# Patient Record
Sex: FEMALE | Race: BLACK OR AFRICAN AMERICAN | NOT HISPANIC OR LATINO | Employment: FULL TIME | URBAN - METROPOLITAN AREA
[De-identification: names, ages, dates, MRNs, and addresses within clinical notes are randomized per-mention and may not be internally consistent; named-entity substitution may affect disease eponyms.]

---

## 2017-01-12 ENCOUNTER — PATIENT OUTREACH (OUTPATIENT)
Dept: ADMINISTRATIVE | Facility: HOSPITAL | Age: 42
End: 2017-01-12
Payer: MEDICAID

## 2017-06-27 ENCOUNTER — PATIENT OUTREACH (OUTPATIENT)
Dept: ADMINISTRATIVE | Facility: HOSPITAL | Age: 42
End: 2017-06-27

## 2017-08-25 ENCOUNTER — OFFICE VISIT (OUTPATIENT)
Dept: OBSTETRICS AND GYNECOLOGY | Facility: CLINIC | Age: 42
End: 2017-08-25
Payer: COMMERCIAL

## 2017-08-25 VITALS
BODY MASS INDEX: 44.01 KG/M2 | HEIGHT: 66 IN | SYSTOLIC BLOOD PRESSURE: 108 MMHG | DIASTOLIC BLOOD PRESSURE: 76 MMHG | WEIGHT: 273.81 LBS

## 2017-08-25 DIAGNOSIS — Z12.4 ROUTINE PAPANICOLAOU SMEAR: Primary | ICD-10-CM

## 2017-08-25 DIAGNOSIS — Z12.31 VISIT FOR SCREENING MAMMOGRAM: ICD-10-CM

## 2017-08-25 PROCEDURE — 88175 CYTOPATH C/V AUTO FLUID REDO: CPT

## 2017-08-25 PROCEDURE — 99999 PR PBB SHADOW E&M-EST. PATIENT-LVL III: CPT | Mod: PBBFAC,,, | Performed by: OBSTETRICS & GYNECOLOGY

## 2017-08-25 PROCEDURE — 99386 PREV VISIT NEW AGE 40-64: CPT | Mod: S$GLB,,, | Performed by: OBSTETRICS & GYNECOLOGY

## 2017-08-25 RX ORDER — MEDROXYPROGESTERONE ACETATE 10 MG/1
10 TABLET ORAL DAILY
Qty: 12 TABLET | Refills: 1 | Status: SHIPPED | OUTPATIENT
Start: 2017-08-25 | End: 2018-11-08

## 2017-08-25 NOTE — PROGRESS NOTES
"HPI:   42 y.o.   OB History      Para Term  AB Living    2 2 2          SAB TAB Ectopic Multiple Live Births                    Patient's last menstrual period was 2017 (within weeks).    Patient is  here for her annual gynecologic exam.  She has no complaints.     ROS:  GENERAL: No fever, chills, fatigability or weight loss.  SKIN: No rashes, itching or changes in color or texture of skin.  HEAD: No headaches or recent head trauma.  EYES: Visual acuity fine. No photophobia, ocular pain or diplopia.  EARS: Denies ear pain, discharge or vertigo.  NOSE: No loss of smell, no epistaxis or postnasal drip.  MOUTH & THROAT: No hoarseness or change in voice. No excessive gum bleeding.  NODES: Denies swollen glands.  CHEST: Denies ANDERS, cyanosis, wheezing, cough and sputum production.  CARDIOVASCULAR: Denies chest pain, PND, orthopnea or reduced exercise tolerance.  ABDOMEN: Appetite fine. No weight loss. Denies diarrhea, abdominal pain, hematemesis or blood in stool.  URINARY: No flank pain, dysuria or hematuria.  PERIPHERAL VASCULAR: No claudication or cyanosis.  MUSCULOSKELETAL: No joint stiffness or swelling. Denies back pain.  NEUROLOGIC: No history of seizures, paralysis, alteration of gait or coordination.    PE:   /76   Ht 5' 6" (1.676 m)   Wt 124.2 kg (273 lb 13 oz)   LMP 2017 (Within Weeks)   BMI 44.19 kg/m²   APPEARANCE: Well nourished, well developed, in no acute distress.  NECK: Neck symmetric without masses or thyromegaly.  BREASTS: Symmetrical, no skin changes or visible lesions. No palpable masses, nipple discharge or adenopathy bilaterally.  ABDOMEN: Flat. Soft. No tenderness or masses. No hepatosplenomegaly. No hernias. No CVA tenderness.  VULVA: No lesions. Normal female genitalia.  URETHRAL MEATUS: Normal size and location, no lesions, no prolapse.  URETHRA: No masses, tenderness, prolapse or scarring.  VAGINA: Moist and well rugated, no discharge, no significant " cystocele or rectocele.  CERVIX: No lesions and discharge. PAP done.  UTERUS: Normal size, regular shape, mobile, non-tender, bladder base nontender.  ADNEXA: No masses, tenderness or CDS nodularity.  ANUS PERINEUM: Normal.    PROCEDURES:  Pap smear    Assessment:  Normal Gynecologic Exam    Plan:  Mammogram and Colonoscopy if indicated by current recommendations.  Return to clinic in one year or for any problems or complaints.  Long hx irreg ccles/skips  Co bilat low discomfort, will set up us  Cycle with provera, call with cycle to set up when cycle ending

## 2017-09-07 ENCOUNTER — HOSPITAL ENCOUNTER (OUTPATIENT)
Dept: RADIOLOGY | Facility: HOSPITAL | Age: 42
Discharge: HOME | End: 2017-09-07
Attending: OBSTETRICS & GYNECOLOGY
Payer: COMMERCIAL

## 2017-09-07 VITALS — BODY MASS INDEX: 43.87 KG/M2 | WEIGHT: 273 LBS | HEIGHT: 66 IN

## 2017-09-07 DIAGNOSIS — Z12.31 VISIT FOR SCREENING MAMMOGRAM: ICD-10-CM

## 2017-09-07 PROCEDURE — 77067 SCR MAMMO BI INCL CAD: CPT | Mod: TC

## 2017-09-29 ENCOUNTER — TELEPHONE (OUTPATIENT)
Dept: OBSTETRICS AND GYNECOLOGY | Facility: CLINIC | Age: 42
End: 2017-09-29

## 2017-09-29 NOTE — TELEPHONE ENCOUNTER
----- Message from Peterson Hull sent at 9/29/2017 10:36 AM CDT -----  Contact: Patient  Patient stated that she was suppose to schedule an ultrasound appointment with Dr. Wiedemann however she was trying to wait until her cycle was over, and now she's asking should she schedule it for Monday or Tuesday? Please contact Patient at [phone]. Thank You.

## 2017-10-03 ENCOUNTER — TELEPHONE (OUTPATIENT)
Dept: OBSTETRICS AND GYNECOLOGY | Facility: CLINIC | Age: 42
End: 2017-10-03

## 2017-10-03 DIAGNOSIS — R10.2 PELVIC PAIN: Primary | ICD-10-CM

## 2017-10-03 NOTE — TELEPHONE ENCOUNTER
----- Message from Jeremy Dubon sent at 10/2/2017  4:05 PM CDT -----  Contact: [phone]/self  Patient requesting to speak with you about being seen tomorrow. Please advise.

## 2017-10-03 NOTE — TELEPHONE ENCOUNTER
----- Message from Silvia Marlow sent at 10/3/2017  2:02 PM CDT -----  Contact: self, [phone]  Patient called in returning your call. Please advise.

## 2017-10-04 ENCOUNTER — OFFICE VISIT (OUTPATIENT)
Dept: OBSTETRICS AND GYNECOLOGY | Facility: CLINIC | Age: 42
End: 2017-10-04
Payer: COMMERCIAL

## 2017-10-04 DIAGNOSIS — R10.2 PELVIC PAIN: ICD-10-CM

## 2017-10-04 PROCEDURE — 76830 TRANSVAGINAL US NON-OB: CPT | Mod: S$GLB,,, | Performed by: OBSTETRICS & GYNECOLOGY

## 2017-10-05 ENCOUNTER — TELEPHONE (OUTPATIENT)
Dept: OBSTETRICS AND GYNECOLOGY | Facility: CLINIC | Age: 42
End: 2017-10-05

## 2017-10-05 NOTE — TELEPHONE ENCOUNTER
----- Message from Jeremy Dubon sent at 10/5/2017 11:36 AM CDT -----  Contact: [phone]/self  Patient is returning your call. Please advise.

## 2017-10-20 ENCOUNTER — HOSPITAL ENCOUNTER (OUTPATIENT)
Dept: RADIOLOGY | Facility: HOSPITAL | Age: 42
Discharge: HOME | End: 2017-10-20
Attending: NURSE PRACTITIONER
Payer: COMMERCIAL

## 2017-10-20 DIAGNOSIS — R10.32 LEFT LOWER QUADRANT PAIN: ICD-10-CM

## 2017-10-20 DIAGNOSIS — R10.32 LEFT LOWER QUADRANT PAIN: Primary | ICD-10-CM

## 2017-10-20 PROCEDURE — 74020 XR ABDOMEN FLAT AND ERECT: CPT | Mod: TC,PO

## 2018-08-09 ENCOUNTER — HOSPITAL ENCOUNTER (EMERGENCY)
Facility: HOSPITAL | Age: 43
Discharge: HOME | End: 2018-08-09
Attending: SURGERY
Payer: COMMERCIAL

## 2018-08-09 VITALS
BODY MASS INDEX: 43.58 KG/M2 | HEART RATE: 91 BPM | TEMPERATURE: 98 F | HEIGHT: 66 IN | OXYGEN SATURATION: 98 % | SYSTOLIC BLOOD PRESSURE: 123 MMHG | WEIGHT: 271.19 LBS | RESPIRATION RATE: 20 BRPM | DIASTOLIC BLOOD PRESSURE: 66 MMHG

## 2018-08-09 DIAGNOSIS — R07.9 CHEST PAIN: ICD-10-CM

## 2018-08-09 DIAGNOSIS — R07.89 ATYPICAL CHEST PAIN: Primary | ICD-10-CM

## 2018-08-09 LAB
ALBUMIN SERPL BCP-MCNC: 4 G/DL
ALP SERPL-CCNC: 102 U/L
ALT SERPL W/O P-5'-P-CCNC: 76 U/L
ANION GAP SERPL CALC-SCNC: 10 MMOL/L
AST SERPL-CCNC: 43 U/L
BASOPHILS # BLD AUTO: 0.04 K/UL
BASOPHILS NFR BLD: 0.4 %
BILIRUB SERPL-MCNC: 0.6 MG/DL
BUN SERPL-MCNC: 10 MG/DL
CALCIUM SERPL-MCNC: 8.7 MG/DL
CHLORIDE SERPL-SCNC: 102 MMOL/L
CO2 SERPL-SCNC: 24 MMOL/L
CREAT SERPL-MCNC: 0.59 MG/DL
D DIMER PPP FEU-MCNC: <200 NG/ML
DIFFERENTIAL METHOD: ABNORMAL
EOSINOPHIL # BLD AUTO: 0.1 K/UL
EOSINOPHIL NFR BLD: 1 %
ERYTHROCYTE [DISTWIDTH] IN BLOOD BY AUTOMATED COUNT: 13.1 %
EST. GFR  (AFRICAN AMERICAN): >60 ML/MIN/1.73 M^2
EST. GFR  (NON AFRICAN AMERICAN): >60 ML/MIN/1.73 M^2
GLUCOSE SERPL-MCNC: 303 MG/DL
HCT VFR BLD AUTO: 42.1 %
HGB BLD-MCNC: 14.1 G/DL
INR PPP: 1.3
LYMPHOCYTES # BLD AUTO: 3.5 K/UL
LYMPHOCYTES NFR BLD: 39.3 %
MCH RBC QN AUTO: 27.1 PG
MCHC RBC AUTO-ENTMCNC: 33.5 G/DL
MCV RBC AUTO: 81 FL
MONOCYTES # BLD AUTO: 0.7 K/UL
MONOCYTES NFR BLD: 7.6 %
NEUTROPHILS # BLD AUTO: 4.6 K/UL
NEUTROPHILS NFR BLD: 51.4 %
PLATELET # BLD AUTO: 306 K/UL
PMV BLD AUTO: 10.2 FL
POTASSIUM SERPL-SCNC: 4 MMOL/L
PROT SERPL-MCNC: 7.7 G/DL
PROTHROMBIN TIME: 14.1 SEC
RBC # BLD AUTO: 5.2 M/UL
SODIUM SERPL-SCNC: 136 MMOL/L
TROPONIN I SERPL DL<=0.01 NG/ML-MCNC: <0.012 NG/ML
WBC # BLD AUTO: 8.91 K/UL

## 2018-08-09 PROCEDURE — 25000003 PHARM REV CODE 250: Performed by: SURGERY

## 2018-08-09 PROCEDURE — 93010 ELECTROCARDIOGRAM REPORT: CPT | Mod: ,,, | Performed by: INTERNAL MEDICINE

## 2018-08-09 PROCEDURE — 85379 FIBRIN DEGRADATION QUANT: CPT

## 2018-08-09 PROCEDURE — 85610 PROTHROMBIN TIME: CPT

## 2018-08-09 PROCEDURE — 85025 COMPLETE CBC W/AUTO DIFF WBC: CPT

## 2018-08-09 PROCEDURE — 96360 HYDRATION IV INFUSION INIT: CPT

## 2018-08-09 PROCEDURE — 99284 EMERGENCY DEPT VISIT MOD MDM: CPT | Mod: 25

## 2018-08-09 PROCEDURE — 80053 COMPREHEN METABOLIC PANEL: CPT

## 2018-08-09 PROCEDURE — 93005 ELECTROCARDIOGRAM TRACING: CPT

## 2018-08-09 PROCEDURE — 84484 ASSAY OF TROPONIN QUANT: CPT

## 2018-08-09 RX ORDER — GLIPIZIDE 10 MG/1
10 TABLET ORAL
COMMUNITY
End: 2018-11-08

## 2018-08-09 RX ORDER — LORAZEPAM 1 MG/1
1 TABLET ORAL EVERY 6 HOURS PRN
Qty: 10 TABLET | Refills: 0 | Status: SHIPPED | OUTPATIENT
Start: 2018-08-09 | End: 2019-08-23

## 2018-08-09 RX ORDER — TRAMADOL HYDROCHLORIDE 50 MG/1
100 TABLET ORAL
Status: COMPLETED | OUTPATIENT
Start: 2018-08-09 | End: 2018-08-09

## 2018-08-09 RX ORDER — ASPIRIN 325 MG
325 TABLET, DELAYED RELEASE (ENTERIC COATED) ORAL
Status: COMPLETED | OUTPATIENT
Start: 2018-08-09 | End: 2018-08-09

## 2018-08-09 RX ADMIN — TRAMADOL HYDROCHLORIDE 100 MG: 50 TABLET, COATED ORAL at 03:08

## 2018-08-09 RX ADMIN — ASPIRIN 325 MG: 325 TABLET, DELAYED RELEASE ORAL at 02:08

## 2018-08-09 RX ADMIN — SODIUM CHLORIDE 1000 ML: 0.9 INJECTION, SOLUTION INTRAVENOUS at 02:08

## 2018-11-08 ENCOUNTER — OFFICE VISIT (OUTPATIENT)
Dept: OBSTETRICS AND GYNECOLOGY | Facility: CLINIC | Age: 43
End: 2018-11-08
Payer: COMMERCIAL

## 2018-11-08 VITALS — WEIGHT: 266 LBS | DIASTOLIC BLOOD PRESSURE: 86 MMHG | SYSTOLIC BLOOD PRESSURE: 114 MMHG | BODY MASS INDEX: 42.93 KG/M2

## 2018-11-08 DIAGNOSIS — Z01.419 WELL WOMAN EXAM WITH ROUTINE GYNECOLOGICAL EXAM: Primary | ICD-10-CM

## 2018-11-08 DIAGNOSIS — Z12.4 ROUTINE PAPANICOLAOU SMEAR: ICD-10-CM

## 2018-11-08 PROCEDURE — 3074F SYST BP LT 130 MM HG: CPT | Mod: CPTII,S$GLB,, | Performed by: OBSTETRICS & GYNECOLOGY

## 2018-11-08 PROCEDURE — 88175 CYTOPATH C/V AUTO FLUID REDO: CPT

## 2018-11-08 PROCEDURE — 99999 PR PBB SHADOW E&M-EST. PATIENT-LVL III: CPT | Mod: PBBFAC,,, | Performed by: OBSTETRICS & GYNECOLOGY

## 2018-11-08 PROCEDURE — 3079F DIAST BP 80-89 MM HG: CPT | Mod: CPTII,S$GLB,, | Performed by: OBSTETRICS & GYNECOLOGY

## 2018-11-08 PROCEDURE — 99396 PREV VISIT EST AGE 40-64: CPT | Mod: S$GLB,,, | Performed by: OBSTETRICS & GYNECOLOGY

## 2018-11-08 NOTE — PROGRESS NOTES
HPI:   43 y.o.   OB History      Para Term  AB Living    2 2 2          SAB TAB Ectopic Multiple Live Births                    Patient's last menstrual period was 2018.    Patient is  here for her annual gynecologic exam.  She has no complaints.     ROS:  GENERAL: No fever, chills, fatigability or weight loss.  SKIN: No rashes, itching or changes in color or texture of skin.  HEAD: No headaches or recent head trauma.  EYES: Visual acuity fine. No photophobia, ocular pain or diplopia.  EARS: Denies ear pain, discharge or vertigo.  NOSE: No loss of smell, no epistaxis or postnasal drip.  MOUTH & THROAT: No hoarseness or change in voice. No excessive gum bleeding.  NODES: Denies swollen glands.  CHEST: Denies ANDERS, cyanosis, wheezing, cough and sputum production.  CARDIOVASCULAR: Denies chest pain, PND, orthopnea or reduced exercise tolerance.  ABDOMEN: Appetite fine. No weight loss. Denies diarrhea, abdominal pain, hematemesis or blood in stool.  URINARY: No flank pain, dysuria or hematuria.  PERIPHERAL VASCULAR: No claudication or cyanosis.  MUSCULOSKELETAL: No joint stiffness or swelling. Denies back pain.  NEUROLOGIC: No history of seizures, paralysis, alteration of gait or coordination.    PE:   /86   Wt 120.7 kg (265 lb 15.8 oz)   LMP 2018   BMI 42.93 kg/m²   APPEARANCE: Well nourished, well developed, in no acute distress.  NECK: Neck symmetric without masses or thyromegaly.  BREASTS: Symmetrical, no skin changes or visible lesions. No palpable masses, nipple discharge or adenopathy bilaterally.  ABDOMEN: Flat. Soft. No tenderness or masses. No hepatosplenomegaly. No hernias. No CVA tenderness.  VULVA: No lesions. Normal female genitalia.  URETHRAL MEATUS: Normal size and location, no lesions, no prolapse.  URETHRA: No masses, tenderness, prolapse or scarring.  VAGINA: Moist and well rugated, no discharge, no significant cystocele or rectocele.  CERVIX: No lesions and  discharge. PAP done.  UTERUS: Normal size, regular shape, mobile, non-tender, bladder base nontender.  ADNEXA: No masses, tenderness or CDS nodularity.  ANUS PERINEUM: Normal.    PROCEDURES:  Pap smear    Assessment:  Normal Gynecologic Exam    Plan:  Mammogram and Colonoscopy if indicated by current recommendations.  Return to clinic in one year or for any problems or complaints.  Still with L side pain, us neg  rec fu with gi

## 2018-11-12 ENCOUNTER — TELEPHONE (OUTPATIENT)
Dept: GASTROENTEROLOGY | Facility: CLINIC | Age: 43
End: 2018-11-12

## 2018-11-12 NOTE — TELEPHONE ENCOUNTER
----- Message from Kerri Dill MD sent at 11/12/2018  2:31 PM CST -----      ----- Message -----  From: Sushil Dill MD  Sent: 11/11/2018   9:49 AM  To: Kerri Dill MD        ----- Message -----  From: Michael A. Wiedemann, MD  Sent: 11/11/2018   9:35 AM  To: Sushil Dill MD    Hi , can you have your nurse call and get her an appt with you or one of the gi docs , for abdominal discomfort L Lower area, thanks, may need scope, thanks

## 2018-11-21 ENCOUNTER — HOSPITAL ENCOUNTER (OUTPATIENT)
Dept: RADIOLOGY | Facility: HOSPITAL | Age: 43
Discharge: HOME | End: 2018-11-21
Attending: OBSTETRICS & GYNECOLOGY
Payer: COMMERCIAL

## 2018-11-21 VITALS — BODY MASS INDEX: 42.59 KG/M2 | WEIGHT: 265 LBS | HEIGHT: 66 IN

## 2018-11-21 DIAGNOSIS — Z01.419 WELL WOMAN EXAM WITH ROUTINE GYNECOLOGICAL EXAM: ICD-10-CM

## 2018-11-21 PROCEDURE — 77063 BREAST TOMOSYNTHESIS BI: CPT | Mod: TC

## 2018-11-21 PROCEDURE — 77067 SCR MAMMO BI INCL CAD: CPT | Mod: 26,,, | Performed by: RADIOLOGY

## 2018-11-21 PROCEDURE — 77063 BREAST TOMOSYNTHESIS BI: CPT | Mod: 26,,, | Performed by: RADIOLOGY

## 2018-11-21 PROCEDURE — 77067 SCR MAMMO BI INCL CAD: CPT | Mod: TC

## 2019-04-12 ENCOUNTER — TELEPHONE (OUTPATIENT)
Dept: OBSTETRICS AND GYNECOLOGY | Facility: CLINIC | Age: 44
End: 2019-04-12

## 2019-04-12 DIAGNOSIS — N64.4 BREAST TENDERNESS IN FEMALE: Primary | ICD-10-CM

## 2019-04-12 NOTE — TELEPHONE ENCOUNTER
Pt states her right breast has been painful and she though it was due to her menstrual cycle but she is still having the pain and it has been 2 weeks. She is asking for an order for a mammo or u/s or do you need to see her in the office?

## 2019-04-12 NOTE — TELEPHONE ENCOUNTER
----- Message from Thuy Vargas sent at 4/12/2019  9:17 AM CDT -----  Contact: self/[phone]  Patient called to state she has been having issues with her breast and needs a referral to [facility] in [place].    Please call to discuss today.

## 2019-04-17 ENCOUNTER — TELEPHONE (OUTPATIENT)
Dept: OBSTETRICS AND GYNECOLOGY | Facility: CLINIC | Age: 44
End: 2019-04-17

## 2019-04-17 NOTE — TELEPHONE ENCOUNTER
----- Message from Thuy Vargas sent at 4/17/2019  1:25 PM CDT -----  Contact: self/[phone]  Patient called to check the status of a message left earlier today.    Please call to advise.

## 2019-04-17 NOTE — TELEPHONE ENCOUNTER
----- Message from Almaz Robertson sent at 4/17/2019  2:41 PM CDT -----  Contact: [phone]  Type:  Patient Returning Call    Who Called: Pt    Who Left Message for Patient: Nurse    Does the patient know what this is regarding?:    Would the patient rather a call back or a response via [facility]? Call back    Best Call Back Number: [phone]    Additional Information: N/A

## 2019-04-24 ENCOUNTER — HOSPITAL ENCOUNTER (OUTPATIENT)
Dept: RADIOLOGY | Facility: HOSPITAL | Age: 44
Discharge: HOME | End: 2019-04-24
Attending: OBSTETRICS & GYNECOLOGY
Payer: COMMERCIAL

## 2019-04-24 DIAGNOSIS — N64.4 BREAST TENDERNESS IN FEMALE: ICD-10-CM

## 2019-04-24 PROCEDURE — 76641 ULTRASOUND BREAST COMPLETE: CPT | Mod: TC,PO,RT

## 2019-08-02 ENCOUNTER — TELEPHONE (OUTPATIENT)
Dept: FAMILY MEDICINE | Facility: CLINIC | Age: 44
End: 2019-08-02

## 2019-08-02 NOTE — TELEPHONE ENCOUNTER
Left message for patient to call office back to make an appointment with Dr Macedo or with another full time physician for PCP.

## 2019-08-23 ENCOUNTER — LAB VISIT (OUTPATIENT)
Dept: LAB | Facility: HOSPITAL | Age: 44
End: 2019-08-23
Attending: INTERNAL MEDICINE
Payer: COMMERCIAL

## 2019-08-23 ENCOUNTER — OFFICE VISIT (OUTPATIENT)
Dept: INTERNAL MEDICINE | Facility: CLINIC | Age: 44
End: 2019-08-23
Payer: COMMERCIAL

## 2019-08-23 VITALS
OXYGEN SATURATION: 95 % | SYSTOLIC BLOOD PRESSURE: 140 MMHG | DIASTOLIC BLOOD PRESSURE: 70 MMHG | HEART RATE: 99 BPM | TEMPERATURE: 99 F | BODY MASS INDEX: 42.27 KG/M2 | HEIGHT: 66 IN | WEIGHT: 263 LBS

## 2019-08-23 DIAGNOSIS — R71.8 MICROCYTOSIS: ICD-10-CM

## 2019-08-23 DIAGNOSIS — F32.A DEPRESSION, UNSPECIFIED DEPRESSION TYPE: ICD-10-CM

## 2019-08-23 DIAGNOSIS — E11.9 TYPE 2 DIABETES MELLITUS WITHOUT COMPLICATION, WITHOUT LONG-TERM CURRENT USE OF INSULIN: ICD-10-CM

## 2019-08-23 DIAGNOSIS — D22.9 ATYPICAL MOLE: ICD-10-CM

## 2019-08-23 DIAGNOSIS — E55.9 VITAMIN D INSUFFICIENCY: ICD-10-CM

## 2019-08-23 DIAGNOSIS — E11.9 TYPE 2 DIABETES MELLITUS WITHOUT COMPLICATION, WITHOUT LONG-TERM CURRENT USE OF INSULIN: Primary | ICD-10-CM

## 2019-08-23 DIAGNOSIS — I10 UNCONTROLLED STAGE 2 HYPERTENSION: ICD-10-CM

## 2019-08-23 DIAGNOSIS — R53.83 FATIGUE, UNSPECIFIED TYPE: ICD-10-CM

## 2019-08-23 DIAGNOSIS — E78.5 HYPERLIPIDEMIA ASSOCIATED WITH TYPE 2 DIABETES MELLITUS: ICD-10-CM

## 2019-08-23 DIAGNOSIS — K59.00 CONSTIPATION, UNSPECIFIED CONSTIPATION TYPE: ICD-10-CM

## 2019-08-23 DIAGNOSIS — E11.69 HYPERLIPIDEMIA ASSOCIATED WITH TYPE 2 DIABETES MELLITUS: ICD-10-CM

## 2019-08-23 LAB
25(OH)D3+25(OH)D2 SERPL-MCNC: 7 NG/ML (ref 30–96)
ALBUMIN SERPL BCP-MCNC: 3.6 G/DL (ref 3.5–5.2)
ALBUMIN/CREAT UR: 6 UG/MG (ref 0–30)
ALP SERPL-CCNC: 100 U/L (ref 55–135)
ALT SERPL W/O P-5'-P-CCNC: 46 U/L (ref 10–44)
ANION GAP SERPL CALC-SCNC: 8 MMOL/L (ref 8–16)
AST SERPL-CCNC: 24 U/L (ref 10–40)
BASOPHILS # BLD AUTO: 0.06 K/UL (ref 0–0.2)
BASOPHILS NFR BLD: 0.7 % (ref 0–1.9)
BILIRUB SERPL-MCNC: 0.3 MG/DL (ref 0.1–1)
BUN SERPL-MCNC: 14 MG/DL (ref 6–20)
CALCIUM SERPL-MCNC: 9.7 MG/DL (ref 8.7–10.5)
CHLORIDE SERPL-SCNC: 102 MMOL/L (ref 95–110)
CHOLEST SERPL-MCNC: 201 MG/DL (ref 120–199)
CHOLEST/HDLC SERPL: 4.6 {RATIO} (ref 2–5)
CO2 SERPL-SCNC: 25 MMOL/L (ref 23–29)
CREAT SERPL-MCNC: 0.9 MG/DL (ref 0.5–1.4)
CREAT UR-MCNC: 50 MG/DL (ref 15–325)
DIFFERENTIAL METHOD: ABNORMAL
EOSINOPHIL # BLD AUTO: 0.1 K/UL (ref 0–0.5)
EOSINOPHIL NFR BLD: 0.9 % (ref 0–8)
ERYTHROCYTE [DISTWIDTH] IN BLOOD BY AUTOMATED COUNT: 12.8 % (ref 11.5–14.5)
EST. GFR  (AFRICAN AMERICAN): >60 ML/MIN/1.73 M^2
EST. GFR  (NON AFRICAN AMERICAN): >60 ML/MIN/1.73 M^2
ESTIMATED AVG GLUCOSE: 312 MG/DL (ref 68–131)
GLUCOSE SERPL-MCNC: 325 MG/DL (ref 70–110)
HBA1C MFR BLD HPLC: 12.5 % (ref 4–5.6)
HCT VFR BLD AUTO: 44.5 % (ref 37–48.5)
HDLC SERPL-MCNC: 44 MG/DL (ref 40–75)
HDLC SERPL: 21.9 % (ref 20–50)
HGB BLD-MCNC: 14.5 G/DL (ref 12–16)
IMM GRANULOCYTES # BLD AUTO: 0.06 K/UL (ref 0–0.04)
IMM GRANULOCYTES NFR BLD AUTO: 0.7 % (ref 0–0.5)
LDLC SERPL CALC-MCNC: 130.2 MG/DL (ref 63–159)
LYMPHOCYTES # BLD AUTO: 3.7 K/UL (ref 1–4.8)
LYMPHOCYTES NFR BLD: 40.6 % (ref 18–48)
MCH RBC QN AUTO: 27.2 PG (ref 27–31)
MCHC RBC AUTO-ENTMCNC: 32.6 G/DL (ref 32–36)
MCV RBC AUTO: 83 FL (ref 82–98)
MICROALBUMIN UR DL<=1MG/L-MCNC: 3 UG/ML
MONOCYTES # BLD AUTO: 0.5 K/UL (ref 0.3–1)
MONOCYTES NFR BLD: 5.7 % (ref 4–15)
NEUTROPHILS # BLD AUTO: 4.6 K/UL (ref 1.8–7.7)
NEUTROPHILS NFR BLD: 51.4 % (ref 38–73)
NONHDLC SERPL-MCNC: 157 MG/DL
NRBC BLD-RTO: 0 /100 WBC
PLATELET # BLD AUTO: 397 K/UL (ref 150–350)
PMV BLD AUTO: 10.7 FL (ref 9.2–12.9)
POTASSIUM SERPL-SCNC: 4.2 MMOL/L (ref 3.5–5.1)
PROT SERPL-MCNC: 7.6 G/DL (ref 6–8.4)
RBC # BLD AUTO: 5.34 M/UL (ref 4–5.4)
SODIUM SERPL-SCNC: 135 MMOL/L (ref 136–145)
TRIGL SERPL-MCNC: 134 MG/DL (ref 30–150)
TSH SERPL DL<=0.005 MIU/L-ACNC: 2.02 UIU/ML (ref 0.4–4)
WBC # BLD AUTO: 8.99 K/UL (ref 3.9–12.7)

## 2019-08-23 PROCEDURE — 99999 PR PBB SHADOW E&M-EST. PATIENT-LVL IV: ICD-10-PCS | Mod: PBBFAC,,, | Performed by: INTERNAL MEDICINE

## 2019-08-23 PROCEDURE — 85025 COMPLETE CBC W/AUTO DIFF WBC: CPT

## 2019-08-23 PROCEDURE — 99204 OFFICE O/P NEW MOD 45 MIN: CPT | Mod: S$GLB,,, | Performed by: INTERNAL MEDICINE

## 2019-08-23 PROCEDURE — 3077F PR MOST RECENT SYSTOLIC BLOOD PRESSURE >= 140 MM HG: ICD-10-PCS | Mod: CPTII,S$GLB,, | Performed by: INTERNAL MEDICINE

## 2019-08-23 PROCEDURE — 82306 VITAMIN D 25 HYDROXY: CPT

## 2019-08-23 PROCEDURE — 3078F DIAST BP <80 MM HG: CPT | Mod: CPTII,S$GLB,, | Performed by: INTERNAL MEDICINE

## 2019-08-23 PROCEDURE — 80053 COMPREHEN METABOLIC PANEL: CPT

## 2019-08-23 PROCEDURE — 82043 UR ALBUMIN QUANTITATIVE: CPT

## 2019-08-23 PROCEDURE — 93010 EKG 12-LEAD: ICD-10-PCS | Mod: S$GLB,,, | Performed by: INTERNAL MEDICINE

## 2019-08-23 PROCEDURE — 36415 COLL VENOUS BLD VENIPUNCTURE: CPT | Mod: PO

## 2019-08-23 PROCEDURE — 3008F BODY MASS INDEX DOCD: CPT | Mod: CPTII,S$GLB,, | Performed by: INTERNAL MEDICINE

## 2019-08-23 PROCEDURE — 99999 PR PBB SHADOW E&M-EST. PATIENT-LVL IV: CPT | Mod: PBBFAC,,, | Performed by: INTERNAL MEDICINE

## 2019-08-23 PROCEDURE — 3077F SYST BP >= 140 MM HG: CPT | Mod: CPTII,S$GLB,, | Performed by: INTERNAL MEDICINE

## 2019-08-23 PROCEDURE — 93010 ELECTROCARDIOGRAM REPORT: CPT | Mod: S$GLB,,, | Performed by: INTERNAL MEDICINE

## 2019-08-23 PROCEDURE — 93005 ELECTROCARDIOGRAM TRACING: CPT | Mod: S$GLB,,, | Performed by: INTERNAL MEDICINE

## 2019-08-23 PROCEDURE — 83036 HEMOGLOBIN GLYCOSYLATED A1C: CPT

## 2019-08-23 PROCEDURE — 93005 EKG 12-LEAD: ICD-10-PCS | Mod: S$GLB,,, | Performed by: INTERNAL MEDICINE

## 2019-08-23 PROCEDURE — 80061 LIPID PANEL: CPT

## 2019-08-23 PROCEDURE — 84443 ASSAY THYROID STIM HORMONE: CPT

## 2019-08-23 PROCEDURE — 3008F PR BODY MASS INDEX (BMI) DOCUMENTED: ICD-10-PCS | Mod: CPTII,S$GLB,, | Performed by: INTERNAL MEDICINE

## 2019-08-23 PROCEDURE — 99204 PR OFFICE/OUTPT VISIT, NEW, LEVL IV, 45-59 MIN: ICD-10-PCS | Mod: S$GLB,,, | Performed by: INTERNAL MEDICINE

## 2019-08-23 PROCEDURE — 3078F PR MOST RECENT DIASTOLIC BLOOD PRESSURE < 80 MM HG: ICD-10-PCS | Mod: CPTII,S$GLB,, | Performed by: INTERNAL MEDICINE

## 2019-08-23 RX ORDER — GLIPIZIDE 5 MG/1
5 TABLET ORAL
Qty: 30 TABLET | Refills: 1 | Status: SHIPPED | OUTPATIENT
Start: 2019-08-23 | End: 2019-09-30

## 2019-08-23 RX ORDER — SERTRALINE HYDROCHLORIDE 25 MG/1
TABLET, FILM COATED ORAL
Qty: 30 TABLET | Refills: 1 | Status: SHIPPED | OUTPATIENT
Start: 2019-08-23 | End: 2020-07-30

## 2019-08-23 RX ORDER — LOSARTAN POTASSIUM AND HYDROCHLOROTHIAZIDE 12.5; 5 MG/1; MG/1
1 TABLET ORAL DAILY
Qty: 30 TABLET | Refills: 1 | Status: SHIPPED | OUTPATIENT
Start: 2019-08-23 | End: 2019-12-11

## 2019-08-23 RX ORDER — METFORMIN HYDROCHLORIDE 500 MG/1
500 TABLET ORAL
Qty: 30 TABLET | Refills: 1 | Status: SHIPPED | OUTPATIENT
Start: 2019-08-23 | End: 2020-03-18

## 2019-08-23 NOTE — PROGRESS NOTES
Subjective:       Patient ID: Hamzah Reis is a 44 y.o. female.    Chief Complaint: [facility]    HPI Mrs. Reis is a 44-year-old female with type 2 diabetes, hypertension, hyperlipidemia and depression who presents to [facility].  She has not been on medication for the last approximately 1 year.  She was taking metformin, Januvia, and glipizide for treatment of type 2 diabetes.  Also at 1 point in the past, she was treated with insulin for about 2 months.  She has symptoms of increased thirst, increased urination, and fatigue.  She checks her blood glucose levels occasionally at home.  Blood glucose levels are typically greater than 200 but not greater than 300.  She reports having hypertension that was treated with medication.  She was taken off of the medication and for some time her blood pressure remained well controlled with no treatment.  However blood pressure today is 152/100 and repeat was 140/70.  She does not recall ever being treated with a statin medication for elevated cholesterol levels.  She had her eye exam at the end of the year.  She is concerned about constipation.  Has a bowel movement every 2-3 days.  Reports that each time she just passes small amounts of stool.  No straining associated.  No associated blood in the stool.  She feels depressed.  Sometimes thinks about killing herself.  But has not had any recent active plans to kill herself.  She has never been treated for anxiety or depression in the past.  She also has a mole on her right leg that she would like to have evaluated.  States that used to be flat but she can now feel that it is raised.    Review of Systems   Constitutional: Positive for fatigue.   Endocrine: Positive for polydipsia and polyuria.   Psychiatric/Behavioral: Positive for dysphoric mood. Negative for suicidal ideas.   All other systems reviewed and are negative.      Objective:      Physical Exam   Constitutional: She is oriented to person, place, and  time. She appears well-developed and well-nourished. No distress.   HENT:   Head: Normocephalic and atraumatic.   Right Ear: External ear normal.   Left Ear: External ear normal.   Nose: Nose normal.   Eyes: Conjunctivae and EOM are normal.   Cardiovascular: Normal rate, regular rhythm, normal heart sounds and intact distal pulses. Exam reveals no gallop and no friction rub.   No murmur heard.  Pulmonary/Chest: Effort normal and breath sounds normal. No stridor. No respiratory distress. She has no wheezes. She has no rales.   Neurological: She is alert and oriented to person, place, and time.   Skin: Skin is warm and dry. She is not diaphoretic.   Psychiatric: She has a normal mood and affect. Her behavior is normal. Judgment and thought content normal.   Vitals reviewed.      Assessment:       1. Type 2 diabetes mellitus without complication, without long-term current use of insulin    2. Vitamin D insufficiency    3. Microcytosis    4. Fatigue, unspecified type    5. Uncontrolled stage 2 hypertension    6. Atypical mole    7. Depression, unspecified depression type    8. Hyperlipidemia associated with type 2 diabetes mellitus    9. Constipation, unspecified constipation type        Plan:     1.  Type 2 diabetes without complication  CMP, A1c, lipids, urine microalbumin today  Starting metformin 500 mg p.o. daily and glipizide 5 mg p.o. daily  Patient will continue to check her blood glucose levels at home and bring these back to clinic on return appointment.    2.  Vitamin-D insufficiency  Checking vitamin-D level today    3.  Microcytosis  Recheck CBC today    4.  Fatigue  Likely due to uncontrolled diabetes and/or depression  Treating both and checking TSH    5.  Uncontrolled stage II hypertension  In office EKG was obtained and appears unchanged when compared to prior  Starting losartan-hydrochlorothiazide today  Checking CMP, lipids, urine microalbumin today    6.  Atypical mole  Referral placed to  Dermatology    7.  Depression  Starting Zoloft 25 mg p.o. daily  Recommend counseling    8.  Hyperlipidemia associated with type 2 diabetes  Check lipid profile today  Discussed with patient that if LDL not less than 70, will start statin medication on return appointment    9.  Constipation  See AVS    Return to clinic in 2 weeks

## 2019-08-23 NOTE — PATIENT INSTRUCTIONS
Drink at least 6, 8 oz glasses of water per day  Increase fiber in your  diet by taking a tbsp of Metamucil in 8 oz of water per day  Add MiraLax 1 cap full in a glass of water per day.  You may titrate this dose up or down as needed to achieve your goal.  Your goal is to have 1 soft formed stool daily

## 2019-09-03 DIAGNOSIS — E11.9 TYPE 2 DIABETES MELLITUS WITHOUT COMPLICATION, UNSPECIFIED WHETHER LONG TERM INSULIN USE: ICD-10-CM

## 2019-09-30 ENCOUNTER — OFFICE VISIT (OUTPATIENT)
Dept: INTERNAL MEDICINE | Facility: CLINIC | Age: 44
End: 2019-09-30
Payer: COMMERCIAL

## 2019-09-30 VITALS
DIASTOLIC BLOOD PRESSURE: 60 MMHG | OXYGEN SATURATION: 98 % | SYSTOLIC BLOOD PRESSURE: 104 MMHG | HEIGHT: 66 IN | TEMPERATURE: 98 F | WEIGHT: 259.94 LBS | BODY MASS INDEX: 41.78 KG/M2 | HEART RATE: 87 BPM

## 2019-09-30 DIAGNOSIS — E78.5 HYPERLIPIDEMIA ASSOCIATED WITH TYPE 2 DIABETES MELLITUS: ICD-10-CM

## 2019-09-30 DIAGNOSIS — E11.9 TYPE 2 DIABETES MELLITUS WITHOUT COMPLICATION, WITHOUT LONG-TERM CURRENT USE OF INSULIN: ICD-10-CM

## 2019-09-30 DIAGNOSIS — E55.9 VITAMIN D DEFICIENCY: ICD-10-CM

## 2019-09-30 DIAGNOSIS — I83.813 VARICOSE VEINS OF BILATERAL LOWER EXTREMITIES WITH PAIN: ICD-10-CM

## 2019-09-30 DIAGNOSIS — M79.604 PAIN OF RIGHT LOWER EXTREMITY: ICD-10-CM

## 2019-09-30 DIAGNOSIS — I15.2 HYPERTENSION ASSOCIATED WITH DIABETES: ICD-10-CM

## 2019-09-30 DIAGNOSIS — E11.59 HYPERTENSION ASSOCIATED WITH DIABETES: ICD-10-CM

## 2019-09-30 DIAGNOSIS — E11.69 HYPERLIPIDEMIA ASSOCIATED WITH TYPE 2 DIABETES MELLITUS: ICD-10-CM

## 2019-09-30 PROCEDURE — 3046F PR MOST RECENT HEMOGLOBIN A1C LEVEL > 9.0%: ICD-10-PCS | Mod: CPTII,S$GLB,, | Performed by: INTERNAL MEDICINE

## 2019-09-30 PROCEDURE — 99214 OFFICE O/P EST MOD 30 MIN: CPT | Mod: S$GLB,,, | Performed by: INTERNAL MEDICINE

## 2019-09-30 PROCEDURE — 99214 PR OFFICE/OUTPT VISIT, EST, LEVL IV, 30-39 MIN: ICD-10-PCS | Mod: S$GLB,,, | Performed by: INTERNAL MEDICINE

## 2019-09-30 PROCEDURE — 3078F DIAST BP <80 MM HG: CPT | Mod: CPTII,S$GLB,, | Performed by: INTERNAL MEDICINE

## 2019-09-30 PROCEDURE — 3008F BODY MASS INDEX DOCD: CPT | Mod: CPTII,S$GLB,, | Performed by: INTERNAL MEDICINE

## 2019-09-30 PROCEDURE — 3008F PR BODY MASS INDEX (BMI) DOCUMENTED: ICD-10-PCS | Mod: CPTII,S$GLB,, | Performed by: INTERNAL MEDICINE

## 2019-09-30 PROCEDURE — 99999 PR PBB SHADOW E&M-EST. PATIENT-LVL IV: ICD-10-PCS | Mod: PBBFAC,,, | Performed by: INTERNAL MEDICINE

## 2019-09-30 PROCEDURE — 3078F PR MOST RECENT DIASTOLIC BLOOD PRESSURE < 80 MM HG: ICD-10-PCS | Mod: CPTII,S$GLB,, | Performed by: INTERNAL MEDICINE

## 2019-09-30 PROCEDURE — 3074F PR MOST RECENT SYSTOLIC BLOOD PRESSURE < 130 MM HG: ICD-10-PCS | Mod: CPTII,S$GLB,, | Performed by: INTERNAL MEDICINE

## 2019-09-30 PROCEDURE — 3074F SYST BP LT 130 MM HG: CPT | Mod: CPTII,S$GLB,, | Performed by: INTERNAL MEDICINE

## 2019-09-30 PROCEDURE — 99999 PR PBB SHADOW E&M-EST. PATIENT-LVL IV: CPT | Mod: PBBFAC,,, | Performed by: INTERNAL MEDICINE

## 2019-09-30 PROCEDURE — 3046F HEMOGLOBIN A1C LEVEL >9.0%: CPT | Mod: CPTII,S$GLB,, | Performed by: INTERNAL MEDICINE

## 2019-09-30 RX ORDER — ASPIRIN 325 MG
50000 TABLET, DELAYED RELEASE (ENTERIC COATED) ORAL WEEKLY
Qty: 12 CAPSULE | Refills: 3 | Status: SHIPPED | OUTPATIENT
Start: 2019-09-30 | End: 2020-07-30 | Stop reason: SDUPTHER

## 2019-09-30 RX ORDER — GLIPIZIDE 5 MG/1
5 TABLET ORAL
Qty: 30 TABLET | Refills: 3 | Status: SHIPPED | OUTPATIENT
Start: 2019-09-30 | End: 2020-02-17 | Stop reason: SDUPTHER

## 2019-09-30 RX ORDER — IBUPROFEN 800 MG/1
800 TABLET ORAL 3 TIMES DAILY PRN
Qty: 60 TABLET | Refills: 0 | Status: SHIPPED | OUTPATIENT
Start: 2019-09-30 | End: 2019-12-13 | Stop reason: SDUPTHER

## 2019-09-30 RX ORDER — ATORVASTATIN CALCIUM 40 MG/1
40 TABLET, FILM COATED ORAL DAILY
Qty: 90 TABLET | Refills: 3 | Status: SHIPPED | OUTPATIENT
Start: 2019-09-30 | End: 2020-05-05 | Stop reason: SDUPTHER

## 2019-09-30 RX ORDER — GLIPIZIDE 5 MG/1
10 TABLET, FILM COATED, EXTENDED RELEASE ORAL
Qty: 60 TABLET | Refills: 5 | Status: SHIPPED | OUTPATIENT
Start: 2019-09-30 | End: 2019-09-30

## 2019-09-30 NOTE — PROGRESS NOTES
Subjective:       Patient ID: Hamzah Reis is a 44 y.o. female.    Chief Complaint: Follow-up; Hypertension; and Leg Pain (both on and off)    HPI Mrs. Reis is a 44 year old female with HTN, type II DM, HLD who presents for follow up of HTN. She also complains of leg pain today. It is located in the right leg. Starts at the ankle and goes up to the hip. On further description, it is actually two separate areas of pain, one in the right thigh and one in the right lower leg. Onset was 1.5 weeks ago. It comes and goes. It is not constant in duration. It is 7/10 in severity. It is sharp in character and associated with some numbness, Says she has large varicose veins in the right upper leg. Denies any trauma to the leg. Has not taken any medications for pain relief.  We started a combo pill at last visit for BP. She took it for about two weeks Now off meds X 2 weeks and BP normal today.   Blood glucose at home has been >200 while taking metformin and 5 mg of glipizide daily. Cannot increase metformin due to GI issues. Has been on insulin in the past and has no issues restarting it    Review of Systems   All other systems reviewed and are negative.      Objective:      Physical Exam   Constitutional: She is oriented to person, place, and time. She appears well-developed and well-nourished. No distress.   HENT:   Head: Normocephalic and atraumatic.   Right Ear: External ear normal.   Left Ear: External ear normal.   Nose: Nose normal.   Eyes: Conjunctivae and EOM are normal.   Cardiovascular: Normal rate, regular rhythm, normal heart sounds and intact distal pulses. Exam reveals no gallop and no friction rub.   No murmur heard.  Pulmonary/Chest: Effort normal and breath sounds normal. No stridor. No respiratory distress. She has no wheezes. She has no rales.   Musculoskeletal:   Negative straight leg raise test on the right. Has full ROM of right hip actively and passively without reproduction of pain    Neurological: She is alert and oriented to person, place, and time.   Skin: Skin is warm and dry. She is not diaphoretic.   Psychiatric: She has a normal mood and affect. Her behavior is normal. Judgment and thought content normal.   Vitals reviewed.      Assessment:       1. Pain of right lower extremity    2. Type 2 diabetes mellitus without complication, without long-term current use of insulin    3. Hyperlipidemia associated with type 2 diabetes mellitus    4. Vitamin D deficiency    5. Hypertension associated with diabetes    6. Varicose veins of bilateral lower extremities with pain        Plan:   Venous insufficiency study to evaluate veins of lower extremities. Ibuprofen prescribed for pain relief.  For type II DM: Continue metformin 500 mg daily and glipizide 5 mg daily. Start levemir 10 units nightly. Pt aware to monitor BG 4 times daily and alert me for any low BGs (see AVS). Pt aware of risk of hypoglycemia with levemir and glipizide use. However her BG is consistently >200 on metformin and glipizide and we are starting very low dose of levemir. If insulin is increased in the future, then her glipizide will be stopped at that time.  Atorvastatin started today  HTN currently resolved  Vitamin D prescribed for supplementation    Hamzah was seen today for follow-up, hypertension and leg pain.    Diagnoses and all orders for this visit:    Pain of right lower extremity  -     ibuprofen (ADVIL,MOTRIN) 800 MG tablet; Take 1 tablet (800 mg total) by mouth 3 (three) times daily as needed for Pain.    Type 2 diabetes mellitus without complication, without long-term current use of insulin  -     Discontinue: glipiZIDE (GLUCOTROL) 5 MG TR24; Take 2 tablets (10 mg total) by mouth daily with breakfast.  -     insulin detemir U-100 (LEVEMIR FLEXTOUCH U-100 INSULN) 100 unit/mL (3 mL) SubQ InPn pen; Inject 10 Units into the skin every evening.  -     glipiZIDE (GLUCOTROL) 5 MG tablet; Take 1 tablet (5 mg total) by  mouth daily with breakfast.    Hyperlipidemia associated with type 2 diabetes mellitus  -     atorvastatin (LIPITOR) 40 MG tablet; Take 1 tablet (40 mg total) by mouth once daily.    Vitamin D deficiency  -     cholecalciferol, vitamin D3, 50,000 unit capsule; Take 1 capsule (50,000 Units total) by mouth once a week.    Hypertension associated with diabetes    Varicose veins of bilateral lower extremities with pain  -     US Lower Extremity Veins Bilateral Insufficiency; Future      RTC one month

## 2019-09-30 NOTE — PATIENT INSTRUCTIONS
Please check your blood sugars before meals and at bedtime (4 times per day total). Please write these numbers down and bring with you to next appt.   You will take metformin 500 mg daily, glipizide 5 mg daily and levemir 10 units nightly   If your BG is < 70, please alert Dr. Santos

## 2019-10-04 ENCOUNTER — PATIENT OUTREACH (OUTPATIENT)
Dept: ADMINISTRATIVE | Facility: OTHER | Age: 44
End: 2019-10-04

## 2019-10-08 ENCOUNTER — INITIAL CONSULT (OUTPATIENT)
Dept: DERMATOLOGY | Facility: CLINIC | Age: 44
End: 2019-10-08
Payer: COMMERCIAL

## 2019-10-08 VITALS — BODY MASS INDEX: 41.8 KG/M2 | WEIGHT: 259 LBS

## 2019-10-08 DIAGNOSIS — L91.8 CUTANEOUS SKIN TAGS: ICD-10-CM

## 2019-10-08 DIAGNOSIS — R20.9 DISTURBANCE OF SKIN SENSATION: ICD-10-CM

## 2019-10-08 DIAGNOSIS — D22.9 NEVUS: Primary | ICD-10-CM

## 2019-10-08 DIAGNOSIS — L81.1 MELASMA: ICD-10-CM

## 2019-10-08 PROCEDURE — 3008F BODY MASS INDEX DOCD: CPT | Mod: CPTII,S$GLB,, | Performed by: DERMATOLOGY

## 2019-10-08 PROCEDURE — 11200 RMVL SKIN TAGS UP TO&INC 15: CPT | Mod: S$GLB,,, | Performed by: DERMATOLOGY

## 2019-10-08 PROCEDURE — 99202 PR OFFICE/OUTPT VISIT, NEW, LEVL II, 15-29 MIN: ICD-10-PCS | Mod: 25,S$GLB,, | Performed by: DERMATOLOGY

## 2019-10-08 PROCEDURE — 99999 PR PBB SHADOW E&M-EST. PATIENT-LVL II: CPT | Mod: PBBFAC,,, | Performed by: DERMATOLOGY

## 2019-10-08 PROCEDURE — 11200 PR REMOVAL OF SKIN TAGS, UP TO 15: ICD-10-PCS | Mod: S$GLB,,, | Performed by: DERMATOLOGY

## 2019-10-08 PROCEDURE — 99999 PR PBB SHADOW E&M-EST. PATIENT-LVL II: ICD-10-PCS | Mod: PBBFAC,,, | Performed by: DERMATOLOGY

## 2019-10-08 PROCEDURE — 99202 OFFICE O/P NEW SF 15 MIN: CPT | Mod: 25,S$GLB,, | Performed by: DERMATOLOGY

## 2019-10-08 PROCEDURE — 3008F PR BODY MASS INDEX (BMI) DOCUMENTED: ICD-10-PCS | Mod: CPTII,S$GLB,, | Performed by: DERMATOLOGY

## 2019-10-08 NOTE — LETTER
October 8, 2019      Tatum Santos MD  [address]  [place] [address]           [place] - Dermatology  [address]  [facility] [place] [place] [address]  Phone: [phone]  Fax: [phone]          Patient: Hamzah Reis   MR Number: [id number]   YOB: 1975   Date of Visit: 10/8/2019       Dear Dr. Tatum Santos:    Thank you for referring Hamzah Reis to me for evaluation. Attached you will find relevant portions of my assessment and plan of care.    If you have questions, please do not hesitate to call me. I look forward to following Hamzah Reis along with you.    Sincerely,    Sylvia Ramirez MD    Enclosure  CC:  No Recipients    If you would like to receive this communication electronically, please contact [email or web address] or [phone] to request more information on [employer] access.    For providers and/or their staff who would like to refer a patient to [facility], please contact us through our one-stop-shop provider referral line, [facility], at [phone].    If you feel you have received this communication in error or would no longer like to receive these types of communications, please e-mail [email or web address]

## 2019-10-08 NOTE — PROGRESS NOTES
Subjective:       Patient ID:  Hamzah Reis is a 44 y.o. female who presents for   Chief Complaint   Patient presents with    Mole     right upper leg, raised, painful     Has a nevus on her right leg present for years irritated and raised now does not bleed no tx.  Also has skin tags around neck and right axilla irritated by clothing.    Mole  - Initial  Affected locations: right upper leg  Signs / symptoms: itching and pain  Aggravated by: nothing  Relieving factors/Treatments tried: nothing        Review of Systems   Constitutional: Negative for fever, chills, weight loss, weight gain, fatigue, night sweats and malaise.   Skin: Negative for daily sunscreen use, activity-related sunscreen use and wears hat.   Hematologic/Lymphatic: Bruises/bleeds easily.        Objective:    Physical Exam   Constitutional: She appears well-developed and well-nourished. No distress.   Neurological: She is alert and oriented to person, place, and time. She is not disoriented.   Psychiatric: She has a normal mood and affect.   Skin:   Areas Examined (abnormalities noted in diagram):   Head / Face Inspection Performed  Neck Inspection Performed  Chest / Axilla Inspection Performed  Back Inspection Performed  RUE Inspected  LUE Inspection Performed  RLE Inspected                   Diagram Legend     Erythematous scaling macule/papule c/w actinic keratosis       Vascular papule c/w angioma      Pigmented verrucoid papule/plaque c/w seborrheic keratosis      Yellow umbilicated papule c/w sebaceous hyperplasia      Irregularly shaped tan macule c/w lentigo     1-2 mm smooth white papules consistent with Milia      Movable subcutaneous cyst with punctum c/w epidermal inclusion cyst      Subcutaneous movable cyst c/w pilar cyst      Firm pink to brown papule c/w dermatofibroma      Pedunculated fleshy papule(s) c/w skin tag(s)      Evenly pigmented macule c/w junctional nevus     Mildly variegated pigmented, slightly  "irregular-bordered macule c/w mildly atypical nevus      Flesh colored to evenly pigmented papule c/w intradermal nevus       Pink pearly papule/plaque c/w basal cell carcinoma      Erythematous hyperkeratotic cursted plaque c/w SCC      Surgical scar with no sign of skin cancer recurrence      Open and closed comedones      Inflammatory papules and pustules      Verrucoid papule consistent consistent with wart     Erythematous eczematous patches and plaques     Dystrophic onycholytic nail with subungual debris c/w onychomycosis     Umbilicated papule    Erythematous-base heme-crusted tan verrucoid plaque consistent with inflamed seborrheic keratosis     Erythematous Silvery Scaling Plaque c/w Psoriasis     See annotation      Assessment / Plan:        Nevus left thigh  The "ABCD" rules to observe pigmented lesions were reviewed.  Brochure provided  Call if grows, darkens    Cutaneous skin tags  Verbal consent obtained. 10 lesions removed with scissor snip removal after anesthesia with 1% lidocaine with epinephrine. Hemostasis achieved with aluminum chloride and hyfrecation. No complications.      Melasma             Follow up if symptoms worsen or fail to improve.  "

## 2019-10-31 RX ORDER — FLUCONAZOLE 100 MG/1
100 TABLET ORAL DAILY
Qty: 3 TABLET | Refills: 0 | Status: SHIPPED | OUTPATIENT
Start: 2019-10-31 | End: 2021-04-22 | Stop reason: SDUPTHER

## 2019-10-31 NOTE — TELEPHONE ENCOUNTER
----- Message from Shoshana Fried sent at 10/31/2019 10:36 AM CDT -----  Contact: [phone]/self  Patient is calling to get medication sent to pharmacy for a yeast infection. [facility]. Please advise.

## 2019-11-26 ENCOUNTER — TELEPHONE (OUTPATIENT)
Dept: INTERNAL MEDICINE | Facility: CLINIC | Age: 44
End: 2019-11-26

## 2019-11-26 NOTE — TELEPHONE ENCOUNTER
Spoke with patient and she declined 315 appt today with Dr. Torres but will go to [facility] on [address] for c/o sore throat. Patient voices understanding.

## 2019-11-26 NOTE — TELEPHONE ENCOUNTER
"----- Message from Shana Lance sent at 11/26/2019  2:38 PM CST -----  Contact: [phone]-self  Patient is requesting to be seen today for "eye discharge and stuck together, sore throat, headache". Please call [phone].  "

## 2019-12-06 DIAGNOSIS — E11.9 TYPE 2 DIABETES MELLITUS WITHOUT COMPLICATION: ICD-10-CM

## 2019-12-11 ENCOUNTER — OFFICE VISIT (OUTPATIENT)
Dept: INTERNAL MEDICINE | Facility: CLINIC | Age: 44
End: 2019-12-11
Payer: COMMERCIAL

## 2019-12-11 ENCOUNTER — LAB VISIT (OUTPATIENT)
Dept: LAB | Facility: HOSPITAL | Age: 44
End: 2019-12-11
Attending: INTERNAL MEDICINE
Payer: COMMERCIAL

## 2019-12-11 VITALS
WEIGHT: 260.13 LBS | SYSTOLIC BLOOD PRESSURE: 126 MMHG | DIASTOLIC BLOOD PRESSURE: 80 MMHG | TEMPERATURE: 99 F | OXYGEN SATURATION: 98 % | BODY MASS INDEX: 41.8 KG/M2 | HEIGHT: 66 IN | HEART RATE: 87 BPM

## 2019-12-11 DIAGNOSIS — Z23 FLU VACCINE NEED: ICD-10-CM

## 2019-12-11 DIAGNOSIS — E55.9 VITAMIN D INSUFFICIENCY: ICD-10-CM

## 2019-12-11 DIAGNOSIS — E78.5 HYPERLIPIDEMIA ASSOCIATED WITH TYPE 2 DIABETES MELLITUS: ICD-10-CM

## 2019-12-11 DIAGNOSIS — E11.9 TYPE 2 DIABETES MELLITUS WITHOUT COMPLICATION: ICD-10-CM

## 2019-12-11 DIAGNOSIS — Z79.4 TYPE 2 DIABETES MELLITUS WITHOUT COMPLICATION, WITH LONG-TERM CURRENT USE OF INSULIN: ICD-10-CM

## 2019-12-11 DIAGNOSIS — E11.9 TYPE 2 DIABETES MELLITUS WITHOUT COMPLICATION, WITH LONG-TERM CURRENT USE OF INSULIN: ICD-10-CM

## 2019-12-11 DIAGNOSIS — R10.9 ABDOMINAL PAIN, UNSPECIFIED ABDOMINAL LOCATION: ICD-10-CM

## 2019-12-11 DIAGNOSIS — E11.69 HYPERLIPIDEMIA ASSOCIATED WITH TYPE 2 DIABETES MELLITUS: ICD-10-CM

## 2019-12-11 LAB
25(OH)D3+25(OH)D2 SERPL-MCNC: 47 NG/ML (ref 30–96)
ALBUMIN SERPL BCP-MCNC: 3.5 G/DL (ref 3.5–5.2)
ALP SERPL-CCNC: 95 U/L (ref 55–135)
ALT SERPL W/O P-5'-P-CCNC: 30 U/L (ref 10–44)
ANION GAP SERPL CALC-SCNC: 8 MMOL/L (ref 8–16)
AST SERPL-CCNC: 16 U/L (ref 10–40)
BASOPHILS # BLD AUTO: 0.04 K/UL (ref 0–0.2)
BASOPHILS NFR BLD: 0.5 % (ref 0–1.9)
BILIRUB SERPL-MCNC: 0.5 MG/DL (ref 0.1–1)
BUN SERPL-MCNC: 10 MG/DL (ref 6–20)
CALCIUM SERPL-MCNC: 9.5 MG/DL (ref 8.7–10.5)
CHLORIDE SERPL-SCNC: 101 MMOL/L (ref 95–110)
CHOLEST SERPL-MCNC: 127 MG/DL (ref 120–199)
CHOLEST/HDLC SERPL: 3.7 {RATIO} (ref 2–5)
CO2 SERPL-SCNC: 27 MMOL/L (ref 23–29)
CREAT SERPL-MCNC: 0.8 MG/DL (ref 0.5–1.4)
CRP SERPL-MCNC: 17.9 MG/L (ref 0–8.2)
DIFFERENTIAL METHOD: ABNORMAL
EOSINOPHIL # BLD AUTO: 0.1 K/UL (ref 0–0.5)
EOSINOPHIL NFR BLD: 1.1 % (ref 0–8)
ERYTHROCYTE [DISTWIDTH] IN BLOOD BY AUTOMATED COUNT: 12.6 % (ref 11.5–14.5)
ERYTHROCYTE [SEDIMENTATION RATE] IN BLOOD BY WESTERGREN METHOD: 27 MM/HR (ref 0–36)
EST. GFR  (AFRICAN AMERICAN): >60 ML/MIN/1.73 M^2
EST. GFR  (NON AFRICAN AMERICAN): >60 ML/MIN/1.73 M^2
GLUCOSE SERPL-MCNC: 273 MG/DL (ref 70–110)
HCT VFR BLD AUTO: 42.5 % (ref 37–48.5)
HDLC SERPL-MCNC: 34 MG/DL (ref 40–75)
HDLC SERPL: 26.8 % (ref 20–50)
HGB BLD-MCNC: 13.7 G/DL (ref 12–16)
IMM GRANULOCYTES # BLD AUTO: 0.03 K/UL (ref 0–0.04)
IMM GRANULOCYTES NFR BLD AUTO: 0.4 % (ref 0–0.5)
LDLC SERPL CALC-MCNC: 75 MG/DL (ref 63–159)
LYMPHOCYTES # BLD AUTO: 3.1 K/UL (ref 1–4.8)
LYMPHOCYTES NFR BLD: 36.6 % (ref 18–48)
MCH RBC QN AUTO: 26.8 PG (ref 27–31)
MCHC RBC AUTO-ENTMCNC: 32.2 G/DL (ref 32–36)
MCV RBC AUTO: 83 FL (ref 82–98)
MONOCYTES # BLD AUTO: 0.5 K/UL (ref 0.3–1)
MONOCYTES NFR BLD: 6.2 % (ref 4–15)
NEUTROPHILS # BLD AUTO: 4.7 K/UL (ref 1.8–7.7)
NEUTROPHILS NFR BLD: 55.2 % (ref 38–73)
NONHDLC SERPL-MCNC: 93 MG/DL
NRBC BLD-RTO: 0 /100 WBC
PLATELET # BLD AUTO: 368 K/UL (ref 150–350)
PMV BLD AUTO: 10.7 FL (ref 9.2–12.9)
POTASSIUM SERPL-SCNC: 4.3 MMOL/L (ref 3.5–5.1)
PROT SERPL-MCNC: 7.1 G/DL (ref 6–8.4)
RBC # BLD AUTO: 5.11 M/UL (ref 4–5.4)
SODIUM SERPL-SCNC: 136 MMOL/L (ref 136–145)
TRIGL SERPL-MCNC: 90 MG/DL (ref 30–150)
WBC # BLD AUTO: 8.56 K/UL (ref 3.9–12.7)

## 2019-12-11 PROCEDURE — 99999 PR PBB SHADOW E&M-EST. PATIENT-LVL III: ICD-10-PCS | Mod: PBBFAC,,, | Performed by: INTERNAL MEDICINE

## 2019-12-11 PROCEDURE — 85652 RBC SED RATE AUTOMATED: CPT

## 2019-12-11 PROCEDURE — 3046F HEMOGLOBIN A1C LEVEL >9.0%: CPT | Mod: CPTII,S$GLB,, | Performed by: INTERNAL MEDICINE

## 2019-12-11 PROCEDURE — 3079F DIAST BP 80-89 MM HG: CPT | Mod: CPTII,S$GLB,, | Performed by: INTERNAL MEDICINE

## 2019-12-11 PROCEDURE — 3079F PR MOST RECENT DIASTOLIC BLOOD PRESSURE 80-89 MM HG: ICD-10-PCS | Mod: CPTII,S$GLB,, | Performed by: INTERNAL MEDICINE

## 2019-12-11 PROCEDURE — 36415 COLL VENOUS BLD VENIPUNCTURE: CPT | Mod: PO

## 2019-12-11 PROCEDURE — 90471 FLU VACCINE (QUAD) GREATER THAN OR EQUAL TO 3YO PRESERVATIVE FREE IM: ICD-10-PCS | Mod: S$GLB,,, | Performed by: INTERNAL MEDICINE

## 2019-12-11 PROCEDURE — 85025 COMPLETE CBC W/AUTO DIFF WBC: CPT

## 2019-12-11 PROCEDURE — 99214 OFFICE O/P EST MOD 30 MIN: CPT | Mod: 25,S$GLB,, | Performed by: INTERNAL MEDICINE

## 2019-12-11 PROCEDURE — 3074F SYST BP LT 130 MM HG: CPT | Mod: CPTII,S$GLB,, | Performed by: INTERNAL MEDICINE

## 2019-12-11 PROCEDURE — 90686 IIV4 VACC NO PRSV 0.5 ML IM: CPT | Mod: S$GLB,,, | Performed by: INTERNAL MEDICINE

## 2019-12-11 PROCEDURE — 3046F PR MOST RECENT HEMOGLOBIN A1C LEVEL > 9.0%: ICD-10-PCS | Mod: CPTII,S$GLB,, | Performed by: INTERNAL MEDICINE

## 2019-12-11 PROCEDURE — 3074F PR MOST RECENT SYSTOLIC BLOOD PRESSURE < 130 MM HG: ICD-10-PCS | Mod: CPTII,S$GLB,, | Performed by: INTERNAL MEDICINE

## 2019-12-11 PROCEDURE — 90686 FLU VACCINE (QUAD) GREATER THAN OR EQUAL TO 3YO PRESERVATIVE FREE IM: ICD-10-PCS | Mod: S$GLB,,, | Performed by: INTERNAL MEDICINE

## 2019-12-11 PROCEDURE — 80053 COMPREHEN METABOLIC PANEL: CPT

## 2019-12-11 PROCEDURE — 83036 HEMOGLOBIN GLYCOSYLATED A1C: CPT

## 2019-12-11 PROCEDURE — 80061 LIPID PANEL: CPT

## 2019-12-11 PROCEDURE — 3008F BODY MASS INDEX DOCD: CPT | Mod: CPTII,S$GLB,, | Performed by: INTERNAL MEDICINE

## 2019-12-11 PROCEDURE — 99999 PR PBB SHADOW E&M-EST. PATIENT-LVL III: CPT | Mod: PBBFAC,,, | Performed by: INTERNAL MEDICINE

## 2019-12-11 PROCEDURE — 99214 PR OFFICE/OUTPT VISIT, EST, LEVL IV, 30-39 MIN: ICD-10-PCS | Mod: 25,S$GLB,, | Performed by: INTERNAL MEDICINE

## 2019-12-11 PROCEDURE — 90471 IMMUNIZATION ADMIN: CPT | Mod: S$GLB,,, | Performed by: INTERNAL MEDICINE

## 2019-12-11 PROCEDURE — 82306 VITAMIN D 25 HYDROXY: CPT

## 2019-12-11 PROCEDURE — 86140 C-REACTIVE PROTEIN: CPT

## 2019-12-11 PROCEDURE — 3008F PR BODY MASS INDEX (BMI) DOCUMENTED: ICD-10-PCS | Mod: CPTII,S$GLB,, | Performed by: INTERNAL MEDICINE

## 2019-12-11 NOTE — PATIENT INSTRUCTIONS
Check blood sugar before meals and at bedtime (total of 4 times daily).  Please write these numbers down and bring them with you to your next appointment with Dr. Santos.    Take over the counter vitamin D 2,000 units per day

## 2019-12-11 NOTE — PROGRESS NOTES
Subjective:       Patient ID: Hamzah Reis is a 44 y.o. female.    Chief Complaint: Diabetes    HPI Mrs. Reis is a 44 year old female with type II DM, HTN, and HLD who presents for follow up of type II DM. Last seen in September and was supposed to follow up one month later.     She complains of abdominal pain. Onset was 2 days ago. It is located in the left lower area of the abdomen. Pain occurs off and on; it is not constant in duration. It occurs randomly. She had diarrhea on Thurs, Fri and Saturday. Last episode was Saturday (4 days ago). Has associated symptoms of nausea and decreased appetite but no associated vomiting, blood in the stools, constipation or hard stools. Had a croissant for breakfast today.    Review of Systems   Gastrointestinal: Positive for abdominal pain, diarrhea (last episode was 4 days ago) and nausea. Negative for blood in stool and vomiting.   All other systems reviewed and are negative.      Objective:      Physical Exam   Constitutional: She is oriented to person, place, and time. She appears well-developed and well-nourished. No distress.   HENT:   Head: Normocephalic and atraumatic.   Right Ear: External ear normal.   Left Ear: External ear normal.   Nose: Nose normal.   Eyes: Conjunctivae and EOM are normal.   Cardiovascular: Normal rate, regular rhythm, normal heart sounds and intact distal pulses. Exam reveals no gallop and no friction rub.   No murmur heard.  Pulmonary/Chest: Effort normal and breath sounds normal. No stridor. No respiratory distress. She has no wheezes. She has no rales.   Abdominal: Soft. Bowel sounds are normal. She exhibits no distension and no mass. There is tenderness (LUQ and LLQ). There is no rebound and no guarding. No hernia.   Musculoskeletal: She exhibits no edema or deformity.   Neurological: She is alert and oriented to person, place, and time.   Skin: Skin is warm and dry. She is not diaphoretic.   Psychiatric: She has a normal mood and  affect. Her behavior is normal. Judgment and thought content normal.   Vitals reviewed.      Assessment:       1. Type 2 diabetes mellitus without complication, with long-term current use of insulin Active   2. Abdominal pain, unspecified abdominal location Active   3. Vitamin D insufficiency Active   4. Hyperlipidemia associated with type 2 diabetes mellitus Well controlled   5. Flu vaccine need        Plan:         Hamzah was seen today for diabetes.    Diagnoses and all orders for this visit:    Type 2 diabetes mellitus without complication, with long-term current use of insulin  -     Lipid panel; Future  -     Comprehensive metabolic panel; Future  -     Hemoglobin A1c; Future    Abdominal pain, unspecified abdominal location  Comments:  Suspect due to viral gastroenteritis. Recommend liquids (soups) and then slow progression to bland diet and then regular diabetic diet  Orders:  -     CBC auto differential; Future  -     Sedimentation rate; Future  -     C-reactive protein; Future    Vitamin D insufficiency  Comments:  Check vitamin D level. Continue vitamin D supplementation  Orders:  -     Vitamin D; Future    Hyperlipidemia associated with type 2 diabetes mellitus  Comments:  LDL very close to goal. continue current medication    Flu vaccine need  -     Influenza - Quadrivalent (6 months+) (PF)    RTC one month

## 2019-12-12 ENCOUNTER — PATIENT MESSAGE (OUTPATIENT)
Dept: INTERNAL MEDICINE | Facility: CLINIC | Age: 44
End: 2019-12-12

## 2019-12-12 ENCOUNTER — TELEPHONE (OUTPATIENT)
Dept: INTERNAL MEDICINE | Facility: CLINIC | Age: 44
End: 2019-12-12

## 2019-12-12 DIAGNOSIS — B37.9 YEAST INFECTION: Primary | ICD-10-CM

## 2019-12-12 LAB
ESTIMATED AVG GLUCOSE: 303 MG/DL (ref 68–131)
ESTIMATED AVG GLUCOSE: 303 MG/DL (ref 68–131)
HBA1C MFR BLD HPLC: 12.2 % (ref 4–5.6)
HBA1C MFR BLD HPLC: 12.2 % (ref 4–5.6)

## 2019-12-12 RX ORDER — FLUCONAZOLE 150 MG/1
150 TABLET ORAL DAILY
Qty: 1 TABLET | Refills: 0 | Status: SHIPPED | OUTPATIENT
Start: 2019-12-12 | End: 2019-12-13 | Stop reason: SDUPTHER

## 2019-12-12 NOTE — TELEPHONE ENCOUNTER
----- Message from Madhuri Burch sent at 12/12/2019  8:11 AM CST -----  Contact: Self [phone]  Patient would like to speak with you about needing medication for an yeast infection. Please advise

## 2019-12-13 DIAGNOSIS — B37.9 YEAST INFECTION: ICD-10-CM

## 2019-12-13 DIAGNOSIS — M79.604 PAIN OF RIGHT LOWER EXTREMITY: ICD-10-CM

## 2019-12-13 RX ORDER — IBUPROFEN 800 MG/1
800 TABLET ORAL 3 TIMES DAILY PRN
Qty: 60 TABLET | Refills: 0 | Status: SHIPPED | OUTPATIENT
Start: 2019-12-13 | End: 2020-03-24 | Stop reason: SDUPTHER

## 2019-12-13 RX ORDER — FLUCONAZOLE 150 MG/1
150 TABLET ORAL DAILY
Qty: 1 TABLET | Refills: 0 | Status: SHIPPED | OUTPATIENT
Start: 2019-12-13 | End: 2019-12-14

## 2019-12-13 RX ORDER — IBUPROFEN 800 MG/1
800 TABLET ORAL 3 TIMES DAILY PRN
Qty: 60 TABLET | Refills: 0 | Status: SHIPPED | OUTPATIENT
Start: 2019-12-13 | End: 2019-12-13 | Stop reason: SDUPTHER

## 2019-12-20 ENCOUNTER — PATIENT OUTREACH (OUTPATIENT)
Dept: ADMINISTRATIVE | Facility: OTHER | Age: 44
End: 2019-12-20

## 2019-12-26 LAB
LEFT EYE DM RETINOPATHY: NEGATIVE
RIGHT EYE DM RETINOPATHY: NEGATIVE

## 2020-01-16 ENCOUNTER — PATIENT OUTREACH (OUTPATIENT)
Dept: ADMINISTRATIVE | Facility: HOSPITAL | Age: 45
End: 2020-01-16

## 2020-01-21 ENCOUNTER — PATIENT OUTREACH (OUTPATIENT)
Dept: ADMINISTRATIVE | Facility: OTHER | Age: 45
End: 2020-01-21

## 2020-02-14 ENCOUNTER — PATIENT OUTREACH (OUTPATIENT)
Dept: ADMINISTRATIVE | Facility: OTHER | Age: 45
End: 2020-02-14

## 2020-02-17 DIAGNOSIS — E11.9 TYPE 2 DIABETES MELLITUS WITHOUT COMPLICATION, WITHOUT LONG-TERM CURRENT USE OF INSULIN: ICD-10-CM

## 2020-02-17 RX ORDER — GLIPIZIDE 5 MG/1
5 TABLET ORAL
Qty: 30 TABLET | Refills: 3 | Status: SHIPPED | OUTPATIENT
Start: 2020-02-17 | End: 2020-03-18

## 2020-02-18 ENCOUNTER — OFFICE VISIT (OUTPATIENT)
Dept: OBSTETRICS AND GYNECOLOGY | Facility: CLINIC | Age: 45
End: 2020-02-18
Payer: COMMERCIAL

## 2020-02-18 VITALS
DIASTOLIC BLOOD PRESSURE: 78 MMHG | SYSTOLIC BLOOD PRESSURE: 114 MMHG | WEIGHT: 253.69 LBS | HEIGHT: 66 IN | BODY MASS INDEX: 40.77 KG/M2

## 2020-02-18 DIAGNOSIS — Z11.3 SCREENING EXAMINATION FOR STD (SEXUALLY TRANSMITTED DISEASE): ICD-10-CM

## 2020-02-18 DIAGNOSIS — N89.8 VAGINAL DISCHARGE: ICD-10-CM

## 2020-02-18 DIAGNOSIS — Z12.31 VISIT FOR SCREENING MAMMOGRAM: ICD-10-CM

## 2020-02-18 DIAGNOSIS — Z12.4 ROUTINE PAPANICOLAOU SMEAR: Primary | ICD-10-CM

## 2020-02-18 PROCEDURE — 99999 PR PBB SHADOW E&M-EST. PATIENT-LVL III: CPT | Mod: PBBFAC,,, | Performed by: OBSTETRICS & GYNECOLOGY

## 2020-02-18 PROCEDURE — 88141 PR  CYTOPATH CERV/VAG INTERPRET: ICD-10-PCS | Mod: ,,, | Performed by: PATHOLOGY

## 2020-02-18 PROCEDURE — 88175 CYTOPATH C/V AUTO FLUID REDO: CPT | Performed by: PATHOLOGY

## 2020-02-18 PROCEDURE — 99999 PR PBB SHADOW E&M-EST. PATIENT-LVL III: ICD-10-PCS | Mod: PBBFAC,,, | Performed by: OBSTETRICS & GYNECOLOGY

## 2020-02-18 PROCEDURE — 99396 PR PREVENTIVE VISIT,EST,40-64: ICD-10-PCS | Mod: S$GLB,,, | Performed by: OBSTETRICS & GYNECOLOGY

## 2020-02-18 PROCEDURE — 87491 CHLMYD TRACH DNA AMP PROBE: CPT | Mod: 59

## 2020-02-18 PROCEDURE — 3078F PR MOST RECENT DIASTOLIC BLOOD PRESSURE < 80 MM HG: ICD-10-PCS | Mod: CPTII,S$GLB,, | Performed by: OBSTETRICS & GYNECOLOGY

## 2020-02-18 PROCEDURE — 88141 CYTOPATH C/V INTERPRET: CPT | Mod: ,,, | Performed by: PATHOLOGY

## 2020-02-18 PROCEDURE — 3078F DIAST BP <80 MM HG: CPT | Mod: CPTII,S$GLB,, | Performed by: OBSTETRICS & GYNECOLOGY

## 2020-02-18 PROCEDURE — 99396 PREV VISIT EST AGE 40-64: CPT | Mod: S$GLB,,, | Performed by: OBSTETRICS & GYNECOLOGY

## 2020-02-18 PROCEDURE — 3074F PR MOST RECENT SYSTOLIC BLOOD PRESSURE < 130 MM HG: ICD-10-PCS | Mod: CPTII,S$GLB,, | Performed by: OBSTETRICS & GYNECOLOGY

## 2020-02-18 PROCEDURE — 87481 CANDIDA DNA AMP PROBE: CPT | Mod: 59

## 2020-02-18 PROCEDURE — 87661 TRICHOMONAS VAGINALIS AMPLIF: CPT

## 2020-02-18 PROCEDURE — 3074F SYST BP LT 130 MM HG: CPT | Mod: CPTII,S$GLB,, | Performed by: OBSTETRICS & GYNECOLOGY

## 2020-02-18 RX ORDER — FLUCONAZOLE 100 MG/1
100 TABLET ORAL DAILY
Qty: 3 TABLET | Refills: 2 | Status: SHIPPED | OUTPATIENT
Start: 2020-02-18 | End: 2020-03-13 | Stop reason: SDUPTHER

## 2020-02-18 NOTE — PROGRESS NOTES
"HPI:   44 y.o.   OB History        2    Para   2    Term   2            AB        Living           SAB        TAB        Ectopic        Multiple        Live Births                  Patient's last menstrual period was 2020 (exact date).    Patient is  here for her annual gynecologic exam.  She has no complaints.     ROS:  GENERAL: No fever, chills, fatigability or weight loss.  SKIN: No rashes, itching or changes in color or texture of skin.  HEAD: No headaches or recent head trauma.  EYES: Visual acuity fine. No photophobia, ocular pain or diplopia.  EARS: Denies ear pain, discharge or vertigo.  NOSE: No loss of smell, no epistaxis or postnasal drip.  MOUTH & THROAT: No hoarseness or change in voice. No excessive gum bleeding.  NODES: Denies swollen glands.  CHEST: Denies ANDERS, cyanosis, wheezing, cough and sputum production.  CARDIOVASCULAR: Denies chest pain, PND, orthopnea or reduced exercise tolerance.  ABDOMEN: Appetite fine. No weight loss. Denies diarrhea, abdominal pain, hematemesis or blood in stool.  URINARY: No flank pain, dysuria or hematuria.  PERIPHERAL VASCULAR: No claudication or cyanosis.  MUSCULOSKELETAL: No joint stiffness or swelling. Denies back pain.  NEUROLOGIC: No history of seizures, paralysis, alteration of gait or coordination.    PE:   /78   Ht 5' 6" (1.676 m)   Wt 115.1 kg (253 lb 11.2 oz)   LMP 2020 (Exact Date)   BMI 40.95 kg/m²   APPEARANCE: Well nourished, well developed, in no acute distress.  NECK: Neck symmetric without masses or thyromegaly.  BREASTS: Symmetrical, no skin changes or visible lesions. No palpable masses, nipple discharge or adenopathy bilaterally.  ABDOMEN: Flat. Soft. No tenderness or masses. No hepatosplenomegaly. No hernias. No CVA tenderness.  VULVA: No lesions. Normal female genitalia.  URETHRAL MEATUS: Normal size and location, no lesions, no prolapse.  URETHRA: No masses, tenderness, prolapse or scarring.  VAGINA: Moist and " well rugated, no discharge, no significant cystocele or rectocele.  CERVIX: No lesions and discharge. PAP done.  UTERUS: Normal size, regular shape, mobile, non-tender, bladder base nontender.  ADNEXA: No masses, tenderness or CDS nodularity.  ANUS PERINEUM: Normal.    PROCEDURES:  Pap smear    Assessment:  Normal Gynecologic Exam    Plan:  Mammogram and Colonoscopy if indicated by current recommendations.  Return to clinic in one year or for any problems or complaints.  Reg but heavy cycles, flooding  Ulns, us for poss [id number]  Culture done, rxdifluan  pamhplet on ablation

## 2020-02-19 LAB
BACTERIAL VAGINOSIS DNA: POSITIVE
CANDIDA GLABRATA DNA: NEGATIVE
CANDIDA KRUSEI DNA: NEGATIVE
CANDIDA RRNA VAG QL PROBE: POSITIVE
T VAGINALIS RRNA GENITAL QL PROBE: NEGATIVE

## 2020-02-20 ENCOUNTER — TELEPHONE (OUTPATIENT)
Dept: OBSTETRICS AND GYNECOLOGY | Facility: CLINIC | Age: 45
End: 2020-02-20

## 2020-02-20 DIAGNOSIS — N92.1 MENOMETRORRHAGIA: Primary | ICD-10-CM

## 2020-02-20 RX ORDER — METRONIDAZOLE 500 MG/1
500 TABLET ORAL 3 TIMES DAILY
Qty: 15 TABLET | Refills: 2 | Status: SHIPPED | OUTPATIENT
Start: 2020-02-20 | End: 2020-03-18

## 2020-02-20 NOTE — TELEPHONE ENCOUNTER
1. I gave her diflucan, but I'm also sending flagyl for bv    2. Set up vagus only for the heavy cycles and possible fibroids    Order in,   Yay, thanks

## 2020-02-21 ENCOUNTER — TELEPHONE (OUTPATIENT)
Dept: OBSTETRICS AND GYNECOLOGY | Facility: CLINIC | Age: 45
End: 2020-02-21

## 2020-02-21 LAB
C TRACH DNA SPEC QL NAA+PROBE: NOT DETECTED
N GONORRHOEA DNA SPEC QL NAA+PROBE: NOT DETECTED

## 2020-02-21 RX ORDER — NAPROXEN 500 MG/1
500 TABLET ORAL 2 TIMES DAILY WITH MEALS
Qty: 20 TABLET | Refills: 2 | Status: SHIPPED | OUTPATIENT
Start: 2020-02-21 | End: 2020-03-22

## 2020-02-21 NOTE — TELEPHONE ENCOUNTER
----- Message from Hellen Baumann sent at 2/21/2020 10:00 AM CST -----  Contact: [phone]-self  Patient is requesting a call back concerning if the Dr Can send in a prescription for Pain. Please call

## 2020-02-28 NOTE — TELEPHONE ENCOUNTER
----- Message from Kristy Shay sent at 2/28/2020 11:47 AM CST -----  Contact: [phone] /self  Type:  Needs Medical Advice    Who Called:  self  Symptoms (please be specific):  Still itching    How long has patient had these symptoms:   Completed the treatment   Pharmacy name and phone #:   [id number] - [place] [place] - [address] [facility]  Would the patient rather a call back or a response via [facility]?  Call   Best Call Back Number:    Additional Information:  Please call to review her PAP results

## 2020-02-29 ENCOUNTER — HOSPITAL ENCOUNTER (OUTPATIENT)
Dept: RADIOLOGY | Facility: HOSPITAL | Age: 45
Discharge: HOME | End: 2020-02-29
Attending: OBSTETRICS & GYNECOLOGY
Payer: COMMERCIAL

## 2020-02-29 DIAGNOSIS — Z12.31 VISIT FOR SCREENING MAMMOGRAM: ICD-10-CM

## 2020-02-29 PROCEDURE — 77067 SCR MAMMO BI INCL CAD: CPT | Mod: 26,,, | Performed by: RADIOLOGY

## 2020-02-29 PROCEDURE — 77067 MAMMO DIGITAL SCREENING BILAT WITH TOMOSYNTHESIS_CAD: ICD-10-PCS | Mod: 26,,, | Performed by: RADIOLOGY

## 2020-02-29 PROCEDURE — 77067 SCR MAMMO BI INCL CAD: CPT | Mod: TC

## 2020-02-29 PROCEDURE — 77063 BREAST TOMOSYNTHESIS BI: CPT | Mod: 26,,, | Performed by: RADIOLOGY

## 2020-02-29 PROCEDURE — 77063 MAMMO DIGITAL SCREENING BILAT WITH TOMOSYNTHESIS_CAD: ICD-10-PCS | Mod: 26,,, | Performed by: RADIOLOGY

## 2020-03-02 RX ORDER — FLUCONAZOLE 100 MG/1
100 TABLET ORAL DAILY
Qty: 7 TABLET | Refills: 0 | Status: SHIPPED | OUTPATIENT
Start: 2020-03-02 | End: 2020-03-09

## 2020-03-04 ENCOUNTER — PATIENT OUTREACH (OUTPATIENT)
Dept: ADMINISTRATIVE | Facility: HOSPITAL | Age: 45
End: 2020-03-04

## 2020-03-04 DIAGNOSIS — E11.9 DIABETES MELLITUS WITHOUT COMPLICATION: Primary | ICD-10-CM

## 2020-03-12 ENCOUNTER — TELEPHONE (OUTPATIENT)
Dept: OBSTETRICS AND GYNECOLOGY | Facility: CLINIC | Age: 45
End: 2020-03-12

## 2020-03-12 NOTE — TELEPHONE ENCOUNTER
----- Message from Andreina Tamayo sent at 3/12/2020  3:48 PM CDT -----  Contact: [phone]  Pt is requesting to speak with you re: personal matter. Please advise

## 2020-03-13 RX ORDER — FLUCONAZOLE 100 MG/1
100 TABLET ORAL DAILY
Qty: 3 TABLET | Refills: 2 | Status: SHIPPED | OUTPATIENT
Start: 2020-03-13 | End: 2020-04-13 | Stop reason: SDUPTHER

## 2020-03-17 ENCOUNTER — TELEPHONE (OUTPATIENT)
Dept: OBSTETRICS AND GYNECOLOGY | Facility: CLINIC | Age: 45
End: 2020-03-17

## 2020-03-17 NOTE — TELEPHONE ENCOUNTER
----- Message from Marietta Yao sent at 3/17/2020  1:27 PM CDT -----  Pt requested a call back regard current medication isn't working.    Pt contact # [phone].      Thanks

## 2020-03-18 ENCOUNTER — LAB VISIT (OUTPATIENT)
Dept: LAB | Facility: HOSPITAL | Age: 45
End: 2020-03-18
Attending: INTERNAL MEDICINE
Payer: COMMERCIAL

## 2020-03-18 ENCOUNTER — OFFICE VISIT (OUTPATIENT)
Dept: INTERNAL MEDICINE | Facility: CLINIC | Age: 45
End: 2020-03-18
Payer: COMMERCIAL

## 2020-03-18 VITALS
SYSTOLIC BLOOD PRESSURE: 126 MMHG | TEMPERATURE: 99 F | OXYGEN SATURATION: 97 % | HEART RATE: 96 BPM | BODY MASS INDEX: 40.25 KG/M2 | WEIGHT: 250.44 LBS | HEIGHT: 66 IN | DIASTOLIC BLOOD PRESSURE: 72 MMHG

## 2020-03-18 DIAGNOSIS — N76.0 BACTERIAL VAGINOSIS: ICD-10-CM

## 2020-03-18 DIAGNOSIS — Z79.4 TYPE 2 DIABETES MELLITUS WITH DIABETIC POLYNEUROPATHY, WITH LONG-TERM CURRENT USE OF INSULIN: ICD-10-CM

## 2020-03-18 DIAGNOSIS — E78.5 HYPERLIPIDEMIA ASSOCIATED WITH TYPE 2 DIABETES MELLITUS: ICD-10-CM

## 2020-03-18 DIAGNOSIS — E11.69 HYPERLIPIDEMIA ASSOCIATED WITH TYPE 2 DIABETES MELLITUS: ICD-10-CM

## 2020-03-18 DIAGNOSIS — E11.42 TYPE 2 DIABETES MELLITUS WITH DIABETIC POLYNEUROPATHY, WITH LONG-TERM CURRENT USE OF INSULIN: ICD-10-CM

## 2020-03-18 DIAGNOSIS — Z79.4 TYPE 2 DIABETES MELLITUS WITH DIABETIC POLYNEUROPATHY, WITH LONG-TERM CURRENT USE OF INSULIN: Primary | ICD-10-CM

## 2020-03-18 DIAGNOSIS — B96.89 BACTERIAL VAGINOSIS: ICD-10-CM

## 2020-03-18 DIAGNOSIS — B37.9 YEAST INFECTION: ICD-10-CM

## 2020-03-18 DIAGNOSIS — E11.42 TYPE 2 DIABETES MELLITUS WITH DIABETIC POLYNEUROPATHY, WITH LONG-TERM CURRENT USE OF INSULIN: Primary | ICD-10-CM

## 2020-03-18 LAB
ALBUMIN SERPL BCP-MCNC: 3.7 G/DL (ref 3.5–5.2)
ALP SERPL-CCNC: 74 U/L (ref 55–135)
ALT SERPL W/O P-5'-P-CCNC: 31 U/L (ref 10–44)
ANION GAP SERPL CALC-SCNC: 11 MMOL/L (ref 8–16)
AST SERPL-CCNC: 21 U/L (ref 10–40)
BILIRUB SERPL-MCNC: 0.5 MG/DL (ref 0.1–1)
BUN SERPL-MCNC: 9 MG/DL (ref 6–20)
CALCIUM SERPL-MCNC: 9.7 MG/DL (ref 8.7–10.5)
CHLORIDE SERPL-SCNC: 103 MMOL/L (ref 95–110)
CHOLEST SERPL-MCNC: 142 MG/DL (ref 120–199)
CHOLEST/HDLC SERPL: 3.6 {RATIO} (ref 2–5)
CO2 SERPL-SCNC: 21 MMOL/L (ref 23–29)
CREAT SERPL-MCNC: 0.9 MG/DL (ref 0.5–1.4)
EST. GFR  (AFRICAN AMERICAN): >60 ML/MIN/1.73 M^2
EST. GFR  (NON AFRICAN AMERICAN): >60 ML/MIN/1.73 M^2
ESTIMATED AVG GLUCOSE: 318 MG/DL (ref 68–131)
GLUCOSE SERPL-MCNC: 270 MG/DL (ref 70–110)
HBA1C MFR BLD HPLC: 12.7 % (ref 4–5.6)
HDLC SERPL-MCNC: 39 MG/DL (ref 40–75)
HDLC SERPL: 27.5 % (ref 20–50)
LDLC SERPL CALC-MCNC: 87.8 MG/DL (ref 63–159)
NONHDLC SERPL-MCNC: 103 MG/DL
POTASSIUM SERPL-SCNC: 4.1 MMOL/L (ref 3.5–5.1)
PROT SERPL-MCNC: 7.6 G/DL (ref 6–8.4)
SODIUM SERPL-SCNC: 135 MMOL/L (ref 136–145)
TRIGL SERPL-MCNC: 76 MG/DL (ref 30–150)

## 2020-03-18 PROCEDURE — 99214 PR OFFICE/OUTPT VISIT, EST, LEVL IV, 30-39 MIN: ICD-10-PCS | Mod: S$GLB,,, | Performed by: INTERNAL MEDICINE

## 2020-03-18 PROCEDURE — 3046F PR MOST RECENT HEMOGLOBIN A1C LEVEL > 9.0%: ICD-10-PCS | Mod: CPTII,S$GLB,, | Performed by: INTERNAL MEDICINE

## 2020-03-18 PROCEDURE — 99214 OFFICE O/P EST MOD 30 MIN: CPT | Mod: S$GLB,,, | Performed by: INTERNAL MEDICINE

## 2020-03-18 PROCEDURE — 3046F HEMOGLOBIN A1C LEVEL >9.0%: CPT | Mod: CPTII,S$GLB,, | Performed by: INTERNAL MEDICINE

## 2020-03-18 PROCEDURE — 36415 COLL VENOUS BLD VENIPUNCTURE: CPT | Mod: PO

## 2020-03-18 PROCEDURE — 3008F BODY MASS INDEX DOCD: CPT | Mod: CPTII,S$GLB,, | Performed by: INTERNAL MEDICINE

## 2020-03-18 PROCEDURE — 80061 LIPID PANEL: CPT

## 2020-03-18 PROCEDURE — 3008F PR BODY MASS INDEX (BMI) DOCUMENTED: ICD-10-PCS | Mod: CPTII,S$GLB,, | Performed by: INTERNAL MEDICINE

## 2020-03-18 PROCEDURE — 3078F PR MOST RECENT DIASTOLIC BLOOD PRESSURE < 80 MM HG: ICD-10-PCS | Mod: CPTII,S$GLB,, | Performed by: INTERNAL MEDICINE

## 2020-03-18 PROCEDURE — 3074F SYST BP LT 130 MM HG: CPT | Mod: CPTII,S$GLB,, | Performed by: INTERNAL MEDICINE

## 2020-03-18 PROCEDURE — 83036 HEMOGLOBIN GLYCOSYLATED A1C: CPT

## 2020-03-18 PROCEDURE — 80053 COMPREHEN METABOLIC PANEL: CPT

## 2020-03-18 PROCEDURE — 99999 PR PBB SHADOW E&M-EST. PATIENT-LVL IV: CPT | Mod: PBBFAC,,, | Performed by: INTERNAL MEDICINE

## 2020-03-18 PROCEDURE — 3074F PR MOST RECENT SYSTOLIC BLOOD PRESSURE < 130 MM HG: ICD-10-PCS | Mod: CPTII,S$GLB,, | Performed by: INTERNAL MEDICINE

## 2020-03-18 PROCEDURE — 99999 PR PBB SHADOW E&M-EST. PATIENT-LVL IV: ICD-10-PCS | Mod: PBBFAC,,, | Performed by: INTERNAL MEDICINE

## 2020-03-18 PROCEDURE — 3078F DIAST BP <80 MM HG: CPT | Mod: CPTII,S$GLB,, | Performed by: INTERNAL MEDICINE

## 2020-03-18 RX ORDER — METRONIDAZOLE 500 MG/1
TABLET ORAL
COMMUNITY
Start: 2020-03-12 | End: 2020-03-18

## 2020-03-18 RX ORDER — INSULIN GLARGINE 100 [IU]/ML
15 INJECTION, SOLUTION SUBCUTANEOUS NIGHTLY
Qty: 15 ML | Refills: 3 | Status: SHIPPED | OUTPATIENT
Start: 2020-03-18 | End: 2020-04-23

## 2020-03-18 RX ORDER — FLUCONAZOLE 100 MG/1
TABLET ORAL
COMMUNITY
Start: 2020-03-13 | End: 2020-03-18

## 2020-03-18 RX ORDER — INSULIN GLARGINE 100 [IU]/ML
15 INJECTION, SOLUTION SUBCUTANEOUS NIGHTLY
Qty: 15 ML | Refills: 3 | Status: SHIPPED | OUTPATIENT
Start: 2020-03-18 | End: 2020-03-18 | Stop reason: SDUPTHER

## 2020-03-18 RX ORDER — CLOTRIMAZOLE AND BETAMETHASONE DIPROPIONATE 10; .64 MG/G; MG/G
CREAM TOPICAL
Qty: 30 G | Refills: 1 | Status: SHIPPED | OUTPATIENT
Start: 2020-03-18 | End: 2021-03-18

## 2020-03-18 RX ORDER — GABAPENTIN 300 MG/1
300 CAPSULE ORAL 3 TIMES DAILY
Qty: 90 CAPSULE | Refills: 11 | Status: SHIPPED | OUTPATIENT
Start: 2020-03-18 | End: 2020-05-05 | Stop reason: SDUPTHER

## 2020-03-18 RX ORDER — METFORMIN HYDROCHLORIDE 1000 MG/1
1000 TABLET ORAL 2 TIMES DAILY WITH MEALS
Qty: 180 TABLET | Refills: 1 | Status: SHIPPED | OUTPATIENT
Start: 2020-03-18 | End: 2021-03-26 | Stop reason: SDUPTHER

## 2020-03-18 RX ORDER — METRONIDAZOLE 500 MG/1
500 TABLET ORAL EVERY 12 HOURS
Qty: 14 TABLET | Refills: 0 | Status: SHIPPED | OUTPATIENT
Start: 2020-03-18 | End: 2020-03-25

## 2020-03-18 RX ORDER — FLUCONAZOLE 150 MG/1
150 TABLET ORAL DAILY
Qty: 7 TABLET | Refills: 0 | Status: SHIPPED | OUTPATIENT
Start: 2020-03-18 | End: 2020-03-25

## 2020-03-18 NOTE — PATIENT INSTRUCTIONS
Check blood sugar before meals and at bedtime (total of 4 times per day).  Please return to clinic in 1 month with this log of your blood sugar measurements.

## 2020-03-18 NOTE — TELEPHONE ENCOUNTER
Called patient, informed patient provider sent in otrisone cream for her to try. Informed patient to try the cream and see if that helps if not to call the office back.

## 2020-03-18 NOTE — TELEPHONE ENCOUNTER
----- Message from Madhuri Burch sent at 3/18/2020  2:06 PM CDT -----  Contact: Self [phone]  Patient would like to speak with you about her medication. Please advise

## 2020-03-18 NOTE — PROGRESS NOTES
Patient ID: Hamzah Reis is a 44 y.o. female.    Chief Complaint: Follow-up (med refills, yeast infection)    MACI Goodson is a 44 y.o. female with type II DM and hyperlipidemia who presents for follow-up of these medical conditions.  Patient was last seen in December and was supposed to follow-up 1 month later.  She reports that she ran out of her Levemir insulin in January.  States that her insurance company does not want to pay for Levemir.  She is currently just taking glipizide 5 mg p.o. daily and metformin 500 mg p.o. daily.  She has been checking her blood sugars occasionally.  She admits that most have been in the 200s and she has seen some in the 300s.  She has not brought a log of blood glucose measurements with her today.  Patient states she has made efforts to improve her diet.  She no longer eats fast food and no longer drinks sodas.    Patient complains of a burning pain in the toes of her feet.  Onset was a few weeks or months ago.  She finds it to be more noticeable at nighttime.  She requests a referral to podiatry.      She also reports that she recently saw her OBGYN and was diagnosed with bacterial vaginosis and yeast infections.  She has had courses of Flagyl and Diflucan respectively for treatment.  She just finished her courses of Flagyl and Diflucan and reports that her symptoms have returned.    Review of Systems   Genitourinary:        Vaginal itching   Neurological:        Pain in toes   All other systems reviewed and are negative.        Objective:     Vitals:    03/18/20 1016   BP: 126/72   Pulse: 96   Temp: 98.5 °F (36.9 °C)        Physical Exam   Constitutional: She is oriented to person, place, and time. She appears well-developed and well-nourished. No distress.   HENT:   Head: Normocephalic and atraumatic.   Right Ear: External ear normal.   Left Ear: External ear normal.   Nose: Nose normal.   Eyes: Conjunctivae and EOM are normal. Right eye exhibits no discharge. Left eye  exhibits no discharge. No scleral icterus.   Cardiovascular: Normal rate, regular rhythm, normal heart sounds and intact distal pulses. Exam reveals no gallop and no friction rub.   No murmur heard.  Pulses:       Dorsalis pedis pulses are 2+ on the right side, and 2+ on the left side.   Pulmonary/Chest: Effort normal and breath sounds normal. No stridor. No respiratory distress. She has no wheezes. She has no rales.   Musculoskeletal:        Right foot: There is normal range of motion and no deformity.        Left foot: There is normal range of motion and no deformity.   Feet:   Right Foot:   Protective Sensation: 10 sites tested. 10 sites sensed.   Skin Integrity: Negative for ulcer, blister, skin breakdown, erythema, warmth, callus or dry skin.   Left Foot:   Protective Sensation: 10 sites tested. 10 sites sensed.   Skin Integrity: Negative for ulcer, blister, skin breakdown, erythema, warmth, callus or dry skin.   Neurological: She is alert and oriented to person, place, and time.   Skin: Skin is warm and dry. She is not diaphoretic.   Psychiatric: She has a normal mood and affect. Her behavior is normal. Judgment and thought content normal.   Vitals reviewed.      Assessment:       1. Type 2 diabetes mellitus with diabetic polyneuropathy, with long-term current use of insulin Poorly controlled   2. Yeast infection Active   3. Bacterial vaginosis Active   4. Hyperlipidemia associated with type 2 diabetes mellitus Well controlled       Plan:     Discussed with patient that she is at risk of infections (such as yeast infections) because of her uncontrolled diabetes.   We are changing her diabetes regimen today.  I am restarting her on insulin (Lantus) at 15 units nightly as she was not well controlled even on 10 units of Levemir in the past.  We are discontinuing glipizide.  I have increased her metformin dose to 1000 mg p.o. b.i.d..  She was given instructions to check her blood sugar before meals and at bedtime.   She is to report back to clinic in 1 month with this blood sugar log so that I can make further adjustments.  Starting gabapentin for diabetic neuropathy.  Referral placed to Podiatry as requested.    Type 2 diabetes mellitus with diabetic polyneuropathy, with long-term current use of insulin  -     insulin (LANTUS SOLOSTAR U-100 INSULIN) glargine 100 units/mL (3mL) SubQ pen; Inject 15 Units into the skin every evening.  Dispense: 15 mL; Refill: 3  -     metFORMIN (GLUCOPHAGE) 1000 MG tablet; Take 1 tablet (1,000 mg total) by mouth 2 (two) times daily with meals.  Dispense: 180 tablet; Refill: 1  -     Hemoglobin A1c; Future; Expected date: 03/18/2020  -     Lipid panel; Future; Expected date: 03/18/2020  -     Comprehensive metabolic panel; Future; Expected date: 03/18/2020  -     gabapentin (NEURONTIN) 300 MG capsule; Take 1 capsule (300 mg total) by mouth 3 (three) times daily.  Dispense: 90 capsule; Refill: 11  -     Ambulatory referral/consult to Podiatry; Future; Expected date: 03/25/2020    Yeast infection  -     fluconazole (DIFLUCAN) 150 MG Tab; Take 1 tablet (150 mg total) by mouth once daily. for 7 days  Dispense: 7 tablet; Refill: 0    Bacterial vaginosis  -     metroNIDAZOLE (FLAGYL) 500 MG tablet; Take 1 tablet (500 mg total) by mouth every 12 (twelve) hours. for 7 days  Dispense: 14 tablet; Refill: 0    Hyperlipidemia associated with type 2 diabetes mellitus  Comments:  Continue statin medication.      25 min spent face-to-face with patient in room with greater than 50% of time spent in discussion of diabetes management.  RTC one month     Warning signs discussed, patient to call with any further issues or worsening of symptoms.       Parts of the above note were dictated using a voice dictation software. Please excuse any grammatical or typographical errors.

## 2020-03-23 ENCOUNTER — TELEPHONE (OUTPATIENT)
Dept: OBSTETRICS AND GYNECOLOGY | Facility: CLINIC | Age: 45
End: 2020-03-23

## 2020-03-23 LAB
FINAL PATHOLOGIC DIAGNOSIS: NORMAL
Lab: NORMAL

## 2020-03-23 NOTE — TELEPHONE ENCOUNTER
----- Message from Michael A. Wiedemann, MD sent at 3/23/2020  2:04 PM CDT -----  Notify the patient that her pap was normal

## 2020-03-24 DIAGNOSIS — M79.604 PAIN OF RIGHT LOWER EXTREMITY: ICD-10-CM

## 2020-03-24 RX ORDER — IBUPROFEN 800 MG/1
800 TABLET ORAL 3 TIMES DAILY PRN
Qty: 60 TABLET | Refills: 0 | Status: SHIPPED | OUTPATIENT
Start: 2020-03-24 | End: 2020-04-09

## 2020-04-08 ENCOUNTER — TELEPHONE (OUTPATIENT)
Dept: INTERNAL MEDICINE | Facility: CLINIC | Age: 45
End: 2020-04-08

## 2020-04-08 ENCOUNTER — PATIENT MESSAGE (OUTPATIENT)
Dept: INTERNAL MEDICINE | Facility: CLINIC | Age: 45
End: 2020-04-08

## 2020-04-08 ENCOUNTER — LAB VISIT (OUTPATIENT)
Dept: PRIMARY CARE CLINIC | Facility: CLINIC | Age: 45
End: 2020-04-08
Payer: COMMERCIAL

## 2020-04-08 ENCOUNTER — OFFICE VISIT (OUTPATIENT)
Dept: INTERNAL MEDICINE | Facility: CLINIC | Age: 45
End: 2020-04-08
Payer: COMMERCIAL

## 2020-04-08 DIAGNOSIS — R05.9 COUGH: ICD-10-CM

## 2020-04-08 DIAGNOSIS — B34.9 VIRAL ILLNESS: ICD-10-CM

## 2020-04-08 DIAGNOSIS — R11.0 NAUSEA: ICD-10-CM

## 2020-04-08 DIAGNOSIS — B34.9 VIRAL ILLNESS: Primary | ICD-10-CM

## 2020-04-08 PROCEDURE — 99213 OFFICE O/P EST LOW 20 MIN: CPT | Mod: 95,,, | Performed by: INTERNAL MEDICINE

## 2020-04-08 PROCEDURE — U0002 COVID-19 LAB TEST NON-CDC: HCPCS

## 2020-04-08 PROCEDURE — 99213 PR OFFICE/OUTPT VISIT, EST, LEVL III, 20-29 MIN: ICD-10-PCS | Mod: 95,,, | Performed by: INTERNAL MEDICINE

## 2020-04-08 RX ORDER — BENZONATATE 100 MG/1
100 CAPSULE ORAL 3 TIMES DAILY PRN
Qty: 30 CAPSULE | Refills: 0 | Status: SHIPPED | OUTPATIENT
Start: 2020-04-08 | End: 2020-04-18

## 2020-04-08 RX ORDER — ONDANSETRON 4 MG/1
4 TABLET, FILM COATED ORAL EVERY 8 HOURS PRN
Qty: 30 TABLET | Refills: 0 | Status: SHIPPED | OUTPATIENT
Start: 2020-04-08 | End: 2020-09-04

## 2020-04-08 RX ORDER — PROMETHAZINE HYDROCHLORIDE AND DEXTROMETHORPHAN HYDROBROMIDE 6.25; 15 MG/5ML; MG/5ML
5 SYRUP ORAL EVERY 6 HOURS PRN
Qty: 118 ML | Refills: 0 | Status: SHIPPED | OUTPATIENT
Start: 2020-04-08 | End: 2020-04-18

## 2020-04-08 NOTE — TELEPHONE ENCOUNTER
----- Message from Magui Reyes sent at 4/8/2020  9:54 AM CDT -----  Contact: PAtient  Type:  Needs Medical Advice    Who Called: Shamica  Symptoms (please be specific): Feels really Whole body aching No fever Loss appetite  How long has patient had these symptoms:  Since Monday  Pharmacy name and phone #:    Would the patient rather a call back or a response via [facility]? Call back  Best Call Back Number: [phone]  Additional Information: Patient called crying and stating she is in a lot of pain

## 2020-04-08 NOTE — PROGRESS NOTES
Patient ID: Hamzah Reis is a 44 y.o. female.    Chief Complaint: No chief complaint on file.    The patient location is: [place]  The chief complaint leading to consultation is: not feeling well  Visit type: Virtual visit with synchronous audio and video  Total time spent with patient: 15 minutes  Each patient to whom he or she provides medical services by telemedicine is:  (1) informed of the relationship between the physician and patient and the respective role of any other health care provider with respect to management of the patient; and (2) notified that he or she may decline to receive medical services by telemedicine and may withdraw from such care at any time.      MACI Goodson is a 44 y.o. female who presents today with chief complaint of not feeling well.  Onset of symptoms was Saturday (4 days ago).  Patient is having headaches with associated symptoms of sore throat, myalgias, low-grade fever, cough, nausea, fatigue, decreased appetite, and abdominal pain.  Symptoms are constant in duration.  Patient has tried taking TheraFlu and Tylenol, but with no relief of her symptoms.  Nothing is making symptoms better.  Patient denies any associated symptoms of diarrhea or ear pain.  Temperature reached a T-max of 99.7°.  Patient works at [employer].  Reports that 2 associates at [employer] have tested positive for COVID-19.  Patient would like an excuse, excusing her from work until April 19th.    Review of Systems   Constitutional: Positive for fatigue and fever.   HENT: Positive for sore throat. Negative for ear pain, sinus pressure and sinus pain.    Respiratory: Positive for cough.    Gastrointestinal: Positive for nausea. Negative for diarrhea and vomiting.   Musculoskeletal: Positive for myalgias.   Neurological: Positive for headaches.       Objective:   There were no vitals filed for this visit.     Physical Exam   Constitutional: She is oriented to person, place, and time. She appears well-developed  and well-nourished. No distress.   Appears fatigued, lying on bed   HENT:   Head: Normocephalic and atraumatic.   Right Ear: External ear normal.   Left Ear: External ear normal.   Nose: Nose normal.   Eyes: Conjunctivae and EOM are normal.   Pulmonary/Chest: Effort normal. No respiratory distress.   Neurological: She is alert and oriented to person, place, and time.   Skin: No rash noted. She is not diaphoretic. No erythema. No pallor.   Psychiatric: She has a normal mood and affect. Her behavior is normal. Judgment and thought content normal.       Assessment:       1. Viral illness Active   2. Nausea Active   3. Cough Active       Plan:     Discussed with patient that her symptoms sound consistent with a viral illness. Sending her for COVID testing. I will give her appropriate return to work instructions once I have test results. For now, she was given a work excuse for today and tomorrow. Advised rest, hydration, tylenol for fever and myalgias and medications as prescribed below for symptomatic relief. Pt will alert me or my staff for worsening symptoms.    Viral illness  -     SARS- CoV-2 (COVID-19) QUALITATIVE PCR; Future; Expected date: 04/08/2020    Nausea  Comments:  Secondary to #1  Orders:  -     ondansetron (ZOFRAN) 4 MG tablet; Take 1 tablet (4 mg total) by mouth every 8 (eight) hours as needed for Nausea.  Dispense: 30 tablet; Refill: 0    Cough  Comments:  Secondary to #1  Orders:  -     promethazine-dextromethorphan (PROMETHAZINE-DM) 6.25-15 mg/5 mL Syrp; Take 5 mLs by mouth every 6 (six) hours as needed.  Dispense: 118 mL; Refill: 0  -     benzonatate (TESSALON) 100 MG capsule; Take 1 capsule (100 mg total) by mouth 3 (three) times daily as needed for Cough.  Dispense: 30 capsule; Refill: 0        RTC PRN    Warning signs discussed, patient to call with any further issues or worsening of symptoms.       Parts of the above note were dictated using a voice dictation software. Please excuse any  grammatical or typographical errors.

## 2020-04-08 NOTE — LETTER
April 8, 2020      [facility]  [address]  [place] [address]  Phone: [phone]  Fax: [phone]       Patient: Hamzah Reis   YOB: 1975  Date of Visit: 04/08/2020    To Whom It May Concern:    Jaciel Reis  was at [facility] on 04/08/2020. Please excuse her from work today April 8th and tomorrow April 9th. If you have any questions or concerns, or if I can be of further assistance, please do not hesitate to contact me.    Sincerely,    Tatum Santos MD

## 2020-04-09 ENCOUNTER — TELEPHONE (OUTPATIENT)
Dept: INTERNAL MEDICINE | Facility: CLINIC | Age: 45
End: 2020-04-09

## 2020-04-09 ENCOUNTER — PATIENT MESSAGE (OUTPATIENT)
Dept: INTERNAL MEDICINE | Facility: CLINIC | Age: 45
End: 2020-04-09

## 2020-04-09 DIAGNOSIS — U07.1 COVID-19 VIRUS INFECTION: Primary | ICD-10-CM

## 2020-04-09 DIAGNOSIS — M79.604 PAIN OF RIGHT LOWER EXTREMITY: ICD-10-CM

## 2020-04-09 LAB — SARS-COV-2 RNA RESP QL NAA+PROBE: DETECTED

## 2020-04-09 RX ORDER — IBUPROFEN 400 MG/1
400 TABLET ORAL 3 TIMES DAILY PRN
Qty: 30 TABLET | Refills: 1 | Status: SHIPPED | OUTPATIENT
Start: 2020-04-09 | End: 2020-10-05 | Stop reason: SDUPTHER

## 2020-04-09 NOTE — TELEPHONE ENCOUNTER
Spoke with patient this morning.  She is aware of positive COVID test.  Patient states she has been trying Tylenol, but this has not been providing relief of fever or body aches.  She is wondering if she can use ibuprofen.  I informed her that there is a theoretical risk with ibuprofen, but no data yet to support this.  It is okay for her to use some ibuprofen as needed for relief of fever or body aches.  We are setting her up for the COVID Home monitoring program.  She was instructed to immediately go to the ER for any symptoms of respiratory distress such as severe shortness of breath or difficulty breathing.  All questions answered.

## 2020-04-12 ENCOUNTER — NURSE TRIAGE (OUTPATIENT)
Dept: ADMINISTRATIVE | Facility: CLINIC | Age: 45
End: 2020-04-12

## 2020-04-12 NOTE — TELEPHONE ENCOUNTER
Pt c/o back and leg pain with no relief from use of extra strength tylenol and ibuprofen. Denies fever, sob, and chest pain. Pt advised to contact provider referring to back and leg pain. Pt concerned about lack of appetite. Advised pt to stay hydrated and to add food back as tolerated starting with bland food. Advised to continue to monitor and care for symptoms at home.    Reason for Disposition   1] COVID-19 infection diagnosed or suspected AND [2] mild symptoms (fever, cough) AND [2] no trouble breathing or other complications    Additional Information   Negative: SEVERE difficulty breathing (e.g., struggling for each breath, speaks in single words)   Negative: Difficult to awaken or acting confused (e.g., disoriented, slurred speech)   Negative: Bluish (or gray) lips or face now   Negative: Shock suspected (e.g., cold/pale/clammy skin, too weak to stand, low BP, rapid pulse)   Negative: Sounds like a life-threatening emergency to the triager   Negative: [1] COVID-19 suspected (e.g., cough, fever, shortness of breath) AND [2] public health department recommends testing   Negative: [1] COVID-19 exposure AND [2] no symptoms   Negative: COVID-19 and Breastfeeding, questions about   Negative: SEVERE or constant chest pain (Exception: mild central chest pain, present only when coughing)   Negative: MODERATE difficulty breathing (e.g., speaks in phrases, SOB even at rest, pulse 100-120)   Negative: Patient sounds very sick or weak to the triager   Negative: MILD difficulty breathing (e.g., minimal/no SOB at rest, SOB with walking, pulse <100)   Negative: Chest pain   Negative: Fever > 103 F (39.4 C)   Negative: [1] Fever > 101 F (38.3 C) AND [2] age > 60   Negative: [1] Fever > 100.0 F (37.8 C) AND [2] bedridden (e.g., nursing home patient, CVA, chronic illness, recovering from surgery)   Negative: HIGH RISK patient (e.g., age > 64 years, diabetes, heart or lung disease, weak immune system)    Negative: Fever present > 3 days (72 hours)   Negative: [1] Fever returns after gone for over 24 hours AND [2] symptoms worse or not improved   Negative: [1] Continuous (nonstop) coughing interferes with work or school AND [2] no improvement using cough treatment per protocol   Negative: Cough present > 3 weeks    Protocols used: CORONAVIRUS (COVID-19) DIAGNOSED OR [id number]

## 2020-04-13 ENCOUNTER — TELEPHONE (OUTPATIENT)
Dept: INTERNAL MEDICINE | Facility: CLINIC | Age: 45
End: 2020-04-13

## 2020-04-13 DIAGNOSIS — T14.8XXA MUSCLE STRAIN: Primary | ICD-10-CM

## 2020-04-13 RX ORDER — CYCLOBENZAPRINE HCL 5 MG
5 TABLET ORAL 3 TIMES DAILY PRN
Qty: 30 TABLET | Refills: 0 | Status: SHIPPED | OUTPATIENT
Start: 2020-04-13 | End: 2020-04-23

## 2020-04-13 NOTE — TELEPHONE ENCOUNTER
Spoke with patient, informed patient of prescription sent to Pharmacy and recommendations. Patient verbalizes understanding.

## 2020-04-13 NOTE — TELEPHONE ENCOUNTER
Patient reports severe back and leg pain, started a couple days ago, has tried tylenol, topical rubs, and heating pad, nothing helping. Patient reports she cant sleep. Will report to Doctor and call patient back with recommendations, patient verbalizes understanding.

## 2020-04-14 RX ORDER — FLUCONAZOLE 100 MG/1
100 TABLET ORAL DAILY
Qty: 3 TABLET | Refills: 2 | Status: SHIPPED | OUTPATIENT
Start: 2020-04-14 | End: 2020-07-13 | Stop reason: SDUPTHER

## 2020-04-16 ENCOUNTER — NURSE TRIAGE (OUTPATIENT)
Dept: ADMINISTRATIVE | Facility: CLINIC | Age: 45
End: 2020-04-16

## 2020-04-16 ENCOUNTER — HOSPITAL ENCOUNTER (EMERGENCY)
Facility: HOSPITAL | Age: 45
Discharge: HOME | End: 2020-04-16
Payer: COMMERCIAL

## 2020-04-16 VITALS
BODY MASS INDEX: 40.18 KG/M2 | OXYGEN SATURATION: 99 % | SYSTOLIC BLOOD PRESSURE: 124 MMHG | DIASTOLIC BLOOD PRESSURE: 77 MMHG | TEMPERATURE: 100 F | RESPIRATION RATE: 24 BRPM | WEIGHT: 250 LBS | HEIGHT: 66 IN | HEART RATE: 107 BPM

## 2020-04-16 DIAGNOSIS — R07.89 CHEST WALL PAIN: Primary | ICD-10-CM

## 2020-04-16 DIAGNOSIS — R07.9 CHEST PAIN: ICD-10-CM

## 2020-04-16 LAB
ALBUMIN SERPL BCP-MCNC: 3.4 G/DL (ref 3.5–5.2)
ALP SERPL-CCNC: 47 U/L (ref 38–126)
ALT SERPL W/O P-5'-P-CCNC: 40 U/L (ref 10–44)
ANION GAP SERPL CALC-SCNC: 8 MMOL/L (ref 8–16)
AST SERPL-CCNC: 30 U/L (ref 15–46)
BASOPHILS # BLD AUTO: 0.01 K/UL (ref 0–0.2)
BASOPHILS NFR BLD: 0.2 % (ref 0–1.9)
BILIRUB SERPL-MCNC: 0.4 MG/DL (ref 0.1–1)
BUN SERPL-MCNC: 5 MG/DL (ref 7–17)
CALCIUM SERPL-MCNC: 7.6 MG/DL (ref 8.7–10.5)
CHLORIDE SERPL-SCNC: 103 MMOL/L (ref 95–110)
CO2 SERPL-SCNC: 22 MMOL/L (ref 23–29)
CREAT SERPL-MCNC: 0.48 MG/DL (ref 0.5–1.4)
DIFFERENTIAL METHOD: ABNORMAL
EOSINOPHIL # BLD AUTO: 0 K/UL (ref 0–0.5)
EOSINOPHIL NFR BLD: 0 % (ref 0–8)
ERYTHROCYTE [DISTWIDTH] IN BLOOD BY AUTOMATED COUNT: 13 % (ref 11.5–14.5)
EST. GFR  (AFRICAN AMERICAN): >60 ML/MIN/1.73 M^2
EST. GFR  (NON AFRICAN AMERICAN): >60 ML/MIN/1.73 M^2
GLUCOSE SERPL-MCNC: 223 MG/DL (ref 70–110)
HCT VFR BLD AUTO: 46.7 % (ref 37–48.5)
HGB BLD-MCNC: 15.2 G/DL (ref 12–16)
IMM GRANULOCYTES # BLD AUTO: 0.03 K/UL (ref 0–0.04)
IMM GRANULOCYTES NFR BLD AUTO: 0.7 % (ref 0–0.5)
LYMPHOCYTES # BLD AUTO: 2 K/UL (ref 1–4.8)
LYMPHOCYTES NFR BLD: 45.3 % (ref 18–48)
MCH RBC QN AUTO: 26.1 PG (ref 27–31)
MCHC RBC AUTO-ENTMCNC: 32.5 G/DL (ref 32–36)
MCV RBC AUTO: 80 FL (ref 82–98)
MONOCYTES # BLD AUTO: 0.4 K/UL (ref 0.3–1)
MONOCYTES NFR BLD: 8.1 % (ref 4–15)
NEUTROPHILS # BLD AUTO: 2 K/UL (ref 1.8–7.7)
NEUTROPHILS NFR BLD: 45.7 % (ref 38–73)
NRBC BLD-RTO: 0 /100 WBC
PLATELET # BLD AUTO: 308 K/UL (ref 150–350)
PMV BLD AUTO: 10.6 FL (ref 9.2–12.9)
POTASSIUM SERPL-SCNC: 3.2 MMOL/L (ref 3.5–5.1)
PROT SERPL-MCNC: 6.5 G/DL (ref 6–8.4)
RBC # BLD AUTO: 5.82 M/UL (ref 4–5.4)
SODIUM SERPL-SCNC: 133 MMOL/L (ref 136–145)
TROPONIN I SERPL DL<=0.01 NG/ML-MCNC: <0.012 NG/ML (ref 0.01–0.03)
TSH SERPL DL<=0.005 MIU/L-ACNC: 1.88 UIU/ML (ref 0.4–4)
WBC # BLD AUTO: 4.46 K/UL (ref 3.9–12.7)

## 2020-04-16 PROCEDURE — 96374 THER/PROPH/DIAG INJ IV PUSH: CPT | Mod: ER

## 2020-04-16 PROCEDURE — 84484 ASSAY OF TROPONIN QUANT: CPT | Mod: ER

## 2020-04-16 PROCEDURE — 85025 COMPLETE CBC W/AUTO DIFF WBC: CPT | Mod: ER

## 2020-04-16 PROCEDURE — 84443 ASSAY THYROID STIM HORMONE: CPT | Mod: ER

## 2020-04-16 PROCEDURE — 93010 ELECTROCARDIOGRAM REPORT: CPT | Mod: ,,, | Performed by: INTERNAL MEDICINE

## 2020-04-16 PROCEDURE — 80053 COMPREHEN METABOLIC PANEL: CPT | Mod: ER

## 2020-04-16 PROCEDURE — 94760 N-INVAS EAR/PLS OXIMETRY 1: CPT | Mod: ER

## 2020-04-16 PROCEDURE — 93005 ELECTROCARDIOGRAM TRACING: CPT | Mod: ER

## 2020-04-16 PROCEDURE — 63600175 PHARM REV CODE 636 W HCPCS: Mod: ER | Performed by: NURSE PRACTITIONER

## 2020-04-16 PROCEDURE — 25000003 PHARM REV CODE 250: Mod: ER | Performed by: NURSE PRACTITIONER

## 2020-04-16 PROCEDURE — 63700000 PHARM REV CODE 250 ALT 637 W/O HCPCS: Mod: ER | Performed by: NURSE PRACTITIONER

## 2020-04-16 PROCEDURE — 93010 EKG 12-LEAD: ICD-10-PCS | Mod: ,,, | Performed by: INTERNAL MEDICINE

## 2020-04-16 PROCEDURE — 99285 EMERGENCY DEPT VISIT HI MDM: CPT | Mod: 25,ER

## 2020-04-16 RX ORDER — ACETAMINOPHEN 500 MG
1000 TABLET ORAL
Status: COMPLETED | OUTPATIENT
Start: 2020-04-16 | End: 2020-04-16

## 2020-04-16 RX ORDER — ONDANSETRON 2 MG/ML
4 INJECTION INTRAMUSCULAR; INTRAVENOUS
Status: COMPLETED | OUTPATIENT
Start: 2020-04-16 | End: 2020-04-16

## 2020-04-16 RX ORDER — POTASSIUM CHLORIDE 20 MEQ/15ML
40 SOLUTION ORAL
Status: COMPLETED | OUTPATIENT
Start: 2020-04-16 | End: 2020-04-16

## 2020-04-16 RX ADMIN — ACETAMINOPHEN 1000 MG: 500 TABLET ORAL at 02:04

## 2020-04-16 RX ADMIN — ONDANSETRON 4 MG: 2 INJECTION INTRAMUSCULAR; INTRAVENOUS at 02:04

## 2020-04-16 RX ADMIN — POTASSIUM CHLORIDE 40 MEQ: 1.5 SOLUTION ORAL at 04:04

## 2020-04-16 NOTE — DISCHARGE INSTRUCTIONS
"Given the current circumstances in the community regarding corona virus, you should presumptively treat yourself as if you do have Coronavirus / Covid 19 and quarantine yourself at home.  Please read the and follow the instructions of the discharge paperwork reguarding "Commonly asked Questions About Home Quarantine" provided by the CDC.    If you have significantly worsening shortness of breath, difficulty breathing, severe chest pain, return to the emergency room.  Take over-the-counter medications and any other prescriptions provided for symptoms of fever, cough, cold, etc..    "

## 2020-04-16 NOTE — ED PROVIDER NOTES
Encounter Date: 2020       History     Chief Complaint   Patient presents with    Cough     pt reports being diagnosed with COVID-19 on 20; reports intermittent sharp center chest discomfort today; denies SOB; +nausea    Chest Pain     44-year-old female presents to the emergency room with sharp center and chest pain and shortness of breath nausea states vomited twice this a.m..  Patient was tested for COVID-19 last Thursday and was positive.  Patient states that until today she had not had fever that she is aware of.  Patient rates chest pain 8/10.  She describes the pain as midsternal without radiation.  Patient states a stabbing like sensation increase in pain with cough patient denies a history of any heart disease.  Also denies abdominal pain.    Past medical history: Depression,Diabetes mellitus, type 2 , Hyperlipidemia, Hypertension             Review of patient's allergies indicates:  No Known Allergies  Past Medical History:   Diagnosis Date    Depression     Diabetes mellitus, type 2     Hyperlipidemia     Hypertension     Vision impairment     wears glasses     History reviewed. No pertinent surgical history.  Family History   Problem Relation Age of Onset    Hypertension Mother 55            Diabetes Mother     Cancer Mother         cervical    Heart disease Father     Diabetes Sister     Hypertension Sister     Cancer Sister         cervical    Breast cancer Sister     Diabetes Maternal Grandmother      Social History     Tobacco Use    Smoking status: Never Smoker    Smokeless tobacco: Never Used   Substance Use Topics    Alcohol use: No    Drug use: No     Review of Systems   Constitutional: Negative for fever.   HENT: Negative for sore throat.    Respiratory: Positive for cough and shortness of breath. Negative for chest tightness.    Cardiovascular: Positive for chest pain.   Gastrointestinal: Positive for nausea and vomiting. Negative for abdominal distention,  abdominal pain, blood in stool and constipation.   Genitourinary: Negative for dysuria.   Musculoskeletal: Negative for back pain.   Skin: Negative for rash.   Neurological: Negative for dizziness and weakness.   Hematological: Does not bruise/bleed easily.   Psychiatric/Behavioral: Negative for agitation and behavioral problems.       Physical Exam     Initial Vitals [04/16/20 1329]   BP Pulse Resp Temp SpO2   (!) 136/91 (!) 123 20 99.6 °F (37.6 °C) 95 %      MAP       --         Physical Exam    Nursing note and vitals reviewed.  Constitutional: She appears well-developed and well-nourished.   HENT:   Head: Normocephalic.   Neck: Normal range of motion.   Cardiovascular: Regular rhythm and normal heart sounds.   Pulmonary/Chest: Breath sounds normal. She has no wheezes. She has no rhonchi. She has no rales. She exhibits no tenderness.   Abdominal: Soft. Bowel sounds are normal.   Neurological: She is alert and oriented to person, place, and time. GCS score is 15. GCS eye subscore is 4. GCS verbal subscore is 5. GCS motor subscore is 6.   Skin: Skin is warm and dry. Capillary refill takes less than 2 seconds.   Psychiatric: She has a normal mood and affect. Thought content normal.         ED Course   Procedures  Labs Reviewed   CBC W/ AUTO DIFFERENTIAL - Abnormal; Notable for the following components:       Result Value    RBC 5.82 (*)     Mean Corpuscular Volume 80 (*)     Mean Corpuscular Hemoglobin 26.1 (*)     Immature Granulocytes 0.7 (*)     All other components within normal limits   COMPREHENSIVE METABOLIC PANEL - Abnormal; Notable for the following components:    Sodium 133 (*)     Potassium 3.2 (*)     CO2 22 (*)     Glucose 223 (*)     BUN, Bld 5 (*)     Creatinine 0.48 (*)     Calcium 7.6 (*)     Albumin 3.4 (*)     All other components within normal limits   TROPONIN I   TSH     EKG Readings: (Independently Interpreted)   Previous EKG: Compared with most recent EKG Previous EKG Date: 08/2019. Ectopy:  No Ectopy. Clinical Impression: Sinus Tachycardia   112 bpm, sinus tachycardia, no STEMI,      ECG Results          EKG 12-lead (Final result)  Result time 04/16/20 14:13:52    Final result by Interface, Lab In [facility] (04/16/20 14:13:52)                 Narrative:    Test Reason : R07.9,    Vent. Rate : 112 BPM     Atrial Rate : 112 BPM     P-R Int : 164 ms          QRS Dur : 082 ms      QT Int : 328 ms       P-R-T Axes : 069 246 062 degrees     QTc Int : 447 ms    Sinus tachycardia  When compared with ECG of 23-AUG-2019 12:44,  No significant change was found other then sinus tachycardia   Confirmed by IVELISSE GARRETT MD ([id number]) on 4/16/2020 2:13:40 PM    Referred By: [email or web address]   SELF           Confirmed By:IVELISSE GARRETT MD                            Imaging Results          X-Ray Chest AP Portable (Final result)  Result time 04/16/20 15:21:03    Final result by Bassam Alcaraz Jr., MD (04/16/20 15:21:03)                 Impression:      No acute findings or interval change.      Electronically signed by: Bassam Alcaraz Jr., MD  Date:    04/16/2020  Time:    15:21             Narrative:    EXAMINATION:  XR CHEST AP PORTABLE    CLINICAL HISTORY:  Chest Pain;    COMPARISON:  Prior radiograph from August 9, 2018.    FINDINGS:  Lungs are clear.  No pleural fluid or pneumothorax.  Heart size within normal limits.  No significant bony findings.                                 Medical Decision Making:   Initial Assessment:   Initial assessment patient states her chest pain started last night was worse last night then today she describes she said that someone called her from the COVID monitoring and she described the chest pain ( worse last night, had improved today) was told to come to the emergency room.  Patient states that she felt well yesterday and today she had felt like she was worsening and she vomited twice low-grade fever she denies abdominal pain, or diarrhea  Differential Diagnosis:   COVID-19, pneumonia,  dehydration    HEART Pathway:  History          0             Suspicion (Low, Med, High)    (+0, 1, 2)   EKG          0   LBBB, LVH, nonspecific repolarization changes  (+1)     ST depression or elevation    (+2)   Age          0   <45       (+0)     45-64       (+1)   >/=65       (+2)  Risk Factors          1  (HTN, HLD, DM, Obesity (BMI >30), FH, Smoking, Atherosclerotic Dz)     None       (+0)   1-2 risk factors     (+1)   3+       (+2)  Initial troponin         0   Normal       (+0)   1-2x normal limit     (+1)   >2x normal limit     (+2)     (total score = 1)     Clinical Tests:   Lab Tests: Ordered and Reviewed  Radiological Study: Ordered and Reviewed  Medical Tests: Ordered and Reviewed  ED Management:  Patient was given Zofran IV.  She is given Tylenol  for temperature, replace her potassium.    Discussed with patient she states that she has nausea medicine at home she was educated to return to the emergency room for increased shortness of breath or worsening chest pain for reassessment patient verbalized understanding  Other:   I have discussed this case with another health care provider.                                 Clinical Impression:       ICD-10-CM ICD-9-CM   1. Chest wall pain R07.89 786.52   2. Chest pain R07.9 786.50             ED Disposition Condition    Discharge Stable        ED Prescriptions     None        Follow-up Information     Follow up With Specialties Details Why Contact Info    Tatum Santos MD Internal Medicine Schedule an appointment as soon as possible for a visit  If symptoms worsen [address] [facility] [address]  [phone]                                       Carissa Barillas [facility]  04/16/20 1556       SANTO Morel  04/16/20 1600

## 2020-04-16 NOTE — TELEPHONE ENCOUNTER
Patient reports significant back pain that is being addressed by her PCP but getting little relief. Had  increase in cough and vomiting today not relieved by Zofran. Reports chest pain last PM, stabbing left side of chest lasting 2 hours, not associated with cough or back pain. Told patient protocols to talk with PCP, urgent care or ER. Attempt to call PCP unsuccessful (at lunch) explained protocol statesshe could go to ER or urgent care. She states she will decide and when she does she will get a ride. Plans to leave shortly.  Patient states understanding and agrees with disposition.  Reason for Disposition   All other patients with chest pain    Protocols used: CHEST PAIN-A-OH

## 2020-04-19 ENCOUNTER — NURSE TRIAGE (OUTPATIENT)
Dept: ADMINISTRATIVE | Facility: CLINIC | Age: 45
End: 2020-04-19

## 2020-04-19 NOTE — TELEPHONE ENCOUNTER
"Patient feeling better except for cough and feeling "winded" with activity and SOB. Explained needing to build tolerance, avoid positions that cause breathing discomfort.  Advised to go outside and get sunlight, avoid contact with others, wear mask.  Go to ed if difficulty breathing at rest,chest pain. Reassured cough lasting for extended periods of time and lungs being one of the last things to recover is not unusual with Covid19. Questions on stopping home isolation, CDC guidelines discussed with ultimate decesion being with PCP. Protocol states she can care for this at home. Patient states understanding and agrees with disposition.  Reason for Disposition   1] COVID-19 infection diagnosed or suspected AND [2] mild symptoms (fever, cough) AND [2] no trouble breathing or other complications    Additional Information   Negative: SEVERE difficulty breathing (e.g., struggling for each breath, speaks in single words)   Negative: Difficult to awaken or acting confused (e.g., disoriented, slurred speech)   Negative: Bluish (or gray) lips or face now   Negative: Shock suspected (e.g., cold/pale/clammy skin, too weak to stand, low BP, rapid pulse)   Negative: Sounds like a life-threatening emergency to the triager   Negative: [1] COVID-19 suspected (e.g., cough, fever, shortness of breath) AND [2] public health department recommends testing   Negative: [1] COVID-19 exposure AND [2] no symptoms   Negative: COVID-19 and Breastfeeding, questions about   Negative: SEVERE or constant chest pain (Exception: mild central chest pain, present only when coughing)   Negative: MODERATE difficulty breathing (e.g., speaks in phrases, SOB even at rest, pulse 100-120)   Negative: Patient sounds very sick or weak to the triager   Negative: MILD difficulty breathing (e.g., minimal/no SOB at rest, SOB with walking, pulse <100)   Negative: Chest pain   Negative: Fever > 103 F (39.4 C)   Negative: [1] Fever > 101 F (38.3 C) AND " [2] age > 60   Negative: [1] Fever > 100.0 F (37.8 C) AND [2] bedridden (e.g., nursing home patient, CVA, chronic illness, recovering from surgery)   Negative: HIGH RISK patient (e.g., age > 64 years, diabetes, heart or lung disease, weak immune system)   Negative: Fever present > 3 days (72 hours)   Negative: [1] Fever returns after gone for over 24 hours AND [2] symptoms worse or not improved   Negative: [1] Continuous (nonstop) coughing interferes with work or school AND [2] no improvement using cough treatment per protocol   Negative: Cough present > 3 weeks    Protocols used: CORONAVIRUS (COVID-19) DIAGNOSED OR [id number]

## 2020-04-21 ENCOUNTER — PATIENT OUTREACH (OUTPATIENT)
Dept: ADMINISTRATIVE | Facility: OTHER | Age: 45
End: 2020-04-21

## 2020-04-23 ENCOUNTER — PATIENT MESSAGE (OUTPATIENT)
Dept: INTERNAL MEDICINE | Facility: CLINIC | Age: 45
End: 2020-04-23

## 2020-04-23 ENCOUNTER — OFFICE VISIT (OUTPATIENT)
Dept: INTERNAL MEDICINE | Facility: CLINIC | Age: 45
End: 2020-04-23
Payer: COMMERCIAL

## 2020-04-23 DIAGNOSIS — S39.012D BACK STRAIN, SUBSEQUENT ENCOUNTER: ICD-10-CM

## 2020-04-23 DIAGNOSIS — Z79.4 TYPE 2 DIABETES MELLITUS WITH DIABETIC POLYNEUROPATHY, WITH LONG-TERM CURRENT USE OF INSULIN: ICD-10-CM

## 2020-04-23 DIAGNOSIS — B96.89 BACTERIAL VAGINOSIS: ICD-10-CM

## 2020-04-23 DIAGNOSIS — E11.42 TYPE 2 DIABETES MELLITUS WITH DIABETIC POLYNEUROPATHY, WITH LONG-TERM CURRENT USE OF INSULIN: ICD-10-CM

## 2020-04-23 DIAGNOSIS — N76.0 BACTERIAL VAGINOSIS: ICD-10-CM

## 2020-04-23 DIAGNOSIS — U07.1 COVID-19 VIRUS INFECTION: ICD-10-CM

## 2020-04-23 PROCEDURE — 3046F HEMOGLOBIN A1C LEVEL >9.0%: CPT | Mod: CPTII,,, | Performed by: INTERNAL MEDICINE

## 2020-04-23 PROCEDURE — 3046F PR MOST RECENT HEMOGLOBIN A1C LEVEL > 9.0%: ICD-10-PCS | Mod: CPTII,,, | Performed by: INTERNAL MEDICINE

## 2020-04-23 PROCEDURE — 99214 OFFICE O/P EST MOD 30 MIN: CPT | Mod: 95,,, | Performed by: INTERNAL MEDICINE

## 2020-04-23 PROCEDURE — 99214 PR OFFICE/OUTPT VISIT, EST, LEVL IV, 30-39 MIN: ICD-10-PCS | Mod: 95,,, | Performed by: INTERNAL MEDICINE

## 2020-04-23 RX ORDER — METRONIDAZOLE 500 MG/1
500 TABLET ORAL EVERY 12 HOURS
Qty: 14 TABLET | Refills: 0 | Status: SHIPPED | OUTPATIENT
Start: 2020-04-23 | End: 2020-04-30

## 2020-04-23 NOTE — PROGRESS NOTES
Patient ID: Hamzah Reis is a 44 y.o. female.    Chief Complaint: No chief complaint on file.    The patient location is: [place]  The chief complaint leading to consultation is: follow up of COVID and diabetes  Visit type: audiovisual  Total time spent with patient: 20 minutes  Each patient to whom he or she provides medical services by telemedicine is:  (1) informed of the relationship between the physician and patient and the respective role of any other health care provider with respect to management of the patient; and (2) notified that he or she may decline to receive medical services by telemedicine and may withdraw from such care at any time.    MACI Goodson is a 44 y.o. female with type II DM who presents for follow up of type II DM and COVID19 infection.  COVID19 infection: She is overall feeling better but still has symptoms of shortness of breath, cough, fatigue and muscle aches. She is having back pain. Feels like pulled muscle and muscle cramping in the back. Has been taking flexeril which helps.   Type II DM: Patient is taking 15 units of lantus daily and metformin 1,000 mg BID. Blood sugars have come down a little bit. She began feeling much better when her blood sugars went down. She admits to missing a few doses of insulin recently due to being sick with COVID. The highest value she has seen recently was 257. The lowest BG value she has seen recently was 200. She has lost a few pounds (7 lbs) since being sick with COVID.   At the end of our visit, she mentions having symptoms of BV again. Wants to know what she can do to prevent this and requests medication for treatment today.     Review of Systems   Constitutional: Positive for fatigue. Negative for fever.   Respiratory: Positive for cough and shortness of breath.    Cardiovascular: Negative for chest pain.   Genitourinary: Negative for dysuria, hematuria and pelvic pain.   Musculoskeletal: Positive for back pain and myalgias.    Neurological: Negative for weakness and headaches.       Objective:   There were no vitals filed for this visit.     Physical Exam   Constitutional: She is oriented to person, place, and time. She appears well-developed and well-nourished. No distress.   HENT:   Head: Normocephalic and atraumatic.   Right Ear: External ear normal.   Left Ear: External ear normal.   Nose: Nose normal.   Eyes: Conjunctivae and EOM are normal.   Pulmonary/Chest: Effort normal. No respiratory distress.   Neurological: She is alert and oriented to person, place, and time.   Skin: No rash noted. She is not diaphoretic. No erythema. No pallor.   Psychiatric: She has a normal mood and affect. Her behavior is normal. Judgment and thought content normal.       Assessment:       1. Type 2 diabetes mellitus with diabetic polyneuropathy, with long-term current use of insulin Active   2. Bacterial vaginosis Active   3. COVID-19 virus infection Active   4. Back strain, subsequent encounter Active       Plan:     For BV recurrence: Need to get BG under better control; no douching; use condoms     Type 2 diabetes mellitus with diabetic polyneuropathy, with long-term current use of insulin  Comments:  Continue current meds. Recommend working on consistency with meds now that she is feeling better. Recheck labs in 2 months  Orders:  -     insulin detemir U-100 (LEVEMIR FLEXTOUCH U-100 INSULN) 100 unit/mL (3 mL) SubQ InPn pen; Inject 15 Units into the skin every evening.  Dispense: 6 mL; Refill: 3  -     Hemoglobin A1c; Future; Expected date: 04/23/2020  -     Lipid panel; Future; Expected date: 04/23/2020  -     Comprehensive metabolic panel; Future; Expected date: 04/23/2020    Bacterial vaginosis  -     metroNIDAZOLE (FLAGYL) 500 MG tablet; Take 1 tablet (500 mg total) by mouth every 12 (twelve) hours. for 7 days  Dispense: 14 tablet; Refill: 0    COVID-19 virus infection  Comments:  Pt appears to be improving but still with some symptoms. Will  write work excuse until Monday May 4    Back strain, subsequent encounter  Comments:  Cont flexeril PRN, apply heat PRN, use movement/stretching/massage of site as needed for pain relief.        RTC 2 months     Warning signs discussed, patient to call with any further issues or worsening of symptoms.       Parts of the above note were dictated using a voice dictation software. Please excuse any grammatical or typographical errors.      Answers for HPI/ROS submitted by the patient on 4/23/2020   Back pain  genital pain: No

## 2020-04-23 NOTE — LETTER
April 23, 2020      [facility] Internal Medicine  [address]  [place] [address]  Phone: [phone]  Fax: [phone]       Patient: Hamzah Reis   YOB: 1975  Date of Visit: 04/23/2020    To Whom It May Concern:    Jaciel Reis  was at [facility] on 04/23/2020.She has tested positive for COVID19 and is still having symptoms. Please excuse her from work until Monday May 4. If you have any questions or concerns, or if I can be of further assistance, please do not hesitate to contact me.    Sincerely,    Tatum Santos MD

## 2020-04-30 ENCOUNTER — PATIENT OUTREACH (OUTPATIENT)
Dept: ADMINISTRATIVE | Facility: HOSPITAL | Age: 45
End: 2020-04-30

## 2020-04-30 ENCOUNTER — PATIENT MESSAGE (OUTPATIENT)
Dept: ADMINISTRATIVE | Facility: HOSPITAL | Age: 45
End: 2020-04-30

## 2020-05-01 ENCOUNTER — TELEPHONE (OUTPATIENT)
Dept: INTERNAL MEDICINE | Facility: CLINIC | Age: 45
End: 2020-05-01

## 2020-05-01 NOTE — TELEPHONE ENCOUNTER
----- Message from Sohalicharito Murphyt sent at 5/1/2020  1:17 PM CDT -----  Contact: [phone] / self   Patient would like to speak with your office regarding her returning back to work , pt would not go into detail. Please Advise.

## 2020-05-05 RX ORDER — FLUCONAZOLE 100 MG/1
100 TABLET ORAL DAILY
Qty: 3 TABLET | Refills: 2 | Status: CANCELLED | OUTPATIENT
Start: 2020-05-05

## 2020-05-07 ENCOUNTER — PATIENT OUTREACH (OUTPATIENT)
Dept: ADMINISTRATIVE | Facility: OTHER | Age: 45
End: 2020-05-07

## 2020-05-08 DIAGNOSIS — Z79.4 TYPE 2 DIABETES MELLITUS WITH DIABETIC POLYNEUROPATHY, WITH LONG-TERM CURRENT USE OF INSULIN: ICD-10-CM

## 2020-05-08 DIAGNOSIS — E11.42 TYPE 2 DIABETES MELLITUS WITH DIABETIC POLYNEUROPATHY, WITH LONG-TERM CURRENT USE OF INSULIN: ICD-10-CM

## 2020-05-11 ENCOUNTER — OFFICE VISIT (OUTPATIENT)
Dept: PODIATRY | Facility: CLINIC | Age: 45
End: 2020-05-11
Payer: COMMERCIAL

## 2020-05-11 VITALS — HEIGHT: 66 IN | BODY MASS INDEX: 38.89 KG/M2 | WEIGHT: 242 LBS

## 2020-05-11 DIAGNOSIS — B35.1 ONYCHOMYCOSIS: Primary | ICD-10-CM

## 2020-05-11 DIAGNOSIS — E11.49 TYPE 2 DIABETES MELLITUS WITH NEUROLOGICAL MANIFESTATIONS: ICD-10-CM

## 2020-05-11 PROCEDURE — 3008F PR BODY MASS INDEX (BMI) DOCUMENTED: ICD-10-PCS | Mod: CPTII,S$GLB,, | Performed by: PODIATRIST

## 2020-05-11 PROCEDURE — 99203 PR OFFICE/OUTPT VISIT, NEW, LEVL III, 30-44 MIN: ICD-10-PCS | Mod: S$GLB,,, | Performed by: PODIATRIST

## 2020-05-11 PROCEDURE — 3046F HEMOGLOBIN A1C LEVEL >9.0%: CPT | Mod: CPTII,S$GLB,, | Performed by: PODIATRIST

## 2020-05-11 PROCEDURE — 99203 OFFICE O/P NEW LOW 30 MIN: CPT | Mod: S$GLB,,, | Performed by: PODIATRIST

## 2020-05-11 PROCEDURE — 99999 PR PBB SHADOW E&M-EST. PATIENT-LVL III: ICD-10-PCS | Mod: PBBFAC,,, | Performed by: PODIATRIST

## 2020-05-11 PROCEDURE — 99999 PR PBB SHADOW E&M-EST. PATIENT-LVL III: CPT | Mod: PBBFAC,,, | Performed by: PODIATRIST

## 2020-05-11 PROCEDURE — 3008F BODY MASS INDEX DOCD: CPT | Mod: CPTII,S$GLB,, | Performed by: PODIATRIST

## 2020-05-11 PROCEDURE — 3046F PR MOST RECENT HEMOGLOBIN A1C LEVEL > 9.0%: ICD-10-PCS | Mod: CPTII,S$GLB,, | Performed by: PODIATRIST

## 2020-05-11 NOTE — PROGRESS NOTES
"Subjective:      Patient ID: Hamzah Reis is a 44 y.o. female.    Chief Complaint: No chief complaint on file.    complains of burning tingling and sharp intermittent pains to the arch of both feet and the toes at night.  She has been taking gabapentin per her primary care physician.  She was diagnosed with COVID-19 over a month ago and self isolated for more than 2 weeks.  She is currently asymptomatic and does not have any fever, shortness of breath.  Says that her diabetes is poorly controlled secondary to the COVID-19 infection and she is steadily getting under control again.  She has been taking metformin for some time.  She does admit that after she was diagnosed with COVID-19 she experienced more neurological symptoms to the feet.  She does admit that she has some low back pain that radiates down to her thighs while she is lying down at night.  This was also aggravated by exposure and infection due to COVID-19.  Furthermore patient complains thickened discolored toenails on both feet.  History of receiving pedicures.    Vitals:    20 1128   Weight: 109.8 kg (242 lb)   Height: 5' 6" (1.676 m)      Past Medical History:   Diagnosis Date    Depression     Diabetes mellitus, type 2     Hyperlipidemia     Hypertension     Vision impairment     wears glasses       No past surgical history on file.    Family History   Problem Relation Age of Onset    Hypertension Mother 55            Diabetes Mother     Cancer Mother         cervical    Heart disease Father     Diabetes Sister     Hypertension Sister     Cancer Sister         cervical    Breast cancer Sister     Diabetes Maternal Grandmother        Social History     Socioeconomic History    Marital status: Single     Spouse name: Not on file    Number of children: Not on file    Years of education: Not on file    Highest education level: Not on file   Occupational History     Employer: [employer]   Social Needs    Financial resource " strain: Not on file    Food insecurity:     Worry: Not on file     Inability: Not on file    Transportation needs:     Medical: Not on file     Non-medical: Not on file   Tobacco Use    Smoking status: Never Smoker    Smokeless tobacco: Never Used   Substance and Sexual Activity    Alcohol use: No    Drug use: No    Sexual activity: Yes     Partners: Male     Birth control/protection: None   Lifestyle    Physical activity:     Days per week: Not on file     Minutes per session: Not on file    Stress: Not on file   Relationships    Social connections:     Talks on phone: Not on file     Gets together: Not on file     Attends Mu-ism service: Not on file     Active member of club or organization: Not on file     Attends meetings of clubs or organizations: Not on file     Relationship status: Not on file   Other Topics Concern    Not on file   Social History Narrative    Not on file       Current Outpatient Medications   Medication Sig Dispense Refill    atorvastatin (LIPITOR) 40 MG tablet Take 1 tablet (40 mg total) by mouth once daily. 90 tablet 3    cholecalciferol, vitamin D3, 50,000 unit capsule Take 1 capsule (50,000 Units total) by mouth once a week. 12 capsule 3    clotrimazole-betamethasone 1-0.05% (LOTRISONE) cream Apply to affected area 2 times daily 30 g 1    fluconazole (DIFLUCAN) 100 MG tablet Take 1 tablet (100 mg total) by mouth once daily. 3 tablet 2    gabapentin (NEURONTIN) 300 MG capsule Take 1 capsule (300 mg total) by mouth 3 (three) times daily. 90 capsule 11    ibuprofen (ADVIL,MOTRIN) 400 MG tablet Take 1 tablet (400 mg total) by mouth 3 (three) times daily as needed (fever or body aches). 30 tablet 1    insulin detemir U-100 (LEVEMIR FLEXTOUCH U-100 INSULN) 100 unit/mL (3 mL) SubQ InPn pen Inject 15 Units into the skin every evening. 15 mL 0    metFORMIN (GLUCOPHAGE) 1000 MG tablet Take 1 tablet (1,000 mg total) by mouth 2 (two) times daily with meals. 180 tablet 1     ondansetron (ZOFRAN) 4 MG tablet Take 1 tablet (4 mg total) by mouth every 8 (eight) hours as needed for Nausea. 30 tablet 0    sertraline (ZOLOFT) 25 MG tablet Take 1/2 tablet p.o. daily for 7 days.  Then increase to 1 whole tablet p.o. daily thereafter. 30 tablet 1    nitroGLYCERIN (NITROSTAT) 0.4 MG SL tablet Place 1 tablet (0.4 mg total) under the tongue every 5 (five) minutes as needed for Chest pain. (Patient not taking: Reported on 12/11/2019) 30 tablet 2     No current facility-administered medications for this visit.        Review of patient's allergies indicates:  No Known Allergies      Review of Systems   Constitution: Negative for chills, fever and malaise/fatigue.   HENT: Negative for congestion and hearing loss.    Cardiovascular: Negative for chest pain, claudication and leg swelling.   Respiratory: Negative for cough and shortness of breath.    Skin: Positive for color change, dry skin and nail changes. Negative for itching, poor wound healing and rash.   Musculoskeletal: Positive for back pain. Negative for joint pain, muscle cramps and muscle weakness.   Gastrointestinal: Negative for nausea and vomiting.   Neurological: Positive for paresthesias. Negative for numbness and weakness.   Psychiatric/Behavioral: Negative for altered mental status.           Objective:      Physical Exam   Constitutional: She is oriented to person, place, and time. No distress.   Cardiovascular:   Pulses:       Dorsalis pedis pulses are 2+ on the right side, and 2+ on the left side.        Posterior tibial pulses are 2+ on the right side, and 2+ on the left side.   Musculoskeletal:   Negative provocation sign and Tinel sign to the lower extremity nerves bilateral.    Rectus appearing foot type bilateral.    No pain with range of motion or manual muscle strength testing to lower extremity bilateral.    No pain on palpation to the foot and ankle bilateral.   Feet:   Right Foot:   Protective Sensation: 10 sites tested.  10 sites sensed.   Skin Integrity: Positive for dry skin.   Left Foot:   Protective Sensation: 10 sites tested. 10 sites sensed.   Skin Integrity: Positive for dry skin.   Neurological: She is alert and oriented to person, place, and time. She has normal strength.   Skin: Skin is warm, dry and intact. Capillary refill takes less than 2 seconds. No ecchymosis and no rash noted. She is not diaphoretic. No cyanosis or erythema. No pallor. Nails show no clubbing.   Referred to [facility] media picture for toenail appearance.    Skin is dry to foot bilateral.  No open lesions or macerations noted to lower extremity bilateral.                 Assessment:       Encounter Diagnoses   Name Primary?    Type 2 diabetes mellitus with neurological manifestations     Onychomycosis Yes         Plan:       Diagnoses and all orders for this visit:    Onychomycosis    Type 2 diabetes mellitus with neurological manifestations  -     Ambulatory referral/consult to Podiatry      I counseled the patient on her conditions, their implications and medical management.    We discussed that her neurological manifestations may be also secondary to chronic use of metformin.  I recommend complex B vitamin to be taken daily which should help with the next 2-3 months.  If this does not continue to improve then I would recommend 2nd opinion from Neurology.  It is also possible that her previous COVID-19 infection could have aggravated the neurological manifestations.  Also discussed tight glycemic control to help with these manifestations and long-term weight loss.    Discussed treatment options for fungal toenails to include topical vs oral vs laser vs combined therapy in detail.    Prescribed topical compound nail solution from [facility].    RTC 6 months or p.r.n. as discussed      .

## 2020-05-11 NOTE — PATIENT INSTRUCTIONS
Prescribed topical compound nail solution from [facility].    Recommend complex B vitamin to be taken daily. It is available over the counter.

## 2020-05-11 NOTE — LETTER
May 11, 2020      Tatum Santos MD  [address] [facility]  [place] [address]           [facility]  [address]  [place] [address]  Phone: [phone]  Fax: [phone]          Patient: Hamzah Reis   MR Number: [id number]   YOB: 1975   Date of Visit: 5/11/2020       Dear Dr. Tatum Santos:    Thank you for referring Hamzah Reis to me for evaluation. Attached you will find relevant portions of my assessment and plan of care.    If you have questions, please do not hesitate to call me. I look forward to following Hamzah Reis along with you.    Sincerely,    Olayinka Patel, YOEL    Enclosure  CC:  No Recipients    If you would like to receive this communication electronically, please contact [email or web address] or [phone] to request more information on [facility] access.    For providers and/or their staff who would like to refer a patient to [facility], please contact us through our one-stop-shop provider referral line, [facility], at [phone].    If you feel you have received this communication in error or would no longer like to receive these types of communications, please e-mail [email or web address]

## 2020-05-18 ENCOUNTER — PATIENT OUTREACH (OUTPATIENT)
Dept: ADMINISTRATIVE | Facility: OTHER | Age: 45
End: 2020-05-18

## 2020-05-19 ENCOUNTER — OFFICE VISIT (OUTPATIENT)
Dept: OBSTETRICS AND GYNECOLOGY | Facility: CLINIC | Age: 45
End: 2020-05-19
Payer: COMMERCIAL

## 2020-05-19 VITALS
HEIGHT: 66 IN | DIASTOLIC BLOOD PRESSURE: 84 MMHG | WEIGHT: 241.5 LBS | BODY MASS INDEX: 38.81 KG/M2 | SYSTOLIC BLOOD PRESSURE: 122 MMHG

## 2020-05-19 DIAGNOSIS — Z11.3 SCREENING FOR STD (SEXUALLY TRANSMITTED DISEASE): Primary | ICD-10-CM

## 2020-05-19 DIAGNOSIS — N89.8 VAGINAL DISCHARGE: ICD-10-CM

## 2020-05-19 PROCEDURE — 3074F SYST BP LT 130 MM HG: CPT | Mod: CPTII,S$GLB,, | Performed by: OBSTETRICS & GYNECOLOGY

## 2020-05-19 PROCEDURE — 3074F PR MOST RECENT SYSTOLIC BLOOD PRESSURE < 130 MM HG: ICD-10-PCS | Mod: CPTII,S$GLB,, | Performed by: OBSTETRICS & GYNECOLOGY

## 2020-05-19 PROCEDURE — 99213 PR OFFICE/OUTPT VISIT, EST, LEVL III, 20-29 MIN: ICD-10-PCS | Mod: S$GLB,,, | Performed by: OBSTETRICS & GYNECOLOGY

## 2020-05-19 PROCEDURE — 87491 CHLMYD TRACH DNA AMP PROBE: CPT | Mod: 59

## 2020-05-19 PROCEDURE — 99999 PR PBB SHADOW E&M-EST. PATIENT-LVL III: CPT | Mod: PBBFAC,,, | Performed by: OBSTETRICS & GYNECOLOGY

## 2020-05-19 PROCEDURE — 99213 OFFICE O/P EST LOW 20 MIN: CPT | Mod: S$GLB,,, | Performed by: OBSTETRICS & GYNECOLOGY

## 2020-05-19 PROCEDURE — 99999 PR PBB SHADOW E&M-EST. PATIENT-LVL III: ICD-10-PCS | Mod: PBBFAC,,, | Performed by: OBSTETRICS & GYNECOLOGY

## 2020-05-19 PROCEDURE — 3079F DIAST BP 80-89 MM HG: CPT | Mod: CPTII,S$GLB,, | Performed by: OBSTETRICS & GYNECOLOGY

## 2020-05-19 PROCEDURE — 3008F BODY MASS INDEX DOCD: CPT | Mod: CPTII,S$GLB,, | Performed by: OBSTETRICS & GYNECOLOGY

## 2020-05-19 PROCEDURE — 3079F PR MOST RECENT DIASTOLIC BLOOD PRESSURE 80-89 MM HG: ICD-10-PCS | Mod: CPTII,S$GLB,, | Performed by: OBSTETRICS & GYNECOLOGY

## 2020-05-19 PROCEDURE — 87481 CANDIDA DNA AMP PROBE: CPT | Mod: 59

## 2020-05-19 PROCEDURE — 3008F PR BODY MASS INDEX (BMI) DOCUMENTED: ICD-10-PCS | Mod: CPTII,S$GLB,, | Performed by: OBSTETRICS & GYNECOLOGY

## 2020-05-19 NOTE — PROGRESS NOTES
"44 y.o.   OB History        2    Para   2    Term   2            AB        Living           SAB        TAB        Ectopic        Multiple        Live Births                   Comlaining of:  1. Bad cycles  2. Chronic irritation  See last visit, needs us, uterus was unls  Pt is diabetic  Did use diflucan but not extended        ROS:  GENERAL: No fever, chills, fatigability or weight loss.  SKIN: No rashes, itching or changes in color or texture of skin.  HEAD: No headaches or recent head trauma.  EYES: Visual acuity fine. No photophobia, ocular pain or diplopia.  EARS: Denies ear pain, discharge or vertigo.  NOSE: No loss of smell, no epistaxis or postnasal drip.  MOUTH & THROAT: No hoarseness or change in voice. No excessive gum bleeding.  NODES: Denies swollen glands.  CHEST: Denies ANDERS, cyanosis, wheezing, cough and sputum production.  CARDIOVASCULAR: Denies chest pain, PND, orthopnea or reduced exercise tolerance.  ABDOMEN: Appetite fine. No weight loss. Denies diarrhea, abdominal pain, hematemesis or blood in stool.  URINARY: No flank pain, dysuria or hematuria.  PERIPHERAL VASCULAR: No claudication or cyanosis.  MUSCULOSKELETAL: No joint stiffness or swelling. Denies back pain.  NEUROLOGIC: No history of seizures, paralysis, alteration of gait or coordination      PE: /84   Ht 5' 6" (1.676 m)   Wt 109.5 kg (241 lb 8 oz)   LMP 2020   BMI 38.98 kg/m²    vuvla sl irritated  Cultures done    A/Plan  rx as indicated for cultues, may n eed  Extended diflucan and/or temovate  gention violet applied  Set up vag us  Disc ablation last visit        "

## 2020-05-20 ENCOUNTER — TELEPHONE (OUTPATIENT)
Dept: OBSTETRICS AND GYNECOLOGY | Facility: CLINIC | Age: 45
End: 2020-05-20

## 2020-05-20 NOTE — TELEPHONE ENCOUNTER
Please set up vagus only   Pt has enlarged uterus, heavy cycles  Thanks,   I think order is already in,

## 2020-05-21 LAB
BACTERIAL VAGINOSIS DNA: NEGATIVE
CANDIDA GLABRATA DNA: NEGATIVE
CANDIDA KRUSEI DNA: NEGATIVE
CANDIDA RRNA VAG QL PROBE: POSITIVE
T VAGINALIS RRNA GENITAL QL PROBE: NEGATIVE

## 2020-05-22 ENCOUNTER — TELEPHONE (OUTPATIENT)
Dept: OBSTETRICS AND GYNECOLOGY | Facility: CLINIC | Age: 45
End: 2020-05-22

## 2020-05-22 LAB
C TRACH DNA SPEC QL NAA+PROBE: NOT DETECTED
N GONORRHOEA DNA SPEC QL NAA+PROBE: NOT DETECTED

## 2020-05-22 RX ORDER — KETOCONAZOLE 200 MG/1
200 TABLET ORAL DAILY
Qty: 7 TABLET | Refills: 1 | Status: SHIPPED | OUTPATIENT
Start: 2020-05-22 | End: 2020-05-29

## 2020-05-29 ENCOUNTER — TELEPHONE (OUTPATIENT)
Dept: OBSTETRICS AND GYNECOLOGY | Facility: CLINIC | Age: 45
End: 2020-05-29

## 2020-05-29 RX ORDER — CLOBETASOL PROPIONATE 0.5 MG/G
CREAM TOPICAL 2 TIMES DAILY
Qty: 30 G | Refills: 1 | Status: SHIPPED | OUTPATIENT
Start: 2020-05-29 | End: 2021-03-26

## 2020-05-29 NOTE — TELEPHONE ENCOUNTER
Pt took the nizoral and she said she is still itching and burning. She would like to know what else she can do

## 2020-05-29 NOTE — TELEPHONE ENCOUNTER
----- Message from Kristy Shay sent at 5/29/2020 11:51 AM CDT -----  Contact: [phone]/self  Type:  Patient Returning Call    Who Called: self  Who Left Message for Patient: Elana  Does the patient know what this is regarding?: US  Would the patient rather a call back or a response via [facility]?  call  Best Call Back Number: [phone]/self  Additional Information:

## 2020-05-29 NOTE — TELEPHONE ENCOUNTER
I guess the gention violet didn't help :(  Hmm  i'll send a stronger steroid cream  Try for few days bid

## 2020-06-05 ENCOUNTER — PROCEDURE VISIT (OUTPATIENT)
Dept: OBSTETRICS AND GYNECOLOGY | Facility: CLINIC | Age: 45
End: 2020-06-05
Payer: COMMERCIAL

## 2020-06-05 DIAGNOSIS — N92.1 MENOMETRORRHAGIA: ICD-10-CM

## 2020-06-05 PROCEDURE — 76830 TRANSVAGINAL US NON-OB: CPT | Mod: S$GLB,,, | Performed by: OBSTETRICS & GYNECOLOGY

## 2020-06-05 PROCEDURE — 76856 US EXAM PELVIC COMPLETE: CPT | Mod: S$GLB,,, | Performed by: OBSTETRICS & GYNECOLOGY

## 2020-06-05 PROCEDURE — 76856 PR  ECHO,PELVIC (NONOBSTETRIC): ICD-10-PCS | Mod: S$GLB,,, | Performed by: OBSTETRICS & GYNECOLOGY

## 2020-06-05 PROCEDURE — 76830 PR  ECHOGRAPHY,TRANSVAGINAL: ICD-10-PCS | Mod: S$GLB,,, | Performed by: OBSTETRICS & GYNECOLOGY

## 2020-06-09 ENCOUNTER — PATIENT OUTREACH (OUTPATIENT)
Dept: ADMINISTRATIVE | Facility: HOSPITAL | Age: 45
End: 2020-06-09

## 2020-06-09 NOTE — LETTER
AUTHORIZATION FOR RELEASE OF   CONFIDENTIAL INFORMATION      [facility],  [address] [place] [address] [place] [address]      Dear [facility],        We are seeing Hamzah Reis, date of birth 1975, in the clinic at [facility]. Tatum Santos MD is the patient's PCP. Hamzah Reis has an outstanding lab/procedure at the time we reviewed her chart. In order to help keep her health information updated, she has authorized us to request the following medical record(s):        (X )  EYE EXAM                    Please fax records to [facility], Shelly L Swindler, MD,          [facility]                                                                     Please Fax to [facility] at [phone].                                                                             [facility] [address] [facility] [place]  [address]    Thank you for your help, Daphne Isbell LPN-[facility]. If I can be of any assistance you can call at [phone]         Patient Name: Hamzah Reis  : 1975  Patient Phone #: [phone]

## 2020-06-09 NOTE — PROGRESS NOTES
audit performed telephone outreach to patient regarding eye exam - [facility] [address]. Outreach to [facility] - Non working fax - [facility]I mailed

## 2020-06-18 ENCOUNTER — LAB VISIT (OUTPATIENT)
Dept: LAB | Facility: HOSPITAL | Age: 45
End: 2020-06-18
Attending: INTERNAL MEDICINE
Payer: COMMERCIAL

## 2020-06-18 ENCOUNTER — OFFICE VISIT (OUTPATIENT)
Dept: URGENT CARE | Facility: CLINIC | Age: 45
End: 2020-06-18
Payer: COMMERCIAL

## 2020-06-18 VITALS
TEMPERATURE: 96 F | BODY MASS INDEX: 38.73 KG/M2 | HEIGHT: 66 IN | OXYGEN SATURATION: 100 % | RESPIRATION RATE: 16 BRPM | WEIGHT: 241 LBS | HEART RATE: 97 BPM

## 2020-06-18 DIAGNOSIS — M54.9 BACK PAIN, UNSPECIFIED BACK LOCATION, UNSPECIFIED BACK PAIN LATERALITY, UNSPECIFIED CHRONICITY: ICD-10-CM

## 2020-06-18 DIAGNOSIS — Z79.4 TYPE 2 DIABETES MELLITUS WITH DIABETIC POLYNEUROPATHY, WITH LONG-TERM CURRENT USE OF INSULIN: ICD-10-CM

## 2020-06-18 DIAGNOSIS — R51.9 NONINTRACTABLE HEADACHE, UNSPECIFIED CHRONICITY PATTERN, UNSPECIFIED HEADACHE TYPE: Primary | ICD-10-CM

## 2020-06-18 DIAGNOSIS — E11.42 TYPE 2 DIABETES MELLITUS WITH DIABETIC POLYNEUROPATHY, WITH LONG-TERM CURRENT USE OF INSULIN: ICD-10-CM

## 2020-06-18 LAB
ALBUMIN SERPL BCP-MCNC: 3.4 G/DL (ref 3.5–5.2)
ALP SERPL-CCNC: 83 U/L (ref 55–135)
ALT SERPL W/O P-5'-P-CCNC: 24 U/L (ref 10–44)
ANION GAP SERPL CALC-SCNC: 7 MMOL/L (ref 8–16)
AST SERPL-CCNC: 14 U/L (ref 10–40)
BILIRUB SERPL-MCNC: 0.4 MG/DL (ref 0.1–1)
BUN SERPL-MCNC: 9 MG/DL (ref 6–20)
CALCIUM SERPL-MCNC: 9.3 MG/DL (ref 8.7–10.5)
CHLORIDE SERPL-SCNC: 102 MMOL/L (ref 95–110)
CHOLEST SERPL-MCNC: 184 MG/DL (ref 120–199)
CHOLEST/HDLC SERPL: 4.8 {RATIO} (ref 2–5)
CO2 SERPL-SCNC: 28 MMOL/L (ref 23–29)
CREAT SERPL-MCNC: 0.8 MG/DL (ref 0.5–1.4)
EST. GFR  (AFRICAN AMERICAN): >60 ML/MIN/1.73 M^2
EST. GFR  (NON AFRICAN AMERICAN): >60 ML/MIN/1.73 M^2
ESTIMATED AVG GLUCOSE: 272 MG/DL (ref 68–131)
GLUCOSE SERPL-MCNC: 226 MG/DL (ref 70–110)
HBA1C MFR BLD HPLC: 11.1 % (ref 4–5.6)
HDLC SERPL-MCNC: 38 MG/DL (ref 40–75)
HDLC SERPL: 20.7 % (ref 20–50)
LDLC SERPL CALC-MCNC: 125.6 MG/DL (ref 63–159)
NONHDLC SERPL-MCNC: 146 MG/DL
POTASSIUM SERPL-SCNC: 4 MMOL/L (ref 3.5–5.1)
PROT SERPL-MCNC: 7.2 G/DL (ref 6–8.4)
SODIUM SERPL-SCNC: 137 MMOL/L (ref 136–145)
TRIGL SERPL-MCNC: 102 MG/DL (ref 30–150)

## 2020-06-18 PROCEDURE — 80061 LIPID PANEL: CPT

## 2020-06-18 PROCEDURE — 36415 COLL VENOUS BLD VENIPUNCTURE: CPT

## 2020-06-18 PROCEDURE — 96372 THER/PROPH/DIAG INJ SC/IM: CPT | Mod: S$GLB,,, | Performed by: FAMILY MEDICINE

## 2020-06-18 PROCEDURE — 83036 HEMOGLOBIN GLYCOSYLATED A1C: CPT

## 2020-06-18 PROCEDURE — 99214 OFFICE O/P EST MOD 30 MIN: CPT | Mod: 25,S$GLB,, | Performed by: FAMILY MEDICINE

## 2020-06-18 PROCEDURE — 99214 PR OFFICE/OUTPT VISIT, EST, LEVL IV, 30-39 MIN: ICD-10-PCS | Mod: 25,S$GLB,, | Performed by: FAMILY MEDICINE

## 2020-06-18 PROCEDURE — 80053 COMPREHEN METABOLIC PANEL: CPT

## 2020-06-18 PROCEDURE — 96372 PR INJECTION,THERAP/PROPH/DIAG2ST, IM OR SUBCUT: ICD-10-PCS | Mod: S$GLB,,, | Performed by: FAMILY MEDICINE

## 2020-06-18 RX ORDER — CYCLOBENZAPRINE HCL 10 MG
10 TABLET ORAL 3 TIMES DAILY PRN
Qty: 30 TABLET | Refills: 0 | Status: SHIPPED | OUTPATIENT
Start: 2020-06-18 | End: 2020-06-28

## 2020-06-18 RX ORDER — HYDROCODONE BITARTRATE AND ACETAMINOPHEN 5; 325 MG/1; MG/1
TABLET ORAL
Qty: 15 TABLET | Refills: 0 | Status: SHIPPED | OUTPATIENT
Start: 2020-06-18 | End: 2020-09-04

## 2020-06-18 RX ORDER — KETOROLAC TROMETHAMINE 30 MG/ML
60 INJECTION, SOLUTION INTRAMUSCULAR; INTRAVENOUS
Status: COMPLETED | OUTPATIENT
Start: 2020-06-18 | End: 2020-06-18

## 2020-06-18 RX ADMIN — KETOROLAC TROMETHAMINE 60 MG: 30 INJECTION, SOLUTION INTRAMUSCULAR; INTRAVENOUS at 11:06

## 2020-06-18 NOTE — PROGRESS NOTES
"Subjective:       Patient ID: Hamzah Reis is a 45 y.o. female.    Vitals:  height is 5' 5.98" (1.676 m) and weight is 109.3 kg (241 lb). Her tympanic temperature is 96.1 °F (35.6 °C). Her pulse is 97. Her respiration is 16 and oxygen saturation is 100%.     Chief Complaint: Headache    45-year-old female with the history of COVID since April complains of having persistent headache off and on times 2-3 months no relief with Tylenol she cannot tolerate ibuprofen, patient denies any sinus issues denies any photophobia or phonophobia denies any nausea vomiting or diarrhea   patient also complains of having lower back pain which is radiating down to the knee no numbness no paresthesias no bowel or bladder dysfunction works at [employer]    Headache   This is a recurrent problem. The current episode started in the past 7 days (X3 DAYS). The problem occurs constantly. The problem has been unchanged. The pain is located in the frontal and parietal region. The pain does not radiate. The pain quality is similar to prior headaches. The quality of the pain is described as aching. The pain is at a severity of 8/10. The pain is moderate. Associated symptoms include back pain. Pertinent negatives include no blurred vision, dizziness, eye pain, fever, loss of balance, nausea, neck pain, photophobia, tinnitus, vomiting or weakness. Nothing (PT HAS NEVER STOPPED HAVING THESE ISSUES SINCE SHE WAS POS FOR COVID-19 04/20) aggravates the symptoms. Treatments tried: Rx MEDS. The treatment provided no relief. There is no history of migraine headaches.       Constitution: Positive for fatigue. Negative for chills, sweating and fever.   HENT: Negative for tinnitus, facial swelling, congestion and sinus pain.    Neck: Negative for neck pain and neck stiffness.   Eyes: Negative for eye pain, photophobia, vision loss, double vision and blurred vision.   Gastrointestinal: Negative for nausea and vomiting.   Genitourinary: Negative for missed " menses.   Musculoskeletal: Positive for back pain. Negative for trauma and muscle ache.        LUMBAR SPINE   Skin: Negative for rash, wound and lesion.   Neurological: Positive for headaches. Negative for dizziness, history of vertigo, light-headedness, facial drooping, speech difficulty, coordination disturbances, loss of balance, history of migraines, disorientation and loss of consciousness.        FRONTAL/PARIETAL   Psychiatric/Behavioral: Negative for disorientation, confusion, nervous/anxious, sleep disturbance and depression. The patient is not nervous/anxious.        Objective:      Physical Exam   Constitutional: She is oriented to person, place, and time. She appears well-developed. She is cooperative.  Non-toxic appearance. She does not appear ill. No distress.   HENT:   Head: Normocephalic and atraumatic.   Right Ear: Hearing, tympanic membrane, external ear and ear canal normal.   Left Ear: Hearing, tympanic membrane, external ear and ear canal normal.   Nose: Nose normal. No mucosal edema, rhinorrhea or nasal deformity. No epistaxis. Right sinus exhibits no maxillary sinus tenderness and no frontal sinus tenderness. Left sinus exhibits no maxillary sinus tenderness and no frontal sinus tenderness.   Mouth/Throat: Uvula is midline, oropharynx is clear and moist and mucous membranes are normal. No trismus in the jaw. Normal dentition. No uvula swelling. No posterior oropharyngeal erythema.   Eyes: Conjunctivae and lids are normal. Right eye exhibits no discharge. Left eye exhibits no discharge. No scleral icterus.   Neck: Trachea normal, normal range of motion, full passive range of motion without pain and phonation normal. Neck supple.   Cardiovascular: Normal rate, regular rhythm, normal heart sounds and normal pulses.   Pulmonary/Chest: Effort normal and breath sounds normal. No respiratory distress.   Abdominal: Soft. Normal appearance and bowel sounds are normal. She exhibits no distension, no  pulsatile midline mass and no mass. There is no abdominal tenderness.   Musculoskeletal: Normal range of motion.         General: No deformity.      Comments: Back is nontender flexion with increased tenderness at 45° straight leg raise exam is negative   Neurological: She is alert and oriented to person, place, and time. She exhibits normal muscle tone. Coordination normal.   Skin: Skin is warm, dry, intact, not diaphoretic and not pale.   Psychiatric: Her speech is normal and behavior is normal. Judgment and thought content normal.   Nursing note and vitals reviewed.        Assessment:       1. Nonintractable headache, unspecified chronicity pattern, unspecified headache type    2. Back pain, unspecified back location, unspecified back pain laterality, unspecified chronicity        Plan:         Nonintractable headache, unspecified chronicity pattern, unspecified headache type  -     Airborne and Contact and Droplet Isolation Status; Standing    Back pain, unspecified back location, unspecified back pain laterality, unspecified chronicity    Other orders  -     ketorolac injection 60 mg  -     HYDROcodone-acetaminophen (NORCO) 5-325 mg per tablet; Take 1 tablet by mouth q.6 to 8 hr p.r.n. headache and back pain  Dispense: 15 tablet; Refill: 0  -     cyclobenzaprine (FLEXERIL) 10 MG tablet; Take 1 tablet (10 mg total) by mouth 3 (three) times daily as needed for Muscle spasms.  Dispense: 30 tablet; Refill: 0

## 2020-06-22 ENCOUNTER — TELEPHONE (OUTPATIENT)
Dept: OBSTETRICS AND GYNECOLOGY | Facility: CLINIC | Age: 45
End: 2020-06-22

## 2020-06-22 NOTE — TELEPHONE ENCOUNTER
----- Message from Radha Eng sent at 6/22/2020 11:31 AM CDT -----  Pt missed a call and would like the nurse to return their call.    Pt can be reached at [phone]       Thanks  KB

## 2020-06-23 ENCOUNTER — OFFICE VISIT (OUTPATIENT)
Dept: INTERNAL MEDICINE | Facility: CLINIC | Age: 45
End: 2020-06-23
Payer: COMMERCIAL

## 2020-06-23 VITALS
WEIGHT: 242.06 LBS | HEIGHT: 66 IN | SYSTOLIC BLOOD PRESSURE: 110 MMHG | BODY MASS INDEX: 38.9 KG/M2 | HEART RATE: 88 BPM | DIASTOLIC BLOOD PRESSURE: 76 MMHG | OXYGEN SATURATION: 99 % | TEMPERATURE: 97 F

## 2020-06-23 DIAGNOSIS — E11.69 HYPERLIPIDEMIA ASSOCIATED WITH TYPE 2 DIABETES MELLITUS: ICD-10-CM

## 2020-06-23 DIAGNOSIS — M54.50 ACUTE BILATERAL LOW BACK PAIN WITHOUT SCIATICA: Primary | ICD-10-CM

## 2020-06-23 DIAGNOSIS — E11.42 TYPE 2 DIABETES MELLITUS WITH DIABETIC POLYNEUROPATHY, WITH LONG-TERM CURRENT USE OF INSULIN: ICD-10-CM

## 2020-06-23 DIAGNOSIS — E78.5 HYPERLIPIDEMIA ASSOCIATED WITH TYPE 2 DIABETES MELLITUS: ICD-10-CM

## 2020-06-23 DIAGNOSIS — Z79.4 TYPE 2 DIABETES MELLITUS WITH DIABETIC POLYNEUROPATHY, WITH LONG-TERM CURRENT USE OF INSULIN: ICD-10-CM

## 2020-06-23 PROCEDURE — 3008F BODY MASS INDEX DOCD: CPT | Mod: CPTII,S$GLB,, | Performed by: INTERNAL MEDICINE

## 2020-06-23 PROCEDURE — 99999 PR PBB SHADOW E&M-EST. PATIENT-LVL IV: ICD-10-PCS | Mod: PBBFAC,,, | Performed by: INTERNAL MEDICINE

## 2020-06-23 PROCEDURE — 3008F PR BODY MASS INDEX (BMI) DOCUMENTED: ICD-10-PCS | Mod: CPTII,S$GLB,, | Performed by: INTERNAL MEDICINE

## 2020-06-23 PROCEDURE — 3046F HEMOGLOBIN A1C LEVEL >9.0%: CPT | Mod: CPTII,S$GLB,, | Performed by: INTERNAL MEDICINE

## 2020-06-23 PROCEDURE — 99214 OFFICE O/P EST MOD 30 MIN: CPT | Mod: S$GLB,,, | Performed by: INTERNAL MEDICINE

## 2020-06-23 PROCEDURE — 3078F DIAST BP <80 MM HG: CPT | Mod: CPTII,S$GLB,, | Performed by: INTERNAL MEDICINE

## 2020-06-23 PROCEDURE — 3074F SYST BP LT 130 MM HG: CPT | Mod: CPTII,S$GLB,, | Performed by: INTERNAL MEDICINE

## 2020-06-23 PROCEDURE — 99999 PR PBB SHADOW E&M-EST. PATIENT-LVL IV: CPT | Mod: PBBFAC,,, | Performed by: INTERNAL MEDICINE

## 2020-06-23 PROCEDURE — 3046F PR MOST RECENT HEMOGLOBIN A1C LEVEL > 9.0%: ICD-10-PCS | Mod: CPTII,S$GLB,, | Performed by: INTERNAL MEDICINE

## 2020-06-23 PROCEDURE — 3078F PR MOST RECENT DIASTOLIC BLOOD PRESSURE < 80 MM HG: ICD-10-PCS | Mod: CPTII,S$GLB,, | Performed by: INTERNAL MEDICINE

## 2020-06-23 PROCEDURE — 3074F PR MOST RECENT SYSTOLIC BLOOD PRESSURE < 130 MM HG: ICD-10-PCS | Mod: CPTII,S$GLB,, | Performed by: INTERNAL MEDICINE

## 2020-06-23 PROCEDURE — 99214 PR OFFICE/OUTPT VISIT, EST, LEVL IV, 30-39 MIN: ICD-10-PCS | Mod: S$GLB,,, | Performed by: INTERNAL MEDICINE

## 2020-06-23 RX ORDER — INSULIN DETEMIR 100 [IU]/ML
20 INJECTION, SOLUTION SUBCUTANEOUS NIGHTLY
Qty: 15 ML | Refills: 0 | Status: SHIPPED | OUTPATIENT
Start: 2020-06-23 | End: 2020-07-30 | Stop reason: SDUPTHER

## 2020-06-23 RX ORDER — OXYCODONE AND ACETAMINOPHEN 5; 325 MG/1; MG/1
1 TABLET ORAL EVERY 6 HOURS PRN
Qty: 40 EACH | Refills: 0 | Status: SHIPPED | OUTPATIENT
Start: 2020-06-23 | End: 2020-07-30 | Stop reason: SDUPTHER

## 2020-06-23 RX ORDER — KETOROLAC TROMETHAMINE 10 MG/1
10 TABLET, FILM COATED ORAL EVERY 6 HOURS
Qty: 40 TABLET | Refills: 0 | Status: SHIPPED | OUTPATIENT
Start: 2020-06-23 | End: 2020-07-30

## 2020-06-23 NOTE — PROGRESS NOTES
Patient ID: Hamzah Reis is a 45 y.o. female.    Chief Complaint: Diabetes and Follow-up    HPI Hamzah is a 45 y.o. female with type II DM, HLD presents for follow up of type II DM, hypertension, hyperlipidemia.      She also complains of back pain.  It is located in the lower back.  She has never had this type of pain before.  It is currently 7/10 in severity.  She denies any trauma to the back or heavy lifting.  Onset was 2 weeks ago.  She was seen in urgent care for this pain.  She was given a Toradol injection which helped to relieve her pain.  She took 1 of her 's Percocet medications which helped to relieve her pain.  She has not yet tried the Flexeril medication that was prescribed by the urgent care. It is a constant problem in duration.    We discussed her labs which show A1c of 11.1 which is an improvement when compared to prior.  Cholesterol is not well controlled.  Patient is compliant with metformin 1000 mg twice daily and Levemir 15 units nightly.  Of note, patient states she was on mealtime insulin in the past.    Review of Systems   Musculoskeletal: Positive for back pain.   All other systems reviewed and are negative.      Objective:     Vitals:    06/23/20 1041   BP: 110/76   Pulse: 88   Temp: 96.9 °F (36.1 °C)        Physical Exam  Vitals signs reviewed.   Constitutional:       General: She is not in acute distress.     Appearance: She is well-developed. She is obese. She is not ill-appearing, toxic-appearing or diaphoretic.   HENT:      Head: Normocephalic and atraumatic.      Right Ear: External ear normal.      Left Ear: External ear normal.      Nose: Nose normal.   Eyes:      General: No scleral icterus.        Right eye: No discharge.         Left eye: No discharge.      Extraocular Movements: Extraocular movements intact.      Conjunctiva/sclera: Conjunctivae normal.   Cardiovascular:      Rate and Rhythm: Normal rate and regular rhythm.      Heart sounds: Normal heart sounds.  No murmur. No friction rub. No gallop.    Pulmonary:      Effort: Pulmonary effort is normal. No respiratory distress.      Breath sounds: Normal breath sounds. No wheezing or rales.   Musculoskeletal:      Comments: Negative straight leg raise test bilaterally.  Full range of motion bilateral hips intact passively without reproduction of pain.   Skin:     General: Skin is warm and dry.   Neurological:      Mental Status: She is alert and oriented to person, place, and time. Mental status is at baseline.   Psychiatric:         Mood and Affect: Mood normal.         Behavior: Behavior normal.         Thought Content: Thought content normal.         Judgment: Judgment normal.         Assessment:       1. Acute bilateral low back pain without sciatica Active   2. Type 2 diabetes mellitus with diabetic polyneuropathy, with long-term current use of insulin Active   3. Hyperlipidemia associated with type 2 diabetes mellitus Sub-optimally controlled       Plan:         Acute bilateral low back pain without sciatica  Comments:  See instructions in AVS  Orders:  -     ketorolac (TORADOL) 10 mg tablet; Take 1 tablet (10 mg total) by mouth every 6 (six) hours.  Dispense: 40 tablet; Refill: 0  -     oxyCODONE-acetaminophen (PERCOCET) 5-325 mg per tablet; Take 1 tablet by mouth every 6 (six) hours as needed for Pain.  Dispense: 40 each; Refill: 0    Type 2 diabetes mellitus with diabetic polyneuropathy, with long-term current use of insulin  Comments:  Improving. Increasing levemir to 20 units nightly. Continue metformin at max dose. RTC in 1 month with BG log  Orders:  -     insulin detemir U-100 (LEVEMIR FLEXTOUCH U-100 INSULN) 100 unit/mL (3 mL) InPn pen; Inject 20 Units into the skin every evening.  Dispense: 15 mL; Refill: 0    Hyperlipidemia associated with type 2 diabetes mellitus  Comments:  If not at goal on next labs, will increase atorvastatin to 80 mg        RTC one month    Warning signs discussed, patient to call  with any further issues or worsening of symptoms.       Parts of the above note were dictated using a voice dictation software. Please excuse any grammatical or typographical errors.

## 2020-06-23 NOTE — PATIENT INSTRUCTIONS
Please check blood sugar every morning.  Please return to clinic in 1 month with this log of blood sugar measurements.    Please use heat,  movement, and massage for back pain.  Please use Toradol and Flexeril 1st for back pain.  If this does not relieve back pain, okay to take the Percocet.

## 2020-06-29 ENCOUNTER — TELEPHONE (OUTPATIENT)
Dept: OBSTETRICS AND GYNECOLOGY | Facility: CLINIC | Age: 45
End: 2020-06-29

## 2020-07-01 ENCOUNTER — TELEPHONE (OUTPATIENT)
Dept: OBSTETRICS AND GYNECOLOGY | Facility: CLINIC | Age: 45
End: 2020-07-01

## 2020-07-01 NOTE — TELEPHONE ENCOUNTER
----- Message from Jeremy Dubon sent at 7/1/2020  1:48 PM CDT -----  Contact: patient/// [phone]  Patient states she's returning your call   Please advise

## 2020-07-02 ENCOUNTER — TELEPHONE (OUTPATIENT)
Dept: OBSTETRICS AND GYNECOLOGY | Facility: CLINIC | Age: 45
End: 2020-07-02

## 2020-07-02 NOTE — TELEPHONE ENCOUNTER
----- Message from Jesu Iglesias sent at 7/2/2020 12:03 PM CDT -----  Patient called in returning your call. Please advise. [phone]

## 2020-07-13 ENCOUNTER — TELEPHONE (OUTPATIENT)
Dept: INTERNAL MEDICINE | Facility: CLINIC | Age: 45
End: 2020-07-13

## 2020-07-13 DIAGNOSIS — Z00.00 ROUTINE GENERAL MEDICAL EXAMINATION AT A HEALTH CARE FACILITY: Primary | ICD-10-CM

## 2020-07-13 DIAGNOSIS — B37.9 YEAST INFECTION: Primary | ICD-10-CM

## 2020-07-13 RX ORDER — FLUCONAZOLE 100 MG/1
100 TABLET ORAL DAILY
Qty: 3 TABLET | Refills: 0 | Status: SHIPPED | OUTPATIENT
Start: 2020-07-13 | End: 2020-08-20 | Stop reason: SDUPTHER

## 2020-07-13 NOTE — TELEPHONE ENCOUNTER
----- Message from Marietta Yao sent at 7/13/2020  9:53 AM CDT -----  Regarding: missed call  Contact: Pt  Pt missed call and asked for another call back.

## 2020-07-16 ENCOUNTER — PATIENT OUTREACH (OUTPATIENT)
Dept: ADMINISTRATIVE | Facility: HOSPITAL | Age: 45
End: 2020-07-16

## 2020-07-16 NOTE — PROGRESS NOTES
Previsit Chart audit performed. Outreach to [facility] for eye exam report no answer

## 2020-07-30 ENCOUNTER — PATIENT OUTREACH (OUTPATIENT)
Dept: ADMINISTRATIVE | Facility: OTHER | Age: 45
End: 2020-07-30

## 2020-07-30 ENCOUNTER — LAB VISIT (OUTPATIENT)
Dept: LAB | Facility: HOSPITAL | Age: 45
End: 2020-07-30
Attending: INTERNAL MEDICINE
Payer: COMMERCIAL

## 2020-07-30 ENCOUNTER — OFFICE VISIT (OUTPATIENT)
Dept: INTERNAL MEDICINE | Facility: CLINIC | Age: 45
End: 2020-07-30
Payer: COMMERCIAL

## 2020-07-30 VITALS
SYSTOLIC BLOOD PRESSURE: 124 MMHG | OXYGEN SATURATION: 98 % | BODY MASS INDEX: 38.96 KG/M2 | DIASTOLIC BLOOD PRESSURE: 78 MMHG | TEMPERATURE: 98 F | WEIGHT: 241.38 LBS | HEART RATE: 96 BPM

## 2020-07-30 DIAGNOSIS — M54.50 ACUTE BILATERAL LOW BACK PAIN WITHOUT SCIATICA: ICD-10-CM

## 2020-07-30 DIAGNOSIS — Z79.4 TYPE 2 DIABETES MELLITUS WITH DIABETIC POLYNEUROPATHY, WITH LONG-TERM CURRENT USE OF INSULIN: ICD-10-CM

## 2020-07-30 DIAGNOSIS — E55.9 VITAMIN D DEFICIENCY: ICD-10-CM

## 2020-07-30 DIAGNOSIS — Z11.59 NEED FOR HEPATITIS C SCREENING TEST: ICD-10-CM

## 2020-07-30 DIAGNOSIS — Z11.4 ENCOUNTER FOR SCREENING FOR HIV: ICD-10-CM

## 2020-07-30 DIAGNOSIS — E11.69 HYPERLIPIDEMIA ASSOCIATED WITH TYPE 2 DIABETES MELLITUS: ICD-10-CM

## 2020-07-30 DIAGNOSIS — E11.42 TYPE 2 DIABETES MELLITUS WITH DIABETIC POLYNEUROPATHY, WITH LONG-TERM CURRENT USE OF INSULIN: ICD-10-CM

## 2020-07-30 DIAGNOSIS — M79.673 PAIN OF FOOT, UNSPECIFIED LATERALITY: ICD-10-CM

## 2020-07-30 DIAGNOSIS — E78.5 HYPERLIPIDEMIA ASSOCIATED WITH TYPE 2 DIABETES MELLITUS: ICD-10-CM

## 2020-07-30 PROCEDURE — 3008F PR BODY MASS INDEX (BMI) DOCUMENTED: ICD-10-PCS | Mod: CPTII,S$GLB,, | Performed by: INTERNAL MEDICINE

## 2020-07-30 PROCEDURE — 3074F PR MOST RECENT SYSTOLIC BLOOD PRESSURE < 130 MM HG: ICD-10-PCS | Mod: CPTII,S$GLB,, | Performed by: INTERNAL MEDICINE

## 2020-07-30 PROCEDURE — 3074F SYST BP LT 130 MM HG: CPT | Mod: CPTII,S$GLB,, | Performed by: INTERNAL MEDICINE

## 2020-07-30 PROCEDURE — 3046F HEMOGLOBIN A1C LEVEL >9.0%: CPT | Mod: CPTII,S$GLB,, | Performed by: INTERNAL MEDICINE

## 2020-07-30 PROCEDURE — 3008F BODY MASS INDEX DOCD: CPT | Mod: CPTII,S$GLB,, | Performed by: INTERNAL MEDICINE

## 2020-07-30 PROCEDURE — 3078F PR MOST RECENT DIASTOLIC BLOOD PRESSURE < 80 MM HG: ICD-10-PCS | Mod: CPTII,S$GLB,, | Performed by: INTERNAL MEDICINE

## 2020-07-30 PROCEDURE — 3078F DIAST BP <80 MM HG: CPT | Mod: CPTII,S$GLB,, | Performed by: INTERNAL MEDICINE

## 2020-07-30 PROCEDURE — 99999 PR PBB SHADOW E&M-EST. PATIENT-LVL IV: CPT | Mod: PBBFAC,,, | Performed by: INTERNAL MEDICINE

## 2020-07-30 PROCEDURE — 99214 PR OFFICE/OUTPT VISIT, EST, LEVL IV, 30-39 MIN: ICD-10-PCS | Mod: S$GLB,,, | Performed by: INTERNAL MEDICINE

## 2020-07-30 PROCEDURE — 99999 PR PBB SHADOW E&M-EST. PATIENT-LVL IV: ICD-10-PCS | Mod: PBBFAC,,, | Performed by: INTERNAL MEDICINE

## 2020-07-30 PROCEDURE — 82043 UR ALBUMIN QUANTITATIVE: CPT

## 2020-07-30 PROCEDURE — 3046F PR MOST RECENT HEMOGLOBIN A1C LEVEL > 9.0%: ICD-10-PCS | Mod: CPTII,S$GLB,, | Performed by: INTERNAL MEDICINE

## 2020-07-30 PROCEDURE — 99214 OFFICE O/P EST MOD 30 MIN: CPT | Mod: S$GLB,,, | Performed by: INTERNAL MEDICINE

## 2020-07-30 RX ORDER — INSULIN DETEMIR 100 [IU]/ML
25 INJECTION, SOLUTION SUBCUTANEOUS NIGHTLY
Qty: 15 ML | Refills: 3 | Status: SHIPPED | OUTPATIENT
Start: 2020-07-30 | End: 2021-06-09 | Stop reason: SDUPTHER

## 2020-07-30 RX ORDER — OXYCODONE AND ACETAMINOPHEN 5; 325 MG/1; MG/1
1 TABLET ORAL EVERY 6 HOURS PRN
Qty: 40 EACH | Refills: 0 | Status: SHIPPED | OUTPATIENT
Start: 2020-07-30 | End: 2020-10-30 | Stop reason: ALTCHOICE

## 2020-07-30 RX ORDER — GABAPENTIN 600 MG/1
600 TABLET ORAL 3 TIMES DAILY
Qty: 90 TABLET | Refills: 11 | Status: SHIPPED | OUTPATIENT
Start: 2020-07-30 | End: 2020-11-14 | Stop reason: SDUPTHER

## 2020-07-30 RX ORDER — ASPIRIN 325 MG
50000 TABLET, DELAYED RELEASE (ENTERIC COATED) ORAL WEEKLY
Qty: 12 CAPSULE | Refills: 3 | Status: SHIPPED | OUTPATIENT
Start: 2020-07-30 | End: 2021-04-22 | Stop reason: SDUPTHER

## 2020-07-30 NOTE — PROGRESS NOTES
Requested updates within Care Everywhere.  Patient's chart was reviewed for overdue SARAHY topics.  Immunizations reconciled.    Orders placed:  Tasked appts:  Labs Linked:

## 2020-07-30 NOTE — PROGRESS NOTES
Patient ID: Hamzah Reis is a 45 y.o. female.    Chief Complaint: Follow-up    HPI Hamzah is a 45 y.o. female 2 diabetes, vitamin D deficiency, hyperlipidemia, and chronic back pain who presents follow-up of medical conditions.    We reviewed last A1c from June.  It showed improvement while taking metformin 1000 mg p.o. b.i.d. and Levemir 20 units daily.  However patient states she has been without her insulin for about 1.5 weeks.  Says the 1st time she picked up her insulin it was free, but the 2nd time she went to  her insulin it cost over 100 dollars.    We reviewed past labs.  Patient has had significant vitamin-D deficiency in the past.  She is still taking vitamin-D 50,000 units weekly.  We will recheck vitamin-D lab on next blood draw.    Upon reviewing labs from June, I did note that her cholesterol has been slowly increasing.  Patient reports she was not compliant with atorvastatin at that time, but is now compliant with taking it daily.    Patient continues to have pain in lower back as well as feet.  She is taking gabapentin 300 mg p.o. t.i.d. without relief.  Reports that Toradol did not help her pain.  Percocet was helpful.  She is wondering if she should follow-up with Podiatry again for the foot pain.    Review of Systems   Musculoskeletal: Positive for back pain.        Foot pain   All other systems reviewed and are negative.      Objective:     Vitals:    07/30/20 1122   BP: 124/78   Pulse: 96   Temp: 98.1 °F (36.7 °C)        Physical Exam  Vitals signs reviewed.   Constitutional:       General: She is not in acute distress.     Appearance: Normal appearance. She is obese. She is not ill-appearing, toxic-appearing or diaphoretic.   HENT:      Head: Normocephalic and atraumatic.      Right Ear: External ear normal.      Left Ear: External ear normal.      Nose: Nose normal.   Eyes:      Extraocular Movements: Extraocular movements intact.      Conjunctiva/sclera: Conjunctivae normal.    Pulmonary:      Effort: Pulmonary effort is normal. No respiratory distress.   Neurological:      Mental Status: She is oriented to person, place, and time. Mental status is at baseline.   Psychiatric:         Mood and Affect: Mood normal.         Behavior: Behavior normal.         Thought Content: Thought content normal.         Judgment: Judgment normal.         Assessment:       1. Type 2 diabetes mellitus with diabetic polyneuropathy, with long-term current use of insulin Active   2. Hyperlipidemia associated with type 2 diabetes mellitus Poorly controlled   3. Vitamin D deficiency Chronic   4. Acute bilateral low back pain without sciatica Chronic   5. Need for hepatitis C screening test    6. Encounter for screening for HIV    7. Pain of foot, unspecified laterality Active       Plan:         Type 2 diabetes mellitus with diabetic polyneuropathy, with long-term current use of insulin  Comments:  Improving. Increasing levemir to 25 units daily. Cont metformin 1000 mg PO BID  Orders:  -     insulin detemir U-100 (LEVEMIR FLEXTOUCH U-100 INSULN) 100 unit/mL (3 mL) InPn pen; Inject 25 Units into the skin every evening.  Dispense: 15 mL; Refill: 3  -     MICROALBUMIN / CREATININE RATIO URINE  -     Hemoglobin A1C; Future; Expected date: 07/30/2020  -     Lipid Panel; Future; Expected date: 07/30/2020  -     Comprehensive metabolic panel; Future; Expected date: 07/30/2020  -     MICROALBUMIN / CREATININE RATIO URINE; Future; Expected date: 07/30/2020    Hyperlipidemia associated with type 2 diabetes mellitus  Comments:  Stressed importance of compliance with atorvatatin    Vitamin D deficiency  -     cholecalciferol, vitamin D3, 1,250 mcg (50,000 unit) capsule; Take 1 capsule (50,000 Units total) by mouth once a week.  Dispense: 12 capsule; Refill: 3  -     Vitamin D; Future; Expected date: 07/30/2020    Acute bilateral low back pain without sciatica  -     gabapentin (NEURONTIN) 600 MG tablet; Take 1 tablet (600  mg total) by mouth 3 (three) times daily.  Dispense: 90 tablet; Refill: 11  -     oxyCODONE-acetaminophen (PERCOCET) 5-325 mg per tablet; Take 1 tablet by mouth every 6 (six) hours as needed for Pain.  Dispense: 40 each; Refill: 0  -     Ambulatory referral/consult to Physical/Occupational Therapy; Future; Expected date: 08/06/2020    Need for hepatitis C screening test  -     Hepatitis C Antibody; Future; Expected date: 07/30/2020    Encounter for screening for HIV  -     HIV 1/2 Ag/Ab (4th Gen); Future; Expected date: 07/30/2020    Pain of foot, unspecified laterality  Comments:  Follow up with podiatry        RTC september    Warning signs discussed, patient to call with any further issues or worsening of symptoms.       Parts of the above note were dictated using a voice dictation software. Please excuse any grammatical or typographical errors.

## 2020-07-31 LAB
ALBUMIN/CREAT UR: 13.9 UG/MG (ref 0–30)
CREAT UR-MCNC: 244 MG/DL (ref 15–325)
MICROALBUMIN UR DL<=1MG/L-MCNC: 34 UG/ML

## 2020-08-03 ENCOUNTER — OFFICE VISIT (OUTPATIENT)
Dept: OBSTETRICS AND GYNECOLOGY | Facility: CLINIC | Age: 45
End: 2020-08-03
Payer: COMMERCIAL

## 2020-08-03 VITALS
DIASTOLIC BLOOD PRESSURE: 80 MMHG | WEIGHT: 242 LBS | SYSTOLIC BLOOD PRESSURE: 120 MMHG | BODY MASS INDEX: 38.89 KG/M2 | HEIGHT: 66 IN

## 2020-08-03 DIAGNOSIS — N92.1 MENOMETRORRHAGIA: Primary | ICD-10-CM

## 2020-08-03 DIAGNOSIS — N92.0 MENORRHAGIA: ICD-10-CM

## 2020-08-03 PROCEDURE — 99499 UNLISTED E&M SERVICE: CPT | Mod: S$GLB,,, | Performed by: OBSTETRICS & GYNECOLOGY

## 2020-08-03 PROCEDURE — 99999 PR PBB SHADOW E&M-EST. PATIENT-LVL III: ICD-10-PCS | Mod: PBBFAC,,, | Performed by: OBSTETRICS & GYNECOLOGY

## 2020-08-03 PROCEDURE — 99499 NO LOS: ICD-10-PCS | Mod: S$GLB,,, | Performed by: OBSTETRICS & GYNECOLOGY

## 2020-08-03 PROCEDURE — 99999 PR PBB SHADOW E&M-EST. PATIENT-LVL III: CPT | Mod: PBBFAC,,, | Performed by: OBSTETRICS & GYNECOLOGY

## 2020-08-03 RX ORDER — LIDOCAINE HYDROCHLORIDE 10 MG/ML
1 INJECTION, SOLUTION EPIDURAL; INFILTRATION; INTRACAUDAL; PERINEURAL ONCE
Status: CANCELLED | OUTPATIENT
Start: 2020-08-03 | End: 2020-08-03

## 2020-08-03 RX ORDER — SODIUM CHLORIDE, SODIUM LACTATE, POTASSIUM CHLORIDE, CALCIUM CHLORIDE 600; 310; 30; 20 MG/100ML; MG/100ML; MG/100ML; MG/100ML
INJECTION, SOLUTION INTRAVENOUS CONTINUOUS
Status: CANCELLED | OUTPATIENT
Start: 2020-08-03

## 2020-08-03 NOTE — PROGRESS NOTES
Pt for ablation next week for menorrhagia  Us was nromal  disucsed procedure, failure rate, complicaitons  Consents done  Pt wants to proceed

## 2020-08-08 ENCOUNTER — CLINICAL SUPPORT (OUTPATIENT)
Dept: URGENT CARE | Facility: CLINIC | Age: 45
End: 2020-08-08
Payer: COMMERCIAL

## 2020-08-08 DIAGNOSIS — Z00.00 ROUTINE GENERAL MEDICAL EXAMINATION AT A HEALTH CARE FACILITY: ICD-10-CM

## 2020-08-08 PROCEDURE — U0003 INFECTIOUS AGENT DETECTION BY NUCLEIC ACID (DNA OR RNA); SEVERE ACUTE RESPIRATORY SYNDROME CORONAVIRUS 2 (SARS-COV-2) (CORONAVIRUS DISEASE [COVID-19]), AMPLIFIED PROBE TECHNIQUE, MAKING USE OF HIGH THROUGHPUT TECHNOLOGIES AS DESCRIBED BY CMS-2020-01-R: HCPCS

## 2020-08-09 LAB — SARS-COV-2 RNA RESP QL NAA+PROBE: NOT DETECTED

## 2020-08-11 ENCOUNTER — HOSPITAL ENCOUNTER (OUTPATIENT)
Facility: HOSPITAL | Age: 45
Discharge: HOME | End: 2020-08-11
Attending: OBSTETRICS & GYNECOLOGY | Admitting: OBSTETRICS & GYNECOLOGY
Payer: COMMERCIAL

## 2020-08-11 ENCOUNTER — TELEPHONE (OUTPATIENT)
Dept: OBSTETRICS AND GYNECOLOGY | Facility: CLINIC | Age: 45
End: 2020-08-11

## 2020-08-11 ENCOUNTER — ANESTHESIA (OUTPATIENT)
Dept: SURGERY | Facility: HOSPITAL | Age: 45
End: 2020-08-11
Payer: COMMERCIAL

## 2020-08-11 ENCOUNTER — ANESTHESIA EVENT (OUTPATIENT)
Dept: SURGERY | Facility: HOSPITAL | Age: 45
End: 2020-08-11
Payer: COMMERCIAL

## 2020-08-11 VITALS
BODY MASS INDEX: 38.89 KG/M2 | DIASTOLIC BLOOD PRESSURE: 71 MMHG | WEIGHT: 242 LBS | HEIGHT: 66 IN | SYSTOLIC BLOOD PRESSURE: 123 MMHG | OXYGEN SATURATION: 97 % | TEMPERATURE: 98 F | RESPIRATION RATE: 15 BRPM | HEART RATE: 64 BPM

## 2020-08-11 DIAGNOSIS — N92.0 MENORRHAGIA: ICD-10-CM

## 2020-08-11 DIAGNOSIS — N92.1 MENOMETRORRHAGIA: ICD-10-CM

## 2020-08-11 LAB
B-HCG UR QL: NEGATIVE
CTP QC/QA: YES
POCT GLUCOSE: 298 MG/DL (ref 70–110)

## 2020-08-11 PROCEDURE — 58563 PR HYSTEROSCOPY,W/ENDOMETRIAL ABLATION: ICD-10-PCS | Mod: ,,, | Performed by: OBSTETRICS & GYNECOLOGY

## 2020-08-11 PROCEDURE — 37000008 HC ANESTHESIA 1ST 15 MINUTES: Performed by: OBSTETRICS & GYNECOLOGY

## 2020-08-11 PROCEDURE — 71000033 HC RECOVERY, INTIAL HOUR: Performed by: OBSTETRICS & GYNECOLOGY

## 2020-08-11 PROCEDURE — 00952 ANES VAG PX HYSTSC&/HSG: CPT | Performed by: OBSTETRICS & GYNECOLOGY

## 2020-08-11 PROCEDURE — 58563 HYSTEROSCOPY ABLATION: CPT | Mod: ,,, | Performed by: OBSTETRICS & GYNECOLOGY

## 2020-08-11 PROCEDURE — 63600175 PHARM REV CODE 636 W HCPCS: Performed by: OBSTETRICS & GYNECOLOGY

## 2020-08-11 PROCEDURE — 37000009 HC ANESTHESIA EA ADD 15 MINS: Performed by: OBSTETRICS & GYNECOLOGY

## 2020-08-11 PROCEDURE — 88305 TISSUE EXAM BY PATHOLOGIST: CPT | Mod: 26,,, | Performed by: PATHOLOGY

## 2020-08-11 PROCEDURE — 81025 URINE PREGNANCY TEST: CPT | Performed by: OBSTETRICS & GYNECOLOGY

## 2020-08-11 PROCEDURE — 63600175 PHARM REV CODE 636 W HCPCS: Performed by: NURSE ANESTHETIST, CERTIFIED REGISTERED

## 2020-08-11 PROCEDURE — 71000039 HC RECOVERY, EACH ADD'L HOUR: Performed by: OBSTETRICS & GYNECOLOGY

## 2020-08-11 PROCEDURE — 88305 TISSUE EXAM BY PATHOLOGIST: ICD-10-PCS | Mod: 26,,, | Performed by: PATHOLOGY

## 2020-08-11 PROCEDURE — 63600175 PHARM REV CODE 636 W HCPCS: Performed by: ANESTHESIOLOGY

## 2020-08-11 PROCEDURE — 36000707: Performed by: OBSTETRICS & GYNECOLOGY

## 2020-08-11 PROCEDURE — 71000016 HC POSTOP RECOV ADDL HR: Performed by: OBSTETRICS & GYNECOLOGY

## 2020-08-11 PROCEDURE — 25000003 PHARM REV CODE 250: Performed by: NURSE ANESTHETIST, CERTIFIED REGISTERED

## 2020-08-11 PROCEDURE — 71000015 HC POSTOP RECOV 1ST HR: Performed by: OBSTETRICS & GYNECOLOGY

## 2020-08-11 PROCEDURE — 36000706: Performed by: OBSTETRICS & GYNECOLOGY

## 2020-08-11 PROCEDURE — 88305 TISSUE EXAM BY PATHOLOGIST: CPT | Performed by: PATHOLOGY

## 2020-08-11 PROCEDURE — 27201423 OPTIME MED/SURG SUP & DEVICES STERILE SUPPLY: Performed by: OBSTETRICS & GYNECOLOGY

## 2020-08-11 RX ORDER — LIDOCAINE HYDROCHLORIDE 10 MG/ML
1 INJECTION, SOLUTION EPIDURAL; INFILTRATION; INTRACAUDAL; PERINEURAL ONCE
Status: DISCONTINUED | OUTPATIENT
Start: 2020-08-11 | End: 2020-08-11 | Stop reason: HOSPADM

## 2020-08-11 RX ORDER — ONDANSETRON HYDROCHLORIDE 2 MG/ML
INJECTION, SOLUTION INTRAMUSCULAR; INTRAVENOUS
Status: DISCONTINUED | OUTPATIENT
Start: 2020-08-11 | End: 2020-08-11

## 2020-08-11 RX ORDER — HYDROMORPHONE HYDROCHLORIDE 2 MG/ML
0.5 INJECTION, SOLUTION INTRAMUSCULAR; INTRAVENOUS; SUBCUTANEOUS EVERY 5 MIN PRN
Status: DISPENSED | OUTPATIENT
Start: 2020-08-11 | End: 2020-08-11

## 2020-08-11 RX ORDER — SUCCINYLCHOLINE CHLORIDE 20 MG/ML
INJECTION INTRAMUSCULAR; INTRAVENOUS
Status: DISCONTINUED | OUTPATIENT
Start: 2020-08-11 | End: 2020-08-11

## 2020-08-11 RX ORDER — SODIUM CHLORIDE, SODIUM LACTATE, POTASSIUM CHLORIDE, CALCIUM CHLORIDE 600; 310; 30; 20 MG/100ML; MG/100ML; MG/100ML; MG/100ML
INJECTION, SOLUTION INTRAVENOUS CONTINUOUS
Status: DISCONTINUED | OUTPATIENT
Start: 2020-08-11 | End: 2020-08-11 | Stop reason: HOSPADM

## 2020-08-11 RX ORDER — NAPROXEN SODIUM 550 MG/1
550 TABLET ORAL 2 TIMES DAILY WITH MEALS
Qty: 20 TABLET | Refills: 3 | Status: SHIPPED | OUTPATIENT
Start: 2020-08-11 | End: 2021-03-26

## 2020-08-11 RX ORDER — SODIUM CHLORIDE 0.9 % (FLUSH) 0.9 %
10 SYRINGE (ML) INJECTION
Status: DISCONTINUED | OUTPATIENT
Start: 2020-08-11 | End: 2020-08-11 | Stop reason: HOSPADM

## 2020-08-11 RX ORDER — ONDANSETRON 2 MG/ML
4 INJECTION INTRAMUSCULAR; INTRAVENOUS ONCE AS NEEDED
Status: DISCONTINUED | OUTPATIENT
Start: 2020-08-11 | End: 2020-08-11 | Stop reason: HOSPADM

## 2020-08-11 RX ORDER — MIDAZOLAM HYDROCHLORIDE 1 MG/ML
INJECTION INTRAMUSCULAR; INTRAVENOUS
Status: DISCONTINUED | OUTPATIENT
Start: 2020-08-11 | End: 2020-08-11

## 2020-08-11 RX ORDER — ONDANSETRON 8 MG/1
8 TABLET, ORALLY DISINTEGRATING ORAL EVERY 8 HOURS PRN
Status: DISCONTINUED | OUTPATIENT
Start: 2020-08-11 | End: 2020-08-11 | Stop reason: HOSPADM

## 2020-08-11 RX ORDER — HYDROCODONE BITARTRATE AND ACETAMINOPHEN 5; 325 MG/1; MG/1
1 TABLET ORAL EVERY 4 HOURS PRN
Status: DISCONTINUED | OUTPATIENT
Start: 2020-08-11 | End: 2020-08-11 | Stop reason: HOSPADM

## 2020-08-11 RX ORDER — PROPOFOL 10 MG/ML
VIAL (ML) INTRAVENOUS
Status: DISCONTINUED | OUTPATIENT
Start: 2020-08-11 | End: 2020-08-11

## 2020-08-11 RX ORDER — DEXAMETHASONE SODIUM PHOSPHATE 4 MG/ML
INJECTION, SOLUTION INTRA-ARTICULAR; INTRALESIONAL; INTRAMUSCULAR; INTRAVENOUS; SOFT TISSUE
Status: DISCONTINUED | OUTPATIENT
Start: 2020-08-11 | End: 2020-08-11

## 2020-08-11 RX ORDER — ACETAMINOPHEN 10 MG/ML
INJECTION, SOLUTION INTRAVENOUS
Status: DISCONTINUED | OUTPATIENT
Start: 2020-08-11 | End: 2020-08-11

## 2020-08-11 RX ORDER — DIPHENHYDRAMINE HCL 25 MG
25 CAPSULE ORAL EVERY 4 HOURS PRN
Status: DISCONTINUED | OUTPATIENT
Start: 2020-08-11 | End: 2020-08-11 | Stop reason: HOSPADM

## 2020-08-11 RX ORDER — OXYCODONE AND ACETAMINOPHEN 5; 325 MG/1; MG/1
1 TABLET ORAL EVERY 4 HOURS PRN
Qty: 10 TABLET | Refills: 0 | Status: SHIPPED | OUTPATIENT
Start: 2020-08-11 | End: 2020-10-30 | Stop reason: ALTCHOICE

## 2020-08-11 RX ORDER — ROCURONIUM BROMIDE 10 MG/ML
INJECTION, SOLUTION INTRAVENOUS
Status: DISCONTINUED | OUTPATIENT
Start: 2020-08-11 | End: 2020-08-11

## 2020-08-11 RX ORDER — FENTANYL CITRATE 50 UG/ML
INJECTION, SOLUTION INTRAMUSCULAR; INTRAVENOUS
Status: DISCONTINUED | OUTPATIENT
Start: 2020-08-11 | End: 2020-08-11

## 2020-08-11 RX ORDER — DIPHENHYDRAMINE HYDROCHLORIDE 50 MG/ML
25 INJECTION INTRAMUSCULAR; INTRAVENOUS EVERY 4 HOURS PRN
Status: DISCONTINUED | OUTPATIENT
Start: 2020-08-11 | End: 2020-08-11 | Stop reason: HOSPADM

## 2020-08-11 RX ORDER — CEFAZOLIN SODIUM 2 G/50ML
2 SOLUTION INTRAVENOUS
Status: COMPLETED | OUTPATIENT
Start: 2020-08-11 | End: 2020-08-11

## 2020-08-11 RX ORDER — KETOROLAC TROMETHAMINE 30 MG/ML
INJECTION, SOLUTION INTRAMUSCULAR; INTRAVENOUS
Status: DISCONTINUED | OUTPATIENT
Start: 2020-08-11 | End: 2020-08-11

## 2020-08-11 RX ORDER — LIDOCAINE HCL/PF 100 MG/5ML
SYRINGE (ML) INTRAVENOUS
Status: DISCONTINUED | OUTPATIENT
Start: 2020-08-11 | End: 2020-08-11

## 2020-08-11 RX ADMIN — ROCURONIUM BROMIDE 5 MG: 10 INJECTION, SOLUTION INTRAVENOUS at 07:08

## 2020-08-11 RX ADMIN — HYDROMORPHONE HYDROCHLORIDE 0.5 MG: 2 INJECTION, SOLUTION INTRAMUSCULAR; INTRAVENOUS; SUBCUTANEOUS at 08:08

## 2020-08-11 RX ADMIN — ONDANSETRON 8 MG: 2 INJECTION, SOLUTION INTRAMUSCULAR; INTRAVENOUS at 07:08

## 2020-08-11 RX ADMIN — PROPOFOL 150 MG: 10 INJECTION, EMULSION INTRAVENOUS at 07:08

## 2020-08-11 RX ADMIN — SUCCINYLCHOLINE CHLORIDE 120 MG: 20 INJECTION, SOLUTION INTRAMUSCULAR; INTRAVENOUS at 07:08

## 2020-08-11 RX ADMIN — KETOROLAC TROMETHAMINE 30 MG: 30 INJECTION, SOLUTION INTRAMUSCULAR; INTRAVENOUS at 07:08

## 2020-08-11 RX ADMIN — CEFAZOLIN SODIUM 2 G: 2 SOLUTION INTRAVENOUS at 07:08

## 2020-08-11 RX ADMIN — LIDOCAINE HYDROCHLORIDE 75 MG: 20 INJECTION, SOLUTION INTRAVENOUS at 07:08

## 2020-08-11 RX ADMIN — MIDAZOLAM HYDROCHLORIDE 2 MG: 1 INJECTION, SOLUTION INTRAMUSCULAR; INTRAVENOUS at 06:08

## 2020-08-11 RX ADMIN — DEXAMETHASONE SODIUM PHOSPHATE 4 MG: 4 INJECTION, SOLUTION INTRA-ARTICULAR; INTRALESIONAL; INTRAMUSCULAR; INTRAVENOUS; SOFT TISSUE at 07:08

## 2020-08-11 RX ADMIN — SODIUM CHLORIDE, SODIUM LACTATE, POTASSIUM CHLORIDE, AND CALCIUM CHLORIDE: .6; .31; .03; .02 INJECTION, SOLUTION INTRAVENOUS at 07:08

## 2020-08-11 RX ADMIN — ACETAMINOPHEN 1000 MG: 10 INJECTION, SOLUTION INTRAVENOUS at 07:08

## 2020-08-11 RX ADMIN — FENTANYL CITRATE 100 MCG: 50 INJECTION, SOLUTION INTRAMUSCULAR; INTRAVENOUS at 07:08

## 2020-08-11 NOTE — DISCHARGE INSTRUCTIONS
ANESTHESIA  -For the first 24 hours after surgery:  Do not drive, use heavy equipment, make important decisions, or drink alcohol  -It is normal to feel sleepy for several hours.  Rest until you are more awake.  -Have someone stay with you, if needed.  They can watch for problems and help keep you safe.  -Some possible post anesthesia side effects include: nausea and vomiting, sore throat and hoarseness, sleepiness, and dizziness.    PAIN  -If you have pain after surgery, pain medicine will help you feel better.  Take it as directed, before pain becomes severe.  Most pain relievers taken by mouth need at least 20-30 minutes to start working.  -Do not drive or drink alcohol while taking pain medicine.  -Pain medication can upset your stomach.  Taking them with a little food may help.  -Other ways to help control pain: elevation, ice, and relaxation  -Call your surgeon if still having unmanageable pain an hour after taking pain medicine.  -Pain medicine can cause constipation.  Taking an over-the counter stool softener while on prescription pain medicine and drinking plenty of fluids can prevent this side effect.  -Call your surgeon if you have severe side effects like: breathing problems, trouble waking up, dizziness, confusion, or severe constipation.    NAUSEA  -Some people have nausea after surgery.  This is often because of anesthesia, pain, pain medicine, or the stress of surgery.  -Do not push yourself to eat.  Start off with clear liquids and soup.  Slowly move to solid foods.  Don't eat fatty, rich, spicy foods at first.  Eat smaller amounts.  -If you develop persistent nausea and vomiting please notify your surgeon immediately.    BLEEDING  -Different types of surgery require different types of care and dressing changes.  It is important to follow all instructions and advice from your surgeon.  Change dressing as directed.  Call your surgeon for any concerns regarding postop bleeding.    SIGNS OF  Emergency Department Encounter    Basic Information:  Patient: Mark Cole Age: 19 year old Sex: female  MRN: .[id number] Encounter Date: .10/7/2019      The patient was endorsed to me by Dr. Carmita Dimas DO at 1400, pending placement.    Written by Jane Montoya , acting as a medical scribe for Dr. Desiree Richter MD.    ED Course:  1400    Visit Vitals  /65   Pulse 89   Temp 98.2 °F (36.8 °C) (Oral)   Resp 16   Wt 50.6 kg (111 lb 8.8 oz)   LMP 08/04/2019   SpO2 98%       Assumed care from Dr. Carmita Dimas DO, pending placement. Pt is resting comfortably with no new complaints.        1600:  Visit Vitals  /65   Pulse 89   Temp 98.2 °F (36.8 °C) (Oral)   Resp 16   Wt 50.6 kg (111 lb 8.8 oz)   LMP 08/04/2019   SpO2 98%     3:38 PM  .  Visit Vitals  /65   Pulse 89   Temp 98.2 °F (36.8 °C) (Oral)   Resp 16   Wt 50.6 kg (111 lb 8.8 oz)   LMP 08/04/2019   SpO2 98%         Pt remained stable under my care. Pt is in no distress. Pt transferred to [facility] and accepted Dr. Florian.    Impression and Plan:  1. Depression with suicidal ideation      1600: Pt transferred to [facility] accepted by Dr. Florian.    The patient will be transferred by [facility] ambulance transport to [facility] for admission, accepted by Dr. Florian.      .This document serves as a record of the services and decisions personally performed and made by Dr. Desiree Richter MD. It was created on the provider's behalf by Jane Montoya , a trained medical scribe. The creation of this document is based on the provider's statements to the medical scribe.     Jane Montoya , 10/7/2019        .The information in this document, crested by medical scribe for me, accurately reflects the services I personally performed and the decisions made by me. I have reviewed and approved this document for accuracy prior to leaving the patient care area.    Desiree Richter , .10/7/2019  .8:12 PM                     Desiree Richter  INFECTION  -Signs of infection include: fever, swelling, drainage, and redness  -Notify your surgeon if you have a fever of 100.4 F (38.0 C) or higher.  -Notify your surgeon if you notice redness, swelling, increased pain, pus, or a foul smell at the incision site.     MD  10/07/19 2012

## 2020-08-11 NOTE — TRANSFER OF CARE
"Anesthesia Transfer of Care Note    Patient: Hamzah Reis    Procedure(s) Performed: Procedure(s) (LRB):  HYSTEROSCOPY, WITH DILATION AND CURETTAGE OF UTERUS (N/A)  ABLATION, ENDOMETRIUM (N/A)    Patient location: PACU    Anesthesia Type: general    Transport from OR: Transported from OR on 6-10 L/min O2 by face mask with adequate spontaneous ventilation    Post pain: adequate analgesia    Post assessment: no apparent anesthetic complications    Post vital signs: stable    Level of consciousness: sedated    Nausea/Vomiting: no nausea/vomiting    Complications: none    Transfer of care protocol was followed      Last vitals:   Visit Vitals  /77   Pulse 90   Temp 37.1 °C (98.8 °F) (Skin)   Resp 20   Ht 5' 6" (1.676 m)   Wt 109.8 kg (242 lb)   SpO2 100%   Breastfeeding No   BMI 39.06 kg/m²     "

## 2020-08-11 NOTE — PLAN OF CARE
VSS  NAD  Discharge instructions given with claimed understanding by pt and family. Patient has ride home with family or friend. Claims pain level is __0____ at this time.  Has voided without difficulty if required by surgical type.

## 2020-08-11 NOTE — ANESTHESIA PREPROCEDURE EVALUATION
08/11/2020  Hamzah Reis is a 45 y.o., female.  Patient Active Problem List   Diagnosis    Hypertension    Type 2 diabetes mellitus with diabetic polyneuropathy, with long-term current use of insulin    Mixed hyperlipidemia    Left hamstring muscle strain    Morbid obesity with BMI of 40.0-44.9, adult    Strain of left quadriceps tendon    Chest pain with low risk of acute coronary syndrome    Hyperlipidemia associated with type 2 diabetes mellitus    Yeast infection    Bacterial vaginosis    Menorrhagia       Anesthesia Evaluation          Review of Systems  Anesthesia Hx:  No problems with previous Anesthesia   Social:  Non-Smoker, No Alcohol Use    Cardiovascular:   Hypertension    Neurological:   Neuromuscular Disease,    Endocrine:   Diabetes    Psych:   Psychiatric History          Physical Exam  General:  Well nourished, Obesity    Airway/Jaw/Neck:  Airway Findings: Mouth Opening: Normal Tongue: Normal  General Airway Assessment: Adult  Mallampati: II      Dental:  Dental Findings: In tact   Chest/Lungs:  Chest/Lungs Findings: Clear to auscultation, Normal Respiratory Rate     Heart/Vascular:  Heart Findings: Rate: Normal  Rhythm: Regular Rhythm        Mental Status:  Mental Status Findings:  Cooperative, Alert and Oriented         Anesthesia Plan  Type of Anesthesia, risks & benefits discussed:  Anesthesia Type:  general  Patient's Preference: general  Intra-op Monitoring Plan:   Intra-op Monitoring Plan Comments:   Post Op Pain Control Plan:   Post Op Pain Control Plan Comments:   Induction:   IV  Beta Blocker:  Patient is not currently on a Beta-Blocker (No further documentation required).       Informed Consent: Patient understands risks and agrees with Anesthesia plan.  Questions answered. Anesthesia consent signed with patient.  ASA Score: 2     Day of Surgery Review of History &  Physical:            Ready For Surgery From Anesthesia Perspective.

## 2020-08-11 NOTE — ANESTHESIA PROCEDURE NOTES
Intubation  Performed by: Pita Diaz CRNA  Authorized by: Floridalma Harris MD     Intubation:     Induction:  Intravenous    Intubated:  Postinduction    Mask Ventilation:  Easy mask    Attempts:  1    Attempted By:  Student    Method of Intubation:  Direct    Blade:  Claudia 3    Laryngeal View Grade: Grade I - full view of chords      Difficult Airway Encountered?: No      Complications:  None    Airway Device:  Oral endotracheal tube    Airway Device Size:  7.0    Style/Cuff Inflation:  Cuffed (inflated to minimal occlusive pressure)    Inflation Amount (mL):  6    Tube secured:  21    Secured at:  The lips    Placement Verified By:  Capnometry and Revisualization with laryngoscopy    Complicating Factors:  None    Findings Post-Intubation:  BS equal bilateral

## 2020-08-11 NOTE — INTERVAL H&P NOTE
The patient has been examined and the H&P has been reviewed:    I concur with the findings and no changes have occurred since H&P was written.          Active Hospital Problems    Diagnosis  POA    Menorrhagia [N92.0]  Yes      Resolved Hospital Problems   No resolved problems to display.

## 2020-08-11 NOTE — TELEPHONE ENCOUNTER
----- Message from Sohail Verduzco sent at 8/11/2020  3:57 PM CDT -----  Contact: [phone] / self  Patient would like a call back from your office regarding sending in pain medication, pt states she had a procedure today but has yet to receive her medication. Please Advise.

## 2020-08-11 NOTE — TELEPHONE ENCOUNTER
"Pt notified about the pharmacy calling about the PCP sending in pain meds. Pt states " I took all of those, I was taking them because of my back". Advised pt that will need to send to Dr Wilson about getting verification on this. Pt verbalized understanding  "

## 2020-08-11 NOTE — TELEPHONE ENCOUNTER
----- Message from Ankit Hull sent at 8/11/2020  9:20 AM CDT -----  Regarding: Medication  Contact: Vianca/ [phone]  Vianca with [facility] pharmacy called in regards to patient medication oxyCODONE-acetaminophen (PERCOCET) 5-325 mg per tablet. She says they need a diagnosis code and prescription. She also wants to be sure that you are aware that the patient received a 7 day supply of the medication from a different provider on 07/30/2020. Please call and advise.

## 2020-08-11 NOTE — BRIEF OP NOTE
Aug 11  mwiedemann  Dx menorrhagia  Procedure DnC, with ablation and hysteroscopy  Blood loss min  Complications none  Pathology endometrium

## 2020-08-11 NOTE — ANESTHESIA POSTPROCEDURE EVALUATION
Anesthesia Post Evaluation    Patient: Hamzah Reis    Procedure(s) Performed: Procedure(s) (LRB):  HYSTEROSCOPY, WITH DILATION AND CURETTAGE OF UTERUS (N/A)  ABLATION, ENDOMETRIUM (N/A)    Final Anesthesia Type: general    Patient location during evaluation: PACU  Patient participation: Yes- Able to Participate  Level of consciousness: awake and alert  Post-procedure vital signs: reviewed and stable  Pain management: adequate  Airway patency: patent    PONV status at discharge: No PONV  Anesthetic complications: no      Cardiovascular status: blood pressure returned to baseline  Respiratory status: unassisted  Hydration status: euvolemic  Follow-up not needed.          Vitals Value Taken Time   /70 08/11/20 0850   Temp 35.9 °C (96.6 °F) 08/11/20 0745   Pulse 72 08/11/20 0854   Resp 12 08/11/20 0853   SpO2 94 % 08/11/20 0854   Vitals shown include unvalidated device data.      Event Time   Out of Recovery 09:00:00         Pain/Sterling Score: Pain Rating Prior to Med Admin: 4 (8/11/2020  8:21 AM)  Pain Rating Post Med Admin: 2 (8/11/2020  8:55 AM)  Sterling Score: 10 (8/11/2020  8:55 AM)

## 2020-08-11 NOTE — TELEPHONE ENCOUNTER
Spoke to Dr. Wiedemann and advised him that the pt was prescribed #40 percocet on 7/30. Per Dr. Wiedemann cancel percocet Rx. Rx canceled

## 2020-08-11 NOTE — H&P
"Pt for Dnc with ablation, hysteroscopy  For menorrhagia    pmh htnc  Dm type 2  psh neg    44 y.o.            OB History         2    Para   2    Term   2            AB        Living            SAB        TAB        Ectopic        Multiple        Live Births                     Comlaining of:  1. Bad cycles  2. Chronic irritation  See last visit, needs us, uterus was unls  Pt is diabetic  Did use diflucan but not extended           ROS:  GENERAL: No fever, chills, fatigability or weight loss.  SKIN: No rashes, itching or changes in color or texture of skin.  HEAD: No headaches or recent head trauma.  EYES: Visual acuity fine. No photophobia, ocular pain or diplopia.  EARS: Denies ear pain, discharge or vertigo.  NOSE: No loss of smell, no epistaxis or postnasal drip.  MOUTH & THROAT: No hoarseness or change in voice. No excessive gum bleeding.  NODES: Denies swollen glands.  CHEST: Denies ANDERS, cyanosis, wheezing, cough and sputum production.  CARDIOVASCULAR: Denies chest pain, PND, orthopnea or reduced exercise tolerance.  ABDOMEN: Appetite fine. No weight loss. Denies diarrhea, abdominal pain, hematemesis or blood in stool.  URINARY: No flank pain, dysuria or hematuria.  PERIPHERAL VASCULAR: No claudication or cyanosis.  MUSCULOSKELETAL: No joint stiffness or swelling. Denies back pain.  NEUROLOGIC: No history of seizures, paralysis, alteration of gait or coordination        PE: /84   Ht 5' 6" (1.676 m)   Wt 109.5 kg (241 lb 8 oz)   LMP 2020   BMI 38.98 kg/m²    vuvla sl irritated  Cultures done     A/Plan  rx as indicated for cultues, may n eed  Extended diflucan and/or temovate  gention violet applied  Set up vag us  Disc ablation last visit    Proc eecd with surgery as planned      "

## 2020-08-11 NOTE — INTERVAL H&P NOTE
The patient has been examined and the H&P has been reviewed:    I concur with the findings and no changes have occurred since H&P was written.    Anesthesia risks, benefits and alternative options discussed and understood by patient/family.          Active Hospital Problems    Diagnosis  POA    Menorrhagia [N92.0]  Yes      Resolved Hospital Problems   No resolved problems to display.

## 2020-08-12 NOTE — DISCHARGE SUMMARY
Discharge Summary      Admission date: 8/11/2020  Discharge date: 8/11/2020     Principle Diagnosis: menorrhagis   Secondary Diagnosis: none  Discharge Diagnosis: menorrhagia    Procedures Performed: see op note, dnc with ablation, hysteroscopy    Hospital Course:  Pt is a 45 y.o. admitted for the above,     Discharge Disposition: Home or Self Care    Patient Instructions:   Discharge Medication List as of 8/11/2020  9:27 AM      START taking these medications    Details   !! oxyCODONE-acetaminophen (PERCOCET) 5-325 mg per tablet Take 1 tablet by mouth every 4 (four) hours as needed for Pain., Starting Tue 8/11/2020, Normal       !! - Potential duplicate medications found. Please discuss with provider.      CONTINUE these medications which have NOT CHANGED    Details   atorvastatin (LIPITOR) 40 MG tablet Take 1 tablet (40 mg total) by mouth once daily., Starting Wed 5/6/2020, Until Thu 5/6/2021, Normal      cholecalciferol, vitamin D3, 1,250 mcg (50,000 unit) capsule Take 1 capsule (50,000 Units total) by mouth once a week., Starting Thu 7/30/2020, Normal      gabapentin (NEURONTIN) 600 MG tablet Take 1 tablet (600 mg total) by mouth 3 (three) times daily., Starting Thu 7/30/2020, Until Fri 7/30/2021, Normal      insulin detemir U-100 (LEVEMIR FLEXTOUCH U-100 INSULN) 100 unit/mL (3 mL) InPn pen Inject 25 Units into the skin every evening., Starting Thu 7/30/2020, Until Fri 7/30/2021, Normal      metFORMIN (GLUCOPHAGE) 1000 MG tablet Take 1 tablet (1,000 mg total) by mouth 2 (two) times daily with meals., Starting Wed 3/18/2020, Until Thu 3/18/2021, Normal      !! oxyCODONE-acetaminophen (PERCOCET) 5-325 mg per tablet Take 1 tablet by mouth every 6 (six) hours as needed for Pain., Starting Thu 7/30/2020, Normal      clobetasoL (TEMOVATE) 0.05 % cream Apply topically 2 (two) times daily., Starting Fri 5/29/2020, Normal      clotrimazole-betamethasone 1-0.05% (LOTRISONE) cream Apply to affected area 2 times daily,  Normal      fluconazole (DIFLUCAN) 100 MG tablet Take 1 tablet (100 mg total) by mouth once daily., Starting Mon 7/13/2020, Normal      HYDROcodone-acetaminophen (NORCO) 5-325 mg per tablet Take 1 tablet by mouth q.6 to 8 hr p.r.n. headache and back pain, Print      ibuprofen (ADVIL,MOTRIN) 400 MG tablet Take 1 tablet (400 mg total) by mouth 3 (three) times daily as needed (fever or body aches)., Starting Thu 4/9/2020, Normal      ondansetron (ZOFRAN) 4 MG tablet Take 1 tablet (4 mg total) by mouth every 8 (eight) hours as needed for Nausea., Starting Wed 4/8/2020, Normal       !! - Potential duplicate medications found. Please discuss with provider.

## 2020-08-12 NOTE — OP NOTE
Aug 11  mwiedemann  Dx menorrhagia  Procedure DnC, with ablation and hysteroscopy  Blood loss min  Complications none  Pathology endometrium        Procedure;  The patient was placed in lithotomy after general anesthesia was induced.  She was prepped in the usual fashion 2 g of Ancef were administered and the bladder was emptied with a sterile catheter.  Bimanual exam was normal with no masses and a slightly enlarged uterus.  The weighted speculum was placed the cervix was dilated and hysteroscopy was done showing fluffy endometrium.  A sharp curettage was done minimal tissue was obtained and this was submitted for pathology.  The NovaSure ablation was then performed lasting 1 min without complications.  It was set at 5-1/2 cm and 2.5 cm width.  Post ablation hysteroscopy showed what appeared to be good global ablation.  There were no complications bleeding was minimal all counts were correct and the patient was taken to recovery in good condition.

## 2020-08-13 LAB
FINAL PATHOLOGIC DIAGNOSIS: NORMAL
GROSS: NORMAL

## 2020-08-14 ENCOUNTER — TELEPHONE (OUTPATIENT)
Dept: OBSTETRICS AND GYNECOLOGY | Facility: CLINIC | Age: 45
End: 2020-08-14

## 2020-08-14 NOTE — TELEPHONE ENCOUNTER
----- Message from Thuy Vargas sent at 8/14/2020  2:59 PM CDT -----  Regarding: Wound re-check  Type:  Sooner Apoointment Request    Caller is requesting a sooner appointment.  Caller declined first available appointment listed below.  Caller will not accept being placed on the waitlist and is requesting a message be sent to doctor.  Name of Caller: Hamzah  When is the first available appointment? 09/2020  Symptoms: needs a wound re-check follow up  Would the patient rather a call back or a response via [facility]?  Call back  Best Call Back Number: [phone]  Additional Information:  no

## 2020-08-19 ENCOUNTER — TELEPHONE (OUTPATIENT)
Dept: INTERNAL MEDICINE | Facility: CLINIC | Age: 45
End: 2020-08-19

## 2020-08-19 NOTE — TELEPHONE ENCOUNTER
----- Message from Sabrina Rush sent at 8/19/2020  2:06 PM CDT -----  Regarding: yeast infection meds request  Pt called to request meds for yeast infection.  [facility] [phone]      Pt can  be reached at [phone]    Thank you!

## 2020-08-20 DIAGNOSIS — B37.9 YEAST INFECTION: ICD-10-CM

## 2020-08-20 RX ORDER — FLUCONAZOLE 100 MG/1
100 TABLET ORAL DAILY
Qty: 3 TABLET | Refills: 0 | Status: SHIPPED | OUTPATIENT
Start: 2020-08-20 | End: 2020-10-30 | Stop reason: ALTCHOICE

## 2020-08-21 ENCOUNTER — TELEPHONE (OUTPATIENT)
Dept: OBSTETRICS AND GYNECOLOGY | Facility: CLINIC | Age: 45
End: 2020-08-21

## 2020-08-21 NOTE — TELEPHONE ENCOUNTER
----- Message from Radha Eng sent at 8/21/2020  2:19 PM CDT -----  Patient called to speak with the doctor concerning her procedure.     She would like a callback at [phone]    Thanks  KB

## 2020-08-31 ENCOUNTER — OFFICE VISIT (OUTPATIENT)
Dept: OBSTETRICS AND GYNECOLOGY | Facility: CLINIC | Age: 45
End: 2020-08-31
Payer: COMMERCIAL

## 2020-08-31 ENCOUNTER — TELEPHONE (OUTPATIENT)
Dept: OBSTETRICS AND GYNECOLOGY | Facility: CLINIC | Age: 45
End: 2020-08-31

## 2020-08-31 VITALS
BODY MASS INDEX: 39.28 KG/M2 | DIASTOLIC BLOOD PRESSURE: 88 MMHG | HEIGHT: 66 IN | WEIGHT: 244.38 LBS | SYSTOLIC BLOOD PRESSURE: 148 MMHG

## 2020-08-31 DIAGNOSIS — R10.30 LOWER ABDOMINAL PAIN: ICD-10-CM

## 2020-08-31 DIAGNOSIS — N71.9 UTERINE INFECTION: Primary | ICD-10-CM

## 2020-08-31 PROCEDURE — 87086 URINE CULTURE/COLONY COUNT: CPT

## 2020-08-31 PROCEDURE — 96372 PR INJECTION,THERAP/PROPH/DIAG2ST, IM OR SUBCUT: ICD-10-PCS | Mod: S$GLB,,, | Performed by: OBSTETRICS & GYNECOLOGY

## 2020-08-31 PROCEDURE — 99999 PR PBB SHADOW E&M-EST. PATIENT-LVL III: CPT | Mod: PBBFAC,,, | Performed by: OBSTETRICS & GYNECOLOGY

## 2020-08-31 PROCEDURE — 3008F BODY MASS INDEX DOCD: CPT | Mod: CPTII,S$GLB,, | Performed by: OBSTETRICS & GYNECOLOGY

## 2020-08-31 PROCEDURE — 99213 PR OFFICE/OUTPT VISIT, EST, LEVL III, 20-29 MIN: ICD-10-PCS | Mod: 25,S$GLB,, | Performed by: OBSTETRICS & GYNECOLOGY

## 2020-08-31 PROCEDURE — 96372 THER/PROPH/DIAG INJ SC/IM: CPT | Mod: S$GLB,,, | Performed by: OBSTETRICS & GYNECOLOGY

## 2020-08-31 PROCEDURE — 3079F DIAST BP 80-89 MM HG: CPT | Mod: CPTII,S$GLB,, | Performed by: OBSTETRICS & GYNECOLOGY

## 2020-08-31 PROCEDURE — 3008F PR BODY MASS INDEX (BMI) DOCUMENTED: ICD-10-PCS | Mod: CPTII,S$GLB,, | Performed by: OBSTETRICS & GYNECOLOGY

## 2020-08-31 PROCEDURE — 3077F SYST BP >= 140 MM HG: CPT | Mod: CPTII,S$GLB,, | Performed by: OBSTETRICS & GYNECOLOGY

## 2020-08-31 PROCEDURE — 99999 PR PBB SHADOW E&M-EST. PATIENT-LVL III: ICD-10-PCS | Mod: PBBFAC,,, | Performed by: OBSTETRICS & GYNECOLOGY

## 2020-08-31 PROCEDURE — 99213 OFFICE O/P EST LOW 20 MIN: CPT | Mod: 25,S$GLB,, | Performed by: OBSTETRICS & GYNECOLOGY

## 2020-08-31 PROCEDURE — 3077F PR MOST RECENT SYSTOLIC BLOOD PRESSURE >= 140 MM HG: ICD-10-PCS | Mod: CPTII,S$GLB,, | Performed by: OBSTETRICS & GYNECOLOGY

## 2020-08-31 PROCEDURE — 3079F PR MOST RECENT DIASTOLIC BLOOD PRESSURE 80-89 MM HG: ICD-10-PCS | Mod: CPTII,S$GLB,, | Performed by: OBSTETRICS & GYNECOLOGY

## 2020-08-31 RX ORDER — LIDOCAINE HYDROCHLORIDE 10 MG/ML
1 INJECTION INFILTRATION; PERINEURAL
Status: COMPLETED | OUTPATIENT
Start: 2020-08-31 | End: 2020-08-31

## 2020-08-31 RX ORDER — CEFTRIAXONE 250 MG/1
250 INJECTION, POWDER, FOR SOLUTION INTRAMUSCULAR; INTRAVENOUS
Status: COMPLETED | OUTPATIENT
Start: 2020-08-31 | End: 2020-08-31

## 2020-08-31 RX ORDER — CIPROFLOXACIN 500 MG/1
500 TABLET ORAL 2 TIMES DAILY
Qty: 14 TABLET | Refills: 0 | Status: SHIPPED | OUTPATIENT
Start: 2020-08-31 | End: 2020-09-10

## 2020-08-31 RX ORDER — OXYCODONE AND ACETAMINOPHEN 5; 325 MG/1; MG/1
1 TABLET ORAL EVERY 6 HOURS PRN
Qty: 10 TABLET | Refills: 0 | Status: SHIPPED | OUTPATIENT
Start: 2020-08-31 | End: 2021-03-26

## 2020-08-31 RX ADMIN — LIDOCAINE HYDROCHLORIDE 1 ML: 10 INJECTION INFILTRATION; PERINEURAL at 02:08

## 2020-08-31 RX ADMIN — CEFTRIAXONE 250 MG: 250 INJECTION, POWDER, FOR SOLUTION INTRAMUSCULAR; INTRAVENOUS at 02:08

## 2020-08-31 NOTE — TELEPHONE ENCOUNTER
----- Message from [facility] sent at 8/31/2020  8:49 AM CDT -----  Pt is returning a missed call pl,ease reach out to pt at [phone]

## 2020-08-31 NOTE — PROGRESS NOTES
Pt received rocephin 250mg reconstituted with 1% lidocaine IM to the RUOQ. Pt tolerated well  Rocephin  Lot [id number]  Exp 8/22  Lidocaine  Lot [id number]  Exp 7/1/21

## 2020-08-31 NOTE — TELEPHONE ENCOUNTER
Pt says her cycle usually comes between the 6-10, she is worried she may have gotten an infection

## 2020-08-31 NOTE — TELEPHONE ENCOUNTER
----- Message from Shoshana Fried sent at 8/28/2020  4:10 PM CDT -----  Contact: [phone]/Self  Type:  Needs Medical Advice    Who Called: Pt  Symptoms (please be specific): Light bleeding    How long has patient had these symptoms:  Today  Pharmacy name and phone #:  -  Would the patient rather a call back or a response via [facility]? Call back   Best Call Back Number: [phone]  Additional Information:

## 2020-08-31 NOTE — PROGRESS NOTES
"45 y.o.   OB History        2    Para   2    Term   2            AB        Living           SAB        TAB        Ectopic        Multiple        Live Births                   Comlaining of: pelvic pain for 1-2 days  Denies fever, pain is low  Some dysuria  Eating, no nausea  Passing flatus      ROS:  GENERAL: No fever, chills, fatigability or weight loss.  SKIN: No rashes, itching or changes in color or texture of skin.  HEAD: No headaches or recent head trauma.  EYES: Visual acuity fine. No photophobia, ocular pain or diplopia.  EARS: Denies ear pain, discharge or vertigo.  NOSE: No loss of smell, no epistaxis or postnasal drip.  MOUTH & THROAT: No hoarseness or change in voice. No excessive gum bleeding.  NODES: Denies swollen glands.  CHEST: Denies ANDERS, cyanosis, wheezing, cough and sputum production.  CARDIOVASCULAR: Denies chest pain, PND, orthopnea or reduced exercise tolerance.  ABDOMEN: Appetite fine. No weight loss. Denies diarrhea, abdominal pain, hematemesis or blood in stool.  URINARY: No flank pain, dysuria or hematuria.  PERIPHERAL VASCULAR: No claudication or cyanosis.  MUSCULOSKELETAL: No joint stiffness or swelling. Denies back pain.  NEUROLOGIC: No history of seizures, paralysis, alteration of gait or coordination      PE: BP (!) 148/88   Ht 5' 6" (1.676 m)   Wt 110.9 kg (244 lb 6.4 oz)   BMI 39.45 kg/m²      abd soft  No rebound  Pelvic , mod tender at introitus  Davon for uterus   Min dc  extr normal    A poss pelvic inf    Plan, cover with ab's  Cbc  im rocephin  Oral cipro  Call to update          "

## 2020-08-31 NOTE — TELEPHONE ENCOUNTER
Pt states she is hurting real bad, she says it started last week. She said she could barely stand up, she says her stomach hurts bad at the bottom. She has been having normal BM. Pt rates her pain an 8/10 and she says she has been having light bleeding/discharge.

## 2020-09-01 ENCOUNTER — TELEPHONE (OUTPATIENT)
Dept: OBSTETRICS AND GYNECOLOGY | Facility: CLINIC | Age: 45
End: 2020-09-01

## 2020-09-01 NOTE — TELEPHONE ENCOUNTER
----- Message from Kristy Shay sent at 9/1/2020  3:06 PM CDT -----  Regarding: pharm  Contact: Pharm/[phone]  Type:  Pharmacy Calling to Clarify an RX    Name of Caller:   Pharmacy Name:  - [facility] [address] [place] [address] [address] [facility];  Prescription Name: ciprofloxacin HCl (CIPRO) 500 MG tablet  What do they need to clarify?: dosage and instructions do not match. Is it for 7 or 10 days  Best Call Back Number: Pharm/[phone]  Additional Information:

## 2020-09-02 LAB
BACTERIA UR CULT: NORMAL
BACTERIA UR CULT: NORMAL

## 2020-09-03 ENCOUNTER — PATIENT OUTREACH (OUTPATIENT)
Dept: ADMINISTRATIVE | Facility: OTHER | Age: 45
End: 2020-09-03

## 2020-09-03 DIAGNOSIS — Z79.4 TYPE 2 DIABETES MELLITUS WITH DIABETIC POLYNEUROPATHY, WITH LONG-TERM CURRENT USE OF INSULIN: Primary | ICD-10-CM

## 2020-09-03 DIAGNOSIS — E11.42 TYPE 2 DIABETES MELLITUS WITH DIABETIC POLYNEUROPATHY, WITH LONG-TERM CURRENT USE OF INSULIN: Primary | ICD-10-CM

## 2020-09-03 NOTE — PROGRESS NOTES
Health Maintenance Due   Topic Date Due    Hepatitis C Screening  1975    Eye Exam  06/01/1987    HIV Screening  06/01/1992    Sign Pain Contract  06/01/1995    Complete Opioid Risk Tool  06/01/1995    TETANUS VACCINE  03/03/2014    Influenza Vaccine (1) 08/01/2020     Updates were requested from care everywhere.  Chart was reviewed for overdue [facility] Encounters ([facility]) topics (CRS, Breast Cancer Screening, Eye exam)  Health Maintenance has been updated.  LINKS immunization registry triggered.  Immunizations were reconciled.

## 2020-09-04 ENCOUNTER — PATIENT OUTREACH (OUTPATIENT)
Dept: ADMINISTRATIVE | Facility: HOSPITAL | Age: 45
End: 2020-09-04

## 2020-09-04 ENCOUNTER — LAB VISIT (OUTPATIENT)
Dept: LAB | Facility: HOSPITAL | Age: 45
End: 2020-09-04
Attending: OBSTETRICS & GYNECOLOGY
Payer: COMMERCIAL

## 2020-09-04 ENCOUNTER — OFFICE VISIT (OUTPATIENT)
Dept: PODIATRY | Facility: CLINIC | Age: 45
End: 2020-09-04
Payer: COMMERCIAL

## 2020-09-04 VITALS
DIASTOLIC BLOOD PRESSURE: 84 MMHG | HEIGHT: 66 IN | BODY MASS INDEX: 39.21 KG/M2 | HEART RATE: 91 BPM | SYSTOLIC BLOOD PRESSURE: 125 MMHG | WEIGHT: 244 LBS

## 2020-09-04 DIAGNOSIS — M72.2 PLANTAR FASCIITIS: ICD-10-CM

## 2020-09-04 DIAGNOSIS — M79.672 FOOT PAIN, BILATERAL: ICD-10-CM

## 2020-09-04 DIAGNOSIS — G57.52 TARSAL TUNNEL SYNDROME, LEFT: Primary | ICD-10-CM

## 2020-09-04 DIAGNOSIS — N71.9 UTERINE INFECTION: ICD-10-CM

## 2020-09-04 DIAGNOSIS — M79.671 FOOT PAIN, BILATERAL: ICD-10-CM

## 2020-09-04 LAB
BASOPHILS # BLD AUTO: 0.05 K/UL (ref 0–0.2)
BASOPHILS NFR BLD: 0.6 % (ref 0–1.9)
DIFFERENTIAL METHOD: ABNORMAL
EOSINOPHIL # BLD AUTO: 0.1 K/UL (ref 0–0.5)
EOSINOPHIL NFR BLD: 1.4 % (ref 0–8)
ERYTHROCYTE [DISTWIDTH] IN BLOOD BY AUTOMATED COUNT: 12.5 % (ref 11.5–14.5)
HCT VFR BLD AUTO: 39.4 % (ref 37–48.5)
HGB BLD-MCNC: 12.7 G/DL (ref 12–16)
IMM GRANULOCYTES # BLD AUTO: 0.03 K/UL (ref 0–0.04)
IMM GRANULOCYTES NFR BLD AUTO: 0.3 % (ref 0–0.5)
LYMPHOCYTES # BLD AUTO: 3.4 K/UL (ref 1–4.8)
LYMPHOCYTES NFR BLD: 38.1 % (ref 18–48)
MCH RBC QN AUTO: 26.3 PG (ref 27–31)
MCHC RBC AUTO-ENTMCNC: 32.2 G/DL (ref 32–36)
MCV RBC AUTO: 82 FL (ref 82–98)
MONOCYTES # BLD AUTO: 0.5 K/UL (ref 0.3–1)
MONOCYTES NFR BLD: 6 % (ref 4–15)
NEUTROPHILS # BLD AUTO: 4.7 K/UL (ref 1.8–7.7)
NEUTROPHILS NFR BLD: 53.6 % (ref 38–73)
NRBC BLD-RTO: 0 /100 WBC
PLATELET # BLD AUTO: 403 K/UL (ref 150–350)
PMV BLD AUTO: 10.2 FL (ref 9.2–12.9)
RBC # BLD AUTO: 4.83 M/UL (ref 4–5.4)
WBC # BLD AUTO: 8.79 K/UL (ref 3.9–12.7)

## 2020-09-04 PROCEDURE — 99213 OFFICE O/P EST LOW 20 MIN: CPT | Mod: S$GLB,,, | Performed by: PODIATRIST

## 2020-09-04 PROCEDURE — 3008F PR BODY MASS INDEX (BMI) DOCUMENTED: ICD-10-PCS | Mod: CPTII,S$GLB,, | Performed by: PODIATRIST

## 2020-09-04 PROCEDURE — 3074F PR MOST RECENT SYSTOLIC BLOOD PRESSURE < 130 MM HG: ICD-10-PCS | Mod: CPTII,S$GLB,, | Performed by: PODIATRIST

## 2020-09-04 PROCEDURE — 99999 PR PBB SHADOW E&M-EST. PATIENT-LVL V: ICD-10-PCS | Mod: PBBFAC,,, | Performed by: PODIATRIST

## 2020-09-04 PROCEDURE — 3008F BODY MASS INDEX DOCD: CPT | Mod: CPTII,S$GLB,, | Performed by: PODIATRIST

## 2020-09-04 PROCEDURE — 3079F PR MOST RECENT DIASTOLIC BLOOD PRESSURE 80-89 MM HG: ICD-10-PCS | Mod: CPTII,S$GLB,, | Performed by: PODIATRIST

## 2020-09-04 PROCEDURE — 85025 COMPLETE CBC W/AUTO DIFF WBC: CPT

## 2020-09-04 PROCEDURE — 3079F DIAST BP 80-89 MM HG: CPT | Mod: CPTII,S$GLB,, | Performed by: PODIATRIST

## 2020-09-04 PROCEDURE — 3074F SYST BP LT 130 MM HG: CPT | Mod: CPTII,S$GLB,, | Performed by: PODIATRIST

## 2020-09-04 PROCEDURE — 99213 PR OFFICE/OUTPT VISIT, EST, LEVL III, 20-29 MIN: ICD-10-PCS | Mod: S$GLB,,, | Performed by: PODIATRIST

## 2020-09-04 PROCEDURE — 99999 PR PBB SHADOW E&M-EST. PATIENT-LVL V: CPT | Mod: PBBFAC,,, | Performed by: PODIATRIST

## 2020-09-04 PROCEDURE — 36415 COLL VENOUS BLD VENIPUNCTURE: CPT

## 2020-09-04 NOTE — PROGRESS NOTES
Chart audit performed. Outreach -[facility] for DM Eye Exam. Braxton osborne/ Daily fax# given

## 2020-09-04 NOTE — PATIENT INSTRUCTIONS
Prescribed compound analgesic cream from [facility] to be applied as needed.    Recommend [employer]i 6 tennis shoes found at [employer].

## 2020-09-05 NOTE — PROGRESS NOTES
Subjective:      Patient ID: Hamzah Reis is a 45 y.o. female.    Chief Complaint: Foot Pain (Bilteral mostly left no pain as of now) and Follow-up    complains of burning tingling and sharp intermittent pains to the arch of both feet and the toes at night.  She has been taking gabapentin per her primary care physician.  She was diagnosed with COVID-19 over a month ago and self isolated for more than 2 weeks.  She is currently asymptomatic and does not have any fever, shortness of breath.  Says that her diabetes is poorly controlled secondary to the COVID-19 infection and she is steadily getting under control again.  She has been taking metformin for some time.  She does admit that after she was diagnosed with COVID-19 she experienced more neurological symptoms to the feet.  She does admit that she has some low back pain that radiates down to her thighs while she is lying down at night.  This was also aggravated by exposure and infection due to COVID-19.  Furthermore patient complains thickened discolored toenails on both feet.  History of receiving pedicures.    09/04/2020:  Presents today complaining of bilateral foot pain left greater than right for the past 3 weeks.  Denies any trauma.  Denies any changes in activity.  She works 8 hour she has.  Complains on pain that will worsen towards the end of her shift.  Notes that the pain is worse when she is lying down at night.  She has a history of back pain that radiates down the legs.  She has been taking gabapentin however recently increased the dose to 600 mg p.o. t.i.d. per her PCP.  And she also received referral for physical therapy to treat the back.  Unable to fully characterize the pain to the be however states that the pain can be sharp.  Also describes some intermittent numbness sensation to the toes specially left foot when she stands for extended periods of time.    Vitals:    09/04/20 1611   BP: 125/84   Pulse: 91   Weight: 110.7 kg (244 lb)  "  Height: 5' 6" (1.676 m)   PainSc: 0-No pain      Past Medical History:   Diagnosis Date    Depression     Diabetes mellitus, type 2     Hyperlipidemia     Hypertension     Vision impairment     wears glasses       Past Surgical History:   Procedure Laterality Date    ENDOMETRIAL ABLATION N/A 2020    Procedure: ABLATION, ENDOMETRIUM;  Surgeon: Michael A. Wiedemann, MD;  Location: [facility] OR;  Service: OB/GYN;  Laterality: N/A;    HYSTEROSCOPY WITH DILATION AND CURETTAGE OF UTERUS N/A 2020    Procedure: HYSTEROSCOPY, WITH DILATION AND CURETTAGE OF UTERUS;  Surgeon: Michael A. Wiedemann, MD;  Location: [facility] OR;  Service: OB/GYN;  Laterality: N/A;       Family History   Problem Relation Age of Onset    Hypertension Mother 55            Diabetes Mother     Cancer Mother         cervical    Heart disease Father     Diabetes Sister     Hypertension Sister     Cancer Sister         cervical    Breast cancer Sister     Diabetes Maternal Grandmother        Social History     Socioeconomic History    Marital status: Single     Spouse name: Not on file    Number of children: Not on file    Years of education: Not on file    Highest education level: Not on file   Occupational History     Employer: [employer]   Social Needs    Financial resource strain: Not on file    Food insecurity     Worry: Not on file     Inability: Not on file    Transportation needs     Medical: Not on file     Non-medical: Not on file   Tobacco Use    Smoking status: Never Smoker    Smokeless tobacco: Never Used   Substance and Sexual Activity    Alcohol use: No    Drug use: No    Sexual activity: Yes     Partners: Male     Birth control/protection: None   Lifestyle    Physical activity     Days per week: Not on file     Minutes per session: Not on file    Stress: Not on file   Relationships    Social connections     Talks on phone: Not on file     Gets together: Not on file     Attends [employer] service: Not on " file     Active member of club or organization: Not on file     Attends meetings of clubs or organizations: Not on file     Relationship status: Not on file   Other Topics Concern    Not on file   Social History Narrative    Not on file       Current Outpatient Medications   Medication Sig Dispense Refill    atorvastatin (LIPITOR) 40 MG tablet Take 1 tablet (40 mg total) by mouth once daily. 90 tablet 3    cholecalciferol, vitamin D3, 1,250 mcg (50,000 unit) capsule Take 1 capsule (50,000 Units total) by mouth once a week. 12 capsule 3    ciprofloxacin HCl (CIPRO) 500 MG tablet Take 1 tablet (500 mg total) by mouth 2 (two) times daily. for 10 days 14 tablet 0    clobetasoL (TEMOVATE) 0.05 % cream Apply topically 2 (two) times daily. 30 g 1    clotrimazole-betamethasone 1-0.05% (LOTRISONE) cream Apply to affected area 2 times daily 30 g 1    fluconazole (DIFLUCAN) 100 MG tablet Take 1 tablet (100 mg total) by mouth once daily. 3 tablet 0    gabapentin (NEURONTIN) 600 MG tablet Take 1 tablet (600 mg total) by mouth 3 (three) times daily. 90 tablet 11    ibuprofen (ADVIL,MOTRIN) 400 MG tablet Take 1 tablet (400 mg total) by mouth 3 (three) times daily as needed (fever or body aches). 30 tablet 1    insulin detemir U-100 (LEVEMIR FLEXTOUCH U-100 INSULN) 100 unit/mL (3 mL) InPn pen Inject 25 Units into the skin every evening. 15 mL 3    metFORMIN (GLUCOPHAGE) 1000 MG tablet Take 1 tablet (1,000 mg total) by mouth 2 (two) times daily with meals. 180 tablet 1    naproxen sodium (ANAPROX DS) 550 MG tablet Take 1 tablet (550 mg total) by mouth 2 (two) times daily with meals. 20 tablet 3    oxyCODONE-acetaminophen (PERCOCET) 5-325 mg per tablet Take 1 tablet by mouth every 6 (six) hours as needed for Pain. 40 each 0    oxyCODONE-acetaminophen (PERCOCET) 5-325 mg per tablet Take 1 tablet by mouth every 4 (four) hours as needed for Pain. 10 tablet 0    oxyCODONE-acetaminophen (PERCOCET) 5-325 mg per tablet Take  1 tablet by mouth every 6 (six) hours as needed for Pain. 10 tablet 0     No current facility-administered medications for this visit.        Review of patient's allergies indicates:  No Known Allergies      Review of Systems   Constitution: Negative for chills, fever and malaise/fatigue.   HENT: Negative for congestion and hearing loss.    Cardiovascular: Negative for chest pain, claudication and leg swelling.   Respiratory: Negative for cough and shortness of breath.    Skin: Positive for color change, dry skin and nail changes. Negative for itching, poor wound healing and rash.   Musculoskeletal: Positive for back pain. Negative for joint pain, muscle cramps and muscle weakness.   Gastrointestinal: Negative for nausea and vomiting.   Neurological: Positive for paresthesias. Negative for numbness and weakness.   Psychiatric/Behavioral: Negative for altered mental status.           Objective:      Physical Exam  Constitutional:       General: She is not in acute distress.     Appearance: She is not diaphoretic.   Cardiovascular:      Pulses:           Dorsalis pedis pulses are 2+ on the right side and 2+ on the left side.        Posterior tibial pulses are 2+ on the right side and 2+ on the left side.   Musculoskeletal:      Comments: Mild diffuse pain on palpation along the medial longitudinal arch of the foot bilateral.  Localized pain positive provocation sign at the distal aspect of the tarsal tunnel left medial hindfoot that radiates to the plantar foot.    Rectus appearing foot type bilateral.    + equinus that reduces with knee bent bilateral.     Feet:      Right foot:      Protective Sensation: 10 sites tested. 10 sites sensed.      Skin integrity: Dry skin present.      Left foot:      Protective Sensation: 10 sites tested. 10 sites sensed.      Skin integrity: Dry skin present.   Skin:     General: Skin is warm and dry.      Capillary Refill: Capillary refill takes less than 2 seconds.      Coloration:  Skin is not pale.      Findings: No ecchymosis, erythema or rash.      Nails: There is no clubbing.        Comments: Referred to attached media picture for toenail appearance.    Skin is dry to foot bilateral.  No open lesions or macerations noted to lower extremity bilateral.   Neurological:      Mental Status: She is alert and oriented to person, place, and time.                   Assessment:       Encounter Diagnoses   Name Primary?    Tarsal tunnel syndrome, left Yes    Plantar fasciitis     Foot pain, bilateral          Plan:       Hamzah was seen today for foot pain and follow-up.    Diagnoses and all orders for this visit:    Tarsal tunnel syndrome, left  -     Ambulatory referral/consult to Physical/Occupational Therapy; Future    Plantar fasciitis  -     Ambulatory referral/consult to Physical/Occupational Therapy; Future    Foot pain, bilateral  -     Ambulatory referral/consult to Physical/Occupational Therapy; Future      I counseled the patient on her conditions, their implications and medical management.    Patient is no longer taking metformin.    From a clinical perspective patient appears to have tarsal tunnel syndrome the left lower extremity which would also be contributing to her foot pain in addition to plantar fasciitis.  Her diabetes is poorly controlled.  We did discuss diagnostic injection to the tarsal tunnel however would not recommend using a corticosteroid and limit injection to Marcaine.  Patient refused injection today.    Prescribed compound topical pain cream to be massage to the affected areas up to 4 times daily as discussed.    Referred to physical therapy for a stem with dry needling iontophoresis along the tarsal tunnel region left medial hindfoot along the plantar aspects of both feet.  Therapy also to increase range of motion to lower extremities specially reducing and focusing on equinus.    We discussed avoiding barefoot walking in flat shoes recommending 1-2 inch heel  height at all times.     Recommended [employer] tennis shoes such as Bondi 6.    RTC 6-8 weeks or p.r.n. as discussed.    .

## 2020-09-17 ENCOUNTER — TELEPHONE (OUTPATIENT)
Dept: OBSTETRICS AND GYNECOLOGY | Facility: CLINIC | Age: 45
End: 2020-09-17

## 2020-09-17 NOTE — TELEPHONE ENCOUNTER
----- Message from Elva Younger sent at 9/17/2020  9:12 AM CDT -----  Regarding: call back  Contact: [phone]  Patient is calling to talk to nurse in regards to patient accidentally hit cancel for her appointment today and would like to see if she can still be seen.

## 2020-09-17 NOTE — TELEPHONE ENCOUNTER
If doing well, steff really can wait and let her  Update us on how much bleedng with cycles  thanks

## 2020-09-17 NOTE — TELEPHONE ENCOUNTER
Pt had to cancel her appt due to not having a ride today. Pt would like to know when she can be seen again

## 2020-10-03 DIAGNOSIS — U07.1 COVID-19 VIRUS INFECTION: ICD-10-CM

## 2020-10-05 ENCOUNTER — PATIENT MESSAGE (OUTPATIENT)
Dept: ADMINISTRATIVE | Facility: HOSPITAL | Age: 45
End: 2020-10-05

## 2020-10-05 RX ORDER — IBUPROFEN 400 MG/1
400 TABLET ORAL 3 TIMES DAILY PRN
Qty: 30 TABLET | Refills: 1 | Status: SHIPPED | OUTPATIENT
Start: 2020-10-05 | End: 2021-03-26

## 2020-10-05 RX ORDER — IBUPROFEN 400 MG/1
400 TABLET ORAL 3 TIMES DAILY PRN
Qty: 30 TABLET | Refills: 1 | OUTPATIENT
Start: 2020-10-05

## 2020-10-13 ENCOUNTER — PATIENT OUTREACH (OUTPATIENT)
Dept: ADMINISTRATIVE | Facility: OTHER | Age: 45
End: 2020-10-13

## 2020-10-13 NOTE — PROGRESS NOTES
Health Maintenance Due   Topic Date Due    Hepatitis C Screening  1975    HIV Screening  06/01/1992    Sign Pain Contract  06/01/1995    Complete Opioid Risk Tool  06/01/1995    Urine Drug Screen  06/01/1995    Naloxone Prescription  06/01/1995    TETANUS VACCINE  03/03/2014    Influenza Vaccine (1) 08/01/2020    Hemoglobin A1c  09/18/2020     Updates were requested from care everywhere.  Chart was reviewed for overdue [facility] Encounters ([facility]) topics (CRS, Breast Cancer Screening, Eye exam)  Health Maintenance has been updated.  LINKS not responding.

## 2020-10-30 ENCOUNTER — OFFICE VISIT (OUTPATIENT)
Dept: OBSTETRICS AND GYNECOLOGY | Facility: CLINIC | Age: 45
End: 2020-10-30
Payer: COMMERCIAL

## 2020-10-30 VITALS
DIASTOLIC BLOOD PRESSURE: 86 MMHG | BODY MASS INDEX: 39.8 KG/M2 | SYSTOLIC BLOOD PRESSURE: 130 MMHG | HEIGHT: 66 IN | WEIGHT: 247.63 LBS

## 2020-10-30 DIAGNOSIS — R10.2 PELVIC PAIN SYNDROME: Primary | ICD-10-CM

## 2020-10-30 DIAGNOSIS — N92.1 MENOMETRORRHAGIA: ICD-10-CM

## 2020-10-30 PROCEDURE — 99999 PR PBB SHADOW E&M-EST. PATIENT-LVL III: ICD-10-PCS | Mod: PBBFAC,,, | Performed by: OBSTETRICS & GYNECOLOGY

## 2020-10-30 PROCEDURE — 3008F PR BODY MASS INDEX (BMI) DOCUMENTED: ICD-10-PCS | Mod: CPTII,S$GLB,, | Performed by: OBSTETRICS & GYNECOLOGY

## 2020-10-30 PROCEDURE — 99999 PR PBB SHADOW E&M-EST. PATIENT-LVL III: CPT | Mod: PBBFAC,,, | Performed by: OBSTETRICS & GYNECOLOGY

## 2020-10-30 PROCEDURE — 3075F PR MOST RECENT SYSTOLIC BLOOD PRESS GE 130-139MM HG: ICD-10-PCS | Mod: CPTII,S$GLB,, | Performed by: OBSTETRICS & GYNECOLOGY

## 2020-10-30 PROCEDURE — 3008F BODY MASS INDEX DOCD: CPT | Mod: CPTII,S$GLB,, | Performed by: OBSTETRICS & GYNECOLOGY

## 2020-10-30 PROCEDURE — 3075F SYST BP GE 130 - 139MM HG: CPT | Mod: CPTII,S$GLB,, | Performed by: OBSTETRICS & GYNECOLOGY

## 2020-10-30 PROCEDURE — 3079F PR MOST RECENT DIASTOLIC BLOOD PRESSURE 80-89 MM HG: ICD-10-PCS | Mod: CPTII,S$GLB,, | Performed by: OBSTETRICS & GYNECOLOGY

## 2020-10-30 PROCEDURE — 3079F DIAST BP 80-89 MM HG: CPT | Mod: CPTII,S$GLB,, | Performed by: OBSTETRICS & GYNECOLOGY

## 2020-10-30 PROCEDURE — 99214 OFFICE O/P EST MOD 30 MIN: CPT | Mod: S$GLB,,, | Performed by: OBSTETRICS & GYNECOLOGY

## 2020-10-30 PROCEDURE — 99214 PR OFFICE/OUTPT VISIT, EST, LEVL IV, 30-39 MIN: ICD-10-PCS | Mod: S$GLB,,, | Performed by: OBSTETRICS & GYNECOLOGY

## 2020-10-30 RX ORDER — LEVONORGESTREL AND ETHINYL ESTRADIOL 0.1-0.02MG
1 KIT ORAL DAILY
Qty: 30 TABLET | Refills: 11 | Status: SHIPPED | OUTPATIENT
Start: 2020-10-30 | End: 2021-03-26

## 2020-10-30 RX ORDER — NAPROXEN 500 MG/1
500 TABLET ORAL 2 TIMES DAILY WITH MEALS
Qty: 30 TABLET | Refills: 2 | Status: SHIPPED | OUTPATIENT
Start: 2020-10-30 | End: 2020-11-14 | Stop reason: SDUPTHER

## 2020-11-01 ENCOUNTER — TELEPHONE (OUTPATIENT)
Dept: OBSTETRICS AND GYNECOLOGY | Facility: CLINIC | Age: 45
End: 2020-11-01

## 2020-11-01 NOTE — PROGRESS NOTES
"45 y.o.   OB History        2    Para   2    Term   2            AB        Living           SAB        TAB        Ectopic        Multiple        Live Births                   Comlaining of:  Pt has heavy bleeding, cramps, had ablation   Also backache during cycles,       ROS:  GENERAL: No fever, chills, fatigability or weight loss.  SKIN: No rashes, itching or changes in color or texture of skin.  HEAD: No headaches or recent head trauma.  EYES: Visual acuity fine. No photophobia, ocular pain or diplopia.  EARS: Denies ear pain, discharge or vertigo.  NOSE: No loss of smell, no epistaxis or postnasal drip.  MOUTH & THROAT: No hoarseness or change in voice. No excessive gum bleeding.  NODES: Denies swollen glands.  CHEST: Denies ANDERS, cyanosis, wheezing, cough and sputum production.  CARDIOVASCULAR: Denies chest pain, PND, orthopnea or reduced exercise tolerance.  ABDOMEN: Appetite fine. No weight loss. Denies diarrhea, abdominal pain, hematemesis or blood in stool.  URINARY: No flank pain, dysuria or hematuria.  PERIPHERAL VASCULAR: No claudication or cyanosis.  MUSCULOSKELETAL: No joint stiffness or swelling. Denies back pain.  NEUROLOGIC: No history of seizures, paralysis, alteration of gait or coordination      PE: /86   Ht 5' 6" (1.676 m)   Wt 112.3 kg (247 lb 9.6 oz)   LMP 10/26/2020   BMI 39.96 kg/m²    abd soft  Pelvic def    A menorrhagia, bad dysmenorrhea, prev ablation  Plan, 25 min counselling on situation, options  Failure of ablation 5-10%  Pictures of recent ablation rev'd, appears to have global ablation :(  Discussed options, meds/ surgery  Pt wants to proceed with next step,ie hyst  Will set up TL  Dicussed how to do surgery, risks benefits, failure to relieve all pain sourdes  But pain mostly at time of section            "

## 2020-11-10 DIAGNOSIS — Z12.4 ROUTINE PAPANICOLAOU SMEAR: Primary | ICD-10-CM

## 2020-11-14 ENCOUNTER — LAB VISIT (OUTPATIENT)
Dept: LAB | Facility: HOSPITAL | Age: 45
End: 2020-11-14
Attending: FAMILY MEDICINE
Payer: COMMERCIAL

## 2020-11-14 ENCOUNTER — OFFICE VISIT (OUTPATIENT)
Dept: FAMILY MEDICINE | Facility: CLINIC | Age: 45
End: 2020-11-14
Attending: FAMILY MEDICINE
Payer: COMMERCIAL

## 2020-11-14 VITALS
WEIGHT: 248.69 LBS | DIASTOLIC BLOOD PRESSURE: 98 MMHG | BODY MASS INDEX: 39.97 KG/M2 | HEIGHT: 66 IN | OXYGEN SATURATION: 98 % | SYSTOLIC BLOOD PRESSURE: 154 MMHG | HEART RATE: 70 BPM | TEMPERATURE: 97 F

## 2020-11-14 DIAGNOSIS — R19.7 DIARRHEA, UNSPECIFIED TYPE: ICD-10-CM

## 2020-11-14 DIAGNOSIS — R11.0 NAUSEA: ICD-10-CM

## 2020-11-14 DIAGNOSIS — R10.11 RUQ ABDOMINAL PAIN: Primary | ICD-10-CM

## 2020-11-14 DIAGNOSIS — R51.9 INTRACTABLE HEADACHE, UNSPECIFIED CHRONICITY PATTERN, UNSPECIFIED HEADACHE TYPE: ICD-10-CM

## 2020-11-14 DIAGNOSIS — R10.11 RUQ ABDOMINAL PAIN: ICD-10-CM

## 2020-11-14 LAB
ALBUMIN SERPL BCP-MCNC: 3.5 G/DL (ref 3.5–5.2)
ALP SERPL-CCNC: 82 U/L (ref 55–135)
ALT SERPL W/O P-5'-P-CCNC: 17 U/L (ref 10–44)
ANION GAP SERPL CALC-SCNC: 7 MMOL/L (ref 8–16)
AST SERPL-CCNC: 13 U/L (ref 10–40)
BASOPHILS # BLD AUTO: 0.03 K/UL (ref 0–0.2)
BASOPHILS NFR BLD: 0.4 % (ref 0–1.9)
BILIRUB SERPL-MCNC: 0.5 MG/DL (ref 0.1–1)
BUN SERPL-MCNC: 12 MG/DL (ref 6–20)
CALCIUM SERPL-MCNC: 9 MG/DL (ref 8.7–10.5)
CHLORIDE SERPL-SCNC: 104 MMOL/L (ref 95–110)
CO2 SERPL-SCNC: 26 MMOL/L (ref 23–29)
CREAT SERPL-MCNC: 0.8 MG/DL (ref 0.5–1.4)
DIFFERENTIAL METHOD: ABNORMAL
EOSINOPHIL # BLD AUTO: 0.1 K/UL (ref 0–0.5)
EOSINOPHIL NFR BLD: 1.1 % (ref 0–8)
ERYTHROCYTE [DISTWIDTH] IN BLOOD BY AUTOMATED COUNT: 13.4 % (ref 11.5–14.5)
EST. GFR  (AFRICAN AMERICAN): >60 ML/MIN/1.73 M^2
EST. GFR  (NON AFRICAN AMERICAN): >60 ML/MIN/1.73 M^2
GLUCOSE SERPL-MCNC: 223 MG/DL (ref 70–110)
HCT VFR BLD AUTO: 40.6 % (ref 37–48.5)
HGB BLD-MCNC: 12.5 G/DL (ref 12–16)
IMM GRANULOCYTES # BLD AUTO: 0.03 K/UL (ref 0–0.04)
IMM GRANULOCYTES NFR BLD AUTO: 0.4 % (ref 0–0.5)
LYMPHOCYTES # BLD AUTO: 2.9 K/UL (ref 1–4.8)
LYMPHOCYTES NFR BLD: 36.9 % (ref 18–48)
MCH RBC QN AUTO: 25.8 PG (ref 27–31)
MCHC RBC AUTO-ENTMCNC: 30.8 G/DL (ref 32–36)
MCV RBC AUTO: 84 FL (ref 82–98)
MONOCYTES # BLD AUTO: 0.5 K/UL (ref 0.3–1)
MONOCYTES NFR BLD: 6.2 % (ref 4–15)
NEUTROPHILS # BLD AUTO: 4.4 K/UL (ref 1.8–7.7)
NEUTROPHILS NFR BLD: 55 % (ref 38–73)
NRBC BLD-RTO: 0 /100 WBC
PLATELET # BLD AUTO: 384 K/UL (ref 150–350)
PMV BLD AUTO: 10.9 FL (ref 9.2–12.9)
POTASSIUM SERPL-SCNC: 4.1 MMOL/L (ref 3.5–5.1)
PROT SERPL-MCNC: 7 G/DL (ref 6–8.4)
RBC # BLD AUTO: 4.84 M/UL (ref 4–5.4)
SODIUM SERPL-SCNC: 137 MMOL/L (ref 136–145)
WBC # BLD AUTO: 7.95 K/UL (ref 3.9–12.7)

## 2020-11-14 PROCEDURE — 3008F BODY MASS INDEX DOCD: CPT | Mod: CPTII,S$GLB,, | Performed by: FAMILY MEDICINE

## 2020-11-14 PROCEDURE — 99214 OFFICE O/P EST MOD 30 MIN: CPT | Mod: S$GLB,,, | Performed by: FAMILY MEDICINE

## 2020-11-14 PROCEDURE — 3008F PR BODY MASS INDEX (BMI) DOCUMENTED: ICD-10-PCS | Mod: CPTII,S$GLB,, | Performed by: FAMILY MEDICINE

## 2020-11-14 PROCEDURE — 80053 COMPREHEN METABOLIC PANEL: CPT

## 2020-11-14 PROCEDURE — 1125F PR PAIN SEVERITY QUANTIFIED, PAIN PRESENT: ICD-10-PCS | Mod: S$GLB,,, | Performed by: FAMILY MEDICINE

## 2020-11-14 PROCEDURE — 3077F SYST BP >= 140 MM HG: CPT | Mod: CPTII,S$GLB,, | Performed by: FAMILY MEDICINE

## 2020-11-14 PROCEDURE — 1125F AMNT PAIN NOTED PAIN PRSNT: CPT | Mod: S$GLB,,, | Performed by: FAMILY MEDICINE

## 2020-11-14 PROCEDURE — 99999 PR PBB SHADOW E&M-EST. PATIENT-LVL IV: ICD-10-PCS | Mod: PBBFAC,,, | Performed by: FAMILY MEDICINE

## 2020-11-14 PROCEDURE — 3077F PR MOST RECENT SYSTOLIC BLOOD PRESSURE >= 140 MM HG: ICD-10-PCS | Mod: CPTII,S$GLB,, | Performed by: FAMILY MEDICINE

## 2020-11-14 PROCEDURE — 3080F PR MOST RECENT DIASTOLIC BLOOD PRESSURE >= 90 MM HG: ICD-10-PCS | Mod: CPTII,S$GLB,, | Performed by: FAMILY MEDICINE

## 2020-11-14 PROCEDURE — 3080F DIAST BP >= 90 MM HG: CPT | Mod: CPTII,S$GLB,, | Performed by: FAMILY MEDICINE

## 2020-11-14 PROCEDURE — 99214 PR OFFICE/OUTPT VISIT, EST, LEVL IV, 30-39 MIN: ICD-10-PCS | Mod: S$GLB,,, | Performed by: FAMILY MEDICINE

## 2020-11-14 PROCEDURE — 99999 PR PBB SHADOW E&M-EST. PATIENT-LVL IV: CPT | Mod: PBBFAC,,, | Performed by: FAMILY MEDICINE

## 2020-11-14 PROCEDURE — 36415 COLL VENOUS BLD VENIPUNCTURE: CPT | Mod: PO

## 2020-11-14 PROCEDURE — 85025 COMPLETE CBC W/AUTO DIFF WBC: CPT

## 2020-11-14 RX ORDER — BUTALBITAL, ACETAMINOPHEN AND CAFFEINE 50; 325; 40 MG/1; MG/1; MG/1
1 TABLET ORAL EVERY 6 HOURS PRN
Qty: 10 TABLET | Refills: 0 | Status: SHIPPED | OUTPATIENT
Start: 2020-11-14 | End: 2020-12-14

## 2020-11-14 NOTE — PROGRESS NOTES
Subjective:       Patient ID: Hamzah Reis is a 45 y.o. female.    Chief Complaint: Headache (x 1 day), Diarrhea, and Cough    45 yr old pleasant female with uncontrolled DM II, HTN, HLD, and other co morbidities presents today for urgent care c/o right sided abdominal pain, diarrhea, headache, nausea and cough.    Abdominal pain/diarrhea/nausea - onset 1 day ago and 4 episodes of watery diarrhea. It is aching type, 5/10 in severity and no aggravating or relieving factors. +nausea.       Headache - bilateral - BP elevated - x 2 days - aching type, no blurred vision - NSAIDS did not help     Headache   This is a new problem. The current episode started in the past 7 days. The problem occurs constantly. The problem has been unchanged. The pain is located in the bilateral region. The pain does not radiate. The quality of the pain is described as aching. The pain is at a severity of 8/10. The pain is severe. Associated symptoms include coughing. Pertinent negatives include no abdominal pain, anorexia, dizziness, drainage, eye pain, eye watering, facial sweating, hearing loss, insomnia, muscle aches, nausea, neck pain, phonophobia, rhinorrhea, scalp tenderness, seizures, sinus pressure, sore throat, swollen glands, tinnitus, vomiting or weight loss. Nothing aggravates the symptoms. She has tried NSAIDs for the symptoms. The treatment provided no relief. There is no history of cancer, hypertension, immunosuppression, migraine headaches, migraines in the family, pseudotumor cerebri, recent head traumas or TMJ.   Diarrhea   This is a new problem. The current episode started yesterday. The problem occurs 2 to 4 times per day. The problem has been unchanged. Associated symptoms include coughing and headaches. Pertinent negatives include no abdominal pain, arthralgias, myalgias, vomiting or weight loss. Nothing aggravates the symptoms. There are no known risk factors. She has tried increased fluids for the symptoms. The  treatment provided no relief. There is no history of bowel resection, irritable bowel syndrome, malabsorption or short gut syndrome.   Cough  This is a new problem. The current episode started yesterday. The problem has been unchanged. The problem occurs constantly. The cough is non-productive. Associated symptoms include headaches. Pertinent negatives include no chest pain, myalgias, rhinorrhea, sore throat, shortness of breath, weight loss or wheezing. Nothing aggravates the symptoms. She has tried nothing for the symptoms. The treatment provided no relief. There is no history of asthma, bronchitis, COPD, emphysema, environmental allergies or pneumonia.     Review of Systems   Constitutional: Negative.  Negative for activity change, diaphoresis, unexpected weight change and weight loss.   HENT: Negative.  Negative for nasal congestion, ear discharge, hearing loss, rhinorrhea, sinus pressure/congestion, sore throat, tinnitus and voice change.    Eyes: Negative.  Negative for pain, discharge and visual disturbance.   Respiratory: Positive for cough. Negative for chest tightness, shortness of breath and wheezing.    Cardiovascular: Negative.  Negative for chest pain.   Gastrointestinal: Positive for diarrhea. Negative for abdominal distention, abdominal pain, anal bleeding, anorexia, constipation, nausea and vomiting.   Endocrine: Negative.  Negative for cold intolerance, polydipsia and polyuria.   Genitourinary: Negative.  Negative for decreased urine volume, difficulty urinating, dysuria, frequency, menstrual problem and vaginal pain.   Musculoskeletal: Negative.  Negative for arthralgias, gait problem, myalgias and neck pain.   Integumentary:  Negative for color change, pallor and wound. Negative.   Allergic/Immunologic: Negative.  Negative for environmental allergies and immunocompromised state.   Neurological: Positive for headaches. Negative for dizziness, tremors, seizures and speech difficulty.    Hematological: Negative.  Negative for adenopathy. Does not bruise/bleed easily.   Psychiatric/Behavioral: Negative.  Negative for agitation, confusion, decreased concentration, hallucinations, self-injury and suicidal ideas. The patient is not nervous/anxious and does not have insomnia.      PMH/PSH/FH/SH/MED/ALLERGY reviewed        Objective:       Vitals:    11/14/20 0949   BP: (!) 154/98   Pulse: 70   Temp: 97.3 °F (36.3 °C)       Physical Exam  Constitutional:       General: She is not in acute distress.     Appearance: She is well-developed. She is not diaphoretic.   HENT:      Head: Normocephalic and atraumatic.      Right Ear: External ear normal.      Left Ear: External ear normal.      Nose: Nose normal.      Mouth/Throat:      Pharynx: No oropharyngeal exudate.   Eyes:      General: No scleral icterus.        Right eye: No discharge.         Left eye: No discharge.      Conjunctiva/sclera: Conjunctivae normal.      Pupils: Pupils are equal, round, and reactive to light.   Neck:      Musculoskeletal: Normal range of motion and neck supple.      Thyroid: No thyromegaly.      Vascular: No JVD.      Trachea: No tracheal deviation.   Cardiovascular:      Rate and Rhythm: Normal rate and regular rhythm.      Heart sounds: Normal heart sounds. No murmur. No friction rub. No gallop.    Pulmonary:      Effort: Pulmonary effort is normal.      Breath sounds: Normal breath sounds. No stridor. No wheezing or rales.   Chest:      Chest wall: No tenderness.   Abdominal:      General: Bowel sounds are normal. There is distension.      Palpations: Abdomen is soft. There is no mass.      Tenderness: There is abdominal tenderness in the right upper quadrant and epigastric area. There is guarding. There is no rebound.      Hernia: No hernia is present.       Musculoskeletal: Normal range of motion.         General: No tenderness.   Lymphadenopathy:      Cervical: No cervical adenopathy.   Skin:     General: Skin is warm  and dry.      Coloration: Skin is not pale.      Findings: No erythema or rash.   Neurological:      Mental Status: She is alert and oriented to person, place, and time.      Cranial Nerves: No cranial nerve deficit.      Motor: No abnormal muscle tone.      Coordination: Coordination normal.      Deep Tendon Reflexes: Reflexes are normal and symmetric. Reflexes normal.   Psychiatric:         Behavior: Behavior normal.         Thought Content: Thought content normal.         Judgment: Judgment normal.         Assessment:       1. RUQ abdominal pain    2. Nausea    3. Diarrhea, unspecified type    4. Intractable headache, unspecified chronicity pattern, unspecified headache type        Plan:           Hamzah was seen today for headache, diarrhea and cough.    Diagnoses and all orders for this visit:    RUQ abdominal pain  -     CBC Auto Differential; Future  -     Comprehensive Metabolic Panel; Future  -     US Abdomen Limited; Future    Nausea  -     CBC Auto Differential; Future  -     Comprehensive Metabolic Panel; Future  -     US Abdomen Limited; Future    Diarrhea, unspecified type  -     CBC Auto Differential; Future  -     Comprehensive Metabolic Panel; Future    Intractable headache, unspecified chronicity pattern, unspecified headache type  -     butalbital-acetaminophen-caffeine -40 mg (FIORICET, ESGIC) -40 mg per tablet; Take 1 tablet by mouth every 6 (six) hours as needed for Pain or Headaches.      RUQ abd pain/nausea/diarrhea  -labs and USG  -r/o sang    Headache  -stress  -fioricet prn    Spent adequate time in obtaining history and explaining differentials    25 minutes spent during this visit of which greater than 50% devoted to face-face counseling and coordination of care regarding diagnosis and management plan    Follow up if symptoms worsen or fail to improve.

## 2020-11-16 RX ORDER — NAPROXEN 500 MG/1
500 TABLET ORAL 2 TIMES DAILY WITH MEALS
Qty: 30 TABLET | Refills: 2 | Status: SHIPPED | OUTPATIENT
Start: 2020-11-16 | End: 2021-03-26

## 2020-11-18 ENCOUNTER — TELEPHONE (OUTPATIENT)
Dept: FAMILY MEDICINE | Facility: CLINIC | Age: 45
End: 2020-11-18

## 2020-12-22 ENCOUNTER — TELEPHONE (OUTPATIENT)
Dept: OBSTETRICS AND GYNECOLOGY | Facility: CLINIC | Age: 45
End: 2020-12-22

## 2020-12-22 ENCOUNTER — PATIENT OUTREACH (OUTPATIENT)
Dept: ADMINISTRATIVE | Facility: OTHER | Age: 45
End: 2020-12-22

## 2020-12-22 NOTE — PROGRESS NOTES
Health Maintenance Due   Topic Date Due    Hepatitis C Screening  1975    HIV Screening  06/01/1992    TETANUS VACCINE  03/03/2014    Influenza Vaccine (1) 08/01/2020    Hemoglobin A1c  09/18/2020    Eye Exam  12/26/2020     Updates were requested from care everywhere.  Chart was reviewed for overdue Proactive [facility] Encounters ([facility]AR[facility]) topics (CRS, Breast Cancer Screening, Eye exam)  Health Maintenance has been updated.  LINKS immunization registry triggered.  Immunizations were reconciled.

## 2021-01-05 ENCOUNTER — PATIENT MESSAGE (OUTPATIENT)
Dept: ADMINISTRATIVE | Facility: HOSPITAL | Age: 46
End: 2021-01-05

## 2021-01-19 ENCOUNTER — TELEPHONE (OUTPATIENT)
Dept: OBSTETRICS AND GYNECOLOGY | Facility: CLINIC | Age: 46
End: 2021-01-19

## 2021-02-17 ENCOUNTER — PATIENT OUTREACH (OUTPATIENT)
Dept: ADMINISTRATIVE | Facility: OTHER | Age: 46
End: 2021-02-17

## 2021-02-17 DIAGNOSIS — Z12.31 ENCOUNTER FOR SCREENING MAMMOGRAM FOR MALIGNANT NEOPLASM OF BREAST: Primary | ICD-10-CM

## 2021-02-22 ENCOUNTER — LAB VISIT (OUTPATIENT)
Dept: FAMILY MEDICINE | Facility: CLINIC | Age: 46
End: 2021-02-22
Payer: COMMERCIAL

## 2021-02-22 ENCOUNTER — OFFICE VISIT (OUTPATIENT)
Dept: OBSTETRICS AND GYNECOLOGY | Facility: CLINIC | Age: 46
End: 2021-02-22
Payer: COMMERCIAL

## 2021-02-22 DIAGNOSIS — Z12.4 ROUTINE PAPANICOLAOU SMEAR: ICD-10-CM

## 2021-02-22 DIAGNOSIS — R10.2 PELVIC PAIN IN FEMALE: Primary | ICD-10-CM

## 2021-02-22 PROCEDURE — 99499 UNLISTED E&M SERVICE: CPT | Mod: S$GLB,,, | Performed by: OBSTETRICS & GYNECOLOGY

## 2021-02-22 PROCEDURE — U0005 INFEC AGEN DETEC AMPLI PROBE: HCPCS

## 2021-02-22 PROCEDURE — 99999 PR PBB SHADOW E&M-EST. PATIENT-LVL II: ICD-10-PCS | Mod: PBBFAC,,, | Performed by: OBSTETRICS & GYNECOLOGY

## 2021-02-22 PROCEDURE — U0003 INFECTIOUS AGENT DETECTION BY NUCLEIC ACID (DNA OR RNA); SEVERE ACUTE RESPIRATORY SYNDROME CORONAVIRUS 2 (SARS-COV-2) (CORONAVIRUS DISEASE [COVID-19]), AMPLIFIED PROBE TECHNIQUE, MAKING USE OF HIGH THROUGHPUT TECHNOLOGIES AS DESCRIBED BY CMS-2020-01-R: HCPCS

## 2021-02-22 PROCEDURE — 99999 PR PBB SHADOW E&M-EST. PATIENT-LVL II: CPT | Mod: PBBFAC,,, | Performed by: OBSTETRICS & GYNECOLOGY

## 2021-02-22 PROCEDURE — 99499 NO LOS: ICD-10-PCS | Mod: S$GLB,,, | Performed by: OBSTETRICS & GYNECOLOGY

## 2021-02-23 ENCOUNTER — HOSPITAL ENCOUNTER (OUTPATIENT)
Dept: PREADMISSION TESTING | Facility: HOSPITAL | Age: 46
Discharge: HOME | End: 2021-02-23
Attending: OBSTETRICS & GYNECOLOGY
Payer: COMMERCIAL

## 2021-02-23 VITALS
WEIGHT: 252 LBS | RESPIRATION RATE: 16 BRPM | SYSTOLIC BLOOD PRESSURE: 119 MMHG | BODY MASS INDEX: 40.5 KG/M2 | HEART RATE: 82 BPM | HEIGHT: 66 IN | DIASTOLIC BLOOD PRESSURE: 84 MMHG | OXYGEN SATURATION: 97 %

## 2021-02-23 LAB — SARS-COV-2 RNA RESP QL NAA+PROBE: NOT DETECTED

## 2021-02-23 RX ORDER — LIDOCAINE HYDROCHLORIDE 10 MG/ML
1 INJECTION, SOLUTION EPIDURAL; INFILTRATION; INTRACAUDAL; PERINEURAL ONCE
Status: CANCELLED | OUTPATIENT
Start: 2021-02-23 | End: 2021-02-23

## 2021-02-23 RX ORDER — SCOLOPAMINE TRANSDERMAL SYSTEM 1 MG/1
1 PATCH, EXTENDED RELEASE TRANSDERMAL
Status: CANCELLED | OUTPATIENT
Start: 2021-02-23

## 2021-02-23 RX ORDER — SODIUM CHLORIDE, SODIUM LACTATE, POTASSIUM CHLORIDE, CALCIUM CHLORIDE 600; 310; 30; 20 MG/100ML; MG/100ML; MG/100ML; MG/100ML
INJECTION, SOLUTION INTRAVENOUS CONTINUOUS
Status: CANCELLED | OUTPATIENT
Start: 2021-02-23

## 2021-02-24 ENCOUNTER — TELEPHONE (OUTPATIENT)
Dept: OBSTETRICS AND GYNECOLOGY | Facility: CLINIC | Age: 46
End: 2021-02-24

## 2021-03-23 ENCOUNTER — PATIENT OUTREACH (OUTPATIENT)
Dept: ADMINISTRATIVE | Facility: OTHER | Age: 46
End: 2021-03-23

## 2021-03-23 DIAGNOSIS — Z79.4 TYPE 2 DIABETES MELLITUS WITH DIABETIC POLYNEUROPATHY, WITH LONG-TERM CURRENT USE OF INSULIN: Primary | ICD-10-CM

## 2021-03-23 DIAGNOSIS — E11.42 TYPE 2 DIABETES MELLITUS WITH DIABETIC POLYNEUROPATHY, WITH LONG-TERM CURRENT USE OF INSULIN: Primary | ICD-10-CM

## 2021-03-26 ENCOUNTER — OFFICE VISIT (OUTPATIENT)
Dept: INTERNAL MEDICINE | Facility: CLINIC | Age: 46
End: 2021-03-26
Payer: COMMERCIAL

## 2021-03-26 ENCOUNTER — LAB VISIT (OUTPATIENT)
Dept: LAB | Facility: HOSPITAL | Age: 46
End: 2021-03-26
Attending: INTERNAL MEDICINE
Payer: COMMERCIAL

## 2021-03-26 VITALS
HEART RATE: 98 BPM | DIASTOLIC BLOOD PRESSURE: 70 MMHG | HEIGHT: 66 IN | OXYGEN SATURATION: 98 % | WEIGHT: 253.5 LBS | SYSTOLIC BLOOD PRESSURE: 126 MMHG | TEMPERATURE: 97 F | BODY MASS INDEX: 40.74 KG/M2

## 2021-03-26 DIAGNOSIS — Z00.00 ANNUAL PHYSICAL EXAM: ICD-10-CM

## 2021-03-26 DIAGNOSIS — Z00.00 ANNUAL PHYSICAL EXAM: Primary | ICD-10-CM

## 2021-03-26 DIAGNOSIS — E11.42 TYPE 2 DIABETES MELLITUS WITH DIABETIC POLYNEUROPATHY, WITH LONG-TERM CURRENT USE OF INSULIN: ICD-10-CM

## 2021-03-26 DIAGNOSIS — E78.5 HYPERLIPIDEMIA ASSOCIATED WITH TYPE 2 DIABETES MELLITUS: ICD-10-CM

## 2021-03-26 DIAGNOSIS — E11.69 HYPERLIPIDEMIA ASSOCIATED WITH TYPE 2 DIABETES MELLITUS: ICD-10-CM

## 2021-03-26 DIAGNOSIS — Z79.4 TYPE 2 DIABETES MELLITUS WITH DIABETIC POLYNEUROPATHY, WITH LONG-TERM CURRENT USE OF INSULIN: ICD-10-CM

## 2021-03-26 LAB
ALBUMIN SERPL BCP-MCNC: 3.5 G/DL (ref 3.5–5.2)
ALP SERPL-CCNC: 76 U/L (ref 55–135)
ALT SERPL W/O P-5'-P-CCNC: 18 U/L (ref 10–44)
ANION GAP SERPL CALC-SCNC: 11 MMOL/L (ref 8–16)
AST SERPL-CCNC: 13 U/L (ref 10–40)
BASOPHILS # BLD AUTO: 0.04 K/UL (ref 0–0.2)
BASOPHILS NFR BLD: 0.5 % (ref 0–1.9)
BILIRUB SERPL-MCNC: 0.4 MG/DL (ref 0.1–1)
BUN SERPL-MCNC: 12 MG/DL (ref 6–20)
CALCIUM SERPL-MCNC: 9.1 MG/DL (ref 8.7–10.5)
CHLORIDE SERPL-SCNC: 104 MMOL/L (ref 95–110)
CHOLEST SERPL-MCNC: 175 MG/DL (ref 120–199)
CHOLEST/HDLC SERPL: 4.5 {RATIO} (ref 2–5)
CO2 SERPL-SCNC: 21 MMOL/L (ref 23–29)
CREAT SERPL-MCNC: 0.9 MG/DL (ref 0.5–1.4)
DIFFERENTIAL METHOD: ABNORMAL
EOSINOPHIL # BLD AUTO: 0.1 K/UL (ref 0–0.5)
EOSINOPHIL NFR BLD: 0.9 % (ref 0–8)
ERYTHROCYTE [DISTWIDTH] IN BLOOD BY AUTOMATED COUNT: 13 % (ref 11.5–14.5)
EST. GFR  (AFRICAN AMERICAN): >60 ML/MIN/1.73 M^2
EST. GFR  (NON AFRICAN AMERICAN): >60 ML/MIN/1.73 M^2
ESTIMATED AVG GLUCOSE: 266 MG/DL (ref 68–131)
GLUCOSE SERPL-MCNC: 245 MG/DL (ref 70–110)
HBA1C MFR BLD: 10.9 % (ref 4–5.6)
HCT VFR BLD AUTO: 40.1 % (ref 37–48.5)
HDLC SERPL-MCNC: 39 MG/DL (ref 40–75)
HDLC SERPL: 22.3 % (ref 20–50)
HGB BLD-MCNC: 12.7 G/DL (ref 12–16)
IMM GRANULOCYTES # BLD AUTO: 0.06 K/UL (ref 0–0.04)
IMM GRANULOCYTES NFR BLD AUTO: 0.7 % (ref 0–0.5)
LDLC SERPL CALC-MCNC: 117 MG/DL (ref 63–159)
LYMPHOCYTES # BLD AUTO: 3 K/UL (ref 1–4.8)
LYMPHOCYTES NFR BLD: 35 % (ref 18–48)
MCH RBC QN AUTO: 26.2 PG (ref 27–31)
MCHC RBC AUTO-ENTMCNC: 31.7 G/DL (ref 32–36)
MCV RBC AUTO: 83 FL (ref 82–98)
MONOCYTES # BLD AUTO: 0.5 K/UL (ref 0.3–1)
MONOCYTES NFR BLD: 6.1 % (ref 4–15)
NEUTROPHILS # BLD AUTO: 4.9 K/UL (ref 1.8–7.7)
NEUTROPHILS NFR BLD: 56.8 % (ref 38–73)
NONHDLC SERPL-MCNC: 136 MG/DL
NRBC BLD-RTO: 0 /100 WBC
PLATELET # BLD AUTO: 377 K/UL (ref 150–350)
PMV BLD AUTO: 10.8 FL (ref 9.2–12.9)
POTASSIUM SERPL-SCNC: 4.1 MMOL/L (ref 3.5–5.1)
PROT SERPL-MCNC: 7.3 G/DL (ref 6–8.4)
RBC # BLD AUTO: 4.85 M/UL (ref 4–5.4)
SODIUM SERPL-SCNC: 136 MMOL/L (ref 136–145)
TRIGL SERPL-MCNC: 95 MG/DL (ref 30–150)
TSH SERPL DL<=0.005 MIU/L-ACNC: 1.48 UIU/ML (ref 0.4–4)
WBC # BLD AUTO: 8.59 K/UL (ref 3.9–12.7)

## 2021-03-26 PROCEDURE — 3046F PR MOST RECENT HEMOGLOBIN A1C LEVEL > 9.0%: ICD-10-PCS | Mod: CPTII,S$GLB,, | Performed by: INTERNAL MEDICINE

## 2021-03-26 PROCEDURE — 3008F PR BODY MASS INDEX (BMI) DOCUMENTED: ICD-10-PCS | Mod: CPTII,S$GLB,, | Performed by: INTERNAL MEDICINE

## 2021-03-26 PROCEDURE — 99999 PR PBB SHADOW E&M-EST. PATIENT-LVL III: ICD-10-PCS | Mod: PBBFAC,,, | Performed by: INTERNAL MEDICINE

## 2021-03-26 PROCEDURE — 99396 PREV VISIT EST AGE 40-64: CPT | Mod: S$GLB,,, | Performed by: INTERNAL MEDICINE

## 2021-03-26 PROCEDURE — 3078F PR MOST RECENT DIASTOLIC BLOOD PRESSURE < 80 MM HG: ICD-10-PCS | Mod: CPTII,S$GLB,, | Performed by: INTERNAL MEDICINE

## 2021-03-26 PROCEDURE — 80053 COMPREHEN METABOLIC PANEL: CPT | Performed by: INTERNAL MEDICINE

## 2021-03-26 PROCEDURE — 3046F HEMOGLOBIN A1C LEVEL >9.0%: CPT | Mod: CPTII,S$GLB,, | Performed by: INTERNAL MEDICINE

## 2021-03-26 PROCEDURE — 85025 COMPLETE CBC W/AUTO DIFF WBC: CPT | Performed by: INTERNAL MEDICINE

## 2021-03-26 PROCEDURE — 99396 PR PREVENTIVE VISIT,EST,40-64: ICD-10-PCS | Mod: S$GLB,,, | Performed by: INTERNAL MEDICINE

## 2021-03-26 PROCEDURE — 1125F PR PAIN SEVERITY QUANTIFIED, PAIN PRESENT: ICD-10-PCS | Mod: S$GLB,,, | Performed by: INTERNAL MEDICINE

## 2021-03-26 PROCEDURE — 99999 PR PBB SHADOW E&M-EST. PATIENT-LVL III: CPT | Mod: PBBFAC,,, | Performed by: INTERNAL MEDICINE

## 2021-03-26 PROCEDURE — 3074F PR MOST RECENT SYSTOLIC BLOOD PRESSURE < 130 MM HG: ICD-10-PCS | Mod: CPTII,S$GLB,, | Performed by: INTERNAL MEDICINE

## 2021-03-26 PROCEDURE — 36415 COLL VENOUS BLD VENIPUNCTURE: CPT | Mod: PO | Performed by: INTERNAL MEDICINE

## 2021-03-26 PROCEDURE — 3074F SYST BP LT 130 MM HG: CPT | Mod: CPTII,S$GLB,, | Performed by: INTERNAL MEDICINE

## 2021-03-26 PROCEDURE — 3008F BODY MASS INDEX DOCD: CPT | Mod: CPTII,S$GLB,, | Performed by: INTERNAL MEDICINE

## 2021-03-26 PROCEDURE — 3078F DIAST BP <80 MM HG: CPT | Mod: CPTII,S$GLB,, | Performed by: INTERNAL MEDICINE

## 2021-03-26 PROCEDURE — 84443 ASSAY THYROID STIM HORMONE: CPT | Performed by: INTERNAL MEDICINE

## 2021-03-26 PROCEDURE — 80061 LIPID PANEL: CPT | Performed by: INTERNAL MEDICINE

## 2021-03-26 PROCEDURE — 1125F AMNT PAIN NOTED PAIN PRSNT: CPT | Mod: S$GLB,,, | Performed by: INTERNAL MEDICINE

## 2021-03-26 PROCEDURE — 83036 HEMOGLOBIN GLYCOSYLATED A1C: CPT | Performed by: INTERNAL MEDICINE

## 2021-03-26 RX ORDER — METFORMIN HYDROCHLORIDE 1000 MG/1
1000 TABLET ORAL 2 TIMES DAILY WITH MEALS
Qty: 180 TABLET | Refills: 1 | Status: SHIPPED | OUTPATIENT
Start: 2021-03-26 | End: 2022-03-07 | Stop reason: SDUPTHER

## 2021-04-05 ENCOUNTER — PATIENT MESSAGE (OUTPATIENT)
Dept: ADMINISTRATIVE | Facility: HOSPITAL | Age: 46
End: 2021-04-05

## 2021-04-22 ENCOUNTER — OFFICE VISIT (OUTPATIENT)
Dept: INTERNAL MEDICINE | Facility: CLINIC | Age: 46
End: 2021-04-22
Payer: COMMERCIAL

## 2021-04-22 VITALS
DIASTOLIC BLOOD PRESSURE: 74 MMHG | HEIGHT: 66 IN | OXYGEN SATURATION: 98 % | TEMPERATURE: 99 F | BODY MASS INDEX: 40.39 KG/M2 | SYSTOLIC BLOOD PRESSURE: 124 MMHG | WEIGHT: 251.31 LBS | HEART RATE: 92 BPM

## 2021-04-22 DIAGNOSIS — E66.01 SEVERE OBESITY (BMI >= 40): ICD-10-CM

## 2021-04-22 DIAGNOSIS — E11.65 UNCONTROLLED TYPE 2 DIABETES MELLITUS WITH HYPERGLYCEMIA: Primary | ICD-10-CM

## 2021-04-22 DIAGNOSIS — B37.9 YEAST INFECTION: Primary | ICD-10-CM

## 2021-04-22 DIAGNOSIS — E55.9 VITAMIN D DEFICIENCY: ICD-10-CM

## 2021-04-22 DIAGNOSIS — S39.012A STRAIN OF LUMBAR REGION, INITIAL ENCOUNTER: ICD-10-CM

## 2021-04-22 PROCEDURE — 3046F HEMOGLOBIN A1C LEVEL >9.0%: CPT | Mod: CPTII,S$GLB,, | Performed by: INTERNAL MEDICINE

## 2021-04-22 PROCEDURE — 3008F BODY MASS INDEX DOCD: CPT | Mod: CPTII,S$GLB,, | Performed by: INTERNAL MEDICINE

## 2021-04-22 PROCEDURE — 1125F PR PAIN SEVERITY QUANTIFIED, PAIN PRESENT: ICD-10-PCS | Mod: S$GLB,,, | Performed by: INTERNAL MEDICINE

## 2021-04-22 PROCEDURE — 99214 PR OFFICE/OUTPT VISIT, EST, LEVL IV, 30-39 MIN: ICD-10-PCS | Mod: S$GLB,,, | Performed by: INTERNAL MEDICINE

## 2021-04-22 PROCEDURE — 3008F PR BODY MASS INDEX (BMI) DOCUMENTED: ICD-10-PCS | Mod: CPTII,S$GLB,, | Performed by: INTERNAL MEDICINE

## 2021-04-22 PROCEDURE — 1125F AMNT PAIN NOTED PAIN PRSNT: CPT | Mod: S$GLB,,, | Performed by: INTERNAL MEDICINE

## 2021-04-22 PROCEDURE — 3046F PR MOST RECENT HEMOGLOBIN A1C LEVEL > 9.0%: ICD-10-PCS | Mod: CPTII,S$GLB,, | Performed by: INTERNAL MEDICINE

## 2021-04-22 PROCEDURE — 99214 OFFICE O/P EST MOD 30 MIN: CPT | Mod: S$GLB,,, | Performed by: INTERNAL MEDICINE

## 2021-04-22 PROCEDURE — 99999 PR PBB SHADOW E&M-EST. PATIENT-LVL III: CPT | Mod: PBBFAC,,, | Performed by: INTERNAL MEDICINE

## 2021-04-22 PROCEDURE — 99999 PR PBB SHADOW E&M-EST. PATIENT-LVL III: ICD-10-PCS | Mod: PBBFAC,,, | Performed by: INTERNAL MEDICINE

## 2021-04-22 RX ORDER — FLUCONAZOLE 100 MG/1
100 TABLET ORAL DAILY
Qty: 3 TABLET | Refills: 0 | Status: SHIPPED | OUTPATIENT
Start: 2021-04-22 | End: 2021-04-25

## 2021-04-22 RX ORDER — DULAGLUTIDE 0.75 MG/.5ML
0.75 INJECTION, SOLUTION SUBCUTANEOUS
Qty: 3 PEN | Refills: 3 | Status: SHIPPED | OUTPATIENT
Start: 2021-04-22 | End: 2022-03-07 | Stop reason: SDUPTHER

## 2021-04-22 RX ORDER — ASPIRIN 325 MG
50000 TABLET, DELAYED RELEASE (ENTERIC COATED) ORAL WEEKLY
Qty: 12 CAPSULE | Refills: 3 | Status: SHIPPED | OUTPATIENT
Start: 2021-04-22 | End: 2023-06-22

## 2021-04-22 RX ORDER — CYCLOBENZAPRINE HCL 5 MG
5 TABLET ORAL 3 TIMES DAILY PRN
Qty: 90 TABLET | Refills: 0 | Status: SHIPPED | OUTPATIENT
Start: 2021-04-22 | End: 2021-05-02

## 2021-04-22 RX ORDER — NAPROXEN 500 MG/1
500 TABLET ORAL 2 TIMES DAILY PRN
Qty: 60 TABLET | Refills: 0 | Status: SHIPPED | OUTPATIENT
Start: 2021-04-22 | End: 2021-10-21

## 2021-05-10 ENCOUNTER — PATIENT OUTREACH (OUTPATIENT)
Dept: ADMINISTRATIVE | Facility: OTHER | Age: 46
End: 2021-05-10

## 2021-05-17 ENCOUNTER — OFFICE VISIT (OUTPATIENT)
Dept: PODIATRY | Facility: CLINIC | Age: 46
End: 2021-05-17
Payer: COMMERCIAL

## 2021-05-17 VITALS
DIASTOLIC BLOOD PRESSURE: 84 MMHG | WEIGHT: 253 LBS | HEIGHT: 66 IN | SYSTOLIC BLOOD PRESSURE: 126 MMHG | BODY MASS INDEX: 40.66 KG/M2 | HEART RATE: 92 BPM

## 2021-05-17 DIAGNOSIS — M77.42 METATARSALGIA OF BOTH FEET: ICD-10-CM

## 2021-05-17 DIAGNOSIS — M79.671 FOOT PAIN, BILATERAL: ICD-10-CM

## 2021-05-17 DIAGNOSIS — E11.65 POORLY CONTROLLED TYPE 2 DIABETES MELLITUS WITH PERIPHERAL NEUROPATHY: Primary | ICD-10-CM

## 2021-05-17 DIAGNOSIS — M79.672 FOOT PAIN, BILATERAL: ICD-10-CM

## 2021-05-17 DIAGNOSIS — E11.42 POORLY CONTROLLED TYPE 2 DIABETES MELLITUS WITH PERIPHERAL NEUROPATHY: Primary | ICD-10-CM

## 2021-05-17 DIAGNOSIS — M77.41 METATARSALGIA OF BOTH FEET: ICD-10-CM

## 2021-05-17 PROCEDURE — 99213 OFFICE O/P EST LOW 20 MIN: CPT | Mod: S$GLB,,, | Performed by: PODIATRIST

## 2021-05-17 PROCEDURE — 3008F PR BODY MASS INDEX (BMI) DOCUMENTED: ICD-10-PCS | Mod: CPTII,S$GLB,, | Performed by: PODIATRIST

## 2021-05-17 PROCEDURE — 99213 PR OFFICE/OUTPT VISIT, EST, LEVL III, 20-29 MIN: ICD-10-PCS | Mod: S$GLB,,, | Performed by: PODIATRIST

## 2021-05-17 PROCEDURE — 99999 PR PBB SHADOW E&M-EST. PATIENT-LVL III: ICD-10-PCS | Mod: PBBFAC,,, | Performed by: PODIATRIST

## 2021-05-17 PROCEDURE — 3046F HEMOGLOBIN A1C LEVEL >9.0%: CPT | Mod: CPTII,S$GLB,, | Performed by: PODIATRIST

## 2021-05-17 PROCEDURE — 1125F PR PAIN SEVERITY QUANTIFIED, PAIN PRESENT: ICD-10-PCS | Mod: S$GLB,,, | Performed by: PODIATRIST

## 2021-05-17 PROCEDURE — 3046F PR MOST RECENT HEMOGLOBIN A1C LEVEL > 9.0%: ICD-10-PCS | Mod: CPTII,S$GLB,, | Performed by: PODIATRIST

## 2021-05-17 PROCEDURE — 3008F BODY MASS INDEX DOCD: CPT | Mod: CPTII,S$GLB,, | Performed by: PODIATRIST

## 2021-05-17 PROCEDURE — 99999 PR PBB SHADOW E&M-EST. PATIENT-LVL III: CPT | Mod: PBBFAC,,, | Performed by: PODIATRIST

## 2021-05-17 PROCEDURE — 1125F AMNT PAIN NOTED PAIN PRSNT: CPT | Mod: S$GLB,,, | Performed by: PODIATRIST

## 2021-05-20 ENCOUNTER — HOSPITAL ENCOUNTER (OUTPATIENT)
Dept: RADIOLOGY | Facility: HOSPITAL | Age: 46
Discharge: HOME | End: 2021-05-20
Attending: PODIATRIST
Payer: COMMERCIAL

## 2021-05-20 DIAGNOSIS — M77.42 METATARSALGIA OF BOTH FEET: ICD-10-CM

## 2021-05-20 DIAGNOSIS — M79.672 FOOT PAIN, BILATERAL: ICD-10-CM

## 2021-05-20 DIAGNOSIS — M79.671 FOOT PAIN, BILATERAL: ICD-10-CM

## 2021-05-20 DIAGNOSIS — M77.41 METATARSALGIA OF BOTH FEET: ICD-10-CM

## 2021-05-20 PROCEDURE — 73630 X-RAY EXAM OF FOOT: CPT | Mod: TC,50,PN

## 2021-05-20 PROCEDURE — 73630 XR FOOT COMPLETE 3 VIEW BILATERAL: ICD-10-PCS | Mod: 26,50,, | Performed by: RADIOLOGY

## 2021-05-20 PROCEDURE — 73630 X-RAY EXAM OF FOOT: CPT | Mod: 26,50,, | Performed by: RADIOLOGY

## 2021-05-22 ENCOUNTER — HOSPITAL ENCOUNTER (OUTPATIENT)
Dept: RADIOLOGY | Facility: HOSPITAL | Age: 46
Discharge: HOME | End: 2021-05-22
Attending: INTERNAL MEDICINE
Payer: COMMERCIAL

## 2021-05-22 DIAGNOSIS — Z12.31 ENCOUNTER FOR SCREENING MAMMOGRAM FOR MALIGNANT NEOPLASM OF BREAST: ICD-10-CM

## 2021-05-22 PROCEDURE — 77067 MAMMO DIGITAL SCREENING BILAT WITH TOMO: ICD-10-PCS | Mod: 26,,, | Performed by: RADIOLOGY

## 2021-05-22 PROCEDURE — 77063 MAMMO DIGITAL SCREENING BILAT WITH TOMO: ICD-10-PCS | Mod: 26,,, | Performed by: RADIOLOGY

## 2021-05-22 PROCEDURE — 77067 SCR MAMMO BI INCL CAD: CPT | Mod: 26,,, | Performed by: RADIOLOGY

## 2021-05-22 PROCEDURE — 77067 SCR MAMMO BI INCL CAD: CPT | Mod: TC

## 2021-05-22 PROCEDURE — 77063 BREAST TOMOSYNTHESIS BI: CPT | Mod: 26,,, | Performed by: RADIOLOGY

## 2021-05-24 ENCOUNTER — PATIENT MESSAGE (OUTPATIENT)
Dept: PODIATRY | Facility: CLINIC | Age: 46
End: 2021-05-24

## 2021-06-08 ENCOUNTER — PATIENT OUTREACH (OUTPATIENT)
Dept: ADMINISTRATIVE | Facility: HOSPITAL | Age: 46
End: 2021-06-08

## 2021-06-09 DIAGNOSIS — Z79.4 TYPE 2 DIABETES MELLITUS WITH DIABETIC POLYNEUROPATHY, WITH LONG-TERM CURRENT USE OF INSULIN: ICD-10-CM

## 2021-06-09 DIAGNOSIS — E11.42 TYPE 2 DIABETES MELLITUS WITH DIABETIC POLYNEUROPATHY, WITH LONG-TERM CURRENT USE OF INSULIN: ICD-10-CM

## 2021-06-09 RX ORDER — INSULIN DETEMIR 100 [IU]/ML
25 INJECTION, SOLUTION SUBCUTANEOUS NIGHTLY
Qty: 15 ML | Refills: 3 | Status: SHIPPED | OUTPATIENT
Start: 2021-06-09 | End: 2021-06-22 | Stop reason: SDUPTHER

## 2021-06-19 ENCOUNTER — LAB VISIT (OUTPATIENT)
Dept: LAB | Facility: HOSPITAL | Age: 46
End: 2021-06-19
Attending: INTERNAL MEDICINE
Payer: COMMERCIAL

## 2021-06-19 DIAGNOSIS — E11.65 UNCONTROLLED TYPE 2 DIABETES MELLITUS WITH HYPERGLYCEMIA: ICD-10-CM

## 2021-06-19 LAB
ALBUMIN SERPL BCP-MCNC: 3.3 G/DL (ref 3.5–5.2)
ALP SERPL-CCNC: 64 U/L (ref 55–135)
ALT SERPL W/O P-5'-P-CCNC: 12 U/L (ref 10–44)
ANION GAP SERPL CALC-SCNC: 10 MMOL/L (ref 8–16)
AST SERPL-CCNC: 11 U/L (ref 10–40)
BILIRUB SERPL-MCNC: 0.4 MG/DL (ref 0.1–1)
BUN SERPL-MCNC: 9 MG/DL (ref 6–20)
CALCIUM SERPL-MCNC: 9.1 MG/DL (ref 8.7–10.5)
CHLORIDE SERPL-SCNC: 104 MMOL/L (ref 95–110)
CHOLEST SERPL-MCNC: 157 MG/DL (ref 120–199)
CHOLEST/HDLC SERPL: 4.5 {RATIO} (ref 2–5)
CO2 SERPL-SCNC: 24 MMOL/L (ref 23–29)
CREAT SERPL-MCNC: 0.7 MG/DL (ref 0.5–1.4)
EST. GFR  (AFRICAN AMERICAN): >60 ML/MIN/1.73 M^2
EST. GFR  (NON AFRICAN AMERICAN): >60 ML/MIN/1.73 M^2
ESTIMATED AVG GLUCOSE: 232 MG/DL (ref 68–131)
GLUCOSE SERPL-MCNC: 138 MG/DL (ref 70–110)
HBA1C MFR BLD: 9.7 % (ref 4–5.6)
HDLC SERPL-MCNC: 35 MG/DL (ref 40–75)
HDLC SERPL: 22.3 % (ref 20–50)
LDLC SERPL CALC-MCNC: 104.8 MG/DL (ref 63–159)
NONHDLC SERPL-MCNC: 122 MG/DL
POTASSIUM SERPL-SCNC: 3.9 MMOL/L (ref 3.5–5.1)
PROT SERPL-MCNC: 6.6 G/DL (ref 6–8.4)
SODIUM SERPL-SCNC: 138 MMOL/L (ref 136–145)
TRIGL SERPL-MCNC: 86 MG/DL (ref 30–150)

## 2021-06-19 PROCEDURE — 80061 LIPID PANEL: CPT | Performed by: INTERNAL MEDICINE

## 2021-06-19 PROCEDURE — 36415 COLL VENOUS BLD VENIPUNCTURE: CPT | Mod: PO | Performed by: INTERNAL MEDICINE

## 2021-06-19 PROCEDURE — 83036 HEMOGLOBIN GLYCOSYLATED A1C: CPT | Performed by: INTERNAL MEDICINE

## 2021-06-19 PROCEDURE — 80053 COMPREHEN METABOLIC PANEL: CPT | Performed by: INTERNAL MEDICINE

## 2021-06-22 ENCOUNTER — OFFICE VISIT (OUTPATIENT)
Dept: INTERNAL MEDICINE | Facility: CLINIC | Age: 46
End: 2021-06-22
Payer: COMMERCIAL

## 2021-06-22 VITALS
HEIGHT: 66 IN | BODY MASS INDEX: 41.59 KG/M2 | RESPIRATION RATE: 17 BRPM | HEART RATE: 88 BPM | OXYGEN SATURATION: 98 % | DIASTOLIC BLOOD PRESSURE: 63 MMHG | WEIGHT: 258.81 LBS | SYSTOLIC BLOOD PRESSURE: 119 MMHG

## 2021-06-22 DIAGNOSIS — Z79.4 TYPE 2 DIABETES MELLITUS WITH DIABETIC POLYNEUROPATHY, WITH LONG-TERM CURRENT USE OF INSULIN: ICD-10-CM

## 2021-06-22 DIAGNOSIS — E11.42 TYPE 2 DIABETES MELLITUS WITH DIABETIC POLYNEUROPATHY, WITH LONG-TERM CURRENT USE OF INSULIN: ICD-10-CM

## 2021-06-22 PROCEDURE — 99999 PR PBB SHADOW E&M-EST. PATIENT-LVL IV: ICD-10-PCS | Mod: PBBFAC,,, | Performed by: INTERNAL MEDICINE

## 2021-06-22 PROCEDURE — 3046F PR MOST RECENT HEMOGLOBIN A1C LEVEL > 9.0%: ICD-10-PCS | Mod: CPTII,S$GLB,, | Performed by: INTERNAL MEDICINE

## 2021-06-22 PROCEDURE — 3046F HEMOGLOBIN A1C LEVEL >9.0%: CPT | Mod: CPTII,S$GLB,, | Performed by: INTERNAL MEDICINE

## 2021-06-22 PROCEDURE — 1126F AMNT PAIN NOTED NONE PRSNT: CPT | Mod: S$GLB,,, | Performed by: INTERNAL MEDICINE

## 2021-06-22 PROCEDURE — 3008F PR BODY MASS INDEX (BMI) DOCUMENTED: ICD-10-PCS | Mod: CPTII,S$GLB,, | Performed by: INTERNAL MEDICINE

## 2021-06-22 PROCEDURE — 99999 PR PBB SHADOW E&M-EST. PATIENT-LVL IV: CPT | Mod: PBBFAC,,, | Performed by: INTERNAL MEDICINE

## 2021-06-22 PROCEDURE — 1126F PR PAIN SEVERITY QUANTIFIED, NO PAIN PRESENT: ICD-10-PCS | Mod: S$GLB,,, | Performed by: INTERNAL MEDICINE

## 2021-06-22 PROCEDURE — 99213 OFFICE O/P EST LOW 20 MIN: CPT | Mod: S$GLB,,, | Performed by: INTERNAL MEDICINE

## 2021-06-22 PROCEDURE — 3008F BODY MASS INDEX DOCD: CPT | Mod: CPTII,S$GLB,, | Performed by: INTERNAL MEDICINE

## 2021-06-22 PROCEDURE — 99213 PR OFFICE/OUTPT VISIT, EST, LEVL III, 20-29 MIN: ICD-10-PCS | Mod: S$GLB,,, | Performed by: INTERNAL MEDICINE

## 2021-06-22 RX ORDER — BLOOD-GLUCOSE CONTROL, NORMAL
EACH MISCELLANEOUS 4 TIMES DAILY
Qty: 200 EACH | Refills: 11 | Status: SHIPPED | OUTPATIENT
Start: 2021-06-22 | End: 2023-06-20

## 2021-06-22 RX ORDER — DEXTROSE 4 G
TABLET,CHEWABLE ORAL
Qty: 1 EACH | Refills: 0 | Status: SHIPPED | OUTPATIENT
Start: 2021-06-22 | End: 2022-03-07 | Stop reason: SDUPTHER

## 2021-06-22 RX ORDER — INSULIN DETEMIR 100 [IU]/ML
25 INJECTION, SOLUTION SUBCUTANEOUS NIGHTLY
Qty: 15 ML | Refills: 3 | Status: SHIPPED | OUTPATIENT
Start: 2021-06-22 | End: 2021-06-22 | Stop reason: SDUPTHER

## 2021-06-22 RX ORDER — INSULIN DETEMIR 100 [IU]/ML
15 INJECTION, SOLUTION SUBCUTANEOUS NIGHTLY
Qty: 15 ML | Refills: 3 | Status: SHIPPED | OUTPATIENT
Start: 2021-06-22 | End: 2022-03-07 | Stop reason: SDUPTHER

## 2021-07-29 ENCOUNTER — TELEPHONE (OUTPATIENT)
Dept: INTERNAL MEDICINE | Facility: CLINIC | Age: 46
End: 2021-07-29

## 2021-08-12 ENCOUNTER — PATIENT OUTREACH (OUTPATIENT)
Dept: ADMINISTRATIVE | Facility: HOSPITAL | Age: 46
End: 2021-08-12

## 2021-09-22 DIAGNOSIS — E11.9 TYPE 2 DIABETES MELLITUS WITHOUT COMPLICATION, UNSPECIFIED WHETHER LONG TERM INSULIN USE: ICD-10-CM

## 2021-09-29 DIAGNOSIS — E11.9 TYPE 2 DIABETES MELLITUS WITHOUT COMPLICATION: ICD-10-CM

## 2021-10-01 ENCOUNTER — PATIENT OUTREACH (OUTPATIENT)
Dept: ADMINISTRATIVE | Facility: OTHER | Age: 46
End: 2021-10-01

## 2021-10-04 ENCOUNTER — PATIENT MESSAGE (OUTPATIENT)
Dept: ADMINISTRATIVE | Facility: HOSPITAL | Age: 46
End: 2021-10-04

## 2021-10-21 ENCOUNTER — OFFICE VISIT (OUTPATIENT)
Dept: INTERNAL MEDICINE | Facility: CLINIC | Age: 46
End: 2021-10-21
Payer: COMMERCIAL

## 2021-10-21 VITALS
OXYGEN SATURATION: 98 % | DIASTOLIC BLOOD PRESSURE: 64 MMHG | HEART RATE: 80 BPM | HEIGHT: 66 IN | WEIGHT: 254.19 LBS | SYSTOLIC BLOOD PRESSURE: 100 MMHG | BODY MASS INDEX: 40.85 KG/M2

## 2021-10-21 DIAGNOSIS — E11.65 UNCONTROLLED TYPE 2 DIABETES MELLITUS WITH HYPERGLYCEMIA: ICD-10-CM

## 2021-10-21 DIAGNOSIS — M54.9 DORSALGIA, UNSPECIFIED: ICD-10-CM

## 2021-10-21 DIAGNOSIS — S83.421A SPRAIN OF LATERAL COLLATERAL LIGAMENT OF RIGHT KNEE, INITIAL ENCOUNTER: ICD-10-CM

## 2021-10-21 PROCEDURE — 3008F PR BODY MASS INDEX (BMI) DOCUMENTED: ICD-10-PCS | Mod: CPTII,S$GLB,, | Performed by: INTERNAL MEDICINE

## 2021-10-21 PROCEDURE — 3046F HEMOGLOBIN A1C LEVEL >9.0%: CPT | Mod: CPTII,S$GLB,, | Performed by: INTERNAL MEDICINE

## 2021-10-21 PROCEDURE — 3008F BODY MASS INDEX DOCD: CPT | Mod: CPTII,S$GLB,, | Performed by: INTERNAL MEDICINE

## 2021-10-21 PROCEDURE — 3074F SYST BP LT 130 MM HG: CPT | Mod: CPTII,S$GLB,, | Performed by: INTERNAL MEDICINE

## 2021-10-21 PROCEDURE — 3078F PR MOST RECENT DIASTOLIC BLOOD PRESSURE < 80 MM HG: ICD-10-PCS | Mod: CPTII,S$GLB,, | Performed by: INTERNAL MEDICINE

## 2021-10-21 PROCEDURE — 1159F PR MEDICATION LIST DOCUMENTED IN MEDICAL RECORD: ICD-10-PCS | Mod: CPTII,S$GLB,, | Performed by: INTERNAL MEDICINE

## 2021-10-21 PROCEDURE — 99999 PR PBB SHADOW E&M-EST. PATIENT-LVL III: CPT | Mod: PBBFAC,,, | Performed by: INTERNAL MEDICINE

## 2021-10-21 PROCEDURE — 99999 PR PBB SHADOW E&M-EST. PATIENT-LVL III: ICD-10-PCS | Mod: PBBFAC,,, | Performed by: INTERNAL MEDICINE

## 2021-10-21 PROCEDURE — 3078F DIAST BP <80 MM HG: CPT | Mod: CPTII,S$GLB,, | Performed by: INTERNAL MEDICINE

## 2021-10-21 PROCEDURE — 3046F PR MOST RECENT HEMOGLOBIN A1C LEVEL > 9.0%: ICD-10-PCS | Mod: CPTII,S$GLB,, | Performed by: INTERNAL MEDICINE

## 2021-10-21 PROCEDURE — 3074F PR MOST RECENT SYSTOLIC BLOOD PRESSURE < 130 MM HG: ICD-10-PCS | Mod: CPTII,S$GLB,, | Performed by: INTERNAL MEDICINE

## 2021-10-21 PROCEDURE — 1159F MED LIST DOCD IN RCRD: CPT | Mod: CPTII,S$GLB,, | Performed by: INTERNAL MEDICINE

## 2021-10-21 PROCEDURE — 99214 OFFICE O/P EST MOD 30 MIN: CPT | Mod: S$GLB,,, | Performed by: INTERNAL MEDICINE

## 2021-10-21 PROCEDURE — 99214 PR OFFICE/OUTPT VISIT, EST, LEVL IV, 30-39 MIN: ICD-10-PCS | Mod: S$GLB,,, | Performed by: INTERNAL MEDICINE

## 2021-10-21 RX ORDER — MELOXICAM 15 MG/1
15 TABLET ORAL DAILY PRN
Qty: 30 TABLET | Refills: 1 | Status: SHIPPED | OUTPATIENT
Start: 2021-10-21 | End: 2022-04-19 | Stop reason: SDUPTHER

## 2021-11-04 ENCOUNTER — HOSPITAL ENCOUNTER (OUTPATIENT)
Dept: RADIOLOGY | Facility: HOSPITAL | Age: 46
Discharge: HOME | End: 2021-11-04
Attending: INTERNAL MEDICINE
Payer: COMMERCIAL

## 2021-11-04 DIAGNOSIS — M54.9 CHRONIC BACK PAIN, UNSPECIFIED BACK LOCATION, UNSPECIFIED BACK PAIN LATERALITY: Primary | ICD-10-CM

## 2021-11-04 DIAGNOSIS — G89.29 CHRONIC BACK PAIN, UNSPECIFIED BACK LOCATION, UNSPECIFIED BACK PAIN LATERALITY: Primary | ICD-10-CM

## 2021-11-04 DIAGNOSIS — M54.9 DORSALGIA, UNSPECIFIED: ICD-10-CM

## 2021-11-04 PROCEDURE — 72110 X-RAY EXAM L-2 SPINE 4/>VWS: CPT | Mod: TC,FY,PO

## 2021-11-09 ENCOUNTER — TELEPHONE (OUTPATIENT)
Dept: INTERNAL MEDICINE | Facility: CLINIC | Age: 46
End: 2021-11-09
Payer: COMMERCIAL

## 2021-11-09 DIAGNOSIS — E11.65 UNCONTROLLED TYPE 2 DIABETES MELLITUS WITH HYPERGLYCEMIA: Primary | ICD-10-CM

## 2022-01-22 ENCOUNTER — PATIENT MESSAGE (OUTPATIENT)
Dept: ADMINISTRATIVE | Facility: HOSPITAL | Age: 47
End: 2022-01-22
Payer: COMMERCIAL

## 2022-01-22 DIAGNOSIS — E11.9 TYPE 2 DIABETES MELLITUS WITHOUT COMPLICATION, UNSPECIFIED WHETHER LONG TERM INSULIN USE: ICD-10-CM

## 2022-03-07 ENCOUNTER — OFFICE VISIT (OUTPATIENT)
Dept: INTERNAL MEDICINE | Facility: CLINIC | Age: 47
End: 2022-03-07
Payer: COMMERCIAL

## 2022-03-07 ENCOUNTER — LAB VISIT (OUTPATIENT)
Dept: LAB | Facility: HOSPITAL | Age: 47
End: 2022-03-07
Attending: INTERNAL MEDICINE
Payer: COMMERCIAL

## 2022-03-07 VITALS
HEART RATE: 100 BPM | HEIGHT: 66 IN | BODY MASS INDEX: 40.89 KG/M2 | SYSTOLIC BLOOD PRESSURE: 120 MMHG | DIASTOLIC BLOOD PRESSURE: 84 MMHG | WEIGHT: 254.44 LBS | OXYGEN SATURATION: 97 %

## 2022-03-07 DIAGNOSIS — E11.65 UNCONTROLLED TYPE 2 DIABETES MELLITUS WITH HYPERGLYCEMIA: ICD-10-CM

## 2022-03-07 DIAGNOSIS — Z91.199 NONCOMPLIANCE: ICD-10-CM

## 2022-03-07 DIAGNOSIS — E78.5 HYPERLIPIDEMIA ASSOCIATED WITH TYPE 2 DIABETES MELLITUS: ICD-10-CM

## 2022-03-07 DIAGNOSIS — F32.A ANXIETY AND DEPRESSION: ICD-10-CM

## 2022-03-07 DIAGNOSIS — R39.89 SUSPECTED UTI: ICD-10-CM

## 2022-03-07 DIAGNOSIS — Z79.4 TYPE 2 DIABETES MELLITUS WITH DIABETIC POLYNEUROPATHY, WITH LONG-TERM CURRENT USE OF INSULIN: ICD-10-CM

## 2022-03-07 DIAGNOSIS — F41.9 ANXIETY AND DEPRESSION: ICD-10-CM

## 2022-03-07 DIAGNOSIS — E66.01 SEVERE OBESITY (BMI >= 40): ICD-10-CM

## 2022-03-07 DIAGNOSIS — E11.42 TYPE 2 DIABETES MELLITUS WITH DIABETIC POLYNEUROPATHY, WITH LONG-TERM CURRENT USE OF INSULIN: ICD-10-CM

## 2022-03-07 DIAGNOSIS — E11.69 HYPERLIPIDEMIA ASSOCIATED WITH TYPE 2 DIABETES MELLITUS: ICD-10-CM

## 2022-03-07 LAB
ALBUMIN SERPL BCP-MCNC: 3.7 G/DL (ref 3.5–5.2)
ALP SERPL-CCNC: 77 U/L (ref 55–135)
ALT SERPL W/O P-5'-P-CCNC: 19 U/L (ref 10–44)
ANION GAP SERPL CALC-SCNC: 12 MMOL/L (ref 8–16)
AST SERPL-CCNC: 12 U/L (ref 10–40)
BILIRUB SERPL-MCNC: 0.4 MG/DL (ref 0.1–1)
BUN SERPL-MCNC: 9 MG/DL (ref 6–20)
CALCIUM SERPL-MCNC: 9.2 MG/DL (ref 8.7–10.5)
CHLORIDE SERPL-SCNC: 99 MMOL/L (ref 95–110)
CHOLEST SERPL-MCNC: 170 MG/DL (ref 120–199)
CHOLEST/HDLC SERPL: 3.9 {RATIO} (ref 2–5)
CO2 SERPL-SCNC: 23 MMOL/L (ref 23–29)
CREAT SERPL-MCNC: 0.9 MG/DL (ref 0.5–1.4)
EST. GFR  (AFRICAN AMERICAN): >60 ML/MIN/1.73 M^2
EST. GFR  (NON AFRICAN AMERICAN): >60 ML/MIN/1.73 M^2
ESTIMATED AVG GLUCOSE: 252 MG/DL (ref 68–131)
GLUCOSE SERPL-MCNC: 283 MG/DL (ref 70–110)
HBA1C MFR BLD: 10.4 % (ref 4–5.6)
HDLC SERPL-MCNC: 44 MG/DL (ref 40–75)
HDLC SERPL: 25.9 % (ref 20–50)
LDLC SERPL CALC-MCNC: 105.8 MG/DL (ref 63–159)
NONHDLC SERPL-MCNC: 126 MG/DL
POTASSIUM SERPL-SCNC: 4.3 MMOL/L (ref 3.5–5.1)
PROT SERPL-MCNC: 7.3 G/DL (ref 6–8.4)
SODIUM SERPL-SCNC: 134 MMOL/L (ref 136–145)
TRIGL SERPL-MCNC: 101 MG/DL (ref 30–150)

## 2022-03-07 PROCEDURE — 1159F MED LIST DOCD IN RCRD: CPT | Mod: CPTII,S$GLB,, | Performed by: INTERNAL MEDICINE

## 2022-03-07 PROCEDURE — 99214 PR OFFICE/OUTPT VISIT, EST, LEVL IV, 30-39 MIN: ICD-10-PCS | Mod: S$GLB,,, | Performed by: INTERNAL MEDICINE

## 2022-03-07 PROCEDURE — 99999 PR PBB SHADOW E&M-EST. PATIENT-LVL IV: ICD-10-PCS | Mod: PBBFAC,,, | Performed by: INTERNAL MEDICINE

## 2022-03-07 PROCEDURE — 3074F PR MOST RECENT SYSTOLIC BLOOD PRESSURE < 130 MM HG: ICD-10-PCS | Mod: CPTII,S$GLB,, | Performed by: INTERNAL MEDICINE

## 2022-03-07 PROCEDURE — 99214 OFFICE O/P EST MOD 30 MIN: CPT | Mod: PBBFAC,PO | Performed by: INTERNAL MEDICINE

## 2022-03-07 PROCEDURE — 3008F PR BODY MASS INDEX (BMI) DOCUMENTED: ICD-10-PCS | Mod: CPTII,S$GLB,, | Performed by: INTERNAL MEDICINE

## 2022-03-07 PROCEDURE — 36415 COLL VENOUS BLD VENIPUNCTURE: CPT | Mod: PO | Performed by: INTERNAL MEDICINE

## 2022-03-07 PROCEDURE — 3008F BODY MASS INDEX DOCD: CPT | Mod: CPTII,S$GLB,, | Performed by: INTERNAL MEDICINE

## 2022-03-07 PROCEDURE — 1159F PR MEDICATION LIST DOCUMENTED IN MEDICAL RECORD: ICD-10-PCS | Mod: CPTII,S$GLB,, | Performed by: INTERNAL MEDICINE

## 2022-03-07 PROCEDURE — 99214 OFFICE O/P EST MOD 30 MIN: CPT | Mod: S$GLB,,, | Performed by: INTERNAL MEDICINE

## 2022-03-07 PROCEDURE — 80061 LIPID PANEL: CPT | Performed by: INTERNAL MEDICINE

## 2022-03-07 PROCEDURE — 80053 COMPREHEN METABOLIC PANEL: CPT | Performed by: INTERNAL MEDICINE

## 2022-03-07 PROCEDURE — 83036 HEMOGLOBIN GLYCOSYLATED A1C: CPT | Performed by: INTERNAL MEDICINE

## 2022-03-07 PROCEDURE — 3079F PR MOST RECENT DIASTOLIC BLOOD PRESSURE 80-89 MM HG: ICD-10-PCS | Mod: CPTII,S$GLB,, | Performed by: INTERNAL MEDICINE

## 2022-03-07 PROCEDURE — 3079F DIAST BP 80-89 MM HG: CPT | Mod: CPTII,S$GLB,, | Performed by: INTERNAL MEDICINE

## 2022-03-07 PROCEDURE — 3074F SYST BP LT 130 MM HG: CPT | Mod: CPTII,S$GLB,, | Performed by: INTERNAL MEDICINE

## 2022-03-07 PROCEDURE — 99999 PR PBB SHADOW E&M-EST. PATIENT-LVL IV: CPT | Mod: PBBFAC,,, | Performed by: INTERNAL MEDICINE

## 2022-03-07 RX ORDER — INSULIN DETEMIR 100 [IU]/ML
15 INJECTION, SOLUTION SUBCUTANEOUS NIGHTLY
Qty: 15 ML | Refills: 3 | Status: SHIPPED | OUTPATIENT
Start: 2022-03-07 | End: 2022-04-19

## 2022-03-07 RX ORDER — BLOOD-GLUCOSE CONTROL, NORMAL
EACH MISCELLANEOUS 4 TIMES DAILY
Qty: 200 EACH | Refills: 11 | Status: SHIPPED | OUTPATIENT
Start: 2022-03-07

## 2022-03-07 RX ORDER — METFORMIN HYDROCHLORIDE 1000 MG/1
1000 TABLET ORAL 2 TIMES DAILY WITH MEALS
Qty: 180 TABLET | Refills: 1 | Status: SHIPPED | OUTPATIENT
Start: 2022-03-07 | End: 2023-02-06

## 2022-03-07 RX ORDER — ATORVASTATIN CALCIUM 40 MG/1
40 TABLET, FILM COATED ORAL DAILY
Qty: 90 TABLET | Refills: 3 | Status: SHIPPED | OUTPATIENT
Start: 2022-03-07 | End: 2022-06-22

## 2022-03-07 RX ORDER — DEXTROSE 4 G
TABLET,CHEWABLE ORAL
Qty: 1 EACH | Refills: 0 | Status: SHIPPED | OUTPATIENT
Start: 2022-03-07

## 2022-03-07 RX ORDER — DULAGLUTIDE 0.75 MG/.5ML
0.75 INJECTION, SOLUTION SUBCUTANEOUS
Qty: 4 PEN | Refills: 3 | Status: SHIPPED | OUTPATIENT
Start: 2022-03-07 | End: 2022-04-19

## 2022-03-07 NOTE — PATIENT INSTRUCTIONS
Labs today.  Check blood glucose 4 times daily (before meals and at bedtime). Write the numbers down and bring them with you to next visit.
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
## 2022-03-10 DIAGNOSIS — N30.00 ACUTE CYSTITIS WITHOUT HEMATURIA: Primary | ICD-10-CM

## 2022-03-10 RX ORDER — CIPROFLOXACIN 500 MG/1
500 TABLET ORAL EVERY 12 HOURS
Qty: 10 TABLET | Refills: 0 | Status: SHIPPED | OUTPATIENT
Start: 2022-03-10 | End: 2022-03-15

## 2022-03-14 ENCOUNTER — TELEPHONE (OUTPATIENT)
Dept: INTERNAL MEDICINE | Facility: CLINIC | Age: 47
End: 2022-03-14
Payer: COMMERCIAL

## 2022-03-14 NOTE — TELEPHONE ENCOUNTER
----- Message from Elva Younger sent at 3/14/2022 10:10 AM CDT -----  Regarding: Mila Calling from [facility] [phone] Ref. [id number]  Contact: Mila Calling [facility] [phone]  Who Called: Mila Calling from [facility] [phone] Ref. [id number]    Calling to talk to nurse in regards to prior authorization on patients medication insulin detemir U-100 (LEVEMIR FLEXTOUCH U-100 INSULN) 100 unit/mL (3 mL) InPn pen 15 mL

## 2022-03-29 ENCOUNTER — PATIENT OUTREACH (OUTPATIENT)
Dept: ADMINISTRATIVE | Facility: HOSPITAL | Age: 47
End: 2022-03-29
Payer: COMMERCIAL

## 2022-03-29 NOTE — LETTER
AUTHORIZATION FOR RELEASE OF   CONFIDENTIAL INFORMATION    Dear [facility] ,    We are seeing Hamzah Reis, date of birth 1975, in the clinic at [facility] INTER[facility]IC[facility]. Tatum Santos MD is the patient's PCP. Hamzah Reis has an outstanding lab/procedure at the time we reviewed her chart. In order to help keep her health information updated, she has authorized us to request the following medical record(s):                                                (X )  DIABETIC EYE EXAM                  Please fax records to [facility], Shelly L Swindler, MD,          [facility]                                                                     Please Fax to [facility] at [phone].     Thank you for your help, Daphne Isbell LPKORI-CCC. If I can be of any assistance you can call at [phone]                        Patient Name: Hamzah Reis  : 1975  Patient Phone #: [phone]

## 2022-03-29 NOTE — LETTER
AUTHORIZATION FOR RELEASE OF   CONFIDENTIAL INFORMATION    Dear [facility],    We are seeing Hamzah Reis, date of birth 1975, in the clinic at [facility] INTER[facility]. Tatum Santos MD is the patient's PCP. Hamzah Reis has an outstanding lab/procedure at the time we reviewed her chart. In order to help keep her health information updated, she has authorized us to request the following medical record(s):            (X)  DIABETIC EYE EXAM                    Please fax records to [facility], Shelly L Swindler, MD,          [facility]                                                                     Please Fax to [facility] at [phone].     Thank you for your help, Daphne Isbell LPKORI-CCC. If I can be of any assistance you can call at [phone]                       Patient Name: Hamzah Reis  : 1975  Patient Phone #: [phone]

## 2022-03-29 NOTE — PROGRESS NOTES
Chart audit performed Care everywhere triggered & updated LINKS triggered and updatedPatient outreach regarding Health Maintenance- Appt sched URINE MICROALBUMIN, Patient has appt thurs 03/31/2022 eye exam

## 2022-04-13 ENCOUNTER — PATIENT OUTREACH (OUTPATIENT)
Dept: ADMINISTRATIVE | Facility: HOSPITAL | Age: 47
End: 2022-04-13
Payer: COMMERCIAL

## 2022-04-13 NOTE — PROGRESS NOTES
Chart audit performed Care everywhere triggered & updated LINKS triggered and updated Patient outreach regarding Health Maintenance- Eden verdugo MD F/U , states she will upload eye exam in portal  Efax sent to [facility]

## 2022-04-13 NOTE — LETTER
AUTHORIZATION FOR RELEASE OF   CONFIDENTIAL INFORMATION    Dear [facility],    We are seeing Hamzah Reis, date of birth 1975, in the clinic at [facility] INTER[facility] MEDICINE. Tatum Santos MD is the patient's PCP. Hamzah Reis has an outstanding lab/procedure at the time we reviewed her chart. In order to help keep her health information updated, she has authorized us to request the following medical record(s):                                                  (X)  DIABETIC EYE EXAM                  Please fax records to [facility], Shelly L Swindler, MD,            [facility]                                                                     Please Fax to [facility] at [phone].     Thank you for your help, Daphne Isbell LPKORI-CCC. If I can be of any assistance you can call at [phone]                        Patient Name: Hamzah Reis  : 1975  Patient Phone #: [phone]

## 2022-04-19 ENCOUNTER — OFFICE VISIT (OUTPATIENT)
Dept: INTERNAL MEDICINE | Facility: CLINIC | Age: 47
End: 2022-04-19
Payer: COMMERCIAL

## 2022-04-19 VITALS
OXYGEN SATURATION: 98 % | WEIGHT: 261.94 LBS | HEART RATE: 71 BPM | HEIGHT: 66 IN | DIASTOLIC BLOOD PRESSURE: 90 MMHG | BODY MASS INDEX: 42.1 KG/M2 | SYSTOLIC BLOOD PRESSURE: 138 MMHG

## 2022-04-19 DIAGNOSIS — Z79.4 TYPE 2 DIABETES MELLITUS WITH DIABETIC POLYNEUROPATHY, WITH LONG-TERM CURRENT USE OF INSULIN: ICD-10-CM

## 2022-04-19 DIAGNOSIS — M54.50 ACUTE BILATERAL LOW BACK PAIN WITHOUT SCIATICA: ICD-10-CM

## 2022-04-19 DIAGNOSIS — Z12.31 SCREENING MAMMOGRAM FOR BREAST CANCER: ICD-10-CM

## 2022-04-19 DIAGNOSIS — E11.42 TYPE 2 DIABETES MELLITUS WITH DIABETIC POLYNEUROPATHY, WITH LONG-TERM CURRENT USE OF INSULIN: ICD-10-CM

## 2022-04-19 DIAGNOSIS — R03.0 ELEVATED BLOOD PRESSURE READING: ICD-10-CM

## 2022-04-19 DIAGNOSIS — E66.01 SEVERE OBESITY (BMI >= 40): ICD-10-CM

## 2022-04-19 PROCEDURE — 3080F PR MOST RECENT DIASTOLIC BLOOD PRESSURE >= 90 MM HG: ICD-10-PCS | Mod: CPTII,S$GLB,, | Performed by: INTERNAL MEDICINE

## 2022-04-19 PROCEDURE — 99999 PR PBB SHADOW E&M-EST. PATIENT-LVL III: ICD-10-PCS | Mod: PBBFAC,,, | Performed by: INTERNAL MEDICINE

## 2022-04-19 PROCEDURE — 99999 PR PBB SHADOW E&M-EST. PATIENT-LVL III: CPT | Mod: PBBFAC,,, | Performed by: INTERNAL MEDICINE

## 2022-04-19 PROCEDURE — 99214 OFFICE O/P EST MOD 30 MIN: CPT | Mod: S$GLB,,, | Performed by: INTERNAL MEDICINE

## 2022-04-19 PROCEDURE — 3046F HEMOGLOBIN A1C LEVEL >9.0%: CPT | Mod: CPTII,S$GLB,, | Performed by: INTERNAL MEDICINE

## 2022-04-19 PROCEDURE — 3008F PR BODY MASS INDEX (BMI) DOCUMENTED: ICD-10-PCS | Mod: CPTII,S$GLB,, | Performed by: INTERNAL MEDICINE

## 2022-04-19 PROCEDURE — 3046F PR MOST RECENT HEMOGLOBIN A1C LEVEL > 9.0%: ICD-10-PCS | Mod: CPTII,S$GLB,, | Performed by: INTERNAL MEDICINE

## 2022-04-19 PROCEDURE — 3075F PR MOST RECENT SYSTOLIC BLOOD PRESS GE 130-139MM HG: ICD-10-PCS | Mod: CPTII,S$GLB,, | Performed by: INTERNAL MEDICINE

## 2022-04-19 PROCEDURE — 3080F DIAST BP >= 90 MM HG: CPT | Mod: CPTII,S$GLB,, | Performed by: INTERNAL MEDICINE

## 2022-04-19 PROCEDURE — 99213 OFFICE O/P EST LOW 20 MIN: CPT | Mod: PBBFAC,PO | Performed by: INTERNAL MEDICINE

## 2022-04-19 PROCEDURE — 3008F BODY MASS INDEX DOCD: CPT | Mod: CPTII,S$GLB,, | Performed by: INTERNAL MEDICINE

## 2022-04-19 PROCEDURE — 3075F SYST BP GE 130 - 139MM HG: CPT | Mod: CPTII,S$GLB,, | Performed by: INTERNAL MEDICINE

## 2022-04-19 PROCEDURE — 99214 PR OFFICE/OUTPT VISIT, EST, LEVL IV, 30-39 MIN: ICD-10-PCS | Mod: S$GLB,,, | Performed by: INTERNAL MEDICINE

## 2022-04-19 RX ORDER — MELOXICAM 15 MG/1
15 TABLET ORAL DAILY PRN
Qty: 90 TABLET | Refills: 0 | Status: SHIPPED | OUTPATIENT
Start: 2022-04-19 | End: 2023-06-22

## 2022-04-19 RX ORDER — DULAGLUTIDE 1.5 MG/.5ML
1.5 INJECTION, SOLUTION SUBCUTANEOUS
Qty: 4 PEN | Refills: 11 | Status: SHIPPED | OUTPATIENT
Start: 2022-04-19 | End: 2023-02-06

## 2022-04-19 RX ORDER — GABAPENTIN 600 MG/1
600 TABLET ORAL 3 TIMES DAILY
Qty: 270 TABLET | Refills: 3 | Status: SHIPPED | OUTPATIENT
Start: 2022-04-19 | End: 2023-06-22 | Stop reason: SDUPTHER

## 2022-04-19 NOTE — PROGRESS NOTES
Patient ID: Hamzah Reis is a 46 y.o. female.    Chief Complaint: Follow-up    HPI Hamzah is a 46 y.o. female with uncontrolled diabetes who presents for routine follow-up of this medical condition.  She is currently taking Levemir 25 units nightly, Trulicity 0.75 mg weekly, metformin 1000 mg p.o. b.i.d..  Last A1c was obtained 1 month ago and was 10.4.  However that time, she was not compliant with medications.  Since that time, she has been compliant with medications.  Since then she has been checking her blood glucose every morning while fasting and again in the evening before bedtime.  Her morning blood sugar has been 130-189 and her evening blood sugar has been 147-220.  Still with back pain. Did not go to PT that was previously ordered. Did get some relief with gabapentin and mobic but has no more of these meds.   Interested in pursuing bariatric surgery. Says there is a form for me to fill out before proceeding with surgery referral.   BP elevated in clinic today. Patient does not currently have diagnosis of HTN.  No new acute complaints today.     Review of Systems   All other systems reviewed and are negative.        Objective:     Vitals:    04/19/22 1305   BP: (!) 138/90   Pulse: 71        Physical Exam  Vitals reviewed.   Constitutional:       General: She is not in acute distress.     Appearance: Normal appearance. She is well-developed. She is not ill-appearing, toxic-appearing or diaphoretic.   HENT:      Head: Normocephalic and atraumatic.      Right Ear: External ear normal.      Left Ear: External ear normal.      Nose: Nose normal.   Eyes:      General: No scleral icterus.        Right eye: No discharge.         Left eye: No discharge.      Extraocular Movements: Extraocular movements intact.      Conjunctiva/sclera: Conjunctivae normal.   Pulmonary:      Effort: Pulmonary effort is normal. No respiratory distress.   Skin:     General: Skin is warm and dry.   Neurological:      General: No  focal deficit present.      Mental Status: She is alert and oriented to person, place, and time. Mental status is at baseline.   Psychiatric:         Mood and Affect: Mood normal.         Behavior: Behavior normal.         Thought Content: Thought content normal.         Judgment: Judgment normal.         Assessment:       1. Type 2 diabetes mellitus with diabetic polyneuropathy, with long-term current use of insulin Poorly controlled   2. Acute bilateral low back pain without sciatica Chronic   3. Screening mammogram for breast cancer    4. Elevated blood pressure reading Active   5. Severe obesity (BMI >= 40) Chronic       Plan:         Type 2 diabetes mellitus with diabetic polyneuropathy, with long-term current use of insulin  Comments:  Increase trulicity. Cont to check BG at home and bring numbers to next visit. Will try to get her DEXCOM   Orders:  -     dulaglutide (TRULICITY) 1.5 mg/0.5 mL pen injector; Inject 1.5 mg into the skin every 7 days.  Dispense: 4 pen; Refill: 11  -     MICROALBUMIN / CREATININE RATIO URINE; Future; Expected date: 04/19/2022    Acute bilateral low back pain without sciatica  -     gabapentin (NEURONTIN) 600 MG tablet; Take 1 tablet (600 mg total) by mouth 3 (three) times daily.  Dispense: 270 tablet; Refill: 3  -     meloxicam (MOBIC) 15 MG tablet; Take 1 tablet (15 mg total) by mouth daily as needed for Pain.  Dispense: 90 tablet; Refill: 0    Screening mammogram for breast cancer  -     Mammo Digital Screening Bilat; Future; Expected date: 04/19/2022    Elevated blood pressure reading  Comments:  If elevated again on return visit, will start antihypertensive medication at that time.    Severe obesity (BMI >= 40)  Comments:  Pt to drop off form for me to complete before doing bariatric surgery referral        RTC one month     Warning signs discussed, patient to call with any further issues or worsening of symptoms.       Parts of the above note were dictated using a voice  dictation software. Please excuse any grammatical or typographical errors.

## 2022-04-26 ENCOUNTER — PATIENT MESSAGE (OUTPATIENT)
Dept: ADMINISTRATIVE | Facility: HOSPITAL | Age: 47
End: 2022-04-26
Payer: COMMERCIAL

## 2022-05-20 ENCOUNTER — TELEPHONE (OUTPATIENT)
Dept: INTERNAL MEDICINE | Facility: CLINIC | Age: 47
End: 2022-05-20

## 2022-05-20 ENCOUNTER — OFFICE VISIT (OUTPATIENT)
Dept: INTERNAL MEDICINE | Facility: CLINIC | Age: 47
End: 2022-05-20
Payer: COMMERCIAL

## 2022-05-20 VITALS
HEART RATE: 76 BPM | WEIGHT: 257.25 LBS | DIASTOLIC BLOOD PRESSURE: 76 MMHG | SYSTOLIC BLOOD PRESSURE: 108 MMHG | BODY MASS INDEX: 41.34 KG/M2 | OXYGEN SATURATION: 98 % | HEIGHT: 66 IN

## 2022-05-20 DIAGNOSIS — Z79.4 TYPE 2 DIABETES MELLITUS WITH DIABETIC POLYNEUROPATHY, WITH LONG-TERM CURRENT USE OF INSULIN: ICD-10-CM

## 2022-05-20 DIAGNOSIS — E11.42 TYPE 2 DIABETES MELLITUS WITH DIABETIC POLYNEUROPATHY, WITH LONG-TERM CURRENT USE OF INSULIN: ICD-10-CM

## 2022-05-20 DIAGNOSIS — R10.2 CHRONIC PELVIC PAIN IN FEMALE: ICD-10-CM

## 2022-05-20 DIAGNOSIS — H00.014 HORDEOLUM EXTERNUM OF LEFT UPPER EYELID: ICD-10-CM

## 2022-05-20 DIAGNOSIS — G89.29 CHRONIC PELVIC PAIN IN FEMALE: ICD-10-CM

## 2022-05-20 PROCEDURE — 3008F BODY MASS INDEX DOCD: CPT | Mod: CPTII,S$GLB,, | Performed by: INTERNAL MEDICINE

## 2022-05-20 PROCEDURE — 99213 OFFICE O/P EST LOW 20 MIN: CPT | Mod: S$GLB,,, | Performed by: INTERNAL MEDICINE

## 2022-05-20 PROCEDURE — 99213 PR OFFICE/OUTPT VISIT, EST, LEVL III, 20-29 MIN: ICD-10-PCS | Mod: S$GLB,,, | Performed by: INTERNAL MEDICINE

## 2022-05-20 PROCEDURE — 3078F PR MOST RECENT DIASTOLIC BLOOD PRESSURE < 80 MM HG: ICD-10-PCS | Mod: CPTII,S$GLB,, | Performed by: INTERNAL MEDICINE

## 2022-05-20 PROCEDURE — 3008F PR BODY MASS INDEX (BMI) DOCUMENTED: ICD-10-PCS | Mod: CPTII,S$GLB,, | Performed by: INTERNAL MEDICINE

## 2022-05-20 PROCEDURE — 3074F PR MOST RECENT SYSTOLIC BLOOD PRESSURE < 130 MM HG: ICD-10-PCS | Mod: CPTII,S$GLB,, | Performed by: INTERNAL MEDICINE

## 2022-05-20 PROCEDURE — 3074F SYST BP LT 130 MM HG: CPT | Mod: CPTII,S$GLB,, | Performed by: INTERNAL MEDICINE

## 2022-05-20 PROCEDURE — 99999 PR PBB SHADOW E&M-EST. PATIENT-LVL III: ICD-10-PCS | Mod: PBBFAC,,, | Performed by: INTERNAL MEDICINE

## 2022-05-20 PROCEDURE — 3046F PR MOST RECENT HEMOGLOBIN A1C LEVEL > 9.0%: ICD-10-PCS | Mod: CPTII,S$GLB,, | Performed by: INTERNAL MEDICINE

## 2022-05-20 PROCEDURE — 3078F DIAST BP <80 MM HG: CPT | Mod: CPTII,S$GLB,, | Performed by: INTERNAL MEDICINE

## 2022-05-20 PROCEDURE — 99999 PR PBB SHADOW E&M-EST. PATIENT-LVL III: CPT | Mod: PBBFAC,,, | Performed by: INTERNAL MEDICINE

## 2022-05-20 PROCEDURE — 99213 OFFICE O/P EST LOW 20 MIN: CPT | Mod: PBBFAC,PO | Performed by: INTERNAL MEDICINE

## 2022-05-20 PROCEDURE — 3046F HEMOGLOBIN A1C LEVEL >9.0%: CPT | Mod: CPTII,S$GLB,, | Performed by: INTERNAL MEDICINE

## 2022-05-20 RX ORDER — OXYCODONE AND ACETAMINOPHEN 5; 325 MG/1; MG/1
1 TABLET ORAL EVERY 12 HOURS PRN
Qty: 60 EACH | Refills: 0 | Status: SHIPPED | OUTPATIENT
Start: 2022-05-20 | End: 2022-06-20

## 2022-05-20 RX ORDER — ERYTHROMYCIN 5 MG/G
OINTMENT OPHTHALMIC 3 TIMES DAILY
Qty: 1 EACH | Refills: 1 | Status: SHIPPED | OUTPATIENT
Start: 2022-05-20 | End: 2023-04-01 | Stop reason: RX

## 2022-05-20 NOTE — TELEPHONE ENCOUNTER
[facility]  Wanted a clarification on the dx of the percocet. And they also said insurance is not covering it.

## 2022-05-20 NOTE — PROGRESS NOTES
Patient ID: Hamzah Reis is a 46 y.o. female.    Chief Complaint: Follow-up, Diabetes, and Abdominal Pain    HPI Hamzah is a 46 y.o. female with type II diabetes who presents for follow up of this medical condition. She does not have a BG log for review today. Reports checking BG in AM and again in the evening after getting home from work. BG has ranged 147-189. She is compliant with trulicity 1.5 mg weekly, levemir 25 units daily and metformin 1000 mg BID. Reports that new insurance does not want to cover trulicity.     Complains of abdominal pain associated with heavy menstrual cycle. Follows with OB-GYN. They had planned for hysterectomy but did not do it because blood sugar was too high. Reports that mobic, toradol, naproxen--did not help her pain. Tried Norco and this provided some minimal relief.    Has stye on left eye. It has improved since it first appeared. She has been doing warm compresses to the eye.    Review of Systems   HENT:        Stye   Gastrointestinal: Positive for abdominal pain.   All other systems reviewed and are negative.      Objective:     Vitals:    05/20/22 0911   BP: 108/76   Pulse: 76        Physical Exam  Vitals reviewed.   Constitutional:       General: She is not in acute distress.     Appearance: Normal appearance. She is well-developed. She is not ill-appearing, toxic-appearing or diaphoretic.   HENT:      Head: Normocephalic and atraumatic.      Comments: Stye-left upper eyelid     Right Ear: External ear normal.      Left Ear: External ear normal.      Nose: Nose normal.   Eyes:      General: No scleral icterus.        Right eye: No discharge.         Left eye: No discharge.      Extraocular Movements: Extraocular movements intact.      Conjunctiva/sclera: Conjunctivae normal.   Cardiovascular:      Rate and Rhythm: Normal rate and regular rhythm.      Heart sounds: Normal heart sounds. No murmur heard.    No friction rub. No gallop.   Pulmonary:      Effort: Pulmonary  effort is normal. No respiratory distress.      Breath sounds: Normal breath sounds. No stridor. No wheezing, rhonchi or rales.   Skin:     General: Skin is warm and dry.   Neurological:      General: No focal deficit present.      Mental Status: She is alert and oriented to person, place, and time. Mental status is at baseline.   Psychiatric:         Mood and Affect: Mood normal.         Behavior: Behavior normal.         Thought Content: Thought content normal.         Judgment: Judgment normal.         Assessment:       1. Type 2 diabetes mellitus with diabetic polyneuropathy, with long-term current use of insulin Sub-optimally controlled   2. Chronic pelvic pain in female Chronic   3. Hordeolum externum of left upper eyelid Active       Plan:         Type 2 diabetes mellitus with diabetic polyneuropathy, with long-term current use of insulin  Comments:  See AVS  Orders:  -     Comprehensive Metabolic Panel; Future; Expected date: 06/20/2022  -     Hemoglobin A1C; Future; Expected date: 06/20/2022  -     Lipid Panel; Future; Expected date: 06/20/2022    Chronic pelvic pain in female  Comments:  F/u with OB-GYN. Trying to improve her BG so hopefully she can get hysterectomy in near future  Orders:  -     oxyCODONE-acetaminophen (PERCOCET) 5-325 mg per tablet; Take 1 tablet by mouth every 12 (twelve) hours as needed for Pain.  Dispense: 60 each; Refill: 0  -     CBC Auto Differential; Future; Expected date: 06/20/2022    Hordeolum externum of left upper eyelid  Comments:  Continue warm compress to area TID  Orders:  -     erythromycin (ROMYCIN) ophthalmic ointment; Place into the left eye 3 (three) times daily.  Dispense: 1 each; Refill: 1        RTC one month    Warning signs discussed, patient to call with any further issues or worsening of symptoms.       Parts of the above note were dictated using a voice dictation software. Please excuse any grammatical or typographical errors.

## 2022-05-20 NOTE — PATIENT INSTRUCTIONS
You are on trulicity but it is not being covered. Will they cover ozempic or victoza or rybelsus instead?\    I want to go up on your trulicity dose to 3 mg weekly. I will wait to hear from you before sending in trulicity or the alternative medication.     For now, continue your levemir at 25 units nightly, metforming 1000 mg BID, and trulicity at 1.5 mg weekly.

## 2022-05-20 NOTE — TELEPHONE ENCOUNTER
----- Message from Jeremy Dubon sent at 5/20/2022  9:48 AM CDT -----  Contact: Sophie osborne/Carlos [phone]  Type:  Pharmacy Calling to Clarify an RX    Name of Caller:sophie   Pharmacy Name: [facility]  Prescription Name: oxyCODONE-acetaminophen (PERCOCET) 5-325 mg per tablet  What do they need to clarify?: diagnosis and if it's chronic pain  Best Call Back Number: [phone]  Additional Information: none

## 2022-05-23 ENCOUNTER — HOSPITAL ENCOUNTER (OUTPATIENT)
Dept: RADIOLOGY | Facility: HOSPITAL | Age: 47
Discharge: HOME | End: 2022-05-23
Attending: INTERNAL MEDICINE
Payer: COMMERCIAL

## 2022-05-23 DIAGNOSIS — Z12.31 SCREENING MAMMOGRAM FOR BREAST CANCER: ICD-10-CM

## 2022-05-23 PROCEDURE — 77063 BREAST TOMOSYNTHESIS BI: CPT | Mod: 26,,, | Performed by: RADIOLOGY

## 2022-05-23 PROCEDURE — 77063 MAMMO DIGITAL SCREENING BILAT WITH TOMO: ICD-10-PCS | Mod: 26,,, | Performed by: RADIOLOGY

## 2022-05-23 PROCEDURE — 77067 MAMMO DIGITAL SCREENING BILAT WITH TOMO: ICD-10-PCS | Mod: 26,,, | Performed by: RADIOLOGY

## 2022-05-23 PROCEDURE — 77067 SCR MAMMO BI INCL CAD: CPT | Mod: 26,,, | Performed by: RADIOLOGY

## 2022-05-23 PROCEDURE — 77067 SCR MAMMO BI INCL CAD: CPT | Mod: TC

## 2022-05-23 PROCEDURE — 77063 BREAST TOMOSYNTHESIS BI: CPT | Mod: TC

## 2022-06-06 ENCOUNTER — PATIENT OUTREACH (OUTPATIENT)
Dept: ADMINISTRATIVE | Facility: HOSPITAL | Age: 47
End: 2022-06-06
Payer: COMMERCIAL

## 2022-06-06 DIAGNOSIS — Z12.11 SCREENING FOR COLORECTAL CANCER: Primary | ICD-10-CM

## 2022-06-06 DIAGNOSIS — Z12.12 SCREENING FOR COLORECTAL CANCER: Primary | ICD-10-CM

## 2022-06-06 NOTE — PROGRESS NOTES
2022 Care Everywhere updates requested and reviewed.  Immunizations reconciled. Media reports reviewed.  Duplicate HM overrides and  orders removed.  Overdue HM topic chart audit and/or requested.  Overdue lab testing linked to upcoming lab appointments if applies.    Lab steve, and quest reviewed ,WOG - FITKIT   Requested *  records DM EYE EXAM - Dr Ribeiro [facility]     Health Maintenance Due   Topic Date Due    Hepatitis C Screening  Never done    HIV Screening  Never done    TETANUS VACCINE  2014    Eye Exam  2020    Diabetes Urine Screening  2021    COVID-19 Vaccine (3 - Booster for Moderna series) 10/18/2021    Foot Exam  2022    Colorectal Cancer Screening  Never done    Hemoglobin A1c  2022

## 2022-06-06 NOTE — LETTER
AUTHORIZATION FOR RELEASE OF   CONFIDENTIAL INFORMATION    Dear Dr. KOSTA Ribeiro MD,    We are seeing Hamzah Reis, date of birth 1975, in the clinic at [facility]. Tatum Santos MD is the patient's PCP. Hamzah Reis has an outstanding lab/procedure at the time we reviewed her chart. In order to help keep her health information updated, she has authorized us to request the following medical record(s):                                               (X)  DIABETIC EYE EXAM                                     Please fax records to [facility], Shelly L Swindler, MD,    PATIENT HAS UPCOMING APPOINTMENT WITH PRIMARY PROVIDER          [facility]                                                                     Please Fax to [facility] at [phone].     Thank you for your help, Daphne Isbell [facility]. If I can be of any assistance you can call at [phone]                       Patient Name: Hamzah Reis  : 1975  Patient Phone #: [phone]

## 2022-06-20 ENCOUNTER — LAB VISIT (OUTPATIENT)
Dept: LAB | Facility: HOSPITAL | Age: 47
End: 2022-06-20
Attending: INTERNAL MEDICINE
Payer: COMMERCIAL

## 2022-06-20 ENCOUNTER — OFFICE VISIT (OUTPATIENT)
Dept: INTERNAL MEDICINE | Facility: CLINIC | Age: 47
End: 2022-06-20
Payer: COMMERCIAL

## 2022-06-20 VITALS
BODY MASS INDEX: 40.5 KG/M2 | DIASTOLIC BLOOD PRESSURE: 84 MMHG | HEART RATE: 89 BPM | HEIGHT: 66 IN | WEIGHT: 252 LBS | OXYGEN SATURATION: 98 % | SYSTOLIC BLOOD PRESSURE: 116 MMHG

## 2022-06-20 DIAGNOSIS — Z79.4 TYPE 2 DIABETES MELLITUS WITH DIABETIC POLYNEUROPATHY, WITH LONG-TERM CURRENT USE OF INSULIN: ICD-10-CM

## 2022-06-20 DIAGNOSIS — E11.42 TYPE 2 DIABETES MELLITUS WITH DIABETIC POLYNEUROPATHY, WITH LONG-TERM CURRENT USE OF INSULIN: ICD-10-CM

## 2022-06-20 DIAGNOSIS — Z00.00 ANNUAL PHYSICAL EXAM: ICD-10-CM

## 2022-06-20 DIAGNOSIS — R10.2 CHRONIC PELVIC PAIN IN FEMALE: ICD-10-CM

## 2022-06-20 DIAGNOSIS — E11.65 UNCONTROLLED TYPE 2 DIABETES MELLITUS WITH HYPERGLYCEMIA: ICD-10-CM

## 2022-06-20 DIAGNOSIS — E78.5 HYPERLIPIDEMIA ASSOCIATED WITH TYPE 2 DIABETES MELLITUS: ICD-10-CM

## 2022-06-20 DIAGNOSIS — G89.29 CHRONIC PELVIC PAIN IN FEMALE: ICD-10-CM

## 2022-06-20 DIAGNOSIS — E11.69 HYPERLIPIDEMIA ASSOCIATED WITH TYPE 2 DIABETES MELLITUS: ICD-10-CM

## 2022-06-20 DIAGNOSIS — N94.6 MENSTRUAL PAIN: ICD-10-CM

## 2022-06-20 DIAGNOSIS — E11.9 TYPE 2 DIABETES MELLITUS WITHOUT COMPLICATION: ICD-10-CM

## 2022-06-20 DIAGNOSIS — Z00.00 ANNUAL PHYSICAL EXAM: Primary | ICD-10-CM

## 2022-06-20 LAB
ALBUMIN SERPL BCP-MCNC: 3.8 G/DL (ref 3.5–5.2)
ALP SERPL-CCNC: 70 U/L (ref 55–135)
ALT SERPL W/O P-5'-P-CCNC: 25 U/L (ref 10–44)
ANION GAP SERPL CALC-SCNC: 9 MMOL/L (ref 8–16)
AST SERPL-CCNC: 20 U/L (ref 10–40)
BASOPHILS # BLD AUTO: 0.04 K/UL (ref 0–0.2)
BASOPHILS NFR BLD: 0.5 % (ref 0–1.9)
BILIRUB SERPL-MCNC: 1 MG/DL (ref 0.1–1)
BUN SERPL-MCNC: 12 MG/DL (ref 6–20)
CALCIUM SERPL-MCNC: 9.1 MG/DL (ref 8.7–10.5)
CHLORIDE SERPL-SCNC: 105 MMOL/L (ref 95–110)
CHOLEST SERPL-MCNC: 188 MG/DL (ref 120–199)
CHOLEST SERPL-MCNC: 188 MG/DL (ref 120–199)
CHOLEST/HDLC SERPL: 4.9 {RATIO} (ref 2–5)
CHOLEST/HDLC SERPL: 4.9 {RATIO} (ref 2–5)
CO2 SERPL-SCNC: 23 MMOL/L (ref 23–29)
CREAT SERPL-MCNC: 1 MG/DL (ref 0.5–1.4)
DIFFERENTIAL METHOD: ABNORMAL
EOSINOPHIL # BLD AUTO: 0.1 K/UL (ref 0–0.5)
EOSINOPHIL NFR BLD: 0.7 % (ref 0–8)
ERYTHROCYTE [DISTWIDTH] IN BLOOD BY AUTOMATED COUNT: 13.6 % (ref 11.5–14.5)
EST. GFR  (AFRICAN AMERICAN): >60 ML/MIN/1.73 M^2
EST. GFR  (NON AFRICAN AMERICAN): >60 ML/MIN/1.73 M^2
ESTIMATED AVG GLUCOSE: 272 MG/DL (ref 68–131)
ESTIMATED AVG GLUCOSE: 272 MG/DL (ref 68–131)
GLUCOSE SERPL-MCNC: 187 MG/DL (ref 70–110)
HBA1C MFR BLD: 11.1 % (ref 4–5.6)
HBA1C MFR BLD: 11.1 % (ref 4–5.6)
HCT VFR BLD AUTO: 42.5 % (ref 37–48.5)
HDLC SERPL-MCNC: 38 MG/DL (ref 40–75)
HDLC SERPL-MCNC: 38 MG/DL (ref 40–75)
HDLC SERPL: 20.2 % (ref 20–50)
HDLC SERPL: 20.2 % (ref 20–50)
HGB BLD-MCNC: 13.1 G/DL (ref 12–16)
IMM GRANULOCYTES # BLD AUTO: 0.02 K/UL (ref 0–0.04)
IMM GRANULOCYTES NFR BLD AUTO: 0.2 % (ref 0–0.5)
LDLC SERPL CALC-MCNC: 132.6 MG/DL (ref 63–159)
LDLC SERPL CALC-MCNC: 132.6 MG/DL (ref 63–159)
LYMPHOCYTES # BLD AUTO: 2.9 K/UL (ref 1–4.8)
LYMPHOCYTES NFR BLD: 32.9 % (ref 18–48)
MCH RBC QN AUTO: 26.2 PG (ref 27–31)
MCHC RBC AUTO-ENTMCNC: 30.8 G/DL (ref 32–36)
MCV RBC AUTO: 85 FL (ref 82–98)
MONOCYTES # BLD AUTO: 0.7 K/UL (ref 0.3–1)
MONOCYTES NFR BLD: 7.5 % (ref 4–15)
NEUTROPHILS # BLD AUTO: 5.1 K/UL (ref 1.8–7.7)
NEUTROPHILS NFR BLD: 58.2 % (ref 38–73)
NONHDLC SERPL-MCNC: 150 MG/DL
NONHDLC SERPL-MCNC: 150 MG/DL
NRBC BLD-RTO: 0 /100 WBC
PLATELET # BLD AUTO: 388 K/UL (ref 150–450)
PMV BLD AUTO: 10.9 FL (ref 9.2–12.9)
POTASSIUM SERPL-SCNC: 3.9 MMOL/L (ref 3.5–5.1)
PROT SERPL-MCNC: 7 G/DL (ref 6–8.4)
RBC # BLD AUTO: 5 M/UL (ref 4–5.4)
SODIUM SERPL-SCNC: 137 MMOL/L (ref 136–145)
TRIGL SERPL-MCNC: 87 MG/DL (ref 30–150)
TRIGL SERPL-MCNC: 87 MG/DL (ref 30–150)
WBC # BLD AUTO: 8.78 K/UL (ref 3.9–12.7)

## 2022-06-20 PROCEDURE — 99999 PR PBB SHADOW E&M-EST. PATIENT-LVL III: ICD-10-PCS | Mod: PBBFAC,,, | Performed by: INTERNAL MEDICINE

## 2022-06-20 PROCEDURE — 99396 PR PREVENTIVE VISIT,EST,40-64: ICD-10-PCS | Mod: S$GLB,,, | Performed by: INTERNAL MEDICINE

## 2022-06-20 PROCEDURE — 80053 COMPREHEN METABOLIC PANEL: CPT | Performed by: INTERNAL MEDICINE

## 2022-06-20 PROCEDURE — 85025 COMPLETE CBC W/AUTO DIFF WBC: CPT | Performed by: INTERNAL MEDICINE

## 2022-06-20 PROCEDURE — 86803 HEPATITIS C AB TEST: CPT | Performed by: INTERNAL MEDICINE

## 2022-06-20 PROCEDURE — 3008F BODY MASS INDEX DOCD: CPT | Mod: CPTII,S$GLB,, | Performed by: INTERNAL MEDICINE

## 2022-06-20 PROCEDURE — 83036 HEMOGLOBIN GLYCOSYLATED A1C: CPT | Performed by: INTERNAL MEDICINE

## 2022-06-20 PROCEDURE — 3079F PR MOST RECENT DIASTOLIC BLOOD PRESSURE 80-89 MM HG: ICD-10-PCS | Mod: CPTII,S$GLB,, | Performed by: INTERNAL MEDICINE

## 2022-06-20 PROCEDURE — 36415 COLL VENOUS BLD VENIPUNCTURE: CPT | Mod: PO | Performed by: INTERNAL MEDICINE

## 2022-06-20 PROCEDURE — 3079F DIAST BP 80-89 MM HG: CPT | Mod: CPTII,S$GLB,, | Performed by: INTERNAL MEDICINE

## 2022-06-20 PROCEDURE — 99213 OFFICE O/P EST LOW 20 MIN: CPT | Mod: PBBFAC,PO | Performed by: INTERNAL MEDICINE

## 2022-06-20 PROCEDURE — 99396 PREV VISIT EST AGE 40-64: CPT | Mod: S$GLB,,, | Performed by: INTERNAL MEDICINE

## 2022-06-20 PROCEDURE — 3008F PR BODY MASS INDEX (BMI) DOCUMENTED: ICD-10-PCS | Mod: CPTII,S$GLB,, | Performed by: INTERNAL MEDICINE

## 2022-06-20 PROCEDURE — 3074F SYST BP LT 130 MM HG: CPT | Mod: CPTII,S$GLB,, | Performed by: INTERNAL MEDICINE

## 2022-06-20 PROCEDURE — 3046F HEMOGLOBIN A1C LEVEL >9.0%: CPT | Mod: CPTII,S$GLB,, | Performed by: INTERNAL MEDICINE

## 2022-06-20 PROCEDURE — 80061 LIPID PANEL: CPT | Performed by: INTERNAL MEDICINE

## 2022-06-20 PROCEDURE — 3074F PR MOST RECENT SYSTOLIC BLOOD PRESSURE < 130 MM HG: ICD-10-PCS | Mod: CPTII,S$GLB,, | Performed by: INTERNAL MEDICINE

## 2022-06-20 PROCEDURE — 99999 PR PBB SHADOW E&M-EST. PATIENT-LVL III: CPT | Mod: PBBFAC,,, | Performed by: INTERNAL MEDICINE

## 2022-06-20 PROCEDURE — 87389 HIV-1 AG W/HIV-1&-2 AB AG IA: CPT | Performed by: INTERNAL MEDICINE

## 2022-06-20 PROCEDURE — 3046F PR MOST RECENT HEMOGLOBIN A1C LEVEL > 9.0%: ICD-10-PCS | Mod: CPTII,S$GLB,, | Performed by: INTERNAL MEDICINE

## 2022-06-20 RX ORDER — OXYCODONE AND ACETAMINOPHEN 7.5; 325 MG/1; MG/1
1 TABLET ORAL EVERY 12 HOURS PRN
Qty: 14 TABLET | Refills: 0 | Status: SHIPPED | OUTPATIENT
Start: 2022-06-20 | End: 2023-02-06

## 2022-06-20 NOTE — PROGRESS NOTES
Patient ID: Hamzah Reis is a 47 y.o. female.    Chief Complaint: Diabetes and Results    HPI Hamzah is a 47 y.o. female with type 2 diabetes, hyperlipidemia, chronic back pain, and heavy menstrual cycles who presents for annual exam.  She complains today of heavy menstrual cycles and pelvic pain.  She would like to undergo hysterectomy for this.  Unfortunately her elevated blood sugars have prevented this.  She is due for labs today.  She reports that at home her blood sugars have been in the range of 140s-180s.  She does not see blood sugar greater than 200 or less than 70. She checks her blood sugar in the morning and in the evening before bedtime.  She is compliant with her Trulicity, Levemir, metformin medications.  No further new acute complaints or concerns today.    Health Maintenance Topics with due status: Not Due       Topic Last Completion Date    Influenza Vaccine 12/11/2019    Cervical Cancer Screening 02/18/2020    Low Dose Statin 03/07/2022    Lipid Panel 03/07/2022    Mammogram 05/23/2022     Review of Systems   Genitourinary:        See HPI   All other systems reviewed and are negative.     Objective:     Vitals:    06/20/22 1031   BP: 116/84   Pulse: 89        Physical Exam  Vitals reviewed.   Constitutional:       General: She is not in acute distress.     Appearance: Normal appearance. She is well-developed. She is obese. She is not ill-appearing, toxic-appearing or diaphoretic.   HENT:      Head: Normocephalic and atraumatic.      Right Ear: External ear normal.      Left Ear: External ear normal.      Nose: Nose normal.   Eyes:      General: No scleral icterus.        Right eye: No discharge.         Left eye: No discharge.      Extraocular Movements: Extraocular movements intact.      Conjunctiva/sclera: Conjunctivae normal.   Cardiovascular:      Rate and Rhythm: Normal rate and regular rhythm.      Heart sounds: Normal heart sounds. No murmur heard.    No friction rub. No gallop.    Pulmonary:      Effort: Pulmonary effort is normal. No respiratory distress.      Breath sounds: Normal breath sounds. No stridor. No wheezing, rhonchi or rales.   Skin:     General: Skin is warm and dry.   Neurological:      General: No focal deficit present.      Mental Status: She is alert and oriented to person, place, and time. Mental status is at baseline.   Psychiatric:         Mood and Affect: Mood normal.         Behavior: Behavior normal.         Thought Content: Thought content normal.         Judgment: Judgment normal.         Assessment:       1. Annual physical exam    2. Menstrual pain Chronic   3. Hyperlipidemia associated with type 2 diabetes mellitus Chronic   4. Type 2 diabetes mellitus with diabetic polyneuropathy, with long-term current use of insulin Chronic       Plan:         Annual physical exam  -     Hepatitis C Antibody; Future; Expected date: 06/20/2022  -     HIV 1/2 Ag/Ab (4th Gen); Future; Expected date: 06/20/2022    Menstrual pain  Comments:  If A1c 8 or less, ok for surgery. If 9 or higher, will recommend against surgery until BG better controlled.   Orders:  -     oxyCODONE-acetaminophen (PERCOCET) 7.5-325 mg per tablet; Take 1 tablet by mouth every 12 (twelve) hours as needed for Pain.  Dispense: 14 tablet; Refill: 0    Hyperlipidemia associated with type 2 diabetes mellitus  Comments:  Check lipids.  Continue statin.    Type 2 diabetes mellitus with diabetic polyneuropathy, with long-term current use of insulin  Comments:  Check A1c.  Continue current medications at this time.        RTC 3 months     Warning signs discussed, patient to call with any further issues or worsening of symptoms.       Parts of the above note were dictated using a voice dictation software. Please excuse any grammatical or typographical errors.

## 2022-06-21 LAB
HCV AB SERPL QL IA: NEGATIVE
HIV 1+2 AB+HIV1 P24 AG SERPL QL IA: NEGATIVE

## 2022-06-22 DIAGNOSIS — E11.65 UNCONTROLLED TYPE 2 DIABETES MELLITUS WITH HYPERGLYCEMIA: Primary | ICD-10-CM

## 2022-06-22 DIAGNOSIS — E11.69 HYPERLIPIDEMIA ASSOCIATED WITH TYPE 2 DIABETES MELLITUS: ICD-10-CM

## 2022-06-22 DIAGNOSIS — E78.5 HYPERLIPIDEMIA ASSOCIATED WITH TYPE 2 DIABETES MELLITUS: ICD-10-CM

## 2022-06-22 RX ORDER — ATORVASTATIN CALCIUM 80 MG/1
80 TABLET, FILM COATED ORAL DAILY
Qty: 90 TABLET | Refills: 3 | Status: SHIPPED | OUTPATIENT
Start: 2022-06-22 | End: 2023-06-22 | Stop reason: SDUPTHER

## 2022-07-05 ENCOUNTER — TELEPHONE (OUTPATIENT)
Dept: INTERNAL MEDICINE | Facility: CLINIC | Age: 47
End: 2022-07-05
Payer: COMMERCIAL

## 2022-07-05 NOTE — TELEPHONE ENCOUNTER
----- Message from Kaushik Lares sent at 7/5/2022  9:44 AM CDT -----  Contact: pt  Type: Requesting to speak with nurse        Who Called: PT  Regarding: would like a call back in regards to a letter she received in the mail for wound care  Would the patient rather a call back or a response via [facility]? Call back  Best Call Back Number: [phone]  Additional Information:

## 2022-07-15 ENCOUNTER — OFFICE VISIT (OUTPATIENT)
Dept: URGENT CARE | Facility: CLINIC | Age: 47
End: 2022-07-15
Payer: COMMERCIAL

## 2022-07-15 VITALS
WEIGHT: 251 LBS | HEART RATE: 96 BPM | OXYGEN SATURATION: 99 % | RESPIRATION RATE: 16 BRPM | BODY MASS INDEX: 40.34 KG/M2 | HEIGHT: 66 IN | SYSTOLIC BLOOD PRESSURE: 149 MMHG | DIASTOLIC BLOOD PRESSURE: 76 MMHG | TEMPERATURE: 99 F
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
## 2022-07-15 DIAGNOSIS — M54.32 SCIATICA, LEFT SIDE: ICD-10-CM

## 2022-07-15 DIAGNOSIS — S39.012A STRAIN OF LUMBAR REGION, INITIAL ENCOUNTER: Primary | ICD-10-CM

## 2022-07-15 PROCEDURE — 1160F PR REVIEW ALL MEDS BY PRESCRIBER/CLIN PHARMACIST DOCUMENTED: ICD-10-PCS | Mod: CPTII,S$GLB,, | Performed by: PHYSICIAN ASSISTANT

## 2022-07-15 PROCEDURE — 1159F MED LIST DOCD IN RCRD: CPT | Mod: CPTII,S$GLB,, | Performed by: PHYSICIAN ASSISTANT

## 2022-07-15 PROCEDURE — 96372 PR INJECTION,THERAP/PROPH/DIAG2ST, IM OR SUBCUT: ICD-10-PCS | Mod: S$GLB,,, | Performed by: PHYSICIAN ASSISTANT

## 2022-07-15 PROCEDURE — 3078F PR MOST RECENT DIASTOLIC BLOOD PRESSURE < 80 MM HG: ICD-10-PCS | Mod: CPTII,S$GLB,, | Performed by: PHYSICIAN ASSISTANT

## 2022-07-15 PROCEDURE — 99213 PR OFFICE/OUTPT VISIT, EST, LEVL III, 20-29 MIN: ICD-10-PCS | Mod: 25,S$GLB,, | Performed by: PHYSICIAN ASSISTANT

## 2022-07-15 PROCEDURE — 3077F PR MOST RECENT SYSTOLIC BLOOD PRESSURE >= 140 MM HG: ICD-10-PCS | Mod: CPTII,S$GLB,, | Performed by: PHYSICIAN ASSISTANT

## 2022-07-15 PROCEDURE — 3008F BODY MASS INDEX DOCD: CPT | Mod: CPTII,S$GLB,, | Performed by: PHYSICIAN ASSISTANT

## 2022-07-15 PROCEDURE — 1159F PR MEDICATION LIST DOCUMENTED IN MEDICAL RECORD: ICD-10-PCS | Mod: CPTII,S$GLB,, | Performed by: PHYSICIAN ASSISTANT

## 2022-07-15 PROCEDURE — 3078F DIAST BP <80 MM HG: CPT | Mod: CPTII,S$GLB,, | Performed by: PHYSICIAN ASSISTANT

## 2022-07-15 PROCEDURE — 3008F PR BODY MASS INDEX (BMI) DOCUMENTED: ICD-10-PCS | Mod: CPTII,S$GLB,, | Performed by: PHYSICIAN ASSISTANT

## 2022-07-15 PROCEDURE — 99213 OFFICE O/P EST LOW 20 MIN: CPT | Mod: 25,S$GLB,, | Performed by: PHYSICIAN ASSISTANT

## 2022-07-15 PROCEDURE — 3077F SYST BP >= 140 MM HG: CPT | Mod: CPTII,S$GLB,, | Performed by: PHYSICIAN ASSISTANT

## 2022-07-15 PROCEDURE — 3046F PR MOST RECENT HEMOGLOBIN A1C LEVEL > 9.0%: ICD-10-PCS | Mod: CPTII,S$GLB,, | Performed by: PHYSICIAN ASSISTANT

## 2022-07-15 PROCEDURE — 96372 THER/PROPH/DIAG INJ SC/IM: CPT | Mod: S$GLB,,, | Performed by: PHYSICIAN ASSISTANT

## 2022-07-15 PROCEDURE — 3046F HEMOGLOBIN A1C LEVEL >9.0%: CPT | Mod: CPTII,S$GLB,, | Performed by: PHYSICIAN ASSISTANT

## 2022-07-15 PROCEDURE — 1160F RVW MEDS BY RX/DR IN RCRD: CPT | Mod: CPTII,S$GLB,, | Performed by: PHYSICIAN ASSISTANT

## 2022-07-15 RX ORDER — IBUPROFEN 800 MG/1
800 TABLET ORAL 3 TIMES DAILY
Qty: 21 TABLET | Refills: 0 | Status: SHIPPED | OUTPATIENT
Start: 2022-07-15 | End: 2022-10-02 | Stop reason: SDUPTHER

## 2022-07-15 RX ORDER — CYCLOBENZAPRINE HCL 5 MG
5 TABLET ORAL 3 TIMES DAILY PRN
Qty: 15 TABLET | Refills: 0 | Status: SHIPPED | OUTPATIENT
Start: 2022-07-15 | End: 2022-07-25

## 2022-07-15 RX ORDER — KETOROLAC TROMETHAMINE 30 MG/ML
30 INJECTION, SOLUTION INTRAMUSCULAR; INTRAVENOUS
Status: COMPLETED | OUTPATIENT
Start: 2022-07-15 | End: 2022-07-15

## 2022-07-15 RX ADMIN — KETOROLAC TROMETHAMINE 30 MG: 30 INJECTION, SOLUTION INTRAMUSCULAR; INTRAVENOUS at 07:07
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
## 2022-07-16 NOTE — PATIENT INSTRUCTIONS
You must understand that you've received an Urgent Care treatment only and that you may be released before all your medical problems are known or treated. You, the patient, will arrange for follow up care as instructed.      Follow up with your PCP or specialty clinic as instructed in the next 2-3 days if not improved or as needed. You can call [phone] to schedule an appointment with appropriate provider.      If you condition worsens, we recommend that you receive another evaluation at the emergency room immediately or contact your primary medical clinic's after hours call service to discuss your concerns.      Please return here or go to the Emergency Department for any concerns or worsening condition.      If you were prescribed a narcotic or controlled substance, do not drive or operate heavy equipment or machinery while taking these medications.

## 2022-07-21 ENCOUNTER — OFFICE VISIT (OUTPATIENT)
Dept: OBSTETRICS AND GYNECOLOGY | Facility: CLINIC | Age: 47
End: 2022-07-21
Payer: COMMERCIAL

## 2022-07-21 VITALS — DIASTOLIC BLOOD PRESSURE: 80 MMHG | SYSTOLIC BLOOD PRESSURE: 122 MMHG | WEIGHT: 252 LBS | BODY MASS INDEX: 40.67 KG/M2

## 2022-07-21 DIAGNOSIS — Z01.419 WELL WOMAN EXAM WITH ROUTINE GYNECOLOGICAL EXAM: Primary | ICD-10-CM

## 2022-07-21 PROCEDURE — 3079F DIAST BP 80-89 MM HG: CPT | Mod: CPTII,S$GLB,, | Performed by: OBSTETRICS & GYNECOLOGY

## 2022-07-21 PROCEDURE — 3046F PR MOST RECENT HEMOGLOBIN A1C LEVEL > 9.0%: ICD-10-PCS | Mod: CPTII,S$GLB,, | Performed by: OBSTETRICS & GYNECOLOGY

## 2022-07-21 PROCEDURE — 99396 PR PREVENTIVE VISIT,EST,40-64: ICD-10-PCS | Mod: S$GLB,,, | Performed by: OBSTETRICS & GYNECOLOGY

## 2022-07-21 PROCEDURE — 99396 PREV VISIT EST AGE 40-64: CPT | Mod: S$GLB,,, | Performed by: OBSTETRICS & GYNECOLOGY

## 2022-07-21 PROCEDURE — 3046F HEMOGLOBIN A1C LEVEL >9.0%: CPT | Mod: CPTII,S$GLB,, | Performed by: OBSTETRICS & GYNECOLOGY

## 2022-07-21 PROCEDURE — 3079F PR MOST RECENT DIASTOLIC BLOOD PRESSURE 80-89 MM HG: ICD-10-PCS | Mod: CPTII,S$GLB,, | Performed by: OBSTETRICS & GYNECOLOGY

## 2022-07-21 PROCEDURE — 3008F BODY MASS INDEX DOCD: CPT | Mod: CPTII,S$GLB,, | Performed by: OBSTETRICS & GYNECOLOGY

## 2022-07-21 PROCEDURE — 3074F SYST BP LT 130 MM HG: CPT | Mod: CPTII,S$GLB,, | Performed by: OBSTETRICS & GYNECOLOGY

## 2022-07-21 PROCEDURE — 3008F PR BODY MASS INDEX (BMI) DOCUMENTED: ICD-10-PCS | Mod: CPTII,S$GLB,, | Performed by: OBSTETRICS & GYNECOLOGY

## 2022-07-21 PROCEDURE — 1159F PR MEDICATION LIST DOCUMENTED IN MEDICAL RECORD: ICD-10-PCS | Mod: CPTII,S$GLB,, | Performed by: OBSTETRICS & GYNECOLOGY

## 2022-07-21 PROCEDURE — 1160F PR REVIEW ALL MEDS BY PRESCRIBER/CLIN PHARMACIST DOCUMENTED: ICD-10-PCS | Mod: CPTII,S$GLB,, | Performed by: OBSTETRICS & GYNECOLOGY

## 2022-07-21 PROCEDURE — 1160F RVW MEDS BY RX/DR IN RCRD: CPT | Mod: CPTII,S$GLB,, | Performed by: OBSTETRICS & GYNECOLOGY

## 2022-07-21 PROCEDURE — 99999 PR PBB SHADOW E&M-EST. PATIENT-LVL III: CPT | Mod: PBBFAC,,, | Performed by: OBSTETRICS & GYNECOLOGY

## 2022-07-21 PROCEDURE — 3074F PR MOST RECENT SYSTOLIC BLOOD PRESSURE < 130 MM HG: ICD-10-PCS | Mod: CPTII,S$GLB,, | Performed by: OBSTETRICS & GYNECOLOGY

## 2022-07-21 PROCEDURE — 99999 PR PBB SHADOW E&M-EST. PATIENT-LVL III: ICD-10-PCS | Mod: PBBFAC,,, | Performed by: OBSTETRICS & GYNECOLOGY

## 2022-07-21 PROCEDURE — 1159F MED LIST DOCD IN RCRD: CPT | Mod: CPTII,S$GLB,, | Performed by: OBSTETRICS & GYNECOLOGY

## 2022-07-21 PROCEDURE — 88175 CYTOPATH C/V AUTO FLUID REDO: CPT | Performed by: OBSTETRICS & GYNECOLOGY

## 2022-07-21 PROCEDURE — 99213 OFFICE O/P EST LOW 20 MIN: CPT | Mod: PBBFAC,PO | Performed by: OBSTETRICS & GYNECOLOGY

## 2022-07-21 NOTE — PROGRESS NOTES
HPI:   47 y.o.   OB History        2    Para   2    Term   2            AB        Living           SAB        IAB        Ectopic        Multiple        Live Births                  Patient's last menstrual period was 2022.    Patient is  here for her annual gynecologic exam.  She has no complaints.     ROS:  GENERAL: No fever, chills, fatigability or weight loss.  SKIN: No rashes, itching or changes in color or texture of skin.  HEAD: No headaches or recent head trauma.  EYES: Visual acuity fine. No photophobia, ocular pain or diplopia.  EARS: Denies ear pain, discharge or vertigo.  NOSE: No loss of smell, no epistaxis or postnasal drip.  MOUTH & THROAT: No hoarseness or change in voice. No excessive gum bleeding.  NODES: Denies swollen glands.  CHEST: Denies ANDERS, cyanosis, wheezing, cough and sputum production.  CARDIOVASCULAR: Denies chest pain, PND, orthopnea or reduced exercise tolerance.  ABDOMEN: Appetite fine. No weight loss. Denies diarrhea, abdominal pain, hematemesis or blood in stool.  URINARY: No flank pain, dysuria or hematuria.  PERIPHERAL VASCULAR: No claudication or cyanosis.  MUSCULOSKELETAL: No joint stiffness or swelling. Denies back pain.  NEUROLOGIC: No history of seizures, paralysis, alteration of gait or coordination.    PE:   /80   Wt 114.3 kg (252 lb)   LMP 2022   BMI 40.67 kg/m²   APPEARANCE: Well nourished, well developed, in no acute distress.  NECK: Neck symmetric without masses or thyromegaly.  BREASTS: Symmetrical, no skin changes or visible lesions. No palpable masses, nipple discharge or adenopathy bilaterally.  ABDOMEN: Flat. Soft. No tenderness or masses. No hepatosplenomegaly. No hernias. No CVA tenderness.  VULVA: No lesions. Normal female genitalia.  URETHRAL MEATUS: Normal size and location, no lesions, no prolapse.  URETHRA: No masses, tenderness, prolapse or scarring.  VAGINA: Moist and well rugated, no discharge, no significant cystocele or  rectocele.  CERVIX: No lesions and discharge. PAP done.  UTERUS: Normal size, regular shape, mobile, non-tender, bladder base nontender.  ADNEXA: No masses, tenderness or CDS nodularity.  ANUS PERINEUM: Normal.    PROCEDURES:  Pap smear    Assessment:  Normal Gynecologic Exam    Plan:  Mammogram and Colonoscopy if indicated by current recommendations.  Return to clinic in one year or for any problems or complaints.  Cycles horrible  Failed ablation  hyst cancelled for sugar  ? Options,

## 2022-08-01 ENCOUNTER — TELEPHONE (OUTPATIENT)
Dept: OBSTETRICS AND GYNECOLOGY | Facility: CLINIC | Age: 47
End: 2022-08-01
Payer: COMMERCIAL

## 2022-08-01 ENCOUNTER — TELEPHONE (OUTPATIENT)
Dept: INTERNAL MEDICINE | Facility: CLINIC | Age: 47
End: 2022-08-01
Payer: COMMERCIAL

## 2022-08-01 LAB
CLINICAL INFO: ABNORMAL
CYTO CVX: ABNORMAL
CYTOLOGIST CVX/VAG CYTO: ABNORMAL
CYTOLOGIST CVX/VAG CYTO: ABNORMAL
CYTOLOGY CMNT CVX/VAG CYTO-IMP: ABNORMAL
CYTOLOGY PAP THIN PREP EXPLANATION: ABNORMAL
DATE OF PREVIOUS PAP: NO
DATE PREVIOUS BX: NO
GEN CATEG CVX/VAG CYTO-IMP: ABNORMAL
LMP START DATE: ABNORMAL
MICROORGANISM CVX/VAG CYTO: ABNORMAL
PATHOLOGIST CVX/VAG CYTO: ABNORMAL
SERVICE CMNT-IMP: ABNORMAL
SPECIMEN SOURCE CVX/VAG CYTO: ABNORMAL
STAT OF ADQ CVX/VAG CYTO-IMP: ABNORMAL

## 2022-08-01 NOTE — TELEPHONE ENCOUNTER
----- Message from Dustinsada Baljinder sent at 8/1/2022  9:56 AM CDT -----  .Type:  Patient Returning Call    Who Called: patient  Who Left Message for Patient: Nurse  Does the patient know what this is regarding?: Test results and pt also thinks she has a uti  Would the patient rather a call back or a response via [facility]? Call back  Best Call Back Number: [phone]  Additional Information: patient is returning call , patient was at work and missed call.
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
## 2022-08-01 NOTE — TELEPHONE ENCOUNTER
----- Message from Napoleon Francis sent at 8/1/2022  8:22 AM CDT -----  Type:  Needs Medical Advice    Who Called: self  Reason:speak with nurse   Would the patient rather a call back or a response via [facility]? call  Best Call Back Number: [phone]  Additional Information: none

## 2022-08-02 NOTE — TELEPHONE ENCOUNTER
Pap was mild, level 1,   Nothing to worry about with that,  But I like to do colposcopy and look,  Schedule next month,  Thanks,

## 2022-08-04 ENCOUNTER — TELEPHONE (OUTPATIENT)
Dept: INTERNAL MEDICINE | Facility: CLINIC | Age: 47
End: 2022-08-04
Payer: COMMERCIAL

## 2022-08-04 ENCOUNTER — TELEPHONE (OUTPATIENT)
Dept: OBSTETRICS AND GYNECOLOGY | Facility: CLINIC | Age: 47
End: 2022-08-04

## 2022-08-04 ENCOUNTER — OFFICE VISIT (OUTPATIENT)
Dept: URGENT CARE | Facility: CLINIC | Age: 47
End: 2022-08-04
Payer: COMMERCIAL

## 2022-08-04 VITALS
BODY MASS INDEX: 40.5 KG/M2 | WEIGHT: 252 LBS | HEIGHT: 66 IN | RESPIRATION RATE: 16 BRPM | DIASTOLIC BLOOD PRESSURE: 88 MMHG | OXYGEN SATURATION: 97 % | HEART RATE: 105 BPM | TEMPERATURE: 98 F | SYSTOLIC BLOOD PRESSURE: 152 MMHG

## 2022-08-04 DIAGNOSIS — R30.0 DYSURIA: Primary | ICD-10-CM

## 2022-08-04 DIAGNOSIS — R52 GENERALIZED BODY ACHES: ICD-10-CM

## 2022-08-04 LAB
B-HCG UR QL: NEGATIVE
BILIRUB UR QL STRIP: NEGATIVE
CTP QC/QA: YES
CTP QC/QA: YES
GLUCOSE UR QL STRIP: NEGATIVE
KETONES UR QL STRIP: NEGATIVE
LEUKOCYTE ESTERASE UR QL STRIP: POSITIVE
PH, POC UA: 5.5 (ref 5–8)
POC BLOOD, URINE: POSITIVE
POC NITRATES, URINE: POSITIVE
PROT UR QL STRIP: POSITIVE
SARS-COV-2 RDRP RESP QL NAA+PROBE: NEGATIVE
SP GR UR STRIP: 1.01 (ref 1–1.03)
UROBILINOGEN UR STRIP-ACNC: ABNORMAL (ref 0.1–1.1)

## 2022-08-04 PROCEDURE — 3079F PR MOST RECENT DIASTOLIC BLOOD PRESSURE 80-89 MM HG: ICD-10-PCS | Mod: CPTII,S$GLB,, | Performed by: FAMILY MEDICINE

## 2022-08-04 PROCEDURE — 3046F HEMOGLOBIN A1C LEVEL >9.0%: CPT | Mod: CPTII,S$GLB,, | Performed by: FAMILY MEDICINE

## 2022-08-04 PROCEDURE — 87186 SC STD MICRODIL/AGAR DIL: CPT | Performed by: FAMILY MEDICINE

## 2022-08-04 PROCEDURE — 99214 PR OFFICE/OUTPT VISIT, EST, LEVL IV, 30-39 MIN: ICD-10-PCS | Mod: S$GLB,,, | Performed by: FAMILY MEDICINE

## 2022-08-04 PROCEDURE — 1159F MED LIST DOCD IN RCRD: CPT | Mod: CPTII,S$GLB,, | Performed by: FAMILY MEDICINE

## 2022-08-04 PROCEDURE — 87088 URINE BACTERIA CULTURE: CPT | Performed by: FAMILY MEDICINE

## 2022-08-04 PROCEDURE — U0002 COVID-19 LAB TEST NON-CDC: HCPCS | Mod: QW,S$GLB,, | Performed by: FAMILY MEDICINE

## 2022-08-04 PROCEDURE — 99214 OFFICE O/P EST MOD 30 MIN: CPT | Mod: S$GLB,,, | Performed by: FAMILY MEDICINE

## 2022-08-04 PROCEDURE — 1159F PR MEDICATION LIST DOCUMENTED IN MEDICAL RECORD: ICD-10-PCS | Mod: CPTII,S$GLB,, | Performed by: FAMILY MEDICINE

## 2022-08-04 PROCEDURE — U0002: ICD-10-PCS | Mod: QW,S$GLB,, | Performed by: FAMILY MEDICINE

## 2022-08-04 PROCEDURE — 81025 POCT URINE PREGNANCY: ICD-10-PCS | Mod: S$GLB,,, | Performed by: FAMILY MEDICINE

## 2022-08-04 PROCEDURE — 3077F PR MOST RECENT SYSTOLIC BLOOD PRESSURE >= 140 MM HG: ICD-10-PCS | Mod: CPTII,S$GLB,, | Performed by: FAMILY MEDICINE

## 2022-08-04 PROCEDURE — 3079F DIAST BP 80-89 MM HG: CPT | Mod: CPTII,S$GLB,, | Performed by: FAMILY MEDICINE

## 2022-08-04 PROCEDURE — 3077F SYST BP >= 140 MM HG: CPT | Mod: CPTII,S$GLB,, | Performed by: FAMILY MEDICINE

## 2022-08-04 PROCEDURE — 81003 POCT URINALYSIS, DIPSTICK, AUTOMATED, W/O SCOPE: ICD-10-PCS | Mod: QW,S$GLB,, | Performed by: FAMILY MEDICINE

## 2022-08-04 PROCEDURE — 87077 CULTURE AEROBIC IDENTIFY: CPT | Performed by: FAMILY MEDICINE

## 2022-08-04 PROCEDURE — 81003 URINALYSIS AUTO W/O SCOPE: CPT | Mod: QW,S$GLB,, | Performed by: FAMILY MEDICINE

## 2022-08-04 PROCEDURE — 87086 URINE CULTURE/COLONY COUNT: CPT | Performed by: FAMILY MEDICINE

## 2022-08-04 PROCEDURE — 3008F PR BODY MASS INDEX (BMI) DOCUMENTED: ICD-10-PCS | Mod: CPTII,S$GLB,, | Performed by: FAMILY MEDICINE

## 2022-08-04 PROCEDURE — 3008F BODY MASS INDEX DOCD: CPT | Mod: CPTII,S$GLB,, | Performed by: FAMILY MEDICINE

## 2022-08-04 PROCEDURE — 81025 URINE PREGNANCY TEST: CPT | Mod: S$GLB,,, | Performed by: FAMILY MEDICINE

## 2022-08-04 PROCEDURE — 3046F PR MOST RECENT HEMOGLOBIN A1C LEVEL > 9.0%: ICD-10-PCS | Mod: CPTII,S$GLB,, | Performed by: FAMILY MEDICINE

## 2022-08-04 RX ORDER — PHENAZOPYRIDINE HYDROCHLORIDE 200 MG/1
200 TABLET, FILM COATED ORAL 3 TIMES DAILY PRN
Qty: 20 TABLET | Refills: 0 | Status: SHIPPED | OUTPATIENT
Start: 2022-08-04 | End: 2023-08-04

## 2022-08-04 RX ORDER — CIPROFLOXACIN 500 MG/1
500 TABLET ORAL 2 TIMES DAILY
Qty: 14 TABLET | Refills: 0 | Status: SHIPPED | OUTPATIENT
Start: 2022-08-04 | End: 2022-08-11

## 2022-08-04 NOTE — TELEPHONE ENCOUNTER
----- Message from Sonya Bunch sent at 8/4/2022  9:31 AM CDT -----  Type:  Patient Returning Call    Who Called:pt  Who Left Message for Patient:office  Does the patient know what this is regarding?:test results  Would the patient rather a call back or a response via [facility]? call  Best Call Back Number:[phone]  Additional Information:

## 2022-08-04 NOTE — PROGRESS NOTES
"Subjective:       Patient ID: Hamzah Reis is a 47 y.o. female.    Vitals:  height is 5' 6" (1.676 m) and weight is 114.3 kg (252 lb). Her temperature is 98.4 °F (36.9 °C). Her blood pressure is 152/88 (abnormal) and her pulse is 105. Her respiration is 16 and oxygen saturation is 97%.     Chief Complaint: Generalized Body Aches and Urinary Tract Infection    Pt presents body aches, chills, headaches and dysuria for two days, states she has beeen having buring on urination for 4 days, was exposed to friend with covid 2 days ago, denies cough or congestion    Vaccinated      URI   This is a new problem. The current episode started in the past 7 days. The problem has been unchanged. There has been no fever. Associated symptoms include dysuria, headaches and joint pain. She has tried acetaminophen for the symptoms. The treatment provided no relief.       Genitourinary: Positive for dysuria.   Neurological: Positive for headaches.       Objective:      Physical Exam   Constitutional: She is oriented to person, place, and time. She appears well-developed. She is cooperative.  Non-toxic appearance. She does not appear ill. No distress.   HENT:   Head: Normocephalic and atraumatic.   Ears:   Right Ear: Hearing, tympanic membrane, external ear and ear canal normal.   Left Ear: Hearing, tympanic membrane, external ear and ear canal normal.   Nose: Nose normal. No mucosal edema, rhinorrhea or nasal deformity. No epistaxis. Right sinus exhibits no maxillary sinus tenderness and no frontal sinus tenderness. Left sinus exhibits no maxillary sinus tenderness and no frontal sinus tenderness.   Mouth/Throat: Uvula is midline, oropharynx is clear and moist and mucous membranes are normal. Mucous membranes are moist. No trismus in the jaw. Normal dentition. No uvula swelling. No posterior oropharyngeal erythema. Oropharynx is clear.   Eyes: Conjunctivae and lids are normal. Pupils are equal, round, and reactive to light. Right " eye exhibits no discharge. Left eye exhibits no discharge. No scleral icterus. Extraocular movement intact   Neck: Trachea normal and phonation normal. Neck supple.   Cardiovascular: Normal rate, regular rhythm, normal heart sounds and normal pulses.   Pulmonary/Chest: Effort normal and breath sounds normal. No respiratory distress.   Abdominal: Normal appearance and bowel sounds are normal. She exhibits no distension, no pulsatile midline mass and no mass. Soft. There is no abdominal tenderness.   Musculoskeletal: Normal range of motion.         General: No deformity. Normal range of motion.   Neurological: She is alert and oriented to person, place, and time. She exhibits normal muscle tone. Coordination normal.   Skin: Skin is warm, dry, intact, not diaphoretic and not pale.   Psychiatric: Her speech is normal and behavior is normal. Judgment and thought content normal.   Nursing note and vitals reviewed.        Assessment:       1. Dysuria    2. Generalized body aches          Plan:         Dysuria  -     POCT Urinalysis, Dipstick, Automated, W/O Scope  -     POCT urine pregnancy    Generalized body aches  -     POCT COVID-19 Rapid Screening    Other orders  -     ciprofloxacin HCl (CIPRO) 500 MG tablet; Take 1 tablet (500 mg total) by mouth 2 (two) times daily. for 7 days  Dispense: 14 tablet; Refill: 0  -     phenazopyridine (PYRIDIUM) 200 MG tablet; Take 1 tablet (200 mg total) by mouth 3 (three) times daily as needed for Pain.  Dispense: 20 tablet; Refill: 0          Results for orders placed or performed in visit on 08/04/22   POCT COVID-19 Rapid Screening   Result Value Ref Range    POC Rapid COVID Negative Negative     Acceptable Yes    POCT Urinalysis, Dipstick, Automated, W/O Scope   Result Value Ref Range    POC Blood, Urine Positive (A) Negative    POC Bilirubin, Urine Negative Negative    POC Urobilinogen, Urine norm 0.1 - 1.1    POC Ketones, Urine Negative Negative    POC Protein,  Urine Positive (A) Negative    POC Nitrates, Urine Positive (A) Negative    POC Glucose, Urine Negative Negative    pH, UA 5.5 5 - 8    POC Specific Gravity, Urine 1.015 1.003 - 1.029    POC Leukocytes, Urine Positive (A) Negative   POCT urine pregnancy   Result Value Ref Range    POC Preg Test, Ur Negative Negative     Acceptable Yes                 Force fluid    If increase fever RTC for further eval

## 2022-08-07 ENCOUNTER — TELEPHONE (OUTPATIENT)
Dept: OBSTETRICS AND GYNECOLOGY | Facility: CLINIC | Age: 47
End: 2022-08-07
Payer: COMMERCIAL

## 2022-08-07 LAB — BACTERIA UR CULT: ABNORMAL

## 2022-08-08 ENCOUNTER — TELEPHONE (OUTPATIENT)
Dept: URGENT CARE | Facility: CLINIC | Age: 47
End: 2022-08-08
Payer: COMMERCIAL

## 2022-08-10 ENCOUNTER — TELEPHONE (OUTPATIENT)
Dept: URGENT CARE | Facility: CLINIC | Age: 47
End: 2022-08-10
Payer: COMMERCIAL

## 2022-08-11 ENCOUNTER — TELEPHONE (OUTPATIENT)
Dept: URGENT CARE | Facility: CLINIC | Age: 47
End: 2022-08-11
Payer: COMMERCIAL

## 2022-08-11 NOTE — TELEPHONE ENCOUNTER
Attempted to call for f/u and +ve urine Cx. Pt has bee treated appropriately. Pt's VM is full.   
show

## 2022-08-17 ENCOUNTER — TELEPHONE (OUTPATIENT)
Dept: INTERNAL MEDICINE | Facility: CLINIC | Age: 47
End: 2022-08-17
Payer: COMMERCIAL

## 2022-08-17 NOTE — TELEPHONE ENCOUNTER
I spoke to patient and schedule a endocrinology appointment for October. Patient voices understanding.

## 2022-08-17 NOTE — TELEPHONE ENCOUNTER
----- Message from Gi Magana sent at 8/17/2022  3:57 PM CDT -----  Type:  Patient Returning Call    Who Called:Pt   Who Left Message for Patient: office /   Does the patient know what this is regarding?:   Would the patient rather a call back or a response via [facility]? Call back   Best Call Back Number: [phone]   Additional Information:  Pt says she missed a call from a few days ago

## 2022-08-18 ENCOUNTER — TELEPHONE (OUTPATIENT)
Dept: OBSTETRICS AND GYNECOLOGY | Facility: CLINIC | Age: 47
End: 2022-08-18

## 2022-08-18 NOTE — TELEPHONE ENCOUNTER
----- Message from Gi Magana sent at 8/17/2022  4:00 PM CDT -----  Type:  Patient Returning Call    Who Called:Pt   Who Left Message for Patient: office /   Does the patient know what this is regarding?:   Would the patient rather a call back or a response via [facility]? Call back   Best Call Back Number: [phone]   Additional Information:  Pt says she missed a call from a few days ago

## 2022-08-24 ENCOUNTER — PATIENT MESSAGE (OUTPATIENT)
Dept: ADMINISTRATIVE | Facility: HOSPITAL | Age: 47
End: 2022-08-24
Payer: COMMERCIAL

## 2022-09-22 ENCOUNTER — TELEPHONE (OUTPATIENT)
Dept: INTERNAL MEDICINE | Facility: CLINIC | Age: 47
End: 2022-09-22
Payer: COMMERCIAL

## 2022-09-22 NOTE — TELEPHONE ENCOUNTER
----- Message from Kaushik Lares sent at 9/22/2022 10:08 AM CDT -----  Contact: pt  Type: Requesting to speak with nurse        Who Called: PT  Regarding: regarding appt   Would the patient rather a call back or a response via [facility]? Call back  Best Call Back Number:   Additional Information:

## 2022-10-02 ENCOUNTER — HOSPITAL ENCOUNTER (EMERGENCY)
Facility: HOSPITAL | Age: 47
Discharge: HOME | End: 2022-10-02
Attending: EMERGENCY MEDICINE
Payer: COMMERCIAL

## 2022-10-02 VITALS
OXYGEN SATURATION: 98 % | WEIGHT: 251 LBS | TEMPERATURE: 98 F | BODY MASS INDEX: 40.51 KG/M2 | RESPIRATION RATE: 18 BRPM | SYSTOLIC BLOOD PRESSURE: 159 MMHG | HEART RATE: 93 BPM | DIASTOLIC BLOOD PRESSURE: 91 MMHG

## 2022-10-02 DIAGNOSIS — N76.0 BV (BACTERIAL VAGINOSIS): ICD-10-CM

## 2022-10-02 DIAGNOSIS — N94.6 DYSMENORRHEA: Primary | ICD-10-CM

## 2022-10-02 DIAGNOSIS — B96.89 BV (BACTERIAL VAGINOSIS): ICD-10-CM

## 2022-10-02 LAB
ALBUMIN SERPL BCP-MCNC: 3.5 G/DL (ref 3.5–5.2)
ALP SERPL-CCNC: 77 U/L (ref 55–135)
ALT SERPL W/O P-5'-P-CCNC: 16 U/L (ref 10–44)
ANION GAP SERPL CALC-SCNC: 10 MMOL/L (ref 8–16)
AST SERPL-CCNC: 12 U/L (ref 10–40)
B-HCG UR QL: NEGATIVE
BACTERIA #/AREA URNS HPF: ABNORMAL /HPF
BACTERIA GENITAL QL WET PREP: ABNORMAL
BASOPHILS # BLD AUTO: 0.03 K/UL (ref 0–0.2)
BASOPHILS NFR BLD: 0.3 % (ref 0–1.9)
BILIRUB SERPL-MCNC: 0.8 MG/DL (ref 0.1–1)
BILIRUB UR QL STRIP: NEGATIVE
BUN SERPL-MCNC: 6 MG/DL (ref 6–20)
CALCIUM SERPL-MCNC: 9.1 MG/DL (ref 8.7–10.5)
CHLORIDE SERPL-SCNC: 104 MMOL/L (ref 95–110)
CLARITY UR: ABNORMAL
CLUE CELLS VAG QL WET PREP: ABNORMAL
CO2 SERPL-SCNC: 24 MMOL/L (ref 23–29)
COLOR UR: YELLOW
CREAT SERPL-MCNC: 0.8 MG/DL (ref 0.5–1.4)
CTP QC/QA: YES
DIFFERENTIAL METHOD: ABNORMAL
EOSINOPHIL # BLD AUTO: 0 K/UL (ref 0–0.5)
EOSINOPHIL NFR BLD: 0.4 % (ref 0–8)
ERYTHROCYTE [DISTWIDTH] IN BLOOD BY AUTOMATED COUNT: 13.7 % (ref 11.5–14.5)
EST. GFR  (NO RACE VARIABLE): >60 ML/MIN/1.73 M^2
FILAMENT FUNGI VAG WET PREP-#/AREA: ABNORMAL
GLUCOSE SERPL-MCNC: 223 MG/DL (ref 70–110)
GLUCOSE UR QL STRIP: ABNORMAL
HCT VFR BLD AUTO: 37.5 % (ref 37–48.5)
HGB BLD-MCNC: 12.4 G/DL (ref 12–16)
HGB UR QL STRIP: ABNORMAL
IMM GRANULOCYTES # BLD AUTO: 0.06 K/UL (ref 0–0.04)
IMM GRANULOCYTES NFR BLD AUTO: 0.5 % (ref 0–0.5)
KETONES UR QL STRIP: NEGATIVE
LEUKOCYTE ESTERASE UR QL STRIP: NEGATIVE
LYMPHOCYTES # BLD AUTO: 2.1 K/UL (ref 1–4.8)
LYMPHOCYTES NFR BLD: 18.4 % (ref 18–48)
MCH RBC QN AUTO: 26.6 PG (ref 27–31)
MCHC RBC AUTO-ENTMCNC: 33.1 G/DL (ref 32–36)
MCV RBC AUTO: 81 FL (ref 82–98)
MICROSCOPIC COMMENT: ABNORMAL
MONOCYTES # BLD AUTO: 0.6 K/UL (ref 0.3–1)
MONOCYTES NFR BLD: 5.6 % (ref 4–15)
NEUTROPHILS # BLD AUTO: 8.5 K/UL (ref 1.8–7.7)
NEUTROPHILS NFR BLD: 74.8 % (ref 38–73)
NITRITE UR QL STRIP: NEGATIVE
NRBC BLD-RTO: 0 /100 WBC
PH UR STRIP: 7 [PH] (ref 5–8)
PLATELET # BLD AUTO: 374 K/UL (ref 150–450)
PMV BLD AUTO: 9.9 FL (ref 9.2–12.9)
POTASSIUM SERPL-SCNC: 3.9 MMOL/L (ref 3.5–5.1)
PROT SERPL-MCNC: 7.3 G/DL (ref 6–8.4)
PROT UR QL STRIP: ABNORMAL
RBC # BLD AUTO: 4.66 M/UL (ref 4–5.4)
RBC #/AREA URNS HPF: 5 /HPF (ref 0–4)
SODIUM SERPL-SCNC: 138 MMOL/L (ref 136–145)
SP GR UR STRIP: 1.02 (ref 1–1.03)
SPECIMEN SOURCE: ABNORMAL
SQUAMOUS #/AREA URNS HPF: 16 /HPF
T VAGINALIS GENITAL QL WET PREP: ABNORMAL
URN SPEC COLLECT METH UR: ABNORMAL
UROBILINOGEN UR STRIP-ACNC: >=8 EU/DL
WBC # BLD AUTO: 11.31 K/UL (ref 3.9–12.7)
WBC #/AREA URNS HPF: 4 /HPF (ref 0–5)
WBC #/AREA VAG WET PREP: ABNORMAL
YEAST GENITAL QL WET PREP: ABNORMAL

## 2022-10-02 PROCEDURE — 87491 CHLMYD TRACH DNA AMP PROBE: CPT | Performed by: PHYSICIAN ASSISTANT

## 2022-10-02 PROCEDURE — 25000003 PHARM REV CODE 250: Performed by: PHYSICIAN ASSISTANT

## 2022-10-02 PROCEDURE — 63600175 PHARM REV CODE 636 W HCPCS: Performed by: PHYSICIAN ASSISTANT

## 2022-10-02 PROCEDURE — 81000 URINALYSIS NONAUTO W/SCOPE: CPT | Performed by: PHYSICIAN ASSISTANT

## 2022-10-02 PROCEDURE — 85025 COMPLETE CBC W/AUTO DIFF WBC: CPT | Performed by: PHYSICIAN ASSISTANT

## 2022-10-02 PROCEDURE — 96375 TX/PRO/DX INJ NEW DRUG ADDON: CPT

## 2022-10-02 PROCEDURE — 87591 N.GONORRHOEAE DNA AMP PROB: CPT | Performed by: PHYSICIAN ASSISTANT

## 2022-10-02 PROCEDURE — 96361 HYDRATE IV INFUSION ADD-ON: CPT

## 2022-10-02 PROCEDURE — 87210 SMEAR WET MOUNT SALINE/INK: CPT | Performed by: PHYSICIAN ASSISTANT

## 2022-10-02 PROCEDURE — 80053 COMPREHEN METABOLIC PANEL: CPT | Performed by: PHYSICIAN ASSISTANT

## 2022-10-02 PROCEDURE — 96374 THER/PROPH/DIAG INJ IV PUSH: CPT

## 2022-10-02 PROCEDURE — 99285 EMERGENCY DEPT VISIT HI MDM: CPT | Mod: 25

## 2022-10-02 PROCEDURE — 81025 URINE PREGNANCY TEST: CPT | Performed by: PHYSICIAN ASSISTANT

## 2022-10-02 RX ORDER — ONDANSETRON 2 MG/ML
4 INJECTION INTRAMUSCULAR; INTRAVENOUS
Status: COMPLETED | OUTPATIENT
Start: 2022-10-02 | End: 2022-10-02

## 2022-10-02 RX ORDER — KETOROLAC TROMETHAMINE 30 MG/ML
15 INJECTION, SOLUTION INTRAMUSCULAR; INTRAVENOUS
Status: COMPLETED | OUTPATIENT
Start: 2022-10-02 | End: 2022-10-02

## 2022-10-02 RX ORDER — KETOROLAC TROMETHAMINE 10 MG/1
10 TABLET, FILM COATED ORAL EVERY 6 HOURS
Qty: 20 TABLET | Refills: 0 | Status: SHIPPED | OUTPATIENT
Start: 2022-10-02 | End: 2022-10-07

## 2022-10-02 RX ORDER — METRONIDAZOLE 500 MG/1
500 TABLET ORAL
Status: COMPLETED | OUTPATIENT
Start: 2022-10-02 | End: 2022-10-02

## 2022-10-02 RX ORDER — METRONIDAZOLE 500 MG/1
500 TABLET ORAL EVERY 12 HOURS
Qty: 14 TABLET | Refills: 0 | Status: SHIPPED | OUTPATIENT
Start: 2022-10-02 | End: 2022-10-09

## 2022-10-02 RX ADMIN — METRONIDAZOLE 500 MG: 500 TABLET ORAL at 03:10

## 2022-10-02 RX ADMIN — ONDANSETRON 4 MG: 2 INJECTION INTRAMUSCULAR; INTRAVENOUS at 01:10

## 2022-10-02 RX ADMIN — KETOROLAC TROMETHAMINE 15 MG: 30 INJECTION, SOLUTION INTRAMUSCULAR; INTRAVENOUS at 01:10

## 2022-10-02 RX ADMIN — SODIUM CHLORIDE 1000 ML: 0.9 INJECTION, SOLUTION INTRAVENOUS at 01:10

## 2022-10-02 NOTE — DISCHARGE INSTRUCTIONS

## 2022-10-02 NOTE — ED PROVIDER NOTES
"Encounter Date: 10/2/2022       History     Chief Complaint   Patient presents with    Abdominal Pain     Pt reports lower abd pain that began yesterday,  started menstrual cycle yesterday, + nausea and x3 emesis, + pain with urination, no relief from Midol      47-year-old female presents ED with concern lower abdominal pain, nausea and 3 episodes of vomiting that began yesterday.  She does report beginning her menstrual cycle 2 days ago.  She does admit to history of painful menstrual cycles with significant bleeding, but reports this pain is "different".  She has continued to have bleeding today, but reports bleeding is "light" today.  Pain described as sharp, not cramping, constant, radiating throughout lower abdomen, severity 10/10.  She has taken Midol with no improvement of her symptoms.  She does report having history of endometrial ablation but with no improvement of her painful menstrual cycles.  No fevers, chills, chest pain, cough, shortness of breath, dysuria, changes in urine color or frequency, constipation, diarrhea.  Last BM this morning, uneventful.  Passing flatus.  No vaginal discharge, skin changes, irritations or concern for STD.  Denies other abdominal surgeries.  No other acute complaints at this time.    The history is provided by the patient.   Review of patient's allergies indicates:  No Known Allergies  Past Medical History:   Diagnosis Date    Depression     Diabetes mellitus, type 2     Hyperlipidemia     Hypertension     Vision impairment     wears glasses     Past Surgical History:   Procedure Laterality Date    ENDOMETRIAL ABLATION N/A 8/11/2020    Procedure: ABLATION, ENDOMETRIUM;  Surgeon: Michael A. Wiedemann, MD;  Location: [facility] OR;  Service: OB/GYN;  Laterality: N/A;    HYSTEROSCOPY WITH DILATION AND CURETTAGE OF UTERUS N/A 8/11/2020    Procedure: HYSTEROSCOPY, WITH DILATION AND CURETTAGE OF UTERUS;  Surgeon: Michael A. Wiedemann, MD;  Location: [facility] OR;  Service: OB/GYN;  " Laterality: N/A;     Family History   Problem Relation Age of Onset    Hypertension Mother 55            Diabetes Mother     Cancer Mother         cervical    Heart disease Father     Diabetes Sister     Hypertension Sister     Cancer Sister         cervical    Breast cancer Sister     Diabetes Maternal Grandmother      Social History     Tobacco Use    Smoking status: Never    Smokeless tobacco: Never   Substance Use Topics    Alcohol use: No    Drug use: No     Review of Systems   Constitutional:  Negative for chills and fever.   HENT:  Negative for sore throat.    Respiratory:  Negative for cough and shortness of breath.    Cardiovascular:  Negative for chest pain.   Gastrointestinal:  Positive for abdominal pain, nausea and vomiting. Negative for blood in stool, constipation and diarrhea.   Genitourinary:  Positive for vaginal bleeding. Negative for decreased urine volume, dysuria, flank pain, frequency, vaginal discharge and vaginal pain.   Musculoskeletal:  Negative for back pain, neck pain and neck stiffness.   Neurological:  Negative for headaches.     Physical Exam     Initial Vitals [10/02/22 1248]   BP Pulse Resp Temp SpO2   (!) 159/91 93 18 98.4 °F (36.9 °C) 98 %      MAP       --         Physical Exam    Nursing note and vitals reviewed.  Constitutional: She appears well-developed and well-nourished. She is cooperative. She does not have a sickly appearance. She does not appear ill. No distress.   Appears to be in discomfort but in no distress   HENT:   Head: Normocephalic and atraumatic.   Eyes: EOM are normal.   Neck: Neck supple.   Normal range of motion.  Cardiovascular:  Normal rate, regular rhythm and normal heart sounds.           Pulmonary/Chest: Effort normal and breath sounds normal.   Abdominal: Abdomen is soft. There is abdominal tenderness in the right lower quadrant, suprapubic area and left lower quadrant.   Generalized abdominal tenderness throughout lower abdomen.  No specific area  pinpoint tenderness.  Bowel sounds intact throughout all 4 quadrants.  No guarding.  No CVA tenderness.   Musculoskeletal:      Cervical back: Normal range of motion and neck supple.     Neurological: She is alert. She is not disoriented. GCS eye subscore is 4. GCS verbal subscore is 5. GCS motor subscore is 6.   Skin: Skin is warm and dry.   Psychiatric: She has a normal mood and affect. Her speech is normal and behavior is normal. Thought content normal.       ED Course   Procedures  Labs Reviewed   VAGINAL SCREEN - Abnormal; Notable for the following components:       Result Value    Clue Cells Few (*)     WBC - Vaginal Screen Occasional (*)     Bacteria - Vaginal Screen Many (*)     All other components within normal limits    Narrative:     Release to patient->Immediate   URINALYSIS, REFLEX TO URINE CULTURE - Abnormal; Notable for the following components:    Appearance, UA Hazy (*)     Protein, UA Trace (*)     Glucose, UA Trace (*)     Occult Blood UA 3+ (*)     Urobilinogen, UA >=8.0 (*)     All other components within normal limits    Narrative:     Specimen Source->Urine   CBC W/ AUTO DIFFERENTIAL - Abnormal; Notable for the following components:    MCV 81 (*)     MCH 26.6 (*)     Gran # (ANC) 8.5 (*)     Immature Grans (Abs) 0.06 (*)     Gran % 74.8 (*)     All other components within normal limits   COMPREHENSIVE METABOLIC PANEL - Abnormal; Notable for the following components:    Glucose 223 (*)     All other components within normal limits   URINALYSIS MICROSCOPIC - Abnormal; Notable for the following components:    RBC, UA 5 (*)     All other components within normal limits    Narrative:     Specimen Source->Urine   C. TRACHOMATIS/N. GONORRHOEAE BY AMP DNA   C. TRACHOMATIS/N. GONORRHOEAE BY AMP DNA   POCT URINE PREGNANCY          Imaging Results              US Pelvis Comp with Transvag NON-OB (xpd) (Final result)  Result time 10/02/22 15:40:03   Procedure changed from US Pelvis Complete Non OB      "Final result by Jimmie Hull MD (10/02/22 15:40:03)                   Impression:      Technically limited examination secondary to orientation of the uterus and difficulty the with penetration.    No acute process.    Two intramural uterine fibroids.    Endometrium is not well visualized.      Electronically signed by: Jimmie Hull MD  Date:    10/02/2022  Time:    15:40               Narrative:    EXAMINATION:  US PELVIS COMP WITH TRANSVAG NON-OB (XPD)    CLINICAL HISTORY:  pelvic pain;    TECHNIQUE:  Transabdominal sonography of the pelvis was performed, followed by transvaginal sonography to better evaluate the uterus and ovaries.    COMPARISON:  None.    FINDINGS:  Uterus:    Size: 11.7 x 5.2 x 5.6 cm    Masses: 2 uterine fibroids measuring 1.5 x 0.8 x 1.1 cm and 1.0 x 0.9 x 1.0 cm.    Endometrium: Endometrium is obscured by shadowing and inability to penetrate secondary to orientation of the uterus..    Right ovary:    Not visualized.  No evidence of adnexal mass.    Left ovary:    Size: 4.0 x 1.9 x 2.5 cm    Appearance: Normal    Vascular Flow: Unable to obtain due to technical factors.    Free Fluid:    Small volume of fluid in the pelvis.                                       Medications   sodium chloride 0.9% bolus 1,000 mL (0 mLs Intravenous Stopped 10/2/22 1435)   ketorolac injection 15 mg (15 mg Intravenous Given 10/2/22 1323)   ondansetron injection 4 mg (4 mg Intravenous Given 10/2/22 1323)   metroNIDAZOLE tablet 500 mg (500 mg Oral Given 10/2/22 1551)     Medical Decision Making:   Initial Assessment:   Patient presents with generalized lower abdominal pain, nausea and vomiting that began yesterday.  She does report beginning her menstrual cycle 1 day prior to onset of symptoms.  She also admits to history of painful cycles, but reports her current pain symptoms are "different".  She has continued have light bleeding today.  Afebrile with vitals grossly unremarkable.  Patient otherwise " resting comfortably but does appear to be in discomfort.  Generalized lower abdominal tenderness noted with no specific area pinpoint tenderness.  Bowel sounds intact but  Differential Diagnosis:   Dysmenorrhea, menstrual cycle, UTI, pyelonephritis, nephrolithiasis, urolithiasis, GERD, ileus, small bowel obstruction, appendicitis, diverticulitis, colitis, constipation, intestinal gas pain.      ED Management:  CBC, CMP, UA, UPT    UPT negative.  UA is noting 3+ occult blood with 5 RBC, suspected during vaginal bleeding.  No significant findings to suggest urinary tract infection.    Vaginal screen positive for clue cells; patient given 1st dose of Flagyl in ED along with prescription.  G/C test is pending at this time.  Will plan to contact patient for any positive findings.  She reports no concern for STD at this time and is deferring prophylactic treatment.    CBC and CMP grossly unremarkable.  Pelvic ultrasound noted to be limited, but showing no significant acute abnormalities.  Noted to have fibroids.                        Clinical Impression:   Final diagnoses:  [N94.6] Dysmenorrhea (Primary)  [N76.0, B96.89] BV (bacterial vaginosis)      ED Disposition Condition    Discharge Stable          ED Prescriptions       Medication Sig Dispense Start Date End Date Auth. Provider    metroNIDAZOLE (FLAGYL) 500 MG tablet Take 1 tablet (500 mg total) by mouth every 12 (twelve) hours. for 7 days 14 tablet 10/2/2022 10/9/2022 Gabriel Muñiz PA-C    ketorolac (TORADOL) 10 mg tablet Take 1 tablet (10 mg total) by mouth every 6 (six) hours. for 5 days 20 tablet 10/2/2022 10/7/2022 Gabriel Muñiz PA-C          Follow-up Information       Follow up With Specialties Details Why Contact Info    Your OB/GYN        Tatum Santos MD Internal Medicine   [address] [facility] [place] [address]  [phone]               Gabriel Muñiz PA-C  10/02/22 0524

## 2022-10-03 LAB
C TRACH DNA SPEC QL NAA+PROBE: NOT DETECTED
N GONORRHOEA DNA SPEC QL NAA+PROBE: NOT DETECTED

## 2022-10-31 ENCOUNTER — PATIENT OUTREACH (OUTPATIENT)
Dept: ADMINISTRATIVE | Facility: HOSPITAL | Age: 47
End: 2022-10-31
Payer: COMMERCIAL

## 2022-10-31 NOTE — LETTER
AUTHORIZATION FOR RELEASE OF   CONFIDENTIAL INFORMATION    Dear [facility],    We are seeing Hamzah Reis, date of birth 1975, in the clinic at [facility] INTER[facility]. Tatum Santos MD is the patient's PCP. Hamzah Reis has an outstanding lab/procedure at the time we reviewed her chart. In order to help keep her health information updated, she has authorized us to request the following medical record(s):                                              (X) DIABETIC EYE EXAM                    Please fax records to [facility], Shelly L Swindler, MD,                    [facility]                                                                     Please Fax to [facility] at [phone].     Thank you for your help, Daphne Isbell [facility]. If I can be of any assistance you can call at [phone]                      Patient Name: Hamzah Reis  : 1975  Patient Phone #: [phone]

## 2022-10-31 NOTE — PROGRESS NOTES
Non-compliant [facility] report chart review -  10/31/2022  Chart review completed for the following  test if overdue  (Mammogram, Colonoscopy, Cervical Cancer Screening,  Diabetic lab testing, and/or Dilated EYE EXAM)   Care Everywhere and Media reports - updates requested and reviewed.    [facility] and [facility] reviewed. DIS reviewed for test needed.   [facility] Due   Topic Date Due    TETANUS VACCINE  03/03/2014    Pneumococcal Vaccines (Age 0-64) (2 - PCV) 09/29/2016    Eye Exam  12/26/2020    COVID-19 Vaccine (3 - Booster for Moderna series) 07/13/2021    Diabetes Urine Screening  07/30/2021    Foot Exam  05/17/2022    Colorectal Cancer Screening  Never done    Influenza Vaccine (1) 09/01/2022    Hemoglobin A1c  09/20/2022

## 2022-12-21 DIAGNOSIS — E11.9 TYPE 2 DIABETES MELLITUS WITHOUT COMPLICATION, UNSPECIFIED WHETHER LONG TERM INSULIN USE: ICD-10-CM

## 2023-02-06 ENCOUNTER — OFFICE VISIT (OUTPATIENT)
Dept: ENDOCRINOLOGY | Facility: CLINIC | Age: 48
End: 2023-02-06
Payer: COMMERCIAL

## 2023-02-06 VITALS
OXYGEN SATURATION: 99 % | SYSTOLIC BLOOD PRESSURE: 114 MMHG | DIASTOLIC BLOOD PRESSURE: 78 MMHG | HEIGHT: 66 IN | HEART RATE: 84 BPM | BODY MASS INDEX: 39.72 KG/M2 | WEIGHT: 247.13 LBS

## 2023-02-06 DIAGNOSIS — E11.42 TYPE 2 DIABETES MELLITUS WITH DIABETIC POLYNEUROPATHY, WITH LONG-TERM CURRENT USE OF INSULIN: Primary | ICD-10-CM

## 2023-02-06 DIAGNOSIS — Z79.4 TYPE 2 DIABETES MELLITUS WITH DIABETIC POLYNEUROPATHY, WITH LONG-TERM CURRENT USE OF INSULIN: Primary | ICD-10-CM

## 2023-02-06 DIAGNOSIS — E78.5 HYPERLIPIDEMIA ASSOCIATED WITH TYPE 2 DIABETES MELLITUS: ICD-10-CM

## 2023-02-06 DIAGNOSIS — I10 PRIMARY HYPERTENSION: ICD-10-CM

## 2023-02-06 DIAGNOSIS — E11.69 HYPERLIPIDEMIA ASSOCIATED WITH TYPE 2 DIABETES MELLITUS: ICD-10-CM

## 2023-02-06 PROCEDURE — 99999 PR PBB SHADOW E&M-EST. PATIENT-LVL V: ICD-10-PCS | Mod: PBBFAC,,, | Performed by: INTERNAL MEDICINE

## 2023-02-06 PROCEDURE — 99204 OFFICE O/P NEW MOD 45 MIN: CPT | Mod: S$GLB,,, | Performed by: INTERNAL MEDICINE

## 2023-02-06 PROCEDURE — 1160F PR REVIEW ALL MEDS BY PRESCRIBER/CLIN PHARMACIST DOCUMENTED: ICD-10-PCS | Mod: CPTII,S$GLB,, | Performed by: INTERNAL MEDICINE

## 2023-02-06 PROCEDURE — 99204 PR OFFICE/OUTPT VISIT, NEW, LEVL IV, 45-59 MIN: ICD-10-PCS | Mod: S$GLB,,, | Performed by: INTERNAL MEDICINE

## 2023-02-06 PROCEDURE — 3078F DIAST BP <80 MM HG: CPT | Mod: CPTII,S$GLB,, | Performed by: INTERNAL MEDICINE

## 2023-02-06 PROCEDURE — 1160F RVW MEDS BY RX/DR IN RCRD: CPT | Mod: CPTII,S$GLB,, | Performed by: INTERNAL MEDICINE

## 2023-02-06 PROCEDURE — 3008F PR BODY MASS INDEX (BMI) DOCUMENTED: ICD-10-PCS | Mod: CPTII,S$GLB,, | Performed by: INTERNAL MEDICINE

## 2023-02-06 PROCEDURE — 99999 PR PBB SHADOW E&M-EST. PATIENT-LVL V: CPT | Mod: PBBFAC,,, | Performed by: INTERNAL MEDICINE

## 2023-02-06 PROCEDURE — 3008F BODY MASS INDEX DOCD: CPT | Mod: CPTII,S$GLB,, | Performed by: INTERNAL MEDICINE

## 2023-02-06 PROCEDURE — 3074F SYST BP LT 130 MM HG: CPT | Mod: CPTII,S$GLB,, | Performed by: INTERNAL MEDICINE

## 2023-02-06 PROCEDURE — 3078F PR MOST RECENT DIASTOLIC BLOOD PRESSURE < 80 MM HG: ICD-10-PCS | Mod: CPTII,S$GLB,, | Performed by: INTERNAL MEDICINE

## 2023-02-06 PROCEDURE — 1159F MED LIST DOCD IN RCRD: CPT | Mod: CPTII,S$GLB,, | Performed by: INTERNAL MEDICINE

## 2023-02-06 PROCEDURE — 3074F PR MOST RECENT SYSTOLIC BLOOD PRESSURE < 130 MM HG: ICD-10-PCS | Mod: CPTII,S$GLB,, | Performed by: INTERNAL MEDICINE

## 2023-02-06 PROCEDURE — 1159F PR MEDICATION LIST DOCUMENTED IN MEDICAL RECORD: ICD-10-PCS | Mod: CPTII,S$GLB,, | Performed by: INTERNAL MEDICINE

## 2023-02-06 RX ORDER — METFORMIN HYDROCHLORIDE 500 MG/1
500 TABLET, EXTENDED RELEASE ORAL 2 TIMES DAILY WITH MEALS
Qty: 180 TABLET | Refills: 3 | Status: SHIPPED | OUTPATIENT
Start: 2023-02-06 | End: 2024-03-29

## 2023-02-06 RX ORDER — BLOOD-GLUCOSE SENSOR
1 EACH MISCELLANEOUS
Qty: 2 EACH | Refills: 11 | Status: SHIPPED | OUTPATIENT
Start: 2023-02-06 | End: 2023-06-22

## 2023-02-06 RX ORDER — SEMAGLUTIDE 1.34 MG/ML
0.5 INJECTION, SOLUTION SUBCUTANEOUS
Qty: 1 PEN | Refills: 11 | Status: SHIPPED | OUTPATIENT
Start: 2023-02-06 | End: 2023-06-20

## 2023-02-06 NOTE — PATIENT INSTRUCTIONS
I will refer you to diabetes education    I sent Cande to pharmacy but call you insurance to ask about coverage for Cande or Dexcom    Ozempic start  Start taking Ozempic 0.25 mg once weekly for 4 weeks then increase to 0.5 mg once weekly for at least 4 weeks    Depending on your glucose we will consider the need to increase to 1 mg once weekly.     Potential Side Effects of Ozempic:   One of the ways this medication works is by decreasing how fast your stomach empties, which makes you feel full faster after you eat and should help you lose weight. The main side-effects are related to GI upset, such as nausea, bloating and abdominal cramps. You can usually avoid this by eating slowly (take half of your normal portion and eat it over 30 minutes). If you do experience nausea, it does tend to get better after the first few weeks. The most severe reaction is acute pancreatitis which can cause severe abdominal pain radiating to your back. If you experience this, stop the medication and go to the ER. Fortunately this is very rare.    Change to metformin XR at 500 mg twice daily. Always take with food  Cont levemir 25 units nightly    Check sugars 2-3 times a day    The phone number for the patient portal help desk is [phone]

## 2023-02-06 NOTE — ASSESSMENT & PLAN NOTE
Start ozempic and diet change as above  Considered sleeve in past and this maybe good option for her once sugars better controlled

## 2023-02-06 NOTE — ASSESSMENT & PLAN NOTE
Uncontrolled since time of diagnosis  Reviewed need for control to prevent complications which she is already developing  Will do the following:  Patient Instructions   I will refer you to diabetes education    I sent Cande to pharmacy but call you insurance to ask about coverage for Cande or Dexcom    Ozempic start  Start taking Ozempic 0.25 mg once weekly for 4 weeks then increase to 0.5 mg once weekly for at least 4 weeks    Depending on your glucose we will consider the need to increase to 1 mg once weekly.     Potential Side Effects of Ozempic:   One of the ways this medication works is by decreasing how fast your stomach empties, which makes you feel full faster after you eat and should help you lose weight. The main side-effects are related to GI upset, such as nausea, bloating and abdominal cramps. You can usually avoid this by eating slowly (take half of your normal portion and eat it over 30 minutes). If you do experience nausea, it does tend to get better after the first few weeks. The most severe reaction is acute pancreatitis which can cause severe abdominal pain radiating to your back. If you experience this, stop the medication and go to the ER. Fortunately this is very rare.    Change to metformin XR at 500 mg twice daily. Always take with food  Cont levemir 25 units nightly    Check sugars 2-3 times a day    The phone number for the patient portal help desk is [phone]
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
## 2023-02-07 ENCOUNTER — TELEPHONE (OUTPATIENT)
Dept: ADMINISTRATIVE | Facility: HOSPITAL | Age: 48
End: 2023-02-07
Payer: COMMERCIAL

## 2023-02-07 ENCOUNTER — PATIENT MESSAGE (OUTPATIENT)
Dept: ADMINISTRATIVE | Facility: HOSPITAL | Age: 48
End: 2023-02-07
Payer: COMMERCIAL

## 2023-02-10 ENCOUNTER — LAB VISIT (OUTPATIENT)
Dept: LAB | Facility: HOSPITAL | Age: 48
End: 2023-02-10
Attending: INTERNAL MEDICINE
Payer: COMMERCIAL

## 2023-02-10 ENCOUNTER — PATIENT MESSAGE (OUTPATIENT)
Dept: ENDOCRINOLOGY | Facility: CLINIC | Age: 48
End: 2023-02-10
Payer: COMMERCIAL

## 2023-02-10 ENCOUNTER — PROCEDURE VISIT (OUTPATIENT)
Dept: OBSTETRICS AND GYNECOLOGY | Facility: CLINIC | Age: 48
End: 2023-02-10
Payer: COMMERCIAL

## 2023-02-10 VITALS — WEIGHT: 248.44 LBS | BODY MASS INDEX: 40.1 KG/M2 | SYSTOLIC BLOOD PRESSURE: 102 MMHG | DIASTOLIC BLOOD PRESSURE: 82 MMHG

## 2023-02-10 DIAGNOSIS — E11.42 TYPE 2 DIABETES MELLITUS WITH DIABETIC POLYNEUROPATHY, WITH LONG-TERM CURRENT USE OF INSULIN: ICD-10-CM

## 2023-02-10 DIAGNOSIS — Z79.4 TYPE 2 DIABETES MELLITUS WITH DIABETIC POLYNEUROPATHY, WITH LONG-TERM CURRENT USE OF INSULIN: ICD-10-CM

## 2023-02-10 DIAGNOSIS — E11.42 TYPE 2 DIABETES MELLITUS WITH DIABETIC POLYNEUROPATHY, WITH LONG-TERM CURRENT USE OF INSULIN: Primary | ICD-10-CM

## 2023-02-10 DIAGNOSIS — Z79.4 TYPE 2 DIABETES MELLITUS WITH DIABETIC POLYNEUROPATHY, WITH LONG-TERM CURRENT USE OF INSULIN: Primary | ICD-10-CM

## 2023-02-10 DIAGNOSIS — R10.2 PELVIC PAIN IN FEMALE: Primary | ICD-10-CM

## 2023-02-10 LAB
ALBUMIN SERPL BCP-MCNC: 3.8 G/DL (ref 3.5–5.2)
ALP SERPL-CCNC: 73 U/L (ref 55–135)
ALT SERPL W/O P-5'-P-CCNC: 17 U/L (ref 10–44)
ANION GAP SERPL CALC-SCNC: 8 MMOL/L (ref 8–16)
AST SERPL-CCNC: 11 U/L (ref 10–40)
BILIRUB SERPL-MCNC: 0.5 MG/DL (ref 0.1–1)
BUN SERPL-MCNC: 10 MG/DL (ref 6–20)
CALCIUM SERPL-MCNC: 9.6 MG/DL (ref 8.7–10.5)
CHLORIDE SERPL-SCNC: 100 MMOL/L (ref 95–110)
CHOLEST SERPL-MCNC: 196 MG/DL (ref 120–199)
CHOLEST/HDLC SERPL: 4.6 {RATIO} (ref 2–5)
CO2 SERPL-SCNC: 26 MMOL/L (ref 23–29)
CREAT SERPL-MCNC: 0.8 MG/DL (ref 0.5–1.4)
EST. GFR  (NO RACE VARIABLE): >60 ML/MIN/1.73 M^2
ESTIMATED AVG GLUCOSE: 246 MG/DL (ref 68–131)
GLUCOSE SERPL-MCNC: 224 MG/DL (ref 70–110)
HBA1C MFR BLD: 10.2 % (ref 4–5.6)
HDLC SERPL-MCNC: 43 MG/DL (ref 40–75)
HDLC SERPL: 21.9 % (ref 20–50)
LDLC SERPL CALC-MCNC: 134.4 MG/DL (ref 63–159)
NONHDLC SERPL-MCNC: 153 MG/DL
POTASSIUM SERPL-SCNC: 4.6 MMOL/L (ref 3.5–5.1)
PROT SERPL-MCNC: 7.8 G/DL (ref 6–8.4)
SODIUM SERPL-SCNC: 134 MMOL/L (ref 136–145)
TRIGL SERPL-MCNC: 93 MG/DL (ref 30–150)
VIT B12 SERPL-MCNC: 386 PG/ML (ref 210–950)

## 2023-02-10 PROCEDURE — 88305 TISSUE EXAM BY PATHOLOGIST: ICD-10-PCS | Mod: 26,,, | Performed by: PATHOLOGY

## 2023-02-10 PROCEDURE — 80053 COMPREHEN METABOLIC PANEL: CPT | Performed by: INTERNAL MEDICINE

## 2023-02-10 PROCEDURE — 36415 COLL VENOUS BLD VENIPUNCTURE: CPT | Performed by: INTERNAL MEDICINE

## 2023-02-10 PROCEDURE — 99499 UNLISTED E&M SERVICE: CPT | Mod: S$GLB,,, | Performed by: OBSTETRICS & GYNECOLOGY

## 2023-02-10 PROCEDURE — 82607 VITAMIN B-12: CPT | Performed by: INTERNAL MEDICINE

## 2023-02-10 PROCEDURE — 99499 NO LOS: ICD-10-PCS | Mod: S$GLB,,, | Performed by: OBSTETRICS & GYNECOLOGY

## 2023-02-10 PROCEDURE — 88305 TISSUE EXAM BY PATHOLOGIST: CPT | Mod: 26,,, | Performed by: PATHOLOGY

## 2023-02-10 PROCEDURE — 80061 LIPID PANEL: CPT | Performed by: INTERNAL MEDICINE

## 2023-02-10 PROCEDURE — 83036 HEMOGLOBIN GLYCOSYLATED A1C: CPT | Performed by: INTERNAL MEDICINE

## 2023-02-10 PROCEDURE — 57456 PR COLPOSCOPY,CERVIX W/ADJ VAGINA, CURETTAG: ICD-10-PCS | Mod: S$GLB,,, | Performed by: OBSTETRICS & GYNECOLOGY

## 2023-02-10 PROCEDURE — 57456 ENDOCERV CURETTAGE W/SCOPE: CPT | Mod: S$GLB,,, | Performed by: OBSTETRICS & GYNECOLOGY

## 2023-02-10 PROCEDURE — 88305 TISSUE EXAM BY PATHOLOGIST: CPT | Performed by: PATHOLOGY

## 2023-02-10 RX ORDER — TRAMADOL HYDROCHLORIDE 50 MG/1
50 TABLET ORAL EVERY 8 HOURS PRN
Qty: 20 TABLET | Refills: 0 | Status: SHIPPED | OUTPATIENT
Start: 2023-02-10 | End: 2023-06-22

## 2023-02-10 NOTE — PROGRESS NOTES
Office visit    47 y.o.   OB History          2    Para   2    Term   2            AB        Living             SAB        IAB        Ectopic        Multiple        Live Births                   Comlaining of:p there for colposcopy  Also co bad cycles, pain, was to have hyst but   Sugar was high  Discussed colpo        ROS:  GENERAL: No fever, chills, fatigability or weight loss.  SKIN: No rashes, itching or changes in color or texture of skin.  HEAD: No headaches or recent head trauma.  EYES: Visual acuity fine. No photophobia, ocular pain or diplopia.  EARS: Denies ear pain, discharge or vertigo.  NOSE: No loss of smell, no epistaxis or postnasal drip.  MOUTH & THROAT: No hoarseness or change in voice. No excessive gum bleeding.  NODES: Denies swollen glands.  CHEST: Denies ANDERS, cyanosis, wheezing, cough and sputum production.  CARDIOVASCULAR: Denies chest pain, PND, orthopnea or reduced exercise tolerance.  ABDOMEN: Appetite fine. No weight loss. Denies diarrhea, abdominal pain, hematemesis or blood in stool.  URINARY: No flank pain, dysuria or hematuria.  PERIPHERAL VASCULAR: No claudication or cyanosis.  MUSCULOSKELETAL: No joint stiffness or swelling. Denies back pain.  NEUROLOGIC: No history of seizures, paralysis, alteration of gait or coordination      PE: /82   Wt 112.7 kg (248 lb 7.3 oz)   LMP 2023 (Exact Date)   BMI 40.10 kg/m²      Colposcopy;  Disussed reasons  Prev neg colpo  Cx viewed after cleanse  No awfl externally  Good ecc done  Mod tissue  Orquidea well    Discussed pelvic pain,  Rx given to use sparing ly  Wait for bx results  Just started ozempic, so hope to loose wt also    A abn pap, colpsoscopy done  Plan, fu after reulst of ecc

## 2023-02-16 LAB
FINAL PATHOLOGIC DIAGNOSIS: NORMAL
GROSS: NORMAL
Lab: NORMAL

## 2023-02-19 ENCOUNTER — PATIENT MESSAGE (OUTPATIENT)
Dept: OBSTETRICS AND GYNECOLOGY | Facility: CLINIC | Age: 48
End: 2023-02-19
Payer: COMMERCIAL

## 2023-02-22 ENCOUNTER — PATIENT MESSAGE (OUTPATIENT)
Dept: OBSTETRICS AND GYNECOLOGY | Facility: CLINIC | Age: 48
End: 2023-02-22
Payer: COMMERCIAL

## 2023-02-22 ENCOUNTER — TELEPHONE (OUTPATIENT)
Dept: OBSTETRICS AND GYNECOLOGY | Facility: CLINIC | Age: 48
End: 2023-02-22
Payer: COMMERCIAL

## 2023-03-02 ENCOUNTER — TELEPHONE (OUTPATIENT)
Dept: ADMINISTRATIVE | Facility: HOSPITAL | Age: 48
End: 2023-03-02
Payer: COMMERCIAL

## 2023-03-06 ENCOUNTER — TELEPHONE (OUTPATIENT)
Dept: ADMINISTRATIVE | Facility: HOSPITAL | Age: 48
End: 2023-03-06
Payer: COMMERCIAL

## 2023-03-08 ENCOUNTER — TELEPHONE (OUTPATIENT)
Dept: ADMINISTRATIVE | Facility: HOSPITAL | Age: 48
End: 2023-03-08
Payer: COMMERCIAL

## 2023-03-30 ENCOUNTER — TELEPHONE (OUTPATIENT)
Dept: OBSTETRICS AND GYNECOLOGY | Facility: CLINIC | Age: 48
End: 2023-03-30
Payer: COMMERCIAL

## 2023-03-30 NOTE — TELEPHONE ENCOUNTER
Patient stated that  she wasn't able to  the tramadol and would like for it to be sent to a different pharmacy       KATERINA Macedo

## 2023-03-30 NOTE — TELEPHONE ENCOUNTER
----- Message from Nunu Boone sent at 3/30/2023  9:29 AM CDT -----  Type:  Patient Returning Call    Who Called:Pt   Does the patient know what this is regarding?:calling regarding traMADoL (ULTRAM) 50 mg tablet  She stated she didn't pick this medication up when provider prescribed this.  Would the patient rather a call back or a response via [facility]? Call   Best Call Back Number: [phone]  Additional Information:

## 2023-04-01 ENCOUNTER — OFFICE VISIT (OUTPATIENT)
Dept: URGENT CARE | Facility: CLINIC | Age: 48
End: 2023-04-01
Payer: COMMERCIAL

## 2023-04-01 VITALS
TEMPERATURE: 98 F | DIASTOLIC BLOOD PRESSURE: 90 MMHG | BODY MASS INDEX: 39.86 KG/M2 | OXYGEN SATURATION: 97 % | SYSTOLIC BLOOD PRESSURE: 138 MMHG | HEART RATE: 82 BPM | RESPIRATION RATE: 18 BRPM | WEIGHT: 248 LBS | HEIGHT: 66 IN

## 2023-04-01 DIAGNOSIS — U07.1 COVID-19 VIRUS INFECTION: Primary | ICD-10-CM

## 2023-04-01 DIAGNOSIS — H00.014 HORDEOLUM EXTERNUM OF LEFT UPPER EYELID: ICD-10-CM

## 2023-04-01 LAB
CTP QC/QA: YES
SARS-COV-2 AG RESP QL IA.RAPID: POSITIVE

## 2023-04-01 PROCEDURE — 87811 SARS CORONAVIRUS 2 ANTIGEN POCT, MANUAL READ: ICD-10-PCS | Mod: QW,S$GLB,, | Performed by: NURSE PRACTITIONER

## 2023-04-01 PROCEDURE — 87811 SARS-COV-2 COVID19 W/OPTIC: CPT | Mod: QW,S$GLB,, | Performed by: NURSE PRACTITIONER

## 2023-04-01 PROCEDURE — 99214 PR OFFICE/OUTPT VISIT, EST, LEVL IV, 30-39 MIN: ICD-10-PCS | Mod: S$GLB,,, | Performed by: NURSE PRACTITIONER

## 2023-04-01 PROCEDURE — 99214 OFFICE O/P EST MOD 30 MIN: CPT | Mod: S$GLB,,, | Performed by: NURSE PRACTITIONER

## 2023-04-01 RX ORDER — ERYTHROMYCIN 5 MG/G
OINTMENT OPHTHALMIC EVERY 6 HOURS
Qty: 3.5 G | Refills: 0 | Status: SHIPPED | OUTPATIENT
Start: 2023-04-01 | End: 2023-04-08

## 2023-04-01 NOTE — PATIENT INSTRUCTIONS
You have tested positive for COVID-19 today.        ISOLATION    If you tested positive and do not have symptoms, you must isolate for 5 days starting on the day of the positive test. I    If you tested positive and have symptoms, you must isolate for 5 days starting on the day of the first symptoms,  not the day of the positive test.    This is the most important part, both the CDC and the [facility] emphasize that you do not test out of isolation.    After 5 days, if your symptoms have improved and you have not had fever on day 5, you can return to the community on day 6- NO TESTING REQUIRED!     In fact, we do not retest if you were positive in the last 90 days.    After your 5 days of isolation are completed, the CDC recommends strict mask use for the first 5 days that you come out of isolation    CDC Testing and Quarantine Guidelines for patients with exposure to a known-positive COVID-19 person:    ·     A 'close exposure' is defined as anyone who has had an exposure (masked or unmasked) to a known COVID -19 positive person            within 6 feet of someone            for a cumulative total of 15 minutes or more over a 24-hour period.    ·     vaccinated Have been boosted or completed the primary series of Pfizer or Moderna vaccine within the last 6 months or completed the primary series of J&J vaccine within the last 2 months and/or had a positive test within 90 days            do NOT need to quarantine after contact with someone who had COVID-19 unless they have symptoms.            fully vaccinated people who have not had a positive test within 90 days, should get tested 3-5 days after their exposure, even if they don't have symptoms and wear a mask indoors in public for 10 days following exposure or until their test result is negative on day 5.            If you develop symptoms test and quarantine.      ·     Unvaccinated, or are more than six months out from their second mRNA dose (or more than 2 months  after the J&J vaccine) and not yet boosted,  and/or NOT had a positive test within 90 days and meet 'close exposure'    you are required by CDC guidelines to quarantine for at least 5 days from time of exposure followed by 5 days of strict masking. It is recommended, but not required to test after 5 days, unless you develop symptoms, in which case you should test at that time.    If you do decide to test at 5 days and are asymptomatic, the risk is that if you test without symptoms on Day 5 for example) and you are positive, your 5 day isolation begins on that day, and you turned your 5 day quarantine into 10 days.            If your exposure does not meet the above definition, you can return to your normal daily activities to include social distancing, wearing a mask and frequent handwashing.    Alternatively, if a 5-day quarantine is not feasible, it is imperative that an exposed person wear a well-fitting mask at all times when around others for 10 days after exposure.           PLEASE READ YOUR DISCHARGE INSTRUCTIONS ENTIRELY AS IT CONTAINS IMPORTANT INFORMATION.      Please drink plenty of fluids.    Please get plenty of rest.    Please return here or go to the Emergency Department for any concerns or worsening of condition.    Please take an over the counter antihistamine medication (allegra/Claritin/Zyrtec) of your choice as directed.    Try an over the counter decongestant like Mucinex D or Sudafed. You buy this behind the pharmacy counter    If you do have Hypertension or palpitations, it is safe to take Coricidin HBP for relief of sinus symptoms.    If not allergic, please take over the counter Tylenol (Acetaminophen) and/or Motrin (Ibuprofen) as directed for control of pain and/or fever.  Please follow up with your primary care doctor or specialist as needed.    Sore throat recommendations: Warm fluids, warm salt water gargles, throat lozenges, tea, honey, soup, rest, hydration.    Use over the counter  flonase: one spray each nostril twice daily OR two sprays each nostril once daily.     If you  smoke, please stop smoking.      Please return or see your primary care doctor if you develop new or worsening symptoms.     Please arrange follow up with your primary medical clinic as soon as possible. You must understand that you've received an Urgent Care treatment only and that you may be released before all of your medical problems are known or treated. You, the patient, will arrange for follow up as instructed. If your symptoms worsen or fail to improve you should go to the Emergency Room.       PLEASE READ YOUR DISCHARGE INSTRUCTIONS ENTIRELY AS IT CONTAINS IMPORTANT INFORMATION.      Warm compresses for 15 minutes at a time to the area.     Use the ointment 4 times daily.     Do not attempt to open the area yourself.     If you find you have the stye without improvement for two weeks, please seen an eye doctor for further treatment.     A referral to ophthamology has been placed for you. Please call [phone] if you need an appt    Please go to the emergency room if you experience loss of vision, swelling around your eye (upper and lower), fever.     Please arrange follow up with your primary medical clinic as soon as possible. You must understand that you've received an Urgent Care treatment only and that you may be released before all of your medical problems are known or treated. You, the patient, will arrange for follow up as instructed. If your symptoms worsen or fail to improve you should go to the Emergency Room.      I will SWITCH the dose or number of times a day I take the medications listed below when I get home from the hospital:  None

## 2023-04-01 NOTE — PROGRESS NOTES
"Subjective:      Patient ID: Hamzah Reis is a 47 y.o. female.    Vitals:  height is 5' 6" (1.676 m) and weight is 112.5 kg (248 lb). Her temperature is 98.2 °F (36.8 °C). Her blood pressure is 138/90 (abnormal) and her pulse is 82. Her respiration is 18 and oxygen saturation is 97%.     Chief Complaint: Headache    47 yr female present with  headache and left eye burry vision.  Symptoms started  about two days ago Treatments at home include Excedrin migraine with no relief  Provider note below:  This is a 47 y.o. female who presents today with a chief complaint of Headache started 2 days ago, reports she took migraine over-the-counter medication Excedrin, patient also reports left eye blurry vision on and off for one week, patient is diabetic and had her last eye exam in 2022, patient reports she does have upcoming appointment in 1 month for eye exam, patient reports history of stye on her left upper eyelid that she had last year and was prescribed the ointment, patient reports she had the left upper eyelid swelling at that time which resolved after using the eye ointment, but her "bump" never went away, denies any drainage or discharge, denies eyelid swelling currently, patient also reports left eye blurred vision on and off since last week,  denies fever, body aches or chills, denies cough, wheezing or shortness of breath, denies nausea, vomiting, diarrhea or abdominal pain, denies chest pain or dizziness positional lightheadedness, denies sore throat or trouble swallowing, denies loss of taste or smell, or any other symptoms        Headache   This is a new problem. The current episode started in the past 7 days. The problem occurs constantly. The problem has been gradually worsening. The pain does not radiate. The quality of the pain is described as aching. The pain is at a severity of 6/10. The pain is moderate. Associated symptoms include blurred vision (on and off). Pertinent negatives include no " dizziness, eye pain, eye redness, hearing loss, insomnia, photophobia, scalp tenderness, seizures, sinus pressure or tinnitus. The symptoms are aggravated by bright light. Treatments tried: Excedrin. The treatment provided no relief. There is no history of migraine headaches.     Constitution: Negative for chills and sweating.   HENT:  Negative for tinnitus, hearing loss and sinus pressure.    Eyes:  Positive for blurred vision (on and off). Negative for eye trauma, eye discharge, eye itching, eye pain, eye redness, photophobia, vision loss, double vision and eyelid swelling.   Neurological:  Positive for headaches. Negative for dizziness, light-headedness, history of migraines and seizures.   Psychiatric/Behavioral:  The patient does not have insomnia.     Objective:     Physical Exam   Constitutional: She is oriented to person, place, and time. She appears well-developed. She is cooperative.  Non-toxic appearance. She does not appear ill. No distress.   HENT:   Head: Normocephalic and atraumatic.   Ears:   Right Ear: Hearing, tympanic membrane, external ear and ear canal normal.   Left Ear: Hearing, tympanic membrane, external ear and ear canal normal.   Nose: Nose normal. No mucosal edema, rhinorrhea or nasal deformity. No epistaxis. Right sinus exhibits no maxillary sinus tenderness and no frontal sinus tenderness. Left sinus exhibits no maxillary sinus tenderness and no frontal sinus tenderness.   Mouth/Throat: Uvula is midline, oropharynx is clear and moist and mucous membranes are normal. No trismus in the jaw. Normal dentition. No uvula swelling. No oropharyngeal exudate, posterior oropharyngeal edema or posterior oropharyngeal erythema.   Eyes: Right eye visual fields normal and left eye visual fields normal. Conjunctivae and lids are normal. Pupils are equal, round, and reactive to light. Left eye exhibits hordeolum (upper,no swelling of eyelid noted). No scleral icterus. Extraocular movement intact vision  grossly intact gaze aligned appropriately   Neck: Trachea normal and phonation normal. Neck supple. No edema present. No erythema present. No neck rigidity present.   Cardiovascular: Normal rate, regular rhythm, normal heart sounds and normal pulses.   Pulmonary/Chest: Effort normal and breath sounds normal. No respiratory distress. She has no decreased breath sounds. She has no rhonchi.   Abdominal: Normal appearance.   Musculoskeletal: Normal range of motion.         General: No deformity. Normal range of motion.   Neurological: no focal deficit. She is alert and oriented to person, place, and time. She has normal motor skills and normal sensation. She exhibits normal muscle tone. Gait and coordination normal. Coordination normal.   Skin: Skin is warm, dry, intact, not diaphoretic and not pale.   Psychiatric: Her speech is normal and behavior is normal. Judgment and thought content normal.   Nursing note and vitals reviewed.  Vision Screening    Right eye Left eye Both eyes   Without correction      With correction 20/40 20/50 20/30       Results for orders placed or performed in visit on 04/01/23   SARS Coronavirus 2 Antigen, POCT Manual Read   Result Value Ref Range    SARS Coronavirus 2 Antigen Positive (A) Negative     Acceptable Yes        Patient in no acute distress.  Vitals reassuring.Risk of complication score 3 .  Discussed results/diagnosis/plan in depth with patient in clinic. Strict precautions given to patient to monitor for worsening signs and symptoms. Advised to follow up with primary.All questions answered. Strict ER precautions given. If your symptoms worsens or fail to improve you should go to the Emergency Room. Discharge and follow-up instructions given verbally/printed. Discharge and follow-up instructions discussed with the patient who expressed understanding and willingness to comply with my recommendations.Patient voiced understanding and in agreement with current treatment  plan.     Please be advised this text was dictated with [employer] software and may contain errors due to translation.    Assessment:     1. COVID-19 virus infection    2. Hordeolum externum of left upper eyelid        Plan:       COVID-19 virus infection  -     SARS Coronavirus 2 Antigen, POCT Manual Read    Hordeolum externum of left upper eyelid    Other orders  -     erythromycin (ROMYCIN) ophthalmic ointment; Place into the left eye every 6 (six) hours. for 7 days  Dispense: 3.5 g; Refill: 0          Medical Decision Making:   Clinical Tests:   Lab Tests: Reviewed  Urgent Care Management:  Patient in no acute distress.  Vitals reassuring.  On exam, patient is nontoxic appearing and afebrile.  Lungs CTA.  Positive COVID 19 results discussed with patient in detail.  Detailed education provided about COVID 19 precautions and recommendations as per CDC guidelines.  Risk of complication score 3.  Paxlovid recommendation, side effects and drug to drug interaction discussed with patient in detail.  Patient declined treatment with Paxlovid.  Patient reported blurred vision on and off for 1 week.  Denies any drainage or discharge.  Patient reports history of stye on her left upper eyelid previously.  Noticed very small stye.  No upper eyelid swelling noted.  No drainage or discharge noted.  Will prescribe erythromycin ointment.  Patient is diabetic and I discussed with patient in detail that she should obtain and other evaluation in the ED for worsening symptoms and she should follow-up with ophthalmologist for further evaluation.  Medication prescribed and over-the-counter medication discussed with patient at length.  Proper hydration advised.  I reiterated the importance of further evaluation if no improvement symptoms and follow-up with primary. Patient voiced understanding and in agreement with current treatment plan.        Other:   I have discussed this case with another health care provider.       <> Summary of the  Discussion: Dr. Lynn       Patient Instructions     You have tested positive for COVID-19 today.        ISOLATION    If you tested positive and do not have symptoms, you must isolate for 5 days starting on the day of the positive test. I    If you tested positive and have symptoms, you must isolate for 5 days starting on the day of the first symptoms,  not the day of the positive test.    This is the most important part, both the CDC and the [facility] emphasize that you do not test out of isolation.    After 5 days, if your symptoms have improved and you have not had fever on day 5, you can return to the community on day 6- NO TESTING REQUIRED!     In fact, we do not retest if you were positive in the last 90 days.    After your 5 days of isolation are completed, the CDC recommends strict mask use for the first 5 days that you come out of isolation    CDC Testing and Quarantine Guidelines for patients with exposure to a known-positive COVID-19 person:    ·     A 'close exposure' is defined as anyone who has had an exposure (masked or unmasked) to a known COVID -19 positive person            within 6 feet of someone            for a cumulative total of 15 minutes or more over a 24-hour period.    ·     vaccinated Have been boosted or completed the primary series of Pfizer or Moderna vaccine within the last 6 months or completed the primary series of J&J vaccine within the last 2 months and/or had a positive test within 90 days            do NOT need to quarantine after contact with someone who had COVID-19 unless they have symptoms.            fully vaccinated people who have not had a positive test within 90 days, should get tested 3-5 days after their exposure, even if they don't have symptoms and wear a mask indoors in public for 10 days following exposure or until their test result is negative on day 5.            If you develop symptoms test and quarantine.      ·     Unvaccinated, or are more than six months out  from their second mRNA dose (or more than 2 months after the J&J vaccine) and not yet boosted,  and/or NOT had a positive test within 90 days and meet 'close exposure'    you are required by CDC guidelines to quarantine for at least 5 days from time of exposure followed by 5 days of strict masking. It is recommended, but not required to test after 5 days, unless you develop symptoms, in which case you should test at that time.    If you do decide to test at 5 days and are asymptomatic, the risk is that if you test without symptoms on Day 5 for example) and you are positive, your 5 day isolation begins on that day, and you turned your 5 day quarantine into 10 days.            If your exposure does not meet the above definition, you can return to your normal daily activities to include social distancing, wearing a mask and frequent handwashing.    Alternatively, if a 5-day quarantine is not feasible, it is imperative that an exposed person wear a well-fitting mask at all times when around others for 10 days after exposure.           PLEASE READ YOUR DISCHARGE INSTRUCTIONS ENTIRELY AS IT CONTAINS IMPORTANT INFORMATION.      Please drink plenty of fluids.    Please get plenty of rest.    Please return here or go to the Emergency Department for any concerns or worsening of condition.    Please take an over the counter antihistamine medication (allegra/Claritin/Zyrtec) of your choice as directed.    Try an over the counter decongestant like Mucinex D or Sudafed. You buy this behind the pharmacy counter    If you do have Hypertension or palpitations, it is safe to take Coricidin HBP for relief of sinus symptoms.    If not allergic, please take over the counter Tylenol (Acetaminophen) and/or Motrin (Ibuprofen) as directed for control of pain and/or fever.  Please follow up with your primary care doctor or specialist as needed.    Sore throat recommendations: Warm fluids, warm salt water gargles, throat lozenges, tea, honey,  soup, rest, hydration.    Use over the counter flonase: one spray each nostril twice daily OR two sprays each nostril once daily.     If you  smoke, please stop smoking.      Please return or see your primary care doctor if you develop new or worsening symptoms.     Please arrange follow up with your primary medical clinic as soon as possible. You must understand that you've received an Urgent Care treatment only and that you may be released before all of your medical problems are known or treated. You, the patient, will arrange for follow up as instructed. If your symptoms worsen or fail to improve you should go to the Emergency Room.       PLEASE READ YOUR DISCHARGE INSTRUCTIONS ENTIRELY AS IT CONTAINS IMPORTANT INFORMATION.      Warm compresses for 15 minutes at a time to the area.     Use the ointment 4 times daily.     Do not attempt to open the area yourself.     If you find you have the stye without improvement for two weeks, please seen an eye doctor for further treatment.     A referral to ophthamology has been placed for you. Please call [phone] if you need an appt    Please go to the emergency room if you experience loss of vision, swelling around your eye (upper and lower), fever.     Please arrange follow up with your primary medical clinic as soon as possible. You must understand that you've received an Urgent Care treatment only and that you may be released before all of your medical problems are known or treated. You, the patient, will arrange for follow up as instructed. If your symptoms worsen or fail to improve you should go to the Emergency Room.

## 2023-04-01 NOTE — LETTER
[address]  [place] [address]  Phone: [phone]  Fax: [phone]          Return to Work/School    Patient: Hamzah Reis  YOB: 1975   Date: 04/01/2023     To Whom It May Concern:     Hamzah Reis was in contact with/seen in my office on 04/01/2023. COVID-19 is present in our communities across the state. There is limited testing for COVID at this time, so not all patients can be tested. In this situation, your employee meets the following criteria:     Hamzah Reis has met the criteria for COVID-19 testing and has a POSITIVE result. She can return to work once they are asymptomatic for 24 hours without the use of fever reducing medications AND at least five days from the start of symptoms (or from the first positive result if they have no symptoms).      If you have any questions or concerns, or if I can be of further assistance, please do not hesitate to contact me.     Sincerely,      Verito Lino NP

## 2023-04-11 ENCOUNTER — PATIENT MESSAGE (OUTPATIENT)
Dept: ADMINISTRATIVE | Facility: HOSPITAL | Age: 48
End: 2023-04-11
Payer: COMMERCIAL

## 2023-04-28 ENCOUNTER — TELEPHONE (OUTPATIENT)
Dept: BARIATRICS | Facility: CLINIC | Age: 48
End: 2023-04-28
Payer: COMMERCIAL

## 2023-04-28 NOTE — TELEPHONE ENCOUNTER
Notified patient of the date & time of financial phone call appt.  Pt aware appt is a telephone call.    Dashboard updated  Appt. 05/01/2023

## 2023-05-17 ENCOUNTER — PATIENT OUTREACH (OUTPATIENT)
Dept: ADMINISTRATIVE | Facility: HOSPITAL | Age: 48
End: 2023-05-17
Payer: COMMERCIAL

## 2023-06-05 ENCOUNTER — TELEPHONE (OUTPATIENT)
Dept: INTERNAL MEDICINE | Facility: CLINIC | Age: 48
End: 2023-06-05
Payer: COMMERCIAL

## 2023-06-07 DIAGNOSIS — Z12.31 OTHER SCREENING MAMMOGRAM: ICD-10-CM

## 2023-06-12 ENCOUNTER — PATIENT MESSAGE (OUTPATIENT)
Dept: ADMINISTRATIVE | Facility: HOSPITAL | Age: 48
End: 2023-06-12
Payer: COMMERCIAL

## 2023-06-15 ENCOUNTER — PATIENT MESSAGE (OUTPATIENT)
Dept: ADMINISTRATIVE | Facility: HOSPITAL | Age: 48
End: 2023-06-15
Payer: COMMERCIAL

## 2023-06-15 ENCOUNTER — PATIENT OUTREACH (OUTPATIENT)
Dept: ADMINISTRATIVE | Facility: HOSPITAL | Age: 48
End: 2023-06-15
Payer: COMMERCIAL

## 2023-06-15 NOTE — LETTER
AUTHORIZATION FOR RELEASE OF   CONFIDENTIAL INFORMATION    Dear [facility] ,    We are seeing Hamzah Reis, date of birth 1975, in the clinic at [facility]. Tatum Santos MD is the patient's PCP. Hamzah Reis has an outstanding lab/procedure at the time we reviewed her chart. In order to help keep her health information updated, she has authorized us to request the following medical record(s):                                                 (X)  DIABETIC EYE EXAM                     Please fax records to [facility], Shelly L Swindler, MD,      [facility]                                                                     Please Fax to [facility] at [phone].     Thank you for your help, Daphne Isbell LPKORI-CCC. If I can be of any assistance you can call at [phone]                      Patient Name: Hamzah Reis  : 1975  Patient Phone #: [phone]

## 2023-06-15 NOTE — LETTER
AUTHORIZATION FOR RELEASE OF   CONFIDENTIAL INFORMATION    Dear [facility],    We are seeing Hamzah Reis, date of birth 1975, in the clinic at [facility]. Tatum Santos MD is the patient's PCP. Hamzah Reis has an outstanding lab/procedure at the time we reviewed her chart. In order to help keep her health information updated, she has authorized us to request the following medical record(s):                                                (X)  DIABETIC EYE EXAM                                 Please fax records to [facility], Shelly L Swindler, MD,          [facility]                                                                     Please Fax to [facility] at [phone].     Thank you for your help, Daphne Isbell LPKORI-CCC. If I can be of any assistance you can call at [phone]                      Patient Name: Hamzah Reis  : 1975  Patient Phone #: [phone]

## 2023-06-15 NOTE — PROGRESS NOTES
06/15/2023 Care Everywhere updates requested and reviewed.  Immunizations reconciled. Media reports reviewed.  Duplicate HM overrides and  orders removed.  Overdue HM topic chart audit and/or requested.  Overdue lab testing linked to upcoming lab appointments if applies.  Lab steve, and [employer] reviewed    DIS reviewed  Efax to [facility] for Eye exam         Health Maintenance Due   Topic Date Due    TETANUS VACCINE  2014    Pneumococcal Vaccines (Age 0-64) (2 - PCV) 2016    Eye Exam  2020    COVID-19 Vaccine (3 - Moderna series) 2021    Colorectal Cancer Screening  Never done    Mammogram  2023      - - -

## 2023-06-20 RX ORDER — SEMAGLUTIDE 0.68 MG/ML
0.5 INJECTION, SOLUTION SUBCUTANEOUS
COMMUNITY
Start: 2023-05-10 | End: 2023-06-22

## 2023-06-21 ENCOUNTER — PATIENT MESSAGE (OUTPATIENT)
Dept: ADMINISTRATIVE | Facility: HOSPITAL | Age: 48
End: 2023-06-21
Payer: COMMERCIAL

## 2023-06-21 ENCOUNTER — LAB VISIT (OUTPATIENT)
Dept: LAB | Facility: HOSPITAL | Age: 48
End: 2023-06-21
Attending: INTERNAL MEDICINE
Payer: COMMERCIAL

## 2023-06-21 DIAGNOSIS — Z79.4 TYPE 2 DIABETES MELLITUS WITH DIABETIC POLYNEUROPATHY, WITH LONG-TERM CURRENT USE OF INSULIN: ICD-10-CM

## 2023-06-21 DIAGNOSIS — E11.42 TYPE 2 DIABETES MELLITUS WITH DIABETIC POLYNEUROPATHY, WITH LONG-TERM CURRENT USE OF INSULIN: ICD-10-CM

## 2023-06-21 LAB
ESTIMATED AVG GLUCOSE: 186 MG/DL (ref 68–131)
HBA1C MFR BLD: 8.1 % (ref 4–5.6)

## 2023-06-21 PROCEDURE — 36415 COLL VENOUS BLD VENIPUNCTURE: CPT | Mod: PO | Performed by: INTERNAL MEDICINE

## 2023-06-21 PROCEDURE — 83036 HEMOGLOBIN GLYCOSYLATED A1C: CPT | Performed by: INTERNAL MEDICINE

## 2023-06-22 ENCOUNTER — OFFICE VISIT (OUTPATIENT)
Dept: ENDOCRINOLOGY | Facility: CLINIC | Age: 48
End: 2023-06-22
Payer: COMMERCIAL

## 2023-06-22 VITALS
SYSTOLIC BLOOD PRESSURE: 142 MMHG | HEART RATE: 79 BPM | OXYGEN SATURATION: 99 % | WEIGHT: 246.94 LBS | HEIGHT: 66 IN | DIASTOLIC BLOOD PRESSURE: 92 MMHG | BODY MASS INDEX: 39.69 KG/M2

## 2023-06-22 DIAGNOSIS — E78.5 HYPERLIPIDEMIA ASSOCIATED WITH TYPE 2 DIABETES MELLITUS: ICD-10-CM

## 2023-06-22 DIAGNOSIS — M54.50 ACUTE BILATERAL LOW BACK PAIN WITHOUT SCIATICA: ICD-10-CM

## 2023-06-22 DIAGNOSIS — Z79.4 TYPE 2 DIABETES MELLITUS WITH DIABETIC POLYNEUROPATHY, WITH LONG-TERM CURRENT USE OF INSULIN: Primary | ICD-10-CM

## 2023-06-22 DIAGNOSIS — E11.42 TYPE 2 DIABETES MELLITUS WITH DIABETIC POLYNEUROPATHY, WITH LONG-TERM CURRENT USE OF INSULIN: Primary | ICD-10-CM

## 2023-06-22 DIAGNOSIS — E11.69 HYPERLIPIDEMIA ASSOCIATED WITH TYPE 2 DIABETES MELLITUS: ICD-10-CM

## 2023-06-22 DIAGNOSIS — I10 PRIMARY HYPERTENSION: ICD-10-CM

## 2023-06-22 PROCEDURE — 1159F MED LIST DOCD IN RCRD: CPT | Mod: CPTII,S$GLB,, | Performed by: INTERNAL MEDICINE

## 2023-06-22 PROCEDURE — 1160F RVW MEDS BY RX/DR IN RCRD: CPT | Mod: CPTII,S$GLB,, | Performed by: INTERNAL MEDICINE

## 2023-06-22 PROCEDURE — 3066F PR DOCUMENTATION OF TREATMENT FOR NEPHROPATHY: ICD-10-PCS | Mod: CPTII,S$GLB,, | Performed by: INTERNAL MEDICINE

## 2023-06-22 PROCEDURE — 3052F HG A1C>EQUAL 8.0%<EQUAL 9.0%: CPT | Mod: CPTII,S$GLB,, | Performed by: INTERNAL MEDICINE

## 2023-06-22 PROCEDURE — 3077F PR MOST RECENT SYSTOLIC BLOOD PRESSURE >= 140 MM HG: ICD-10-PCS | Mod: CPTII,S$GLB,, | Performed by: INTERNAL MEDICINE

## 2023-06-22 PROCEDURE — 3008F PR BODY MASS INDEX (BMI) DOCUMENTED: ICD-10-PCS | Mod: CPTII,S$GLB,, | Performed by: INTERNAL MEDICINE

## 2023-06-22 PROCEDURE — 3061F NEG MICROALBUMINURIA REV: CPT | Mod: CPTII,S$GLB,, | Performed by: INTERNAL MEDICINE

## 2023-06-22 PROCEDURE — 99999 PR PBB SHADOW E&M-EST. PATIENT-LVL III: ICD-10-PCS | Mod: PBBFAC,,, | Performed by: INTERNAL MEDICINE

## 2023-06-22 PROCEDURE — 3061F PR NEG MICROALBUMINURIA RESULT DOCUMENTED/REVIEW: ICD-10-PCS | Mod: CPTII,S$GLB,, | Performed by: INTERNAL MEDICINE

## 2023-06-22 PROCEDURE — 3066F NEPHROPATHY DOC TX: CPT | Mod: CPTII,S$GLB,, | Performed by: INTERNAL MEDICINE

## 2023-06-22 PROCEDURE — 1159F PR MEDICATION LIST DOCUMENTED IN MEDICAL RECORD: ICD-10-PCS | Mod: CPTII,S$GLB,, | Performed by: INTERNAL MEDICINE

## 2023-06-22 PROCEDURE — 1160F PR REVIEW ALL MEDS BY PRESCRIBER/CLIN PHARMACIST DOCUMENTED: ICD-10-PCS | Mod: CPTII,S$GLB,, | Performed by: INTERNAL MEDICINE

## 2023-06-22 PROCEDURE — 3080F PR MOST RECENT DIASTOLIC BLOOD PRESSURE >= 90 MM HG: ICD-10-PCS | Mod: CPTII,S$GLB,, | Performed by: INTERNAL MEDICINE

## 2023-06-22 PROCEDURE — 99214 OFFICE O/P EST MOD 30 MIN: CPT | Mod: S$GLB,,, | Performed by: INTERNAL MEDICINE

## 2023-06-22 PROCEDURE — 3052F PR MOST RECENT HEMOGLOBIN A1C LEVEL 8.0 - < 9.0%: ICD-10-PCS | Mod: CPTII,S$GLB,, | Performed by: INTERNAL MEDICINE

## 2023-06-22 PROCEDURE — 99999 PR PBB SHADOW E&M-EST. PATIENT-LVL III: CPT | Mod: PBBFAC,,, | Performed by: INTERNAL MEDICINE

## 2023-06-22 PROCEDURE — 3008F BODY MASS INDEX DOCD: CPT | Mod: CPTII,S$GLB,, | Performed by: INTERNAL MEDICINE

## 2023-06-22 PROCEDURE — 99214 PR OFFICE/OUTPT VISIT, EST, LEVL IV, 30-39 MIN: ICD-10-PCS | Mod: S$GLB,,, | Performed by: INTERNAL MEDICINE

## 2023-06-22 PROCEDURE — 3080F DIAST BP >= 90 MM HG: CPT | Mod: CPTII,S$GLB,, | Performed by: INTERNAL MEDICINE

## 2023-06-22 PROCEDURE — 3077F SYST BP >= 140 MM HG: CPT | Mod: CPTII,S$GLB,, | Performed by: INTERNAL MEDICINE

## 2023-06-22 RX ORDER — SEMAGLUTIDE 1.34 MG/ML
1 INJECTION, SOLUTION SUBCUTANEOUS
Qty: 9 ML | Refills: 3 | Status: SHIPPED | OUTPATIENT
Start: 2023-06-22 | End: 2023-07-31 | Stop reason: SDUPTHER

## 2023-06-22 RX ORDER — MELOXICAM 15 MG/1
15 TABLET ORAL DAILY PRN
Qty: 90 TABLET | Refills: 0 | Status: SHIPPED | OUTPATIENT
Start: 2023-06-22 | End: 2024-03-29

## 2023-06-22 RX ORDER — GABAPENTIN 600 MG/1
600 TABLET ORAL 2 TIMES DAILY
Qty: 270 TABLET | Refills: 3 | Status: SHIPPED | OUTPATIENT
Start: 2023-06-22

## 2023-06-22 RX ORDER — ATORVASTATIN CALCIUM 80 MG/1
80 TABLET, FILM COATED ORAL DAILY
Qty: 90 TABLET | Refills: 3 | Status: SHIPPED | OUTPATIENT
Start: 2023-06-22 | End: 2024-06-21

## 2023-06-22 NOTE — PATIENT INSTRUCTIONS
Increase ozempic to 1 mg weekly    Check sugars twice daily- fasting and once in evening    Restart atorvastatin daily    A1c and lipids 3 months    I would like to see you for a follow-up visit in 6 months which will be in December. My schedule for then will open at the beginning of August so please set yourself a reminder to schedule a visit when it opens. The spots fill quickly so please do this at the beginning of the month. You can reach out to us for help with scheduling or book yourself on the portal.

## 2023-06-22 NOTE — ASSESSMENT & PLAN NOTE
As above  Interested in bariatric surgery and I think she would be great candidate for this as BMI >35 and co-morbidities with DM, HLD

## 2023-06-22 NOTE — ASSESSMENT & PLAN NOTE
Excellent improvement in A1c and suspect would be lower if not off ozempic for last 3 weeks as having difficulty getting prescription  Resume ozempic and increase to 1 mg dose  Cont levemir and metformin at current doses  Check sugars twice daily  Labs 3 months
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
## 2023-06-29 ENCOUNTER — OFFICE VISIT (OUTPATIENT)
Dept: INTERNAL MEDICINE | Facility: CLINIC | Age: 48
End: 2023-06-29
Payer: COMMERCIAL

## 2023-06-29 VITALS
HEART RATE: 82 BPM | DIASTOLIC BLOOD PRESSURE: 80 MMHG | HEIGHT: 66 IN | BODY MASS INDEX: 39.72 KG/M2 | WEIGHT: 247.13 LBS | SYSTOLIC BLOOD PRESSURE: 116 MMHG | OXYGEN SATURATION: 97 %

## 2023-06-29 DIAGNOSIS — Z00.00 ANNUAL PHYSICAL EXAM: Primary | ICD-10-CM

## 2023-06-29 DIAGNOSIS — E11.42 TYPE 2 DIABETES MELLITUS WITH DIABETIC POLYNEUROPATHY, WITH LONG-TERM CURRENT USE OF INSULIN: ICD-10-CM

## 2023-06-29 DIAGNOSIS — E66.01 CLASS 2 SEVERE OBESITY DUE TO EXCESS CALORIES WITH SERIOUS COMORBIDITY AND BODY MASS INDEX (BMI) OF 39.0 TO 39.9 IN ADULT: ICD-10-CM

## 2023-06-29 DIAGNOSIS — E11.69 HYPERLIPIDEMIA ASSOCIATED WITH TYPE 2 DIABETES MELLITUS: ICD-10-CM

## 2023-06-29 DIAGNOSIS — Z12.11 ENCOUNTER FOR SCREENING COLONOSCOPY: ICD-10-CM

## 2023-06-29 DIAGNOSIS — M54.32 LEFT SIDED SCIATICA: ICD-10-CM

## 2023-06-29 DIAGNOSIS — Z23 NEED FOR VACCINATION AGAINST STREPTOCOCCUS PNEUMONIAE: ICD-10-CM

## 2023-06-29 DIAGNOSIS — E78.5 HYPERLIPIDEMIA ASSOCIATED WITH TYPE 2 DIABETES MELLITUS: ICD-10-CM

## 2023-06-29 DIAGNOSIS — Z23 NEED FOR TETANUS BOOSTER: ICD-10-CM

## 2023-06-29 DIAGNOSIS — Z79.4 TYPE 2 DIABETES MELLITUS WITH DIABETIC POLYNEUROPATHY, WITH LONG-TERM CURRENT USE OF INSULIN: ICD-10-CM

## 2023-06-29 PROCEDURE — 1159F MED LIST DOCD IN RCRD: CPT | Mod: CPTII,S$GLB,, | Performed by: INTERNAL MEDICINE

## 2023-06-29 PROCEDURE — 90714 TD VACC NO PRESV 7 YRS+ IM: CPT | Mod: S$GLB,,, | Performed by: INTERNAL MEDICINE

## 2023-06-29 PROCEDURE — 90472 IMMUNIZATION ADMIN EACH ADD: CPT | Mod: S$GLB,,, | Performed by: INTERNAL MEDICINE

## 2023-06-29 PROCEDURE — 3066F PR DOCUMENTATION OF TREATMENT FOR NEPHROPATHY: ICD-10-PCS | Mod: CPTII,S$GLB,, | Performed by: INTERNAL MEDICINE

## 2023-06-29 PROCEDURE — 1159F PR MEDICATION LIST DOCUMENTED IN MEDICAL RECORD: ICD-10-PCS | Mod: CPTII,S$GLB,, | Performed by: INTERNAL MEDICINE

## 2023-06-29 PROCEDURE — 3008F BODY MASS INDEX DOCD: CPT | Mod: CPTII,S$GLB,, | Performed by: INTERNAL MEDICINE

## 2023-06-29 PROCEDURE — 3066F NEPHROPATHY DOC TX: CPT | Mod: CPTII,S$GLB,, | Performed by: INTERNAL MEDICINE

## 2023-06-29 PROCEDURE — 3074F SYST BP LT 130 MM HG: CPT | Mod: CPTII,S$GLB,, | Performed by: INTERNAL MEDICINE

## 2023-06-29 PROCEDURE — 3008F PR BODY MASS INDEX (BMI) DOCUMENTED: ICD-10-PCS | Mod: CPTII,S$GLB,, | Performed by: INTERNAL MEDICINE

## 2023-06-29 PROCEDURE — 90677 PNEUMOCOCCAL CONJUGATE VACCINE 20-VALENT: ICD-10-PCS | Mod: S$GLB,,, | Performed by: INTERNAL MEDICINE

## 2023-06-29 PROCEDURE — 3061F PR NEG MICROALBUMINURIA RESULT DOCUMENTED/REVIEW: ICD-10-PCS | Mod: CPTII,S$GLB,, | Performed by: INTERNAL MEDICINE

## 2023-06-29 PROCEDURE — 99396 PR PREVENTIVE VISIT,EST,40-64: ICD-10-PCS | Mod: 25,S$GLB,, | Performed by: INTERNAL MEDICINE

## 2023-06-29 PROCEDURE — 3052F PR MOST RECENT HEMOGLOBIN A1C LEVEL 8.0 - < 9.0%: ICD-10-PCS | Mod: CPTII,S$GLB,, | Performed by: INTERNAL MEDICINE

## 2023-06-29 PROCEDURE — 90471 TD VACCINE GREATER THAN OR EQUAL TO 7YO PRESERVATIVE FREE IM: ICD-10-PCS | Mod: S$GLB,,, | Performed by: INTERNAL MEDICINE

## 2023-06-29 PROCEDURE — 1160F RVW MEDS BY RX/DR IN RCRD: CPT | Mod: CPTII,S$GLB,, | Performed by: INTERNAL MEDICINE

## 2023-06-29 PROCEDURE — 3079F DIAST BP 80-89 MM HG: CPT | Mod: CPTII,S$GLB,, | Performed by: INTERNAL MEDICINE

## 2023-06-29 PROCEDURE — 1160F PR REVIEW ALL MEDS BY PRESCRIBER/CLIN PHARMACIST DOCUMENTED: ICD-10-PCS | Mod: CPTII,S$GLB,, | Performed by: INTERNAL MEDICINE

## 2023-06-29 PROCEDURE — 90677 PCV20 VACCINE IM: CPT | Mod: S$GLB,,, | Performed by: INTERNAL MEDICINE

## 2023-06-29 PROCEDURE — 90714 TD VACCINE GREATER THAN OR EQUAL TO 7YO PRESERVATIVE FREE IM: ICD-10-PCS | Mod: S$GLB,,, | Performed by: INTERNAL MEDICINE

## 2023-06-29 PROCEDURE — 99396 PREV VISIT EST AGE 40-64: CPT | Mod: 25,S$GLB,, | Performed by: INTERNAL MEDICINE

## 2023-06-29 PROCEDURE — 90472 PNEUMOCOCCAL CONJUGATE VACCINE 20-VALENT: ICD-10-PCS | Mod: S$GLB,,, | Performed by: INTERNAL MEDICINE

## 2023-06-29 PROCEDURE — 3079F PR MOST RECENT DIASTOLIC BLOOD PRESSURE 80-89 MM HG: ICD-10-PCS | Mod: CPTII,S$GLB,, | Performed by: INTERNAL MEDICINE

## 2023-06-29 PROCEDURE — 3052F HG A1C>EQUAL 8.0%<EQUAL 9.0%: CPT | Mod: CPTII,S$GLB,, | Performed by: INTERNAL MEDICINE

## 2023-06-29 PROCEDURE — 99999 PR PBB SHADOW E&M-EST. PATIENT-LVL V: CPT | Mod: PBBFAC,,, | Performed by: INTERNAL MEDICINE

## 2023-06-29 PROCEDURE — 3061F NEG MICROALBUMINURIA REV: CPT | Mod: CPTII,S$GLB,, | Performed by: INTERNAL MEDICINE

## 2023-06-29 PROCEDURE — 90471 IMMUNIZATION ADMIN: CPT | Mod: S$GLB,,, | Performed by: INTERNAL MEDICINE

## 2023-06-29 PROCEDURE — 99999 PR PBB SHADOW E&M-EST. PATIENT-LVL V: ICD-10-PCS | Mod: PBBFAC,,, | Performed by: INTERNAL MEDICINE

## 2023-06-29 PROCEDURE — 3074F PR MOST RECENT SYSTOLIC BLOOD PRESSURE < 130 MM HG: ICD-10-PCS | Mod: CPTII,S$GLB,, | Performed by: INTERNAL MEDICINE

## 2023-06-29 NOTE — PROGRESS NOTES
Patient ID: Hamzah Reis is a 48 y.o. female.    Chief Complaint: Annual Exam    HPI Hamzah is a 48 y.o. female with  type 2 diabetes, hyperlipidemia, chronic back pain, and heavy menstrual cycles who presents for annual exam. She complains of back pain. Located in left lower back. Associated with radiation of pain down left leg and tingling and numbness at times. I ordered PT for her in the past but she was not able to go due to transportation issues. She says she can go now. No further acute complaints.   We reviewed most recent A1c, CMP, lipid and urine test in chart.     Health Maintenance Topics with due status: Not Due       Topic Last Completion Date    Influenza Vaccine 12/11/2019    Cervical Cancer Screening 07/21/2022    Foot Exam 02/06/2023    Diabetes Urine Screening 02/10/2023    Lipid Panel 02/10/2023    Hemoglobin A1c 06/21/2023    Low Dose Statin 06/29/2023      Review of Systems   Musculoskeletal:  Positive for back pain (chronic).   All other systems reviewed and are negative.      Objective:     Vitals:    06/29/23 0923   BP: 116/80   Pulse: 82        Physical Exam  Vitals reviewed.   Constitutional:       General: She is not in acute distress.     Appearance: Normal appearance. She is well-developed. She is obese. She is not ill-appearing, toxic-appearing or diaphoretic.   HENT:      Head: Normocephalic and atraumatic.      Right Ear: External ear normal.      Left Ear: External ear normal.      Nose: Nose normal.   Eyes:      General: No scleral icterus.        Right eye: No discharge.         Left eye: No discharge.      Extraocular Movements: Extraocular movements intact.      Conjunctiva/sclera: Conjunctivae normal.   Cardiovascular:      Rate and Rhythm: Normal rate and regular rhythm.      Heart sounds: Normal heart sounds. No murmur heard.    No friction rub. No gallop.   Pulmonary:      Effort: Pulmonary effort is normal. No respiratory distress.      Breath sounds: Normal breath  sounds. No stridor. No wheezing, rhonchi or rales.   Skin:     General: Skin is warm and dry.   Neurological:      General: No focal deficit present.      Mental Status: She is alert and oriented to person, place, and time. Mental status is at baseline.   Psychiatric:         Mood and Affect: Mood normal.         Behavior: Behavior normal.         Thought Content: Thought content normal.         Judgment: Judgment normal.       Assessment:       1. Annual physical exam    2. Left sided sciatica Chronic   3. Need for tetanus booster    4. Need for vaccination against Streptococcus pneumoniae    5. Encounter for screening colonoscopy    6. Type 2 diabetes mellitus with diabetic polyneuropathy, with long-term current use of insulin Sub-optimally controlled   7. Class 2 severe obesity due to excess calories with serious comorbidity and body mass index (BMI) of 39.0 to 39.9 in adult Chronic   8. Hyperlipidemia associated with type 2 diabetes mellitus Chronic       Plan:         Annual physical exam    Left sided sciatica  -     Ambulatory referral/consult to Physical/Occupational Therapy; Future; Expected date: 07/06/2023    Need for tetanus booster  -     (In Office Administered) Td Vaccine - Preservative Free    Need for vaccination against Streptococcus pneumoniae  -     (In Office Administered) Pneumococcal Conjugate Vaccine (20 Valent) (IM)    Encounter for screening colonoscopy  -     Case Request Endoscopy: COLONOSCOPY    Type 2 diabetes mellitus with diabetic polyneuropathy, with long-term current use of insulin  Comments:  Much improved. Continue to follow with endocrinology   Orders:  -     Comprehensive Metabolic Panel; Future; Expected date: 12/29/2023  -     Hemoglobin A1C; Future; Expected date: 12/29/2023  -     Lipid Panel; Future; Expected date: 12/29/2023    Class 2 severe obesity due to excess calories with serious comorbidity and body mass index (BMI) of 39.0 to 39.9 in adult  Comments:  Pt plans for  bariatric surgery. Continue to follow with bariatric surgery.   Orders:  -     CBC Auto Differential; Future; Expected date: 12/29/2023    Hyperlipidemia associated with type 2 diabetes mellitus  Comments:  Cont current medication        RTC 6 months     Warning signs discussed, patient to call with any further issues or worsening of symptoms.       Parts of the above note were dictated using a voice dictation software. Please excuse any grammatical or typographical errors.

## 2023-07-26 ENCOUNTER — TELEPHONE (OUTPATIENT)
Dept: OBSTETRICS AND GYNECOLOGY | Facility: CLINIC | Age: 48
End: 2023-07-26
Payer: COMMERCIAL

## 2023-07-26 NOTE — TELEPHONE ENCOUNTER
----- Message from Ronan Hernandez sent at 7/26/2023 10:45 AM CDT -----  Contact: pt  Type:  Needs Medical Advice    Who Called: pt   Would the patient rather a call back or a response via [facility]? call  Best Call Back Number: [phone]  Additional Information:   Pt requesting a call regarding care and discomfort she having

## 2023-07-26 NOTE — TELEPHONE ENCOUNTER
Called both numbers provided on patient chart.  Did not get a response will leave mychart message.    KATERINA Sparrow

## 2023-07-31 DIAGNOSIS — E11.42 TYPE 2 DIABETES MELLITUS WITH DIABETIC POLYNEUROPATHY, WITH LONG-TERM CURRENT USE OF INSULIN: ICD-10-CM

## 2023-07-31 DIAGNOSIS — Z79.4 TYPE 2 DIABETES MELLITUS WITH DIABETIC POLYNEUROPATHY, WITH LONG-TERM CURRENT USE OF INSULIN: ICD-10-CM

## 2023-07-31 RX ORDER — SEMAGLUTIDE 1.34 MG/ML
1 INJECTION, SOLUTION SUBCUTANEOUS
Qty: 3 ML | Refills: 12 | Status: SHIPPED | OUTPATIENT
Start: 2023-07-31 | End: 2023-08-28 | Stop reason: SDUPTHER

## 2023-07-31 NOTE — TELEPHONE ENCOUNTER
----- Message from Cortney Robertson sent at 7/31/2023  9:01 AM CDT -----  Regarding: Pt advice  Contact: [phone]  Pt is calling for medication to be sent over as 1 dosage and not an 90 days supply pt states the medication is to expensive and would like the 1 dosage. Please call for more information. RX:semaglutide (OZEMPIC) 1 mg/dose (4 mg/3 mL) 9 mL

## 2023-08-10 ENCOUNTER — OFFICE VISIT (OUTPATIENT)
Dept: PODIATRY | Facility: CLINIC | Age: 48
End: 2023-08-10
Payer: COMMERCIAL

## 2023-08-10 VITALS
DIASTOLIC BLOOD PRESSURE: 76 MMHG | HEIGHT: 66 IN | HEART RATE: 82 BPM | SYSTOLIC BLOOD PRESSURE: 100 MMHG | WEIGHT: 247 LBS | BODY MASS INDEX: 39.7 KG/M2

## 2023-08-10 DIAGNOSIS — E11.42 TYPE 2 DIABETES MELLITUS WITH DIABETIC POLYNEUROPATHY, WITH LONG-TERM CURRENT USE OF INSULIN: Primary | ICD-10-CM

## 2023-08-10 DIAGNOSIS — Z79.4 TYPE 2 DIABETES MELLITUS WITH DIABETIC POLYNEUROPATHY, WITH LONG-TERM CURRENT USE OF INSULIN: Primary | ICD-10-CM

## 2023-08-10 DIAGNOSIS — E11.9 ENCOUNTER FOR DIABETIC FOOT EXAM: ICD-10-CM

## 2023-08-10 PROCEDURE — 3078F DIAST BP <80 MM HG: CPT | Mod: CPTII,S$GLB,, | Performed by: PODIATRIST

## 2023-08-10 PROCEDURE — 3008F BODY MASS INDEX DOCD: CPT | Mod: CPTII,S$GLB,, | Performed by: PODIATRIST

## 2023-08-10 PROCEDURE — 3061F PR NEG MICROALBUMINURIA RESULT DOCUMENTED/REVIEW: ICD-10-PCS | Mod: CPTII,S$GLB,, | Performed by: PODIATRIST

## 2023-08-10 PROCEDURE — 3074F SYST BP LT 130 MM HG: CPT | Mod: CPTII,S$GLB,, | Performed by: PODIATRIST

## 2023-08-10 PROCEDURE — 3008F PR BODY MASS INDEX (BMI) DOCUMENTED: ICD-10-PCS | Mod: CPTII,S$GLB,, | Performed by: PODIATRIST

## 2023-08-10 PROCEDURE — 99999 PR PBB SHADOW E&M-EST. PATIENT-LVL III: CPT | Mod: PBBFAC,,, | Performed by: PODIATRIST

## 2023-08-10 PROCEDURE — 99213 PR OFFICE/OUTPT VISIT, EST, LEVL III, 20-29 MIN: ICD-10-PCS | Mod: S$GLB,,, | Performed by: PODIATRIST

## 2023-08-10 PROCEDURE — 3052F PR MOST RECENT HEMOGLOBIN A1C LEVEL 8.0 - < 9.0%: ICD-10-PCS | Mod: CPTII,S$GLB,, | Performed by: PODIATRIST

## 2023-08-10 PROCEDURE — 3061F NEG MICROALBUMINURIA REV: CPT | Mod: CPTII,S$GLB,, | Performed by: PODIATRIST

## 2023-08-10 PROCEDURE — 3052F HG A1C>EQUAL 8.0%<EQUAL 9.0%: CPT | Mod: CPTII,S$GLB,, | Performed by: PODIATRIST

## 2023-08-10 PROCEDURE — 3066F NEPHROPATHY DOC TX: CPT | Mod: CPTII,S$GLB,, | Performed by: PODIATRIST

## 2023-08-10 PROCEDURE — 1159F MED LIST DOCD IN RCRD: CPT | Mod: CPTII,S$GLB,, | Performed by: PODIATRIST

## 2023-08-10 PROCEDURE — 1160F PR REVIEW ALL MEDS BY PRESCRIBER/CLIN PHARMACIST DOCUMENTED: ICD-10-PCS | Mod: CPTII,S$GLB,, | Performed by: PODIATRIST

## 2023-08-10 PROCEDURE — 3066F PR DOCUMENTATION OF TREATMENT FOR NEPHROPATHY: ICD-10-PCS | Mod: CPTII,S$GLB,, | Performed by: PODIATRIST

## 2023-08-10 PROCEDURE — 99213 OFFICE O/P EST LOW 20 MIN: CPT | Mod: S$GLB,,, | Performed by: PODIATRIST

## 2023-08-10 PROCEDURE — 1159F PR MEDICATION LIST DOCUMENTED IN MEDICAL RECORD: ICD-10-PCS | Mod: CPTII,S$GLB,, | Performed by: PODIATRIST

## 2023-08-10 PROCEDURE — 3074F PR MOST RECENT SYSTOLIC BLOOD PRESSURE < 130 MM HG: ICD-10-PCS | Mod: CPTII,S$GLB,, | Performed by: PODIATRIST

## 2023-08-10 PROCEDURE — 3078F PR MOST RECENT DIASTOLIC BLOOD PRESSURE < 80 MM HG: ICD-10-PCS | Mod: CPTII,S$GLB,, | Performed by: PODIATRIST

## 2023-08-10 PROCEDURE — 1160F RVW MEDS BY RX/DR IN RCRD: CPT | Mod: CPTII,S$GLB,, | Performed by: PODIATRIST

## 2023-08-10 PROCEDURE — 99999 PR PBB SHADOW E&M-EST. PATIENT-LVL III: ICD-10-PCS | Mod: PBBFAC,,, | Performed by: PODIATRIST

## 2023-08-10 RX ORDER — DICLOFENAC SODIUM 10 MG/G
2 GEL TOPICAL 4 TIMES DAILY PRN
Qty: 100 G | Refills: 5 | Status: SHIPPED | OUTPATIENT
Start: 2023-08-10 | End: 2024-03-29

## 2023-08-10 NOTE — PROGRESS NOTES
"Subjective:      Patient ID: Hamzah Reis is a 48 y.o. female.    Chief Complaint: Foot Pain (Bilateral )  complains of burning tingling and sharp intermittent pains to the toes at night, mainly. Though she does have pain during the day.  This is chronic.  She has seen Dr. Patel a few years ago.  She does have diabetes and low back pain.  She is being referred to Physical therapy for the back           Patient Active Problem List   Diagnosis    Type 2 diabetes mellitus with diabetic polyneuropathy, with long-term current use of insulin    Left hamstring muscle strain    BMI 39.0-39.9,adult    Strain of left quadriceps tendon    Chest pain with low risk of acute coronary syndrome    Hyperlipidemia associated with type 2 diabetes mellitus    Yeast infection    Bacterial vaginosis    Menorrhagia    Noncompliance    Anxiety and depression         Current Outpatient Medications on File Prior to Visit   Medication Sig Dispense Refill    atorvastatin (LIPITOR) 80 MG tablet Take 1 tablet (80 mg total) by mouth once daily. 90 tablet 3    blood sugar diagnostic Strp Use to check blood glucose before meals and at bedtime up to 4 times a day 200 strip 11    blood-glucose meter Misc Use as instructed to check blood glucose before meals and at bedtime 1 each 0    gabapentin (NEURONTIN) 600 MG tablet Take 1 tablet (600 mg total) by mouth 2 (two) times daily. 270 tablet 3    insulin detemir U-100 (LEVEMIR) 100 unit/mL injection Inject 25 Units into the skin every evening. 22.5 mL 3    lancets 30 gauge Misc 1 application by Misc.(Non-Drug; Combo Route) route 4 (four) times daily. Check  before meals and at bedtime 200 each 11    meloxicam (MOBIC) 15 MG tablet Take 1 tablet (15 mg total) by mouth daily as needed for Pain. 90 tablet 0    metFORMIN (GLUCOPHAGE-XR) 500 MG ER 24hr tablet Take 1 tablet (500 mg total) by mouth 2 (two) times daily with meals. 180 tablet 3    pen needle, diabetic (NOVOFINE PLUS) 32 gauge x 1/6" Ndle " "use with insulin once daily, must last 100 days 100 each 3    semaglutide (OZEMPIC) 1 mg/dose (4 mg/3 mL) Inject 1 mg into the skin every 7 days. 3 mL 12     No current facility-administered medications on file prior to visit.         Review of patient's allergies indicates:  No Known Allergies          Vitals:    08/10/23 1548   BP: 100/76   Pulse: 82   Weight: 112 kg (247 lb)   Height: 5' 6" (1.676 m)   PainSc:   8   PainLoc: Foot      Past Medical History:   Diagnosis Date    Depression     Diabetes mellitus, type 2     Hyperlipidemia     Hypertension     Vision impairment     wears glasses       Past Surgical History:   Procedure Laterality Date    ENDOMETRIAL ABLATION N/A 2020    Procedure: ABLATION, ENDOMETRIUM;  Surgeon: Michael A. Wiedemann, MD;  Location: [facility] OR;  Service: OB/GYN;  Laterality: N/A;    HYSTEROSCOPY WITH DILATION AND CURETTAGE OF UTERUS N/A 2020    Procedure: HYSTEROSCOPY, WITH DILATION AND CURETTAGE OF UTERUS;  Surgeon: Michael A. Wiedemann, MD;  Location: [facility] OR;  Service: OB/GYN;  Laterality: N/A;       Family History   Problem Relation Age of Onset    Hypertension Mother 55            Diabetes Mother     Cancer Mother         cervical    Heart disease Father     Diabetes Sister     Hypertension Sister     Cancer Sister         cervical    Breast cancer Sister     Diabetes Maternal Grandmother        Social History     Socioeconomic History    Marital status: Single   Occupational History     Employer: [employer]   Tobacco Use    Smoking status: Never     Passive exposure: Never    Smokeless tobacco: Never   Substance and Sexual Activity    Alcohol use: No    Drug use: No    Sexual activity: Yes     Partners: Male     Birth control/protection: None       Current Outpatient Medications   Medication Sig Dispense Refill    atorvastatin (LIPITOR) 80 MG tablet Take 1 tablet (80 mg total) by mouth once daily. 90 tablet 3    blood sugar diagnostic Strp Use to check blood glucose " "before meals and at bedtime up to 4 times a day 200 strip 11    blood-glucose meter Misc Use as instructed to check blood glucose before meals and at bedtime 1 each 0    gabapentin (NEURONTIN) 600 MG tablet Take 1 tablet (600 mg total) by mouth 2 (two) times daily. 270 tablet 3    insulin detemir U-100 (LEVEMIR) 100 unit/mL injection Inject 25 Units into the skin every evening. 22.5 mL 3    lancets 30 gauge Misc 1 application by Misc.(Non-Drug; Combo Route) route 4 (four) times daily. Check  before meals and at bedtime 200 each 11    meloxicam (MOBIC) 15 MG tablet Take 1 tablet (15 mg total) by mouth daily as needed for Pain. 90 tablet 0    metFORMIN (GLUCOPHAGE-XR) 500 MG ER 24hr tablet Take 1 tablet (500 mg total) by mouth 2 (two) times daily with meals. 180 tablet 3    pen needle, diabetic (NOVOFINE PLUS) 32 gauge x 1/6" Ndle use with insulin once daily, must last 100 days 100 each 3    semaglutide (OZEMPIC) 1 mg/dose (4 mg/3 mL) Inject 1 mg into the skin every 7 days. 3 mL 12    diclofenac sodium (VOLTAREN) 1 % Gel Apply 2 g topically 4 (four) times daily as needed. To painful area on the feet. 100 g 5     No current facility-administered medications for this visit.       Review of patient's allergies indicates:  No Known Allergies      Review of Systems   Constitutional: Negative for chills, fever and malaise/fatigue.   HENT:  Negative for congestion and hearing loss.    Cardiovascular:  Negative for chest pain, claudication and leg swelling.   Respiratory:  Negative for cough and shortness of breath.    Skin:  Positive for dry skin. Negative for color change, itching, nail changes, poor wound healing and rash.   Musculoskeletal:  Positive for back pain. Negative for joint pain, muscle cramps and muscle weakness.   Gastrointestinal:  Negative for nausea and vomiting.   Neurological:  Positive for paresthesias. Negative for numbness and weakness.   Psychiatric/Behavioral:  Negative for altered mental status.  "           Objective:      Physical Exam  Constitutional:       General: She is not in acute distress.     Appearance: She is not diaphoretic.   Cardiovascular:      Pulses:           Dorsalis pedis pulses are 2+ on the right side and 2+ on the left side.        Posterior tibial pulses are 2+ on the right side and 2+ on the left side.   Musculoskeletal:      Comments: Moderate pain on palpation distal second and third intermetatarsal spaces and overlying the second and third metatarsal shafts with mild localized edema, no warmth or discoloration bilateral foot. - Lauchman test to MTPs 2-4 bilateral foot. No pain on palpation of plantar met heads 2-4 bilateral foot.    Rectus appearing foot type bilateral.    + equinus that reduces with knee bent bilateral.     Feet:      Right foot:      Protective Sensation: 10 sites tested.  10 sites sensed.      Skin integrity: Dry skin present.      Left foot:      Protective Sensation: 10 sites tested.  10 sites sensed.      Skin integrity: Dry skin present.   Skin:     General: Skin is warm and dry.      Capillary Refill: Capillary refill takes less than 2 seconds.      Coloration: Skin is not pale.      Findings: No ecchymosis, erythema or rash.      Nails: There is no clubbing.      Comments: No open lesions or macerations bilateral lower extremity.     Neurological:      Mental Status: She is alert and oriented to person, place, and time.               Assessment:       Encounter Diagnoses   Name Primary?    Type 2 diabetes mellitus with diabetic polyneuropathy, with long-term current use of insulin Yes    Encounter for diabetic foot exam          Plan:       Hamzah was seen today for foot pain.    Diagnoses and all orders for this visit:    Type 2 diabetes mellitus with diabetic polyneuropathy, with long-term current use of insulin    Encounter for diabetic foot exam    Other orders  -     diclofenac sodium (VOLTAREN) 1 % Gel; Apply 2 g topically 4 (four) times daily as  needed. To painful area on the feet.      I counseled the patient on her conditions, their implications and medical management.  1 chronic condition with exacerbation.   OTC Meds as ordered.  Consider NeuropAway.   Continue good nutrition and blood sugar control to help prevent podiatric complications of diabetes.   Maintain proper foot hygiene.   Continue wearing proper shoe gear, daily foot inspections, never walking without protective shoe gear, never putting sharp instruments to feet.  Follow up after Physical therapy and back pain relief.   .

## 2023-08-16 ENCOUNTER — PATIENT MESSAGE (OUTPATIENT)
Dept: ADMINISTRATIVE | Facility: HOSPITAL | Age: 48
End: 2023-08-16
Payer: COMMERCIAL

## 2023-08-28 DIAGNOSIS — Z79.4 TYPE 2 DIABETES MELLITUS WITH DIABETIC POLYNEUROPATHY, WITH LONG-TERM CURRENT USE OF INSULIN: ICD-10-CM

## 2023-08-28 DIAGNOSIS — E11.42 TYPE 2 DIABETES MELLITUS WITH DIABETIC POLYNEUROPATHY, WITH LONG-TERM CURRENT USE OF INSULIN: ICD-10-CM

## 2023-08-28 RX ORDER — SEMAGLUTIDE 1.34 MG/ML
1 INJECTION, SOLUTION SUBCUTANEOUS
Qty: 3 ML | Refills: 12 | Status: SHIPPED | OUTPATIENT
Start: 2023-08-28 | End: 2024-03-29

## 2023-08-28 NOTE — TELEPHONE ENCOUNTER
----- Message from Beth Ha sent at 8/28/2023  9:11 AM CDT -----  Contact: [phone]  Type:  RX Refill Request        Who Called:pt        Refill or New Rx:refill         RX Name and Strength:semaglutide (OZEMPIC) 1 mg/dose (4 mg/3 mL)        Preferred Pharmacy with phone number:    [facility] [address] [place] [address] [address] [address]  Phone: [phone] Fax: [phone]              Best Call Back Number:[phone]          Additional Informat[employer]

## 2023-09-18 ENCOUNTER — TELEPHONE (OUTPATIENT)
Dept: GASTROENTEROLOGY | Facility: CLINIC | Age: 48
End: 2023-09-18
Payer: COMMERCIAL

## 2023-09-18 ENCOUNTER — OFFICE VISIT (OUTPATIENT)
Dept: OBSTETRICS AND GYNECOLOGY | Facility: CLINIC | Age: 48
End: 2023-09-18
Payer: COMMERCIAL

## 2023-09-18 VITALS
SYSTOLIC BLOOD PRESSURE: 136 MMHG | WEIGHT: 246.94 LBS | BODY MASS INDEX: 39.85 KG/M2 | DIASTOLIC BLOOD PRESSURE: 89 MMHG

## 2023-09-18 DIAGNOSIS — N92.1 MENORRHAGIA WITH IRREGULAR CYCLE: Primary | ICD-10-CM

## 2023-09-18 PROCEDURE — 99999 PR PBB SHADOW E&M-EST. PATIENT-LVL III: CPT | Mod: PBBFAC,,, | Performed by: OBSTETRICS & GYNECOLOGY

## 2023-09-18 PROCEDURE — 99213 PR OFFICE/OUTPT VISIT, EST, LEVL III, 20-29 MIN: ICD-10-PCS | Mod: S$GLB,,, | Performed by: OBSTETRICS & GYNECOLOGY

## 2023-09-18 PROCEDURE — 3052F PR MOST RECENT HEMOGLOBIN A1C LEVEL 8.0 - < 9.0%: ICD-10-PCS | Mod: CPTII,S$GLB,, | Performed by: OBSTETRICS & GYNECOLOGY

## 2023-09-18 PROCEDURE — 3008F PR BODY MASS INDEX (BMI) DOCUMENTED: ICD-10-PCS | Mod: CPTII,S$GLB,, | Performed by: OBSTETRICS & GYNECOLOGY

## 2023-09-18 PROCEDURE — 3066F NEPHROPATHY DOC TX: CPT | Mod: CPTII,S$GLB,, | Performed by: OBSTETRICS & GYNECOLOGY

## 2023-09-18 PROCEDURE — 3052F HG A1C>EQUAL 8.0%<EQUAL 9.0%: CPT | Mod: CPTII,S$GLB,, | Performed by: OBSTETRICS & GYNECOLOGY

## 2023-09-18 PROCEDURE — 99999 PR PBB SHADOW E&M-EST. PATIENT-LVL III: ICD-10-PCS | Mod: PBBFAC,,, | Performed by: OBSTETRICS & GYNECOLOGY

## 2023-09-18 PROCEDURE — 3061F NEG MICROALBUMINURIA REV: CPT | Mod: CPTII,S$GLB,, | Performed by: OBSTETRICS & GYNECOLOGY

## 2023-09-18 PROCEDURE — 3061F PR NEG MICROALBUMINURIA RESULT DOCUMENTED/REVIEW: ICD-10-PCS | Mod: CPTII,S$GLB,, | Performed by: OBSTETRICS & GYNECOLOGY

## 2023-09-18 PROCEDURE — 1160F RVW MEDS BY RX/DR IN RCRD: CPT | Mod: CPTII,S$GLB,, | Performed by: OBSTETRICS & GYNECOLOGY

## 2023-09-18 PROCEDURE — 1159F MED LIST DOCD IN RCRD: CPT | Mod: CPTII,S$GLB,, | Performed by: OBSTETRICS & GYNECOLOGY

## 2023-09-18 PROCEDURE — 1160F PR REVIEW ALL MEDS BY PRESCRIBER/CLIN PHARMACIST DOCUMENTED: ICD-10-PCS | Mod: CPTII,S$GLB,, | Performed by: OBSTETRICS & GYNECOLOGY

## 2023-09-18 PROCEDURE — 3066F PR DOCUMENTATION OF TREATMENT FOR NEPHROPATHY: ICD-10-PCS | Mod: CPTII,S$GLB,, | Performed by: OBSTETRICS & GYNECOLOGY

## 2023-09-18 PROCEDURE — 3075F SYST BP GE 130 - 139MM HG: CPT | Mod: CPTII,S$GLB,, | Performed by: OBSTETRICS & GYNECOLOGY

## 2023-09-18 PROCEDURE — 99213 OFFICE O/P EST LOW 20 MIN: CPT | Mod: S$GLB,,, | Performed by: OBSTETRICS & GYNECOLOGY

## 2023-09-18 PROCEDURE — 1159F PR MEDICATION LIST DOCUMENTED IN MEDICAL RECORD: ICD-10-PCS | Mod: CPTII,S$GLB,, | Performed by: OBSTETRICS & GYNECOLOGY

## 2023-09-18 PROCEDURE — 3075F PR MOST RECENT SYSTOLIC BLOOD PRESS GE 130-139MM HG: ICD-10-PCS | Mod: CPTII,S$GLB,, | Performed by: OBSTETRICS & GYNECOLOGY

## 2023-09-18 PROCEDURE — 3079F PR MOST RECENT DIASTOLIC BLOOD PRESSURE 80-89 MM HG: ICD-10-PCS | Mod: CPTII,S$GLB,, | Performed by: OBSTETRICS & GYNECOLOGY

## 2023-09-18 PROCEDURE — 3079F DIAST BP 80-89 MM HG: CPT | Mod: CPTII,S$GLB,, | Performed by: OBSTETRICS & GYNECOLOGY

## 2023-09-18 PROCEDURE — 3008F BODY MASS INDEX DOCD: CPT | Mod: CPTII,S$GLB,, | Performed by: OBSTETRICS & GYNECOLOGY

## 2023-09-18 RX ORDER — OXYCODONE AND ACETAMINOPHEN 5; 325 MG/1; MG/1
1 TABLET ORAL EVERY 12 HOURS PRN
Qty: 12 TABLET | Refills: 0 | Status: SHIPPED | OUTPATIENT
Start: 2023-09-18 | End: 2024-03-29

## 2023-09-18 RX ORDER — POLYETHYLENE GLYCOL 3350, SODIUM SULFATE ANHYDROUS, SODIUM BICARBONATE, SODIUM CHLORIDE, POTASSIUM CHLORIDE 236; 22.74; 6.74; 5.86; 2.97 G/4L; G/4L; G/4L; G/4L; G/4L
4 POWDER, FOR SOLUTION ORAL ONCE
Qty: 1 EACH | Refills: 0 | Status: SHIPPED | OUTPATIENT
Start: 2023-09-18 | End: 2023-09-18

## 2023-09-18 RX ORDER — IBUPROFEN 600 MG/1
600 TABLET ORAL EVERY 6 HOURS PRN
Qty: 20 TABLET | Refills: 1 | Status: SHIPPED | OUTPATIENT
Start: 2023-09-18 | End: 2024-03-29 | Stop reason: DRUGHIGH

## 2023-09-18 RX ORDER — MEDROXYPROGESTERONE ACETATE 10 MG/1
10 TABLET ORAL NIGHTLY
Qty: 20 TABLET | Refills: 1 | Status: SHIPPED | OUTPATIENT
Start: 2023-09-18 | End: 2024-03-29

## 2023-09-18 NOTE — TELEPHONE ENCOUNTER
Endoscopy Scheduling Questionnaire:         Are you taking any blood thinners? no               If Yes  Have you been on them for longer than one year? N/a    2. Have you been diagnosed with Diverticulitis in past three months?  no    3. Are you on dialysis or have Kidney Disease? no    4. Previous Colonoscopy?  no         If yes Do you have a history of colon polyps?  N/a    5. Family History of Colon Cancer: no         Relation: n/a       Age at Diagnosis: n/a      6. Are you a diabetic?  yes    7. Do you have a history of constipation?  no    8. Are you taking a GLP-1 Agonist/Adipex (weight loss drugs)? Yes Patient instructed to discontinue taking Ozempic 7 days prior to colonoscopy.  Dulaglutide (Trulicity) (weekly)  Exenatide extended release (Bydureon bcise) (weekly)  Exenatide (Byetta) (twice daily)  Semaglutide (Ozempic) (weekly)  Liraglutide (Victoza, Saxenda) (daily)  Lixisenatide (Adlyxin) (daily)  Semaglutide (Rybelsus) (taken by mouth once daily)    Hold GLP-1 agonists on the day of the procedure/surgery for patients who take the medication daily.    Hold GLP-1 agonists a week prior to the procedure/surgery for patients who take the medication weekly.    Procedure scheduled with Dr. Dr. Alva Salguero   on  10/5/2023    The prep being used is Golytely     The patient's prep instructions were sent via patient portal.      GOLYTELY/ COLYTE/ NULYTELY Prep Instructions    [facility] [address]  Clinic Office [phone]  Endoscopy Lab [phone]    You are scheduled for a Colonoscopy with Dr. Dr. Alva Salguero on 10/5/2023 at [facility] in [place].    Check in at the Hospital -1st floor, Information desk.   Call [phone] to reschedule.    An adult friend/family member must come with you to drive you home.  You cannot drive, take a taxi, Uber/Lyft or bus to leave the Endoscopy Center alone.  If you do not have someone to drive you home, your test will be  cancelled.     Please follow the directions of your doctor if you take any pills that thin your blood. If you take these meds: Aggrenox, Brilinta, Effient, Eliquis, Lovenox, Plavix, Pletal, Pradaxa, Ticilid, Xarelto or Coumadin, let the doctor's office know.    Please hold any GLP-1 medications prior to the procedure: Dulaglutide Trulicity(hold week prior), Exenatide Byetta (hold the morning of procedure), Semaglutide Ozempic (hold week prior), Liraglutide Victoza, Saxenda(hold week prior), Lixisenatide Adlyxin (hold the morning of procedure), Semaglutide Rybelsus (hold the morning of procedure), Tirzepatide Mounjaro (hold week prior)     DON'T: On the morning of the test do not take insulin or pills for diabetes.     DO: On the morning of the test, do take any pills for blood pressure, heart, anti-rejection and or seizures with a small sip of water. Bring any inhalers with you.    To have a good prep, you must follow these instructions - please do not use the directions from the pharmacy.    The doctor will send a prescription for the Golytely.      The Day Before the test:    You can only drink CLEAR LIQUIDS the whole day before your test.  You can't eat any food for the whole day.    You CAN have:  Water, Coffee or decaf coffee (no milk or cream)  Tea  Soft drinks - regular and sugar free  Jello (green or yellow)  Apple Juice, white grape juice, white cranberry juice  Gatorade, Power Aid, Crystal Light, Leonidas Aid  Lemonade and Limeade  Bouillon, clear soup  Snowball, popsicles  YOU CAN'T DRINK ANYTHING RED, PURPLE ORANGE OR BLUE   YOU CAN'T DRINK ALCOHOL  ONLY DRINK WHAT IS ON THE LIST      At 12 noon,   Add water up to the line on the jug of GOLYTELY (should be 1 gallon when done).  You can add a packet of yellow/green powder drink mix to the jug.   Put the bottle in the refrigerator if you prefer. It should taste better if it is cold. Do NOT put this solution over ice. It is ok to drink with a  straw.    At 5 pm the NIGHT before your test:    Drink 1 glass (8 ounces) every 10 minutes until 1/2 of the jug is finished.  Put the jug back in the refrigerator after you finish the first half, and don't drink any more until 5 hours before you come to the hospital (see below for more specific details).    You can continue to drink clear liquids until you go to sleep.    The Day of the test - We will call you 2 days before your test to tell you what time to get there.    5 hours before you come to the hospital (this may be in the middle of the night)  Drink 1 glass (8 ounces) every 10 minutes until the jug is finished.     YOU CAN'T EAT OR DRINK ANYTHING ELSE ONCE YOU FINISH THE PREP    Leave all valuables and jewelry at home. You will be at the hospital for 2-4 hours.    Call the Endoscopy department at [phone] with any questions about your procedure.

## 2023-09-18 NOTE — PROGRESS NOTES
48 y.o.   OB History          2    Para   2    Term   2            AB        Living             SAB        IAB        Ectopic        Multiple        Live Births                   Comlaining of: heavy on and off bleeding  Hx of heavy cycles, but reg   Cramps also  Chart rev.d  Small fibroids on us in past  Pt ref exam today, bleeding heavy        ROS:  GENERAL: No fever, chills, fatigability or weight loss.  SKIN: No rashes, itching or changes in color or texture of skin.  HEAD: No headaches or recent head trauma.  EYES: Visual acuity fine. No photophobia, ocular pain or diplopia.  EARS: Denies ear pain, discharge or vertigo.  NOSE: No loss of smell, no epistaxis or postnasal drip.  MOUTH & THROAT: No hoarseness or change in voice. No excessive gum bleeding.  NODES: Denies swollen glands.  CHEST: Denies ANDERS, cyanosis, wheezing, cough and sputum production.  CARDIOVASCULAR: Denies chest pain, PND, orthopnea or reduced exercise tolerance.  ABDOMEN: Appetite fine. No weight loss. Denies diarrhea, abdominal pain, hematemesis or blood in stool.  URINARY: No flank pain, dysuria or hematuria.  PERIPHERAL VASCULAR: No claudication or cyanosis.  MUSCULOSKELETAL: No joint stiffness or swelling. Denies back pain.  NEUROLOGIC: No history of seizures, paralysis, alteration of gait or coordination      PE: /89   Wt 112 kg (246 lb 14.6 oz)   LMP 2023 (Exact Date)   BMI 39.85 kg/m²    Exam def  Discussed in detail, causes, needs, meds etc    A/plan  - start provera each night  -med for cramps  -call Monday a week to update  Stressed need for pap and poss embx  Prob us also to check fibroid growth   I spent a total of 25 minutes on the day of the visit.This includes face to face time and non-face to face time preparing to see the patient (eg, review of tests), obtaining and/or reviewing separately obtained history, documenting clinical information in the electronic or other health record, independently  interpreting results and communicating results to the patient/family/caregiver, or care coordinator

## 2023-09-25 ENCOUNTER — PATIENT MESSAGE (OUTPATIENT)
Dept: OBSTETRICS AND GYNECOLOGY | Facility: CLINIC | Age: 48
End: 2023-09-25
Payer: COMMERCIAL

## 2023-09-25 ENCOUNTER — TELEPHONE (OUTPATIENT)
Dept: OBSTETRICS AND GYNECOLOGY | Facility: CLINIC | Age: 48
End: 2023-09-25
Payer: COMMERCIAL

## 2023-10-02 ENCOUNTER — OFFICE VISIT (OUTPATIENT)
Dept: OBSTETRICS AND GYNECOLOGY | Facility: CLINIC | Age: 48
End: 2023-10-02
Payer: COMMERCIAL

## 2023-10-02 VITALS
SYSTOLIC BLOOD PRESSURE: 126 MMHG | DIASTOLIC BLOOD PRESSURE: 86 MMHG | BODY MASS INDEX: 40.17 KG/M2 | WEIGHT: 248.88 LBS

## 2023-10-02 DIAGNOSIS — D25.1 FIBROIDS, INTRAMURAL: Primary | ICD-10-CM

## 2023-10-02 DIAGNOSIS — Z01.419 WELL WOMAN EXAM WITH ROUTINE GYNECOLOGICAL EXAM: ICD-10-CM

## 2023-10-02 PROCEDURE — 3061F NEG MICROALBUMINURIA REV: CPT | Mod: CPTII,S$GLB,, | Performed by: OBSTETRICS & GYNECOLOGY

## 2023-10-02 PROCEDURE — 99396 PR PREVENTIVE VISIT,EST,40-64: ICD-10-PCS | Mod: S$GLB,,, | Performed by: OBSTETRICS & GYNECOLOGY

## 2023-10-02 PROCEDURE — 99999 PR PBB SHADOW E&M-EST. PATIENT-LVL III: CPT | Mod: PBBFAC,,, | Performed by: OBSTETRICS & GYNECOLOGY

## 2023-10-02 PROCEDURE — 3052F PR MOST RECENT HEMOGLOBIN A1C LEVEL 8.0 - < 9.0%: ICD-10-PCS | Mod: CPTII,S$GLB,, | Performed by: OBSTETRICS & GYNECOLOGY

## 2023-10-02 PROCEDURE — 1159F MED LIST DOCD IN RCRD: CPT | Mod: CPTII,S$GLB,, | Performed by: OBSTETRICS & GYNECOLOGY

## 2023-10-02 PROCEDURE — 1160F RVW MEDS BY RX/DR IN RCRD: CPT | Mod: CPTII,S$GLB,, | Performed by: OBSTETRICS & GYNECOLOGY

## 2023-10-02 PROCEDURE — 3079F PR MOST RECENT DIASTOLIC BLOOD PRESSURE 80-89 MM HG: ICD-10-PCS | Mod: CPTII,S$GLB,, | Performed by: OBSTETRICS & GYNECOLOGY

## 2023-10-02 PROCEDURE — 1159F PR MEDICATION LIST DOCUMENTED IN MEDICAL RECORD: ICD-10-PCS | Mod: CPTII,S$GLB,, | Performed by: OBSTETRICS & GYNECOLOGY

## 2023-10-02 PROCEDURE — 3066F NEPHROPATHY DOC TX: CPT | Mod: CPTII,S$GLB,, | Performed by: OBSTETRICS & GYNECOLOGY

## 2023-10-02 PROCEDURE — 3066F PR DOCUMENTATION OF TREATMENT FOR NEPHROPATHY: ICD-10-PCS | Mod: CPTII,S$GLB,, | Performed by: OBSTETRICS & GYNECOLOGY

## 2023-10-02 PROCEDURE — 3074F SYST BP LT 130 MM HG: CPT | Mod: CPTII,S$GLB,, | Performed by: OBSTETRICS & GYNECOLOGY

## 2023-10-02 PROCEDURE — 88175 CYTOPATH C/V AUTO FLUID REDO: CPT | Performed by: OBSTETRICS & GYNECOLOGY

## 2023-10-02 PROCEDURE — 3008F BODY MASS INDEX DOCD: CPT | Mod: CPTII,S$GLB,, | Performed by: OBSTETRICS & GYNECOLOGY

## 2023-10-02 PROCEDURE — 3079F DIAST BP 80-89 MM HG: CPT | Mod: CPTII,S$GLB,, | Performed by: OBSTETRICS & GYNECOLOGY

## 2023-10-02 PROCEDURE — 3074F PR MOST RECENT SYSTOLIC BLOOD PRESSURE < 130 MM HG: ICD-10-PCS | Mod: CPTII,S$GLB,, | Performed by: OBSTETRICS & GYNECOLOGY

## 2023-10-02 PROCEDURE — 99396 PREV VISIT EST AGE 40-64: CPT | Mod: S$GLB,,, | Performed by: OBSTETRICS & GYNECOLOGY

## 2023-10-02 PROCEDURE — 3052F HG A1C>EQUAL 8.0%<EQUAL 9.0%: CPT | Mod: CPTII,S$GLB,, | Performed by: OBSTETRICS & GYNECOLOGY

## 2023-10-02 PROCEDURE — 99999 PR PBB SHADOW E&M-EST. PATIENT-LVL III: ICD-10-PCS | Mod: PBBFAC,,, | Performed by: OBSTETRICS & GYNECOLOGY

## 2023-10-02 PROCEDURE — 3008F PR BODY MASS INDEX (BMI) DOCUMENTED: ICD-10-PCS | Mod: CPTII,S$GLB,, | Performed by: OBSTETRICS & GYNECOLOGY

## 2023-10-02 PROCEDURE — 3061F PR NEG MICROALBUMINURIA RESULT DOCUMENTED/REVIEW: ICD-10-PCS | Mod: CPTII,S$GLB,, | Performed by: OBSTETRICS & GYNECOLOGY

## 2023-10-02 PROCEDURE — 1160F PR REVIEW ALL MEDS BY PRESCRIBER/CLIN PHARMACIST DOCUMENTED: ICD-10-PCS | Mod: CPTII,S$GLB,, | Performed by: OBSTETRICS & GYNECOLOGY

## 2023-10-02 NOTE — PROGRESS NOTES
HPI:   48 y.o.   OB History          2    Para   2    Term   2            AB        Living             SAB        IAB        Ectopic        Multiple        Live Births                  Patient's last menstrual period was 2023 (exact date).    Patient is  here for her annual gynecologic exam.  She has no complaints.     ROS:  GENERAL: No fever, chills, fatigability or weight loss.  SKIN: No rashes, itching or changes in color or texture of skin.  HEAD: No headaches or recent head trauma.  EYES: Visual acuity fine. No photophobia, ocular pain or diplopia.  EARS: Denies ear pain, discharge or vertigo.  NOSE: No loss of smell, no epistaxis or postnasal drip.  MOUTH & THROAT: No hoarseness or change in voice. No excessive gum bleeding.  NODES: Denies swollen glands.  CHEST: Denies ANDERS, cyanosis, wheezing, cough and sputum production.  CARDIOVASCULAR: Denies chest pain, PND, orthopnea or reduced exercise tolerance.  ABDOMEN: Appetite fine. No weight loss. Denies diarrhea, abdominal pain, hematemesis or blood in stool.  URINARY: No flank pain, dysuria or hematuria.  PERIPHERAL VASCULAR: No claudication or cyanosis.  MUSCULOSKELETAL: No joint stiffness or swelling. Denies back pain.  NEUROLOGIC: No history of seizures, paralysis, alteration of gait or coordination.    PE:   /86   Wt 112.9 kg (248 lb 14.4 oz)   LMP 2023 (Exact Date)   BMI 40.17 kg/m²   APPEARANCE: Well nourished, well developed, in no acute distress.  NECK: Neck symmetric without masses or thyromegaly.  BREASTS: Symmetrical, no skin changes or visible lesions. No palpable masses, nipple discharge or adenopathy bilaterally.  ABDOMEN: Flat. Soft. No tenderness or masses. No hepatosplenomegaly. No hernias. No CVA tenderness.  VULVA: No lesions. Normal female genitalia.  URETHRAL MEATUS: Normal size and location, no lesions, no prolapse.  URETHRA: No masses, tenderness, prolapse or scarring.  VAGINA: Moist and well rugated, no  discharge, no significant cystocele or rectocele.  CERVIX: No lesions and discharge. PAP done.  UTERUS: Normal size, regular shape, mobile, non-tender, bladder base nontender.  ADNEXA: No masses, tenderness or CDS nodularity.  ANUS PERINEUM: Normal.    PROCEDURES:  Pap smear    Assessment:  Normal Gynecologic Exam    Plan:  Mammogram and Colonoscopy if indicated by current recommendations.  Return to clinic in one year or for any problems or complaints.  Provera stopped cycle, off one week, bleeding stopped  Plan, mammo and us to check fibroids

## 2023-10-03 ENCOUNTER — TELEPHONE (OUTPATIENT)
Dept: ENDOSCOPY | Facility: HOSPITAL | Age: 48
End: 2023-10-03
Payer: COMMERCIAL

## 2023-10-03 NOTE — TELEPHONE ENCOUNTER
Left messages instructing patient to call dept @ [phone] between 8am-4pm.    Arrival time to be given @ 6610  Colon/Golytely  (Message sent via [facility] portal)

## 2023-10-04 ENCOUNTER — TELEPHONE (OUTPATIENT)
Dept: ENDOSCOPY | Facility: HOSPITAL | Age: 48
End: 2023-10-04
Payer: COMMERCIAL

## 2023-10-04 ENCOUNTER — TELEPHONE (OUTPATIENT)
Dept: GASTROENTEROLOGY | Facility: CLINIC | Age: 48
End: 2023-10-04
Payer: COMMERCIAL

## 2023-10-04 NOTE — TELEPHONE ENCOUNTER
Left message instructing patient to call dept @ [phone] by 2pm today.    Arrival time to be given @ 1130, message noted in chart re: poss rescheduling exam.  Colon/Golytely  (Message sent via [facility])

## 2023-10-04 NOTE — TELEPHONE ENCOUNTER
Attempted to reach patient at number left on voice mail. Call could not be completed. The other number on patients chart is not in service. Patient called to reschedule her colonoscopy that is scheduled on tomorrow.

## 2023-10-09 ENCOUNTER — PATIENT MESSAGE (OUTPATIENT)
Dept: ADMINISTRATIVE | Facility: HOSPITAL | Age: 48
End: 2023-10-09
Payer: COMMERCIAL

## 2023-10-10 LAB
FINAL PATHOLOGIC DIAGNOSIS: NORMAL
Lab: NORMAL

## 2023-10-18 ENCOUNTER — TELEPHONE (OUTPATIENT)
Dept: INTERNAL MEDICINE | Facility: CLINIC | Age: 48
End: 2023-10-18
Payer: COMMERCIAL

## 2023-10-23 ENCOUNTER — TELEPHONE (OUTPATIENT)
Dept: OBSTETRICS AND GYNECOLOGY | Facility: CLINIC | Age: 48
End: 2023-10-23
Payer: COMMERCIAL

## 2023-10-23 RX ORDER — OXYCODONE AND ACETAMINOPHEN 5; 325 MG/1; MG/1
1 TABLET ORAL EVERY 8 HOURS PRN
Qty: 10 EACH | Refills: 0 | Status: SHIPPED | OUTPATIENT
Start: 2023-10-23 | End: 2024-03-29

## 2023-10-23 NOTE — TELEPHONE ENCOUNTER
----- Message from Kaushik Lares sent at 10/20/2023 11:54 AM CDT -----  Contact: pt  Type: Requesting to speak with nurse        Who Called: PT  Regarding: personal  Would the patient rather a call back or a response via [facility]? Call back  Best Call Back Number: [phone]  Additional Information:

## 2023-10-23 NOTE — TELEPHONE ENCOUNTER
Pt never filled her script of percocet and provera.     She would like to know if you could resend percocet, she is currently on her cycle and is in pain

## 2023-10-23 NOTE — TELEPHONE ENCOUNTER
----- Message from Kaushik Lares sent at 10/23/2023  1:44 PM CDT -----  Contact: [facility]  Type: Requesting to speak with nurse        [facility]  Regarding: diagnosis code needed oxyCODONE-acetaminophen (PERCOCET) 5-325 mg per tablet  Would the patient rather a call back or a response via [facility]? Call back  Best Call Back Number: [facility] [place], [place] - [address] [place] [address]   Phone: [phone]  Fax:  [phone]        Additional Information:

## 2023-10-27 ENCOUNTER — TELEPHONE (OUTPATIENT)
Dept: OBSTETRICS AND GYNECOLOGY | Facility: CLINIC | Age: 48
End: 2023-10-27

## 2023-10-27 NOTE — TELEPHONE ENCOUNTER
----- Message from Nunu Boone sent at 10/27/2023  9:55 AM CDT -----  .Type:  Pharmacy Calling to Clarify an RX    [facility]arm[facility]  Prescription Name:oxyCODONE-acetaminophen (PERCOCET) 5-325 mg per tablet  What do they need to clarify?: diagnosis code  Best Call Back Number:[phone]  Additional Information:

## 2023-10-30 ENCOUNTER — LAB VISIT (OUTPATIENT)
Dept: LAB | Facility: HOSPITAL | Age: 48
End: 2023-10-30
Attending: OBSTETRICS & GYNECOLOGY
Payer: COMMERCIAL

## 2023-10-30 DIAGNOSIS — Z01.419 WELL WOMAN EXAM WITH ROUTINE GYNECOLOGICAL EXAM: ICD-10-CM

## 2023-10-30 LAB — FSH SERPL-ACNC: 16 MIU/ML

## 2023-10-30 PROCEDURE — 83001 ASSAY OF GONADOTROPIN (FSH): CPT | Performed by: OBSTETRICS & GYNECOLOGY

## 2023-10-30 PROCEDURE — 36415 COLL VENOUS BLD VENIPUNCTURE: CPT | Performed by: OBSTETRICS & GYNECOLOGY

## 2023-11-01 ENCOUNTER — PATIENT OUTREACH (OUTPATIENT)
Dept: ADMINISTRATIVE | Facility: HOSPITAL | Age: 48
End: 2023-11-01
Payer: COMMERCIAL

## 2023-11-01 NOTE — PROGRESS NOTES
Updates were requested from care everywhere.  Health Maintenance has been updated.  LINKS immunization registry triggered.  Immunizations were reconciled.  Telephone outreach re: diabetic eye exam. Left voice mail.

## 2023-11-02 ENCOUNTER — HOSPITAL ENCOUNTER (OUTPATIENT)
Dept: RADIOLOGY | Facility: HOSPITAL | Age: 48
Discharge: HOME | End: 2023-11-02
Attending: OBSTETRICS & GYNECOLOGY
Payer: COMMERCIAL

## 2023-11-02 ENCOUNTER — PATIENT MESSAGE (OUTPATIENT)
Dept: ADMINISTRATIVE | Facility: HOSPITAL | Age: 48
End: 2023-11-02
Payer: COMMERCIAL

## 2023-11-02 ENCOUNTER — PATIENT MESSAGE (OUTPATIENT)
Dept: OBSTETRICS AND GYNECOLOGY | Facility: CLINIC | Age: 48
End: 2023-11-02
Payer: COMMERCIAL

## 2023-11-02 ENCOUNTER — TELEPHONE (OUTPATIENT)
Dept: OBSTETRICS AND GYNECOLOGY | Facility: CLINIC | Age: 48
End: 2023-11-02
Payer: COMMERCIAL

## 2023-11-02 VITALS — WEIGHT: 248 LBS | BODY MASS INDEX: 39.86 KG/M2 | HEIGHT: 66 IN

## 2023-11-02 DIAGNOSIS — Z01.419 WELL WOMAN EXAM WITH ROUTINE GYNECOLOGICAL EXAM: ICD-10-CM

## 2023-11-02 PROCEDURE — 77067 MAMMO DIGITAL SCREENING BILAT WITH TOMO: ICD-10-PCS | Mod: 26,,, | Performed by: RADIOLOGY

## 2023-11-02 PROCEDURE — 77067 SCR MAMMO BI INCL CAD: CPT | Mod: 26,,, | Performed by: RADIOLOGY

## 2023-11-02 PROCEDURE — 77063 BREAST TOMOSYNTHESIS BI: CPT | Mod: 26,,, | Performed by: RADIOLOGY

## 2023-11-02 PROCEDURE — 77067 SCR MAMMO BI INCL CAD: CPT | Mod: TC

## 2023-11-02 PROCEDURE — 77063 MAMMO DIGITAL SCREENING BILAT WITH TOMO: ICD-10-PCS | Mod: 26,,, | Performed by: RADIOLOGY

## 2023-11-03 ENCOUNTER — PATIENT MESSAGE (OUTPATIENT)
Dept: ADMINISTRATIVE | Facility: HOSPITAL | Age: 48
End: 2023-11-03
Payer: COMMERCIAL

## 2023-11-07 ENCOUNTER — TELEPHONE (OUTPATIENT)
Dept: ENDOSCOPY | Facility: HOSPITAL | Age: 48
End: 2023-11-07
Payer: COMMERCIAL

## 2023-11-07 NOTE — TELEPHONE ENCOUNTER
Attempted to call both numbers listed to confirm procedure appt, no answer and unable to leave message

## 2023-11-08 ENCOUNTER — PATIENT OUTREACH (OUTPATIENT)
Dept: ADMINISTRATIVE | Facility: HOSPITAL | Age: 48
End: 2023-11-08
Payer: COMMERCIAL

## 2023-11-08 ENCOUNTER — TELEPHONE (OUTPATIENT)
Dept: ENDOSCOPY | Facility: HOSPITAL | Age: 48
End: 2023-11-08
Payer: COMMERCIAL

## 2023-11-08 NOTE — LETTER
AUTHORIZATION FOR RELEASE OF   CONFIDENTIAL INFORMATION    Dear [facility],    We are seeing Hamzah Reis, date of birth 1975, in the clinic at [facility]. Tatum Santos MD is the patient's PCP. Hamzah Reis has an outstanding lab/procedure at the time we reviewed her chart. In order to help keep her health information updated, she has authorized us to request the following medical record(s):                             (X)  EYE EXAM DIABETIC RETINOPATHY REPORT                               Please fax records to [facility], Swindler, Shelly L., MD,      [facility]                                                        Please Fax to [facility] at [phone].     Thank you for your help, Daphne Isbell LPKORI-[facility] If I can be of any assistance you can call at [phone]                      Patient Name: Hamzah Reis  : 1975  Patient Phone #: [phone]

## 2023-11-08 NOTE — TELEPHONE ENCOUNTER
Attempted to contact both numbers listed. NO answer and unable to leave message, 2nd number not in service.

## 2023-11-08 NOTE — PROGRESS NOTES
2023 Care Everywhere updates requested and reviewed.  Immunizations reconciled. Media reports reviewed.  Duplicate HM overrides and  orders removed.  Overdue HM topic chart audit and/or requested.  Overdue lab testing linked to upcoming lab appointments if applies.   Lab steve, and [facility] reviewed   DIS reviewed     Efax sent to [facility]

## 2023-11-09 ENCOUNTER — TELEPHONE (OUTPATIENT)
Dept: ENDOSCOPY | Facility: HOSPITAL | Age: 48
End: 2023-11-09
Payer: COMMERCIAL

## 2023-11-09 NOTE — TELEPHONE ENCOUNTER
Attempt to reschedule pt's colonoscopy, times two, mobile voicemail not set uo.  Home number not in service.

## 2023-12-08 ENCOUNTER — TELEPHONE (OUTPATIENT)
Dept: GASTROENTEROLOGY | Facility: CLINIC | Age: 48
End: 2023-12-08
Payer: COMMERCIAL

## 2023-12-15 ENCOUNTER — PATIENT MESSAGE (OUTPATIENT)
Dept: ADMINISTRATIVE | Facility: HOSPITAL | Age: 48
End: 2023-12-15
Payer: COMMERCIAL

## 2023-12-15 ENCOUNTER — PATIENT OUTREACH (OUTPATIENT)
Dept: ADMINISTRATIVE | Facility: HOSPITAL | Age: 48
End: 2023-12-15
Payer: COMMERCIAL

## 2023-12-15 NOTE — PROGRESS NOTES
12/15/2023 Care Everywhere updates requested and reviewed.  Immunizations reconciled. Media reports reviewed.  Duplicate HM overrides and  orders removed.  Overdue HM topic chart audit and/or requested.  Overdue lab testing linked to upcoming lab appointments if applies.   Lab steve, and [employer] reviewed  Efax sent to [facility]

## 2023-12-15 NOTE — LETTER
AUTHORIZATION FOR RELEASE OF   CONFIDENTIAL INFORMATION    Dear [facility],    We are seeing Hamzah Reis, date of birth 1975, in the clinic at [facility]. Tatum Santos MD is the patient's PCP. Hamzah Reis has an outstanding lab/procedure at the time we reviewed her chart. In order to help keep her health information updated, she has authorized us to request the following medical record(s):                                              (X)  DIABETIC EYE EXAM                                 Please fax records to [facility], Swindler, Shelly L., MD,                                                  Please Fax to [facility] at [phone]    Thank you for your help, Daphne Isbell [facility]PKOR[facility]. If I can be of any assistance you can call at [phone]                     Patient Name: Hamzah Reis  : 1975  Patient Phone #: [phone]

## 2023-12-29 ENCOUNTER — TELEPHONE (OUTPATIENT)
Dept: FAMILY MEDICINE | Facility: CLINIC | Age: 48
End: 2023-12-29
Payer: COMMERCIAL

## 2023-12-29 NOTE — TELEPHONE ENCOUNTER
----- Message from Sonya Bunch sent at 12/29/2023  8:38 AM CST -----  Type:  Needs Medical Advice    Who Called: pt     Would the patient rather a call back or a response via [facility]? call  Best Call Back Number:[phone]   Additional Information: pt is requesting to get a return call to discuss the appointment that is scheduled for 12/29/23@9:00

## 2023-12-29 NOTE — TELEPHONE ENCOUNTER
Patient called stating she wont be able to make today's and would like to reschedule. I informed patient she should be receiving a call for a sooner appointment.

## 2024-01-10 ENCOUNTER — PATIENT MESSAGE (OUTPATIENT)
Dept: ADMINISTRATIVE | Facility: HOSPITAL | Age: 49
End: 2024-01-10
Payer: COMMERCIAL

## 2024-01-24 ENCOUNTER — TELEPHONE (OUTPATIENT)
Dept: OBSTETRICS AND GYNECOLOGY | Facility: CLINIC | Age: 49
End: 2024-01-24
Payer: COMMERCIAL

## 2024-01-24 NOTE — TELEPHONE ENCOUNTER
----- Message from Brenda Lance sent at 1/24/2024  2:58 PM CST -----  Contact: pt  Type:  Needs Medical Advice    Who Called: pt  Would the patient rather a call back or a response via [facility]? call  Best Call Back Number: [phone]   Additional Information:   Pt requesting a call regarding medication that's was suppose to be called in

## 2024-03-29 ENCOUNTER — HOSPITAL ENCOUNTER (OUTPATIENT)
Facility: HOSPITAL | Age: 49
Discharge: HOME | End: 2024-03-31
Attending: EMERGENCY MEDICINE | Admitting: HOSPITALIST
Payer: COMMERCIAL

## 2024-03-29 DIAGNOSIS — N83.209 HEMORRHAGIC OVARIAN CYST: ICD-10-CM

## 2024-03-29 DIAGNOSIS — N70.11 HYDROSALPINX: ICD-10-CM

## 2024-03-29 DIAGNOSIS — Z79.4 TYPE 2 DIABETES MELLITUS WITH DIABETIC POLYNEUROPATHY, WITH LONG-TERM CURRENT USE OF INSULIN: ICD-10-CM

## 2024-03-29 DIAGNOSIS — N93.9 ABNORMAL UTERINE BLEEDING (AUB): ICD-10-CM

## 2024-03-29 DIAGNOSIS — E11.65 HYPERGLYCEMIA DUE TO DIABETES MELLITUS: Primary | ICD-10-CM

## 2024-03-29 DIAGNOSIS — E11.42 TYPE 2 DIABETES MELLITUS WITH DIABETIC POLYNEUROPATHY, WITH LONG-TERM CURRENT USE OF INSULIN: ICD-10-CM

## 2024-03-29 DIAGNOSIS — R07.9 CHEST PAIN: ICD-10-CM

## 2024-03-29 DIAGNOSIS — N83.209 OVARIAN CYSTIC MASS: ICD-10-CM

## 2024-03-29 PROBLEM — R11.2 NAUSEA AND VOMITING: Status: ACTIVE | Noted: 2024-03-29

## 2024-03-29 LAB
ALBUMIN SERPL BCP-MCNC: 3.7 G/DL (ref 3.5–5.2)
ALP SERPL-CCNC: 81 U/L (ref 55–135)
ALT SERPL W/O P-5'-P-CCNC: 17 U/L (ref 10–44)
ANION GAP SERPL CALC-SCNC: 11 MMOL/L (ref 8–16)
AST SERPL-CCNC: 15 U/L (ref 10–40)
B-HCG UR QL: NEGATIVE
B-OH-BUTYR BLD STRIP-SCNC: 0.1 MMOL/L (ref 0–0.5)
BASOPHILS # BLD AUTO: 0.03 K/UL (ref 0–0.2)
BASOPHILS NFR BLD: 0.5 % (ref 0–1.9)
BILIRUB SERPL-MCNC: 0.4 MG/DL (ref 0.1–1)
BILIRUB UR QL STRIP: NEGATIVE
BNP SERPL-MCNC: 21 PG/ML (ref 0–99)
BUN SERPL-MCNC: 8 MG/DL (ref 6–20)
CALCIUM SERPL-MCNC: 9.5 MG/DL (ref 8.7–10.5)
CHLORIDE SERPL-SCNC: 104 MMOL/L (ref 95–110)
CHOLEST SERPL-MCNC: 170 MG/DL (ref 120–199)
CHOLEST/HDLC SERPL: 4.5 {RATIO} (ref 2–5)
CLARITY UR: ABNORMAL
CO2 SERPL-SCNC: 23 MMOL/L (ref 23–29)
COLOR UR: YELLOW
CREAT SERPL-MCNC: 0.9 MG/DL (ref 0.5–1.4)
CTP QC/QA: YES
DIFFERENTIAL METHOD BLD: ABNORMAL
EOSINOPHIL # BLD AUTO: 0.1 K/UL (ref 0–0.5)
EOSINOPHIL NFR BLD: 1.7 % (ref 0–8)
ERYTHROCYTE [DISTWIDTH] IN BLOOD BY AUTOMATED COUNT: 13.2 % (ref 11.5–14.5)
EST. GFR  (NO RACE VARIABLE): >60 ML/MIN/1.73 M^2
ESTIMATED AVG GLUCOSE: 229 MG/DL (ref 68–131)
FIO2: 21 %
GLUCOSE SERPL-MCNC: 267 MG/DL (ref 70–110)
GLUCOSE UR QL STRIP: ABNORMAL
HBA1C MFR BLD: 9.6 % (ref 4–5.6)
HCT VFR BLD AUTO: 41.9 % (ref 37–48.5)
HDLC SERPL-MCNC: 38 MG/DL (ref 40–75)
HDLC SERPL: 22.4 % (ref 20–50)
HGB BLD-MCNC: 13.7 G/DL (ref 12–16)
HGB UR QL STRIP: ABNORMAL
IMM GRANULOCYTES # BLD AUTO: 0.02 K/UL (ref 0–0.04)
IMM GRANULOCYTES NFR BLD AUTO: 0.3 % (ref 0–0.5)
KETONES UR QL STRIP: NEGATIVE
LACTATE SERPL-SCNC: 1 MMOL/L (ref 0.5–2.2)
LDLC SERPL CALC-MCNC: 115.8 MG/DL (ref 63–159)
LEUKOCYTE ESTERASE UR QL STRIP: NEGATIVE
LIPASE SERPL-CCNC: 15 U/L (ref 4–60)
LYMPHOCYTES # BLD AUTO: 1.9 K/UL (ref 1–4.8)
LYMPHOCYTES NFR BLD: 28.9 % (ref 18–48)
MCH RBC QN AUTO: 26.6 PG (ref 27–31)
MCHC RBC AUTO-ENTMCNC: 32.7 G/DL (ref 32–36)
MCV RBC AUTO: 81 FL (ref 82–98)
MICROSCOPIC COMMENT: ABNORMAL
MONOCYTES # BLD AUTO: 0.4 K/UL (ref 0.3–1)
MONOCYTES NFR BLD: 5.4 % (ref 4–15)
NEUTROPHILS # BLD AUTO: 4.2 K/UL (ref 1.8–7.7)
NEUTROPHILS NFR BLD: 63.2 % (ref 38–73)
NITRITE UR QL STRIP: NEGATIVE
NONHDLC SERPL-MCNC: 132 MG/DL
NRBC BLD-RTO: 0 /100 WBC
PCO2 BLDA: 47.7 MMHG (ref 35–45)
PH SMN: 7.36 [PH] (ref 7.35–7.45)
PH UR STRIP: 6 [PH] (ref 5–8)
PLATELET # BLD AUTO: 419 K/UL (ref 150–450)
PMV BLD AUTO: 9.6 FL (ref 9.2–12.9)
PO2 BLDA: 33.5 MMHG (ref 40–60)
POC BASE DEFICIT: 0.9 MMOL/L (ref -2–2)
POC HCO3: 27 MMOL/L (ref 24–28)
POC PERFORMED BY: ABNORMAL
POC SATURATED O2: 62.3 % (ref 95–100)
POCT GLUCOSE: 150 MG/DL (ref 70–110)
POCT GLUCOSE: 230 MG/DL (ref 70–110)
POCT GLUCOSE: 237 MG/DL (ref 70–110)
POTASSIUM SERPL-SCNC: 4.1 MMOL/L (ref 3.5–5.1)
PROCALCITONIN SERPL IA-MCNC: 0.04 NG/ML
PROT SERPL-MCNC: 7.6 G/DL (ref 6–8.4)
PROT UR QL STRIP: ABNORMAL
RBC # BLD AUTO: 5.15 M/UL (ref 4–5.4)
RBC #/AREA URNS HPF: >100 /HPF (ref 0–4)
SODIUM SERPL-SCNC: 138 MMOL/L (ref 136–145)
SP GR UR STRIP: 1.02 (ref 1–1.03)
SPECIMEN SOURCE: ABNORMAL
SQUAMOUS #/AREA URNS HPF: 18 /HPF
TRIGL SERPL-MCNC: 81 MG/DL (ref 30–150)
TROPONIN I SERPL DL<=0.01 NG/ML-MCNC: 0.02 NG/ML (ref 0–0.03)
TROPONIN I SERPL DL<=0.01 NG/ML-MCNC: <0.006 NG/ML (ref 0–0.03)
URN SPEC COLLECT METH UR: ABNORMAL
UROBILINOGEN UR STRIP-ACNC: ABNORMAL EU/DL
WBC # BLD AUTO: 6.64 K/UL (ref 3.9–12.7)
WBC #/AREA URNS HPF: 2 /HPF (ref 0–5)

## 2024-03-29 PROCEDURE — 83605 ASSAY OF LACTIC ACID: CPT | Performed by: NURSE PRACTITIONER

## 2024-03-29 PROCEDURE — 93005 ELECTROCARDIOGRAM TRACING: CPT

## 2024-03-29 PROCEDURE — 80061 LIPID PANEL: CPT | Performed by: NURSE PRACTITIONER

## 2024-03-29 PROCEDURE — 83036 HEMOGLOBIN GLYCOSYLATED A1C: CPT | Performed by: NURSE PRACTITIONER

## 2024-03-29 PROCEDURE — 82962 GLUCOSE BLOOD TEST: CPT

## 2024-03-29 PROCEDURE — 80053 COMPREHEN METABOLIC PANEL: CPT

## 2024-03-29 PROCEDURE — 84145 PROCALCITONIN (PCT): CPT | Performed by: NURSE PRACTITIONER

## 2024-03-29 PROCEDURE — 83690 ASSAY OF LIPASE: CPT

## 2024-03-29 PROCEDURE — G0378 HOSPITAL OBSERVATION PER HR: HCPCS

## 2024-03-29 PROCEDURE — 85025 COMPLETE CBC W/AUTO DIFF WBC: CPT

## 2024-03-29 PROCEDURE — 96365 THER/PROPH/DIAG IV INF INIT: CPT | Mod: 59

## 2024-03-29 PROCEDURE — 25000003 PHARM REV CODE 250

## 2024-03-29 PROCEDURE — 63600175 PHARM REV CODE 636 W HCPCS: Performed by: NURSE PRACTITIONER

## 2024-03-29 PROCEDURE — 96361 HYDRATE IV INFUSION ADD-ON: CPT

## 2024-03-29 PROCEDURE — 87040 BLOOD CULTURE FOR BACTERIA: CPT | Mod: 59 | Performed by: NURSE PRACTITIONER

## 2024-03-29 PROCEDURE — 99285 EMERGENCY DEPT VISIT HI MDM: CPT | Mod: 25

## 2024-03-29 PROCEDURE — 25500020 PHARM REV CODE 255: Performed by: EMERGENCY MEDICINE

## 2024-03-29 PROCEDURE — 63600175 PHARM REV CODE 636 W HCPCS

## 2024-03-29 PROCEDURE — 82010 KETONE BODYS QUAN: CPT

## 2024-03-29 PROCEDURE — 96368 THER/DIAG CONCURRENT INF: CPT

## 2024-03-29 PROCEDURE — 81000 URINALYSIS NONAUTO W/SCOPE: CPT

## 2024-03-29 PROCEDURE — A4216 STERILE WATER/SALINE, 10 ML: HCPCS | Performed by: NURSE PRACTITIONER

## 2024-03-29 PROCEDURE — 84484 ASSAY OF TROPONIN QUANT: CPT | Mod: 91

## 2024-03-29 PROCEDURE — 25000003 PHARM REV CODE 250: Performed by: NURSE PRACTITIONER

## 2024-03-29 PROCEDURE — 96372 THER/PROPH/DIAG INJ SC/IM: CPT | Mod: 59 | Performed by: NURSE PRACTITIONER

## 2024-03-29 PROCEDURE — 93010 ELECTROCARDIOGRAM REPORT: CPT | Mod: ,,, | Performed by: INTERNAL MEDICINE

## 2024-03-29 PROCEDURE — 83880 ASSAY OF NATRIURETIC PEPTIDE: CPT

## 2024-03-29 PROCEDURE — 99204 OFFICE O/P NEW MOD 45 MIN: CPT | Mod: ,,, | Performed by: INTERNAL MEDICINE

## 2024-03-29 PROCEDURE — 82803 BLOOD GASES ANY COMBINATION: CPT

## 2024-03-29 PROCEDURE — 99900035 HC TECH TIME PER 15 MIN (STAT)

## 2024-03-29 PROCEDURE — 81025 URINE PREGNANCY TEST: CPT

## 2024-03-29 PROCEDURE — 84484 ASSAY OF TROPONIN QUANT: CPT | Mod: 91 | Performed by: NURSE PRACTITIONER

## 2024-03-29 PROCEDURE — 96375 TX/PRO/DX INJ NEW DRUG ADDON: CPT

## 2024-03-29 RX ORDER — IPRATROPIUM BROMIDE AND ALBUTEROL SULFATE 2.5; .5 MG/3ML; MG/3ML
3 SOLUTION RESPIRATORY (INHALATION) EVERY 4 HOURS PRN
Status: DISCONTINUED | OUTPATIENT
Start: 2024-03-29 | End: 2024-03-31 | Stop reason: HOSPADM

## 2024-03-29 RX ORDER — ONDANSETRON HYDROCHLORIDE 2 MG/ML
4 INJECTION, SOLUTION INTRAVENOUS EVERY 8 HOURS PRN
Status: DISCONTINUED | OUTPATIENT
Start: 2024-03-29 | End: 2024-03-31 | Stop reason: HOSPADM

## 2024-03-29 RX ORDER — ACETAMINOPHEN 325 MG/1
650 TABLET ORAL EVERY 4 HOURS PRN
Status: DISCONTINUED | OUTPATIENT
Start: 2024-03-29 | End: 2024-03-31 | Stop reason: HOSPADM

## 2024-03-29 RX ORDER — AMOXICILLIN 250 MG
1 CAPSULE ORAL 2 TIMES DAILY
Status: DISCONTINUED | OUTPATIENT
Start: 2024-03-29 | End: 2024-03-31 | Stop reason: HOSPADM

## 2024-03-29 RX ORDER — IBUPROFEN 200 MG
16 TABLET ORAL
Status: DISCONTINUED | OUTPATIENT
Start: 2024-03-29 | End: 2024-03-31 | Stop reason: HOSPADM

## 2024-03-29 RX ORDER — IBUPROFEN 200 MG
24 TABLET ORAL
Status: DISCONTINUED | OUTPATIENT
Start: 2024-03-29 | End: 2024-03-31 | Stop reason: HOSPADM

## 2024-03-29 RX ORDER — ONDANSETRON 8 MG/1
8 TABLET, ORALLY DISINTEGRATING ORAL EVERY 8 HOURS PRN
Status: DISCONTINUED | OUTPATIENT
Start: 2024-03-29 | End: 2024-03-31 | Stop reason: HOSPADM

## 2024-03-29 RX ORDER — DOXYCYCLINE HYCLATE 100 MG
100 TABLET ORAL
Status: COMPLETED | OUTPATIENT
Start: 2024-03-29 | End: 2024-03-29

## 2024-03-29 RX ORDER — ONDANSETRON HYDROCHLORIDE 2 MG/ML
4 INJECTION, SOLUTION INTRAVENOUS
Status: COMPLETED | OUTPATIENT
Start: 2024-03-29 | End: 2024-03-29

## 2024-03-29 RX ORDER — MORPHINE SULFATE 2 MG/ML
1 INJECTION, SOLUTION INTRAMUSCULAR; INTRAVENOUS EVERY 6 HOURS PRN
Status: DISCONTINUED | OUTPATIENT
Start: 2024-03-29 | End: 2024-03-31 | Stop reason: HOSPADM

## 2024-03-29 RX ORDER — KETOROLAC TROMETHAMINE 30 MG/ML
15 INJECTION, SOLUTION INTRAMUSCULAR; INTRAVENOUS
Status: COMPLETED | OUTPATIENT
Start: 2024-03-29 | End: 2024-03-29

## 2024-03-29 RX ORDER — TALC
6 POWDER (GRAM) TOPICAL NIGHTLY PRN
Status: DISCONTINUED | OUTPATIENT
Start: 2024-03-29 | End: 2024-03-31 | Stop reason: HOSPADM

## 2024-03-29 RX ORDER — ATORVASTATIN CALCIUM 40 MG/1
80 TABLET, FILM COATED ORAL DAILY
Status: DISCONTINUED | OUTPATIENT
Start: 2024-03-30 | End: 2024-03-31 | Stop reason: HOSPADM

## 2024-03-29 RX ORDER — GLUCAGON 1 MG
1 KIT INJECTION
Status: DISCONTINUED | OUTPATIENT
Start: 2024-03-29 | End: 2024-03-31 | Stop reason: HOSPADM

## 2024-03-29 RX ORDER — METRONIDAZOLE 500 MG/100ML
500 INJECTION, SOLUTION INTRAVENOUS
Status: COMPLETED | OUTPATIENT
Start: 2024-03-29 | End: 2024-03-29

## 2024-03-29 RX ORDER — ENOXAPARIN SODIUM 100 MG/ML
40 INJECTION SUBCUTANEOUS EVERY 24 HOURS
Status: DISCONTINUED | OUTPATIENT
Start: 2024-03-29 | End: 2024-03-30

## 2024-03-29 RX ORDER — SODIUM CHLORIDE 0.9 % (FLUSH) 0.9 %
10 SYRINGE (ML) INJECTION EVERY 8 HOURS
Status: DISCONTINUED | OUTPATIENT
Start: 2024-03-29 | End: 2024-03-31 | Stop reason: HOSPADM

## 2024-03-29 RX ORDER — IBUPROFEN 200 MG
200 TABLET ORAL EVERY 6 HOURS PRN
COMMUNITY

## 2024-03-29 RX ORDER — SIMETHICONE 80 MG
1 TABLET,CHEWABLE ORAL 4 TIMES DAILY PRN
Status: DISCONTINUED | OUTPATIENT
Start: 2024-03-29 | End: 2024-03-31 | Stop reason: HOSPADM

## 2024-03-29 RX ORDER — HYDROCODONE BITARTRATE AND ACETAMINOPHEN 7.5; 325 MG/1; MG/1
1 TABLET ORAL EVERY 6 HOURS PRN
Status: DISCONTINUED | OUTPATIENT
Start: 2024-03-29 | End: 2024-03-31 | Stop reason: HOSPADM

## 2024-03-29 RX ORDER — ALUMINUM HYDROXIDE, MAGNESIUM HYDROXIDE, AND SIMETHICONE 1200; 120; 1200 MG/30ML; MG/30ML; MG/30ML
30 SUSPENSION ORAL 4 TIMES DAILY PRN
Status: DISCONTINUED | OUTPATIENT
Start: 2024-03-29 | End: 2024-03-31 | Stop reason: HOSPADM

## 2024-03-29 RX ORDER — INSULIN ASPART 100 [IU]/ML
0-5 INJECTION, SOLUTION INTRAVENOUS; SUBCUTANEOUS
Status: DISCONTINUED | OUTPATIENT
Start: 2024-03-29 | End: 2024-03-31 | Stop reason: HOSPADM

## 2024-03-29 RX ADMIN — CEFTRIAXONE SODIUM 1 G: 1 INJECTION, POWDER, FOR SOLUTION INTRAMUSCULAR; INTRAVENOUS at 03:03

## 2024-03-29 RX ADMIN — ONDANSETRON 4 MG: 2 INJECTION INTRAMUSCULAR; INTRAVENOUS at 11:03

## 2024-03-29 RX ADMIN — SODIUM CHLORIDE 1000 ML: 9 INJECTION, SOLUTION INTRAVENOUS at 11:03

## 2024-03-29 RX ADMIN — Medication 10 ML: at 09:03

## 2024-03-29 RX ADMIN — SENNOSIDES AND DOCUSATE SODIUM 1 TABLET: 8.6; 5 TABLET ORAL at 08:03

## 2024-03-29 RX ADMIN — INSULIN ASPART 1 UNITS: 100 INJECTION, SOLUTION INTRAVENOUS; SUBCUTANEOUS at 08:03

## 2024-03-29 RX ADMIN — HYDROCODONE BITARTRATE AND ACETAMINOPHEN 1 TABLET: 7.5; 325 TABLET ORAL at 06:03

## 2024-03-29 RX ADMIN — IOHEXOL 100 ML: 350 INJECTION, SOLUTION INTRAVENOUS at 01:03

## 2024-03-29 RX ADMIN — METRONIDAZOLE 500 MG: 5 INJECTION, SOLUTION INTRAVENOUS at 03:03

## 2024-03-29 RX ADMIN — DOXYCYCLINE HYCLATE 100 MG: 100 TABLET, COATED ORAL at 03:03

## 2024-03-29 RX ADMIN — ENOXAPARIN SODIUM 40 MG: 40 INJECTION SUBCUTANEOUS at 04:03

## 2024-03-29 RX ADMIN — KETOROLAC TROMETHAMINE 15 MG: 30 INJECTION, SOLUTION INTRAMUSCULAR; INTRAVENOUS at 03:03

## 2024-03-29 NOTE — Clinical Note
Diagnosis: Ovarian cystic mass [202872]   Future Attending Provider: MURRAY WHYTE [[id number]]   Is the patient being sent to ED Observation?: No   Special Needs:: No Special Needs [1]

## 2024-03-29 NOTE — H&P
[facility]  History & Physical    Patient Name: Hamzah Reis  MRN: [id number]  Patient Class: OP- Observation  Admission Date: 3/29/2024  Attending Physician: Az Parra,*   Primary Care Provider: Tatum Santos MD         Patient information was obtained from patient, past medical records, and ER records.     Subjective:     Principal Problem:Hemorrhagic ovarian cyst    Chief Complaint:   Chief Complaint   Patient presents with    Abdominal Pain     Diffuse abd pain x a few hours. + emesis earlier today. Last oral intake was apple juice at 0800. Pt has hx of DM and report cbg in the 300s.         HPI: Hamzah Reis is a 47 y/o F with a pmh of DM, HTN, and depression. Her PCP is Dr. Santos. She presents to the ED here at [facility] for lower abdominal pain that began this morning.  Patient reports 1 episode of emesis while at work on today. She also complains of CP that she had on Tuesday, which has since resolved.  She denies shortness of breath, CP, palpitations, leg swelling, lightheadedness, syncope, diaphoresis, or any trauma. She reports that she started having vaginal bleeding once she to the ED. Patient had an endometrial ablation and hysteroscopy with D&C in 2020. Patient denies dysuria, hematuria, flank pain, unusual vaginal discharge--also denies any known STD exposure. Her ED workup includes CT abd/pelvis with IV contrast--left adnexal cystic lesion measuring 6.2 x 5.2 cm in transverse dimensions with a fluid-filled tubular structure a medially adjacent to the adnexal cyst, possibly hydrosalpinx.  There is a small amount of adjacent free peritoneal fluid.  The possibility of tubo-ovarian abscess should be considered.  Further evaluation with pelvic ultrasound examination may be helpful. 8 mm pleural based nodule abutting the left hemidiaphragm. US pelvic- Complex left ovarian cyst, suggests hemorrhagic cyst.  If pain persists in the region, follow-up  ultrasound could be performed in 6-8 weeks to ensure resolution. No discrete endometrial abnormalities. Negative pregnancy test. CXR--No acute abnormality. IV ceftriaxone, doxycyline, Flagyl were ordered. Pain regimen ordered in the ED. Hemoglobin is stable. IVFs were ordered. OBGYN, ID, and cardiology were consulted. She will be admitted to the [facility] service for further care.     Past Medical History:   Diagnosis Date    Depression     Diabetes mellitus, type 2     Hyperlipidemia     Hypertension     Vision impairment     wears glasses       Past Surgical History:   Procedure Laterality Date    ENDOMETRIAL ABLATION N/A 8/11/2020    Procedure: ABLATION, ENDOMETRIUM;  Surgeon: Michael A. Wiedemann, MD;  Location: [facility] OR;  Service: OB/GYN;  Laterality: N/A;    HYSTEROSCOPY WITH DILATION AND CURETTAGE OF UTERUS N/A 8/11/2020    Procedure: HYSTEROSCOPY, WITH DILATION AND CURETTAGE OF UTERUS;  Surgeon: Michael A. Wiedemann, MD;  Location: [facility] OR;  Service: OB/GYN;  Laterality: N/A;       Review of patient's allergies indicates:  No Known Allergies    No current facility-administered medications on file prior to encounter.     Current Outpatient Medications on File Prior to Encounter   Medication Sig    atorvastatin (LIPITOR) 80 MG tablet Take 1 tablet (80 mg total) by mouth once daily.    gabapentin (NEURONTIN) 600 MG tablet Take 1 tablet (600 mg total) by mouth 2 (two) times daily. (Patient taking differently: Take 600 mg by mouth 2 (two) times daily as needed.)    ibuprofen (ADVIL,MOTRIN) 200 MG tablet Take 200 mg by mouth every 6 (six) hours as needed for Pain.    insulin detemir U-100 (LEVEMIR) 100 unit/mL injection Inject 25 Units into the skin every evening.    metFORMIN (GLUCOPHAGE-XR) 500 MG ER 24hr tablet Take 1 tablet (500 mg total) by mouth 2 (two) times daily with meals.    blood sugar diagnostic Strp Use to check blood glucose before meals and at bedtime up to 4 times a day (Patient not taking: Reported  "on 9/18/2023)    blood-glucose meter Misc Use as instructed to check blood glucose before meals and at bedtime (Patient not taking: Reported on 9/18/2023)    lancets 30 gauge Misc 1 application by Misc.(Non-Drug; Combo Route) route 4 (four) times daily. Check  before meals and at bedtime (Patient not taking: Reported on 9/18/2023)    pen needle, diabetic (NOVOFINE PLUS) 32 gauge x 1/6" Ndle use with insulin once daily, must last 100 days (Patient not taking: Reported on 9/18/2023)    [DISCONTINUED] diclofenac sodium (VOLTAREN) 1 % Gel Apply 2 g topically 4 (four) times daily as needed. To painful area on the feet. (Patient not taking: Reported on 9/18/2023)    [DISCONTINUED] ibuprofen (ADVIL,MOTRIN) 600 MG tablet Take 1 tablet (600 mg total) by mouth every 6 (six) hours as needed for Pain. (Patient not taking: Reported on 10/2/2023)    [DISCONTINUED] medroxyPROGESTERone (PROVERA) 10 MG tablet Take 1 tablet (10 mg total) by mouth every evening. (Patient not taking: Reported on 10/2/2023)    [DISCONTINUED] meloxicam (MOBIC) 15 MG tablet Take 1 tablet (15 mg total) by mouth daily as needed for Pain. (Patient not taking: Reported on 9/18/2023)    [DISCONTINUED] oxyCODONE-acetaminophen (PERCOCET) 5-325 mg per tablet Take 1 tablet by mouth every 12 (twelve) hours as needed for Pain. (Patient not taking: Reported on 10/2/2023)    [DISCONTINUED] oxyCODONE-acetaminophen (PERCOCET) 5-325 mg per tablet Take 1 tablet by mouth every 8 (eight) hours as needed for Pain.    [DISCONTINUED] semaglutide (OZEMPIC) 1 mg/dose (4 mg/3 mL) Inject 1 mg into the skin every 7 days. (Patient not taking: Reported on 10/2/2023)     Family History       Problem Relation (Age of Onset)    Breast cancer Sister    Cancer Mother, Sister    Diabetes Mother, Sister, Maternal Grandmother    Heart disease Father    Hypertension Mother (55), Sister          Tobacco Use    Smoking status: Never     Passive exposure: Never    Smokeless tobacco: Never "   Substance and Sexual Activity    Alcohol use: No    Drug use: No    Sexual activity: Yes     Partners: Male     Birth control/protection: None     Review of Systems   Constitutional:  Positive for activity change, appetite change and fatigue. Negative for fever.   HENT:  Negative for trouble swallowing.    Gastrointestinal:  Positive for abdominal pain (lower abd sharp pain) and vomiting (1 episode of vomiting on today). Negative for abdominal distention, diarrhea and nausea.   Genitourinary:  Negative for difficulty urinating, hematuria and menstrual problem.   Musculoskeletal:  Negative for gait problem.   Skin:  Negative for wound.   Hematological:  Does not bruise/bleed easily.   Psychiatric/Behavioral:  Negative for confusion.      Objective:     Vital Signs (Most Recent):  Temp: 98.8 °F (37.1 °C) (03/29/24 1638)  Pulse: 95 (03/29/24 1638)  Resp: 17 (03/29/24 1638)  BP: (!) 134/93 (03/29/24 1638)  SpO2: 98 % (03/29/24 1638) Vital Signs (24h Range):  Temp:  [98.3 °F (36.8 °C)-98.8 °F (37.1 °C)] 98.8 °F (37.1 °C)  Pulse:  [83-95] 95  Resp:  [15-19] 17  SpO2:  [97 %-100 %] 98 %  BP: (134-200)/(69-95) 134/93     Weight: 111.6 kg (246 lb)  Body mass index is 39.71 kg/m².     Physical Exam  Vitals and nursing note reviewed.   Constitutional:       Appearance: She is obese. She is ill-appearing.   HENT:      Head: Normocephalic and atraumatic.      Mouth/Throat:      Mouth: Mucous membranes are moist.   Eyes:      Extraocular Movements: Extraocular movements intact.   Cardiovascular:      Rate and Rhythm: Normal rate and regular rhythm.   Abdominal:      General: Bowel sounds are normal. There is no distension.      Palpations: Abdomen is soft.      Tenderness: There is no abdominal tenderness. There is no guarding.   Musculoskeletal:         General: Normal range of motion.      Cervical back: Normal range of motion and neck supple.      Right lower leg: No edema.      Left lower leg: No edema.   Skin:     General:  Skin is warm and dry.      Capillary Refill: Capillary refill takes 2 to 3 seconds.   Neurological:      Mental Status: She is alert and oriented to person, place, and time.   Psychiatric:         Mood and Affect: Mood normal.         Behavior: Behavior normal.                Significant Labs: All pertinent labs within the past 24 hours have been reviewed.    Significant Imaging: I have reviewed all pertinent imaging results/findings within the past 24 hours.  Assessment/Plan:     * Hemorrhagic ovarian cyst  Was initially concerned for tubo-ovarian abscess from CT abd/pelvis read.  US pelvis complete transvag notes a Complex left ovarian cyst, suggests hemorrhagic cyst.  Case d/w OBGYN who does not think abx are warranted.  Will cont pain control  Appreciate GYN as well as ID recs  Monitor h/h--stable at this time      Chest pain  Patient c/o CP on Tuesday that has now resolved.  Troponin is wnl--will trend x3  No acute changes on EKG  BNP wnl  TTE pending  Even though there is a very low suspicion for ACS we will consult cardiology.      Nausea and vomiting  Cont antiemetics IV/SL       Anxiety and depression  Patient has persistent depression which is mild and is currently controlled. We will not consult psychiatry at this time. Patient does not display psychosis at this time. Continue to monitor closely and adjust plan of care as needed.        Hyperlipidemia associated with type 2 diabetes mellitus  Cont statin      BMI 39.0-39.9,adult  Body mass index is 39.71 kg/m². Morbid obesity complicates all aspects of disease management from diagnostic modalities to treatment. Weight loss encouraged and health benefits explained to patient.        Type 2 diabetes mellitus with diabetic polyneuropathy, with long-term current use of insulin  Patient's FSGs are uncontrolled due to hyperglycemia on current medication regimen.  Last A1c reviewed-   Lab Results   Component Value Date    HGBA1C 9.6 (H) 03/29/2024     Most  recent fingerstick glucose reviewed-   Recent Labs   Lab 03/29/24  1049 03/29/24  1641   POCTGLUCOSE 230* 150*     Current correctional scale  Low  Maintain anti-hyperglycemic dose as follows-   Antihyperglycemics (From admission, onward)      None          Hold Oral hypoglycemics while patient is in the hospital.          VTE Risk Mitigation (From admission, onward)           Ordered     enoxaparin injection 40 mg  Daily         03/29/24 1522     IP VTE HIGH RISK PATIENT  Once         03/29/24 1522     Place sequential compression device  Until discontinued         03/29/24 1522                         On 03/29/2024, patient should be placed in hospital observation services under my care in collaboration with Dr. Az Parra.           Shannon Milligan NP  Department of Hospital Medicine  [facility]

## 2024-03-29 NOTE — CARE UPDATE
Case discussed with Martha Weems and would not normally admit TOA in an adult that was afebrile and normal white blood cell count. I suggested a pelvic ultrasound to rule out torsion and that has just resulted. The ultrasound does not show evidence of a TOA and there is only a hemorrhagic cyst which does not need treatment at this time. This might mean what is seen on CT scan is suggestive of diverticulitis. I will see patient in the morning. She does not need antibiotics from a GYN stand point!! Please discontinue antibiotics unless appropriate for diverticulitis.    Ria Spaulding MD  Obstetrics & Gynecology  [facility]

## 2024-03-29 NOTE — ASSESSMENT & PLAN NOTE
Patient's FSGs are uncontrolled due to hyperglycemia on current medication regimen.  Last A1c reviewed-   Lab Results   Component Value Date    HGBA1C 9.6 (H) 03/29/2024     Most recent fingerstick glucose reviewed-   Recent Labs   Lab 03/29/24  1049 03/29/24  1641   POCTGLUCOSE 230* 150*     Current correctional scale  Low  Maintain anti-hyperglycemic dose as follows-   Antihyperglycemics (From admission, onward)      None          Hold Oral hypoglycemics while patient is in the hospital.

## 2024-03-29 NOTE — PHARMACY MED REC
"  Admission Medication History     The home medication history was taken by Glenis Teague CPhT.    Medication history obtained from, Patient Verified     You may go to "Admission" then "Reconcile Home Medications" tabs to review and/or act upon these items.     The home medication list has been updated by the Pharmacy department.   Please read ALL comments highlighted in yellow.   Please address this information as you see fit.    Feel free to contact us if you have any questions or require assistance.      The medications listed below were removed from the home medication list.  Please reorder if appropriate:  Patient reports no longer taking the following medication(s):  Diclofenac 1% gel  Medroxyprogesterone 10 mg  Mobic 15 mg  Ozempic 4 mg/3ml pen  Percocet 5-325 mg        Glenis Teague CPhT.  Ext [phone]             .          "

## 2024-03-29 NOTE — ASSESSMENT & PLAN NOTE
Patient has persistent depression which is mild and is currently controlled. We will not consult psychiatry at this time. Patient does not display psychosis at this time. Continue to monitor closely and adjust plan of care as needed.

## 2024-03-29 NOTE — ED TRIAGE NOTES
Patient reports to ED with c/o diffuse abdominal pain with one episode of vomiting since this AM. Denies fever/chills, diarrhea, changes in urinary habits. Does report CBGs running in 300s, reports compliancy with insulin but not diet.     Two patient identifiers have been checked and are correct.      Appearance: Pt awake, alert & oriented to person, place & time. Pt in no acute distress at present time. Pt is clean and well groomed with clothes appropriately fastened.   Skin: Skin warm, dry & intact. Color consistent with ethnicity. Mucous membranes moist. No breakdown or brusing noted.   Musculoskeletal: Patient moving all extremities well, no obvious swelling or deformities noted.   Respiratory: Respirations spontaneous, even, and non-labored. Visible chest rise noted. Airway is open and patent. No accessory muscle use noted.   Neurologic: Sensation is intact. Speech is clear and appropriate. Eyes open spontaneously, behavior appropriate to situation, follows commands, facial expression symmetrical, bilateral hand grasp equal and even, purposeful motor response noted.  Cardiac: All peripheral pulses present. No Bilateral lower extremity edema. Cap refill is <3 seconds.  Abdomen: Abdomen soft, non-tender to palpation.   : Pt reports no dysuria or hematuria.

## 2024-03-29 NOTE — ASSESSMENT & PLAN NOTE
Patient c/o CP on Tuesday that has now resolved.  Troponin is wnl--will trend x3  No acute changes on EKG  BNP wnl  TTE pending  Even though there is a very low suspicion for ACS we will consult cardiology.

## 2024-03-29 NOTE — ASSESSMENT & PLAN NOTE
Body mass index is 39.71 kg/m². Morbid obesity complicates all aspects of disease management from diagnostic modalities to treatment. Weight loss encouraged and health benefits explained to patient.

## 2024-03-29 NOTE — NURSING
Pt up to floor, VS taken and recorded. Pain 8/10 in abdomen. Pt oriented to room, call light and personal items within reach. Advised pt to call for assistance, pt verbalized understanding.

## 2024-03-29 NOTE — ASSESSMENT & PLAN NOTE
Was initially concerned for tubo-ovarian abscess from CT abd/pelvis read.  US pelvis complete transvag notes a Complex left ovarian cyst, suggests hemorrhagic cyst.  Case d/w OBGYN who does not think abx are warranted.  Will cont pain control  Appreciate GYN as well as ID recs  Monitor h/h--stable at this time

## 2024-03-29 NOTE — SUBJECTIVE & OBJECTIVE
Past Medical History:   Diagnosis Date    Depression     Diabetes mellitus, type 2     Hyperlipidemia     Hypertension     Vision impairment     wears glasses       Past Surgical History:   Procedure Laterality Date    ENDOMETRIAL ABLATION N/A 8/11/2020    Procedure: ABLATION, ENDOMETRIUM;  Surgeon: Michael A. Wiedemann, MD;  Location: [facility] OR;  Service: OB/GYN;  Laterality: N/A;    HYSTEROSCOPY WITH DILATION AND CURETTAGE OF UTERUS N/A 8/11/2020    Procedure: HYSTEROSCOPY, WITH DILATION AND CURETTAGE OF UTERUS;  Surgeon: Michael A. Wiedemann, MD;  Location: [facility] OR;  Service: OB/GYN;  Laterality: N/A;       Review of patient's allergies indicates:  No Known Allergies    No current facility-administered medications on file prior to encounter.     Current Outpatient Medications on File Prior to Encounter   Medication Sig    atorvastatin (LIPITOR) 80 MG tablet Take 1 tablet (80 mg total) by mouth once daily.    gabapentin (NEURONTIN) 600 MG tablet Take 1 tablet (600 mg total) by mouth 2 (two) times daily. (Patient taking differently: Take 600 mg by mouth 2 (two) times daily as needed.)    ibuprofen (ADVIL,MOTRIN) 200 MG tablet Take 200 mg by mouth every 6 (six) hours as needed for Pain.    insulin detemir U-100 (LEVEMIR) 100 unit/mL injection Inject 25 Units into the skin every evening.    metFORMIN (GLUCOPHAGE-XR) 500 MG ER 24hr tablet Take 1 tablet (500 mg total) by mouth 2 (two) times daily with meals.    blood sugar diagnostic Strp Use to check blood glucose before meals and at bedtime up to 4 times a day (Patient not taking: Reported on 9/18/2023)    blood-glucose meter Misc Use as instructed to check blood glucose before meals and at bedtime (Patient not taking: Reported on 9/18/2023)    lancets 30 gauge Misc 1 application by Misc.(Non-Drug; Combo Route) route 4 (four) times daily. Check  before meals and at bedtime (Patient not taking: Reported on 9/18/2023)    pen needle, diabetic (NOVOFINE PLUS) 32 gauge x  "1/6" Ndle use with insulin once daily, must last 100 days (Patient not taking: Reported on 9/18/2023)    [DISCONTINUED] diclofenac sodium (VOLTAREN) 1 % Gel Apply 2 g topically 4 (four) times daily as needed. To painful area on the feet. (Patient not taking: Reported on 9/18/2023)    [DISCONTINUED] ibuprofen (ADVIL,MOTRIN) 600 MG tablet Take 1 tablet (600 mg total) by mouth every 6 (six) hours as needed for Pain. (Patient not taking: Reported on 10/2/2023)    [DISCONTINUED] medroxyPROGESTERone (PROVERA) 10 MG tablet Take 1 tablet (10 mg total) by mouth every evening. (Patient not taking: Reported on 10/2/2023)    [DISCONTINUED] meloxicam (MOBIC) 15 MG tablet Take 1 tablet (15 mg total) by mouth daily as needed for Pain. (Patient not taking: Reported on 9/18/2023)    [DISCONTINUED] oxyCODONE-acetaminophen (PERCOCET) 5-325 mg per tablet Take 1 tablet by mouth every 12 (twelve) hours as needed for Pain. (Patient not taking: Reported on 10/2/2023)    [DISCONTINUED] oxyCODONE-acetaminophen (PERCOCET) 5-325 mg per tablet Take 1 tablet by mouth every 8 (eight) hours as needed for Pain.    [DISCONTINUED] semaglutide (OZEMPIC) 1 mg/dose (4 mg/3 mL) Inject 1 mg into the skin every 7 days. (Patient not taking: Reported on 10/2/2023)     Family History       Problem Relation (Age of Onset)    Breast cancer Sister    Cancer Mother, Sister    Diabetes Mother, Sister, Maternal Grandmother    Heart disease Father    Hypertension Mother (55), Sister          Tobacco Use    Smoking status: Never     Passive exposure: Never    Smokeless tobacco: Never   Substance and Sexual Activity    Alcohol use: No    Drug use: No    Sexual activity: Yes     Partners: Male     Birth control/protection: None     Review of Systems   Constitutional:  Positive for activity change, appetite change and fatigue. Negative for fever.   HENT:  Negative for trouble swallowing.    Gastrointestinal:  Positive for abdominal pain (lower abd sharp pain) and " vomiting (1 episode of vomiting on today). Negative for abdominal distention, diarrhea and nausea.   Genitourinary:  Negative for difficulty urinating, hematuria and menstrual problem.   Musculoskeletal:  Negative for gait problem.   Skin:  Negative for wound.   Hematological:  Does not bruise/bleed easily.   Psychiatric/Behavioral:  Negative for confusion.      Objective:     Vital Signs (Most Recent):  Temp: 98.8 °F (37.1 °C) (03/29/24 1638)  Pulse: 95 (03/29/24 1638)  Resp: 17 (03/29/24 1638)  BP: (!) 134/93 (03/29/24 1638)  SpO2: 98 % (03/29/24 1638) Vital Signs (24h Range):  Temp:  [98.3 °F (36.8 °C)-98.8 °F (37.1 °C)] 98.8 °F (37.1 °C)  Pulse:  [83-95] 95  Resp:  [15-19] 17  SpO2:  [97 %-100 %] 98 %  BP: (134-200)/(69-95) 134/93     Weight: 111.6 kg (246 lb)  Body mass index is 39.71 kg/m².     Physical Exam  Vitals and nursing note reviewed.   Constitutional:       Appearance: She is obese. She is ill-appearing.   HENT:      Head: Normocephalic and atraumatic.      Mouth/Throat:      Mouth: Mucous membranes are moist.   Eyes:      Extraocular Movements: Extraocular movements intact.   Cardiovascular:      Rate and Rhythm: Normal rate and regular rhythm.   Abdominal:      General: Bowel sounds are normal. There is no distension.      Palpations: Abdomen is soft.      Tenderness: There is no abdominal tenderness. There is no guarding.   Musculoskeletal:         General: Normal range of motion.      Cervical back: Normal range of motion and neck supple.      Right lower leg: No edema.      Left lower leg: No edema.   Skin:     General: Skin is warm and dry.      Capillary Refill: Capillary refill takes 2 to 3 seconds.   Neurological:      Mental Status: She is alert and oriented to person, place, and time.   Psychiatric:         Mood and Affect: Mood normal.         Behavior: Behavior normal.                Significant Labs: All pertinent labs within the past 24 hours have been reviewed.    Significant Imaging:  I have reviewed all pertinent imaging results/findings within the past 24 hours.

## 2024-03-29 NOTE — CONSULTS
[facility] Infectious Diseases Consult Note    Primary Attending Physician: Az Parra,*     Reason for Consult:     Possible tubo-ovarian abscess    Assessment (see problem list below):     Hamzah Reis is a 48 y.o. female with:  Left ovarian mass with abdominal pain of recent onset.  No fever.  One episode of vomiting.  Although this could potentially be a tubo-ovarian abscess, would suspect more fever and a more gradual onset.  Much more likely to be acute ovarian process such as ovarian torsion or hemorrhage.  Ultrasound not consistent with torsion.  Patient currently on ceftriaxone, doxycycline, metronidazole which should cover most organisms associated with abscess  Diabetes, hypertension, hyperlipidemia, depression, dysfunctional uterine bleeding with endometrial ablation and D and C    Plan:     Discontinue antibiotics for the moment  Follow up gyn consult  Monitor patient progress    Thank you for allowing us to participate in the care of this patient. Please contact me if you have any questions regarding this consult.    Octavio Hogan  [facility] ID  Pager: [phone]    Subjective:      History of Present Illness:  Hamzah Reis is a 48 y.o. female with diabetes, hypertension, hyperlipidemia, depression, endometrial ablation, D and C seen in the emergency department for abdominal pain beginning this morning with 1 episode of vomiting.    Patient has history of endometrial ablation and D and C in 2020    Patient has irregular menses and began having some vaginal bleeding prior to evaluation.    Patient notes some abdominal pain last night but no fever.    03/29/2024 patient presented to emergency department with onset of diffuse generalized abdominal pain beginning this morning associated with 1 episode of vomiting.  Upon evaluation, the patient was afebrile.  WBC 6.64.  Abdominal CT showed complex left adnexal mass with fluid.  Ultrasound confirmed that this was a complex ovarian mass.   Patient was empirically started on ceftriaxone, doxycycline, metronidazole.  Gynecology has been consulted    Infectious disease was consulted for possible tubo-ovarian abscess    Works retail in customer service.  Lives with boyfriend and daughter.  No pets.  No recent infections.    Past Medical History:  Past Medical History:   Diagnosis Date    Depression     Diabetes mellitus, type 2     Hyperlipidemia     Hypertension     Vision impairment     wears glasses       Past Surgical History:  Past Surgical History:   Procedure Laterality Date    ENDOMETRIAL ABLATION N/A 2020    Procedure: ABLATION, ENDOMETRIUM;  Surgeon: Michael A. Wiedemann, MD;  Location: [facility] OR;  Service: OB/GYN;  Laterality: N/A;    HYSTEROSCOPY WITH DILATION AND CURETTAGE OF UTERUS N/A 2020    Procedure: HYSTEROSCOPY, WITH DILATION AND CURETTAGE OF UTERUS;  Surgeon: Michael A. Wiedemann, MD;  Location: [facility] OR;  Service: OB/GYN;  Laterality: N/A;       Allergies:  Review of patient's allergies indicates:  No Known Allergies    Medications:  Reviewed  Antibiotics  Ceftriaxone  Doxycycline  Metronidazole  Family History:  Family History   Problem Relation Age of Onset    Hypertension Mother 55            Diabetes Mother     Cancer Mother         cervical    Heart disease Father     Diabetes Sister     Hypertension Sister     Cancer Sister         cervical    Breast cancer Sister     Diabetes Maternal Grandmother        Social History:  Social History     Tobacco Use    Smoking status: Never     Passive exposure: Never    Smokeless tobacco: Never   Substance Use Topics    Alcohol use: No    Drug use: No     Review of Systems   Gastrointestinal:  Positive for abdominal pain.   All other systems reviewed and are negative.      Objective:   Last 24 Hour Vital Signs:  BP  Min: 142/69  Max: 200/93  Temp  Av.3 °F (36.8 °C)  Min: 98.3 °F (36.8 °C)  Max: 98.3 °F (36.8 °C)  Pulse  Av.5  Min: 83  Max: 88  Resp  Av.4  Min: 15   "Max: 19  SpO2  Av.5 %  Min: 97 %  Max: 100 %  Height  Av' 6" (167.6 cm)  Min: 5' 6" (167.6 cm)  Max: 5' 6" (167.6 cm)  Weight  Av.6 kg (246 lb)  Min: 111.6 kg (246 lb)  Max: 111.6 kg (246 lb)  No intake/output data recorded.    Physical Exam  Vitals and nursing note reviewed.   HENT:      Head: Normocephalic and atraumatic.      Nose: Nose normal. No congestion or rhinorrhea.      Mouth/Throat:      Mouth: Mucous membranes are moist.      Pharynx: Oropharynx is clear. No oropharyngeal exudate.   Eyes:      Extraocular Movements: Extraocular movements intact.      Conjunctiva/sclera: Conjunctivae normal.      Pupils: Pupils are equal, round, and reactive to light.   Cardiovascular:      Rate and Rhythm: Normal rate and regular rhythm.      Pulses: Normal pulses.      Heart sounds: Normal heart sounds. No murmur heard.     No friction rub. No gallop.   Pulmonary:      Effort: Pulmonary effort is normal.      Breath sounds: Normal breath sounds. No rales.   Abdominal:      Comments: Bowel sounds present.  Mild abdominal tenderness throughout.   Genitourinary:     Comments: No Puri  Musculoskeletal:         General: No swelling or tenderness.      Cervical back: Normal range of motion and neck supple.      Right lower leg: No edema.      Left lower leg: No edema.   Lymphadenopathy:      Cervical: No cervical adenopathy.   Skin:     Findings: No erythema, lesion or rash.   Neurological:      General: No focal deficit present.      Mental Status: She is alert and oriented to person, place, and time. Mental status is at baseline.   Psychiatric:         Mood and Affect: Mood normal.         Behavior: Behavior normal.         Devices:  Peripheral IV    Laboratory Results: reviewed  Most Recent Data:  CBC:   Lab Results   Component Value Date    WBC 6.64 2024    HGB 13.7 2024    HCT 41.9 2024     2024    MCV 81 (L) 2024    RDW 13.2 2024     BMP:   Lab Results "   Component Value Date     03/29/2024    K 4.1 03/29/2024     03/29/2024    CO2 23 03/29/2024    BUN 8 03/29/2024     (H) 03/29/2024    CALCIUM 9.5 03/29/2024     LFTs:   Lab Results   Component Value Date    PROT 7.6 03/29/2024    ALBUMIN 3.7 03/29/2024    BILITOT 0.4 03/29/2024    AST 15 03/29/2024    ALKPHOS 81 03/29/2024    ALT 17 03/29/2024     Urinalysis:   Lab Results   Component Value Date    LABURIN ESCHERICHIA COLI  >100,000 cfu/ml   (A) 08/04/2022    COLORU Yellow 03/29/2024    SPECGRAV 1.020 03/29/2024    NITRITE Negative 03/29/2024    KETONESU Negative 03/29/2024    UROBILINOGEN 2.0-3.0 (A) 03/29/2024       Trended Lab Data:  Recent Labs   Lab 03/29/24  1058   WBC 6.64   HGB 13.7   HCT 41.9      MCV 81*   RDW 13.2      K 4.1      CO2 23   BUN 8   *   PROT 7.6   ALBUMIN 3.7   BILITOT 0.4   AST 15   ALKPHOS 81   ALT 17       Microbiology Data:  Blood cultures in process    Other Results:    Radiology:  Reviewed  Pelvic ultrasound with complex left ovarian cyst  Abdominal CT with left cystic lesion in the adnexa.  Also with 8 mm pleural-based nodule  Chest x-ray without abnormality  Problem List  Patient Active Problem List   Diagnosis    Type 2 diabetes mellitus with diabetic polyneuropathy, with long-term current use of insulin    Left hamstring muscle strain    BMI 39.0-39.9,adult    Strain of left quadriceps tendon    Chest pain with low risk of acute coronary syndrome    Hyperlipidemia associated with type 2 diabetes mellitus    Yeast infection    Bacterial vaginosis    Menorrhagia    Noncompliance    Anxiety and depression     See above for impression and recommendations.

## 2024-03-29 NOTE — HPI
Hamzah Reis is a 47 y/o F with a pmh of DM, HTN, and depression. Her PCP is Dr. Santos. She presents to the ED here at [facility] for lower abdominal pain that began this morning.  Patient reports 1 episode of emesis while at work on today. She also complains of CP that she had on Tuesday, which has since resolved.  She denies shortness of breath, CP, palpitations, leg swelling, lightheadedness, syncope, diaphoresis, or any trauma. She reports that she started having vaginal bleeding once she to the ED. Patient had an endometrial ablation and hysteroscopy with D&C in 2020. Patient denies dysuria, hematuria, flank pain, unusual vaginal discharge--also denies any known STD exposure. Her ED workup includes CT abd/pelvis with IV contrast--left adnexal cystic lesion measuring 6.2 x 5.2 cm in transverse dimensions with a fluid-filled tubular structure a medially adjacent to the adnexal cyst, possibly hydrosalpinx.  There is a small amount of adjacent free peritoneal fluid.  The possibility of tubo-ovarian abscess should be considered.  Further evaluation with pelvic ultrasound examination may be helpful. 8 mm pleural based nodule abutting the left hemidiaphragm. US pelvic- Complex left ovarian cyst, suggests hemorrhagic cyst.  If pain persists in the region, follow-up ultrasound could be performed in 6-8 weeks to ensure resolution. No discrete endometrial abnormalities. Negative pregnancy test. CXR--No acute abnormality. IV ceftriaxone, doxycyline, Flagyl were ordered. Pain regimen ordered in the ED. Hemoglobin is stable. IVFs were ordered. OBGYN, ID, and cardiology were consulted. She will be admitted to the [facility] service for further care.

## 2024-03-29 NOTE — PLAN OF CARE
03/29/24 1703   Admission   Initial VN Admission Questions Complete   Communication Issues? None   Shift   Virtual Nurse - Patient Verbalized Approval Of Camera Use   Safety/Activity   Patient Rounds visualized patient;placement of personal items at bedside;bed in low position;bed wheels locked;call light in patient/parent reach;ID band on;clutter free environment maintained

## 2024-03-30 DIAGNOSIS — N93.9 ABNORMAL UTERINE BLEEDING (AUB): Primary | ICD-10-CM

## 2024-03-30 LAB
ANION GAP SERPL CALC-SCNC: 9 MMOL/L (ref 8–16)
ASCENDING AORTA: 3.09 CM
AV INDEX (PROSTH): 0.82
AV MEAN GRADIENT: 4 MMHG
AV PEAK GRADIENT: 6 MMHG
AV VALVE AREA BY VELOCITY RATIO: 2.64 CM²
AV VALVE AREA: 2.85 CM²
AV VELOCITY RATIO: 0.76
BASOPHILS # BLD AUTO: 0.04 K/UL (ref 0–0.2)
BASOPHILS NFR BLD: 0.4 % (ref 0–1.9)
BSA FOR ECHO PROCEDURE: 2.3 M2
BUN SERPL-MCNC: 9 MG/DL (ref 6–20)
CALCIUM SERPL-MCNC: 8.7 MG/DL (ref 8.7–10.5)
CHLORIDE SERPL-SCNC: 105 MMOL/L (ref 95–110)
CO2 SERPL-SCNC: 25 MMOL/L (ref 23–29)
CREAT SERPL-MCNC: 0.8 MG/DL (ref 0.5–1.4)
CV ECHO LV RWT: 0.5 CM
DIFFERENTIAL METHOD BLD: ABNORMAL
DOP CALC AO PEAK VEL: 1.27 M/S
DOP CALC AO VTI: 27.5 CM
DOP CALC LVOT AREA: 3.5 CM2
DOP CALC LVOT DIAMETER: 2.1 CM
DOP CALC LVOT PEAK VEL: 0.97 M/S
DOP CALC LVOT STROKE VOLUME: 78.24 CM3
DOP CALC MV VTI: 18.5 CM
DOP CALCLVOT PEAK VEL VTI: 22.6 CM
E WAVE DECELERATION TIME: 213.25 MSEC
E/A RATIO: 1.43
E/E' RATIO: 8.75 M/S
ECHO LV POSTERIOR WALL: 1.04 CM (ref 0.6–1.1)
EOSINOPHIL # BLD AUTO: 0.1 K/UL (ref 0–0.5)
EOSINOPHIL NFR BLD: 1.2 % (ref 0–8)
ERYTHROCYTE [DISTWIDTH] IN BLOOD BY AUTOMATED COUNT: 13.4 % (ref 11.5–14.5)
EST. GFR  (NO RACE VARIABLE): >60 ML/MIN/1.73 M^2
FRACTIONAL SHORTENING: 27 % (ref 28–44)
GLUCOSE SERPL-MCNC: 190 MG/DL (ref 70–110)
HCT VFR BLD AUTO: 35.9 % (ref 37–48.5)
HGB BLD-MCNC: 11.7 G/DL (ref 12–16)
IMM GRANULOCYTES # BLD AUTO: 0.03 K/UL (ref 0–0.04)
IMM GRANULOCYTES NFR BLD AUTO: 0.3 % (ref 0–0.5)
INTERVENTRICULAR SEPTUM: 1.14 CM (ref 0.6–1.1)
IVC DIAMETER: 1.81 CM
LA MAJOR: 5.32 CM
LA MINOR: 4.73 CM
LA WIDTH: 3.8 CM
LEFT ATRIUM SIZE: 3.79 CM
LEFT ATRIUM VOLUME INDEX MOD: 17.5 ML/M2
LEFT ATRIUM VOLUME INDEX: 27.9 ML/M2
LEFT ATRIUM VOLUME MOD: 38.53 CM3
LEFT ATRIUM VOLUME: 61.3 CM3
LEFT INTERNAL DIMENSION IN SYSTOLE: 3.04 CM (ref 2.1–4)
LEFT VENTRICLE DIASTOLIC VOLUME INDEX: 35.47 ML/M2
LEFT VENTRICLE DIASTOLIC VOLUME: 78.04 ML
LEFT VENTRICLE MASS INDEX: 70 G/M2
LEFT VENTRICLE SYSTOLIC VOLUME INDEX: 16.5 ML/M2
LEFT VENTRICLE SYSTOLIC VOLUME: 36.23 ML
LEFT VENTRICULAR INTERNAL DIMENSION IN DIASTOLE: 4.19 CM (ref 3.5–6)
LEFT VENTRICULAR MASS: 154.45 G
LV LATERAL E/E' RATIO: 7.78 M/S
LV SEPTAL E/E' RATIO: 10 M/S
LVOT MG: 2.33 MMHG
LVOT MV: 0.73 CM/S
LYMPHOCYTES # BLD AUTO: 2.7 K/UL (ref 1–4.8)
LYMPHOCYTES NFR BLD: 28.8 % (ref 18–48)
MAGNESIUM SERPL-MCNC: 1.4 MG/DL (ref 1.6–2.6)
MCH RBC QN AUTO: 26.6 PG (ref 27–31)
MCHC RBC AUTO-ENTMCNC: 32.6 G/DL (ref 32–36)
MCV RBC AUTO: 82 FL (ref 82–98)
MONOCYTES # BLD AUTO: 0.6 K/UL (ref 0.3–1)
MONOCYTES NFR BLD: 6.6 % (ref 4–15)
MV A" WAVE DURATION": 110.37 MSEC
MV MEAN GRADIENT: 1 MMHG
MV PEAK A VEL: 0.49 M/S
MV PEAK E VEL: 0.7 M/S
MV PEAK GRADIENT: 2 MMHG
MV STENOSIS PRESSURE HALF TIME: 61.84 MS
MV VALVE AREA BY CONTINUITY EQUATION: 4.23 CM2
MV VALVE AREA P 1/2 METHOD: 3.56 CM2
NEUTROPHILS # BLD AUTO: 5.8 K/UL (ref 1.8–7.7)
NEUTROPHILS NFR BLD: 62.7 % (ref 38–73)
NRBC BLD-RTO: 0 /100 WBC
OHS LV EJECTION FRACTION SIMPSONS BIPLANE MOD: 67 %
PISA TR MAX VEL: 1.56 M/S
PLATELET # BLD AUTO: 356 K/UL (ref 150–450)
PMV BLD AUTO: 9.6 FL (ref 9.2–12.9)
POCT GLUCOSE: 141 MG/DL (ref 70–110)
POCT GLUCOSE: 169 MG/DL (ref 70–110)
POCT GLUCOSE: 175 MG/DL (ref 70–110)
POCT GLUCOSE: 202 MG/DL (ref 70–110)
POTASSIUM SERPL-SCNC: 3.8 MMOL/L (ref 3.5–5.1)
PV MV: 0.78 M/S
PV PEAK GRADIENT: 4 MMHG
PV PEAK VELOCITY: 1.06 M/S
RA MAJOR: 3.99 CM
RA PRESSURE ESTIMATED: 3 MMHG
RA WIDTH: 3.64 CM
RBC # BLD AUTO: 4.4 M/UL (ref 4–5.4)
RV TB RVSP: 5 MMHG
RV TISSUE DOPPLER FREE WALL SYSTOLIC VELOCITY 1 (APICAL 4 CHAMBER VIEW): 11.82 CM/S
SINUS: 3.02 CM
SODIUM SERPL-SCNC: 139 MMOL/L (ref 136–145)
STJ: 2.91 CM
TDI LATERAL: 0.09 M/S
TDI SEPTAL: 0.07 M/S
TDI: 0.08 M/S
TR MAX PG: 10 MMHG
TRICUSPID ANNULAR PLANE SYSTOLIC EXCURSION: 1.78 CM
TV REST PULMONARY ARTERY PRESSURE: 13 MMHG
WBC # BLD AUTO: 9.25 K/UL (ref 3.9–12.7)
Z-SCORE OF LEFT VENTRICULAR DIMENSION IN END DIASTOLE: -5.84
Z-SCORE OF LEFT VENTRICULAR DIMENSION IN END SYSTOLE: -3.21

## 2024-03-30 PROCEDURE — 80048 BASIC METABOLIC PNL TOTAL CA: CPT | Performed by: NURSE PRACTITIONER

## 2024-03-30 PROCEDURE — 83735 ASSAY OF MAGNESIUM: CPT | Performed by: NURSE PRACTITIONER

## 2024-03-30 PROCEDURE — 25000003 PHARM REV CODE 250: Performed by: OBSTETRICS & GYNECOLOGY

## 2024-03-30 PROCEDURE — 63600175 PHARM REV CODE 636 W HCPCS: Performed by: NURSE PRACTITIONER

## 2024-03-30 PROCEDURE — 96376 TX/PRO/DX INJ SAME DRUG ADON: CPT

## 2024-03-30 PROCEDURE — 25000003 PHARM REV CODE 250: Performed by: NURSE PRACTITIONER

## 2024-03-30 PROCEDURE — 85025 COMPLETE CBC W/AUTO DIFF WBC: CPT | Performed by: NURSE PRACTITIONER

## 2024-03-30 PROCEDURE — 99213 OFFICE O/P EST LOW 20 MIN: CPT | Mod: 25,,, | Performed by: INTERNAL MEDICINE

## 2024-03-30 PROCEDURE — 94761 N-INVAS EAR/PLS OXIMETRY MLT: CPT

## 2024-03-30 PROCEDURE — 96372 THER/PROPH/DIAG INJ SC/IM: CPT | Performed by: HOSPITALIST

## 2024-03-30 PROCEDURE — 96372 THER/PROPH/DIAG INJ SC/IM: CPT | Performed by: NURSE PRACTITIONER

## 2024-03-30 PROCEDURE — 96367 TX/PROPH/DG ADDL SEQ IV INF: CPT

## 2024-03-30 PROCEDURE — 63600175 PHARM REV CODE 636 W HCPCS: Performed by: HOSPITALIST

## 2024-03-30 PROCEDURE — 96375 TX/PRO/DX INJ NEW DRUG ADDON: CPT

## 2024-03-30 PROCEDURE — 99900035 HC TECH TIME PER 15 MIN (STAT)

## 2024-03-30 PROCEDURE — 36415 COLL VENOUS BLD VENIPUNCTURE: CPT | Performed by: NURSE PRACTITIONER

## 2024-03-30 PROCEDURE — A4216 STERILE WATER/SALINE, 10 ML: HCPCS | Performed by: NURSE PRACTITIONER

## 2024-03-30 PROCEDURE — G0378 HOSPITAL OBSERVATION PER HR: HCPCS

## 2024-03-30 PROCEDURE — 96366 THER/PROPH/DIAG IV INF ADDON: CPT

## 2024-03-30 RX ORDER — ENOXAPARIN SODIUM 100 MG/ML
40 INJECTION SUBCUTANEOUS EVERY 12 HOURS
Status: DISCONTINUED | OUTPATIENT
Start: 2024-03-30 | End: 2024-03-31

## 2024-03-30 RX ORDER — MAGNESIUM SULFATE HEPTAHYDRATE 40 MG/ML
2 INJECTION, SOLUTION INTRAVENOUS ONCE
Status: COMPLETED | OUTPATIENT
Start: 2024-03-30 | End: 2024-03-30

## 2024-03-30 RX ORDER — MEDROXYPROGESTERONE ACETATE 10 MG/1
20 TABLET ORAL DAILY
Qty: 60 TABLET | Refills: 5 | Status: SHIPPED | OUTPATIENT
Start: 2024-03-30 | End: 2024-03-31

## 2024-03-30 RX ORDER — MEDROXYPROGESTERONE ACETATE 10 MG/1
20 TABLET ORAL DAILY
Status: DISCONTINUED | OUTPATIENT
Start: 2024-03-30 | End: 2024-03-31 | Stop reason: HOSPADM

## 2024-03-30 RX ADMIN — SENNOSIDES AND DOCUSATE SODIUM 1 TABLET: 8.6; 5 TABLET ORAL at 09:03

## 2024-03-30 RX ADMIN — MORPHINE SULFATE 1 MG: 2 INJECTION, SOLUTION INTRAMUSCULAR; INTRAVENOUS at 05:03

## 2024-03-30 RX ADMIN — SENNOSIDES AND DOCUSATE SODIUM 1 TABLET: 8.6; 5 TABLET ORAL at 08:03

## 2024-03-30 RX ADMIN — Medication 10 ML: at 02:03

## 2024-03-30 RX ADMIN — Medication 10 ML: at 09:03

## 2024-03-30 RX ADMIN — ENOXAPARIN SODIUM 40 MG: 40 INJECTION SUBCUTANEOUS at 09:03

## 2024-03-30 RX ADMIN — MORPHINE SULFATE 1 MG: 2 INJECTION, SOLUTION INTRAMUSCULAR; INTRAVENOUS at 09:03

## 2024-03-30 RX ADMIN — INSULIN ASPART 1 UNITS: 100 INJECTION, SOLUTION INTRAVENOUS; SUBCUTANEOUS at 09:03

## 2024-03-30 RX ADMIN — MEDROXYPROGESTERONE ACETATE 20 MG: 10 TABLET ORAL at 02:03

## 2024-03-30 RX ADMIN — ATORVASTATIN CALCIUM 80 MG: 40 TABLET, FILM COATED ORAL at 08:03

## 2024-03-30 RX ADMIN — Medication 10 ML: at 05:03

## 2024-03-30 RX ADMIN — MAGNESIUM SULFATE HEPTAHYDRATE 2 G: 40 INJECTION, SOLUTION INTRAVENOUS at 10:03

## 2024-03-30 NOTE — CONSULTS
[place] [facility]  Cardiology  Consult Note    Patient Name: Hamzah Reis  MRN: [id number]  Admission Date: 3/29/2024  Hospital Length of Stay: 0 days  Code Status: Full Code   Attending Provider: Az Parra,*   Consulting Provider: Travis Henao MD PhD  Primary Care Physician: Tatum Santos MD  Principal Problem:Hemorrhagic ovarian cyst    Patient information was obtained from patient, past medical records, and ER records.     [facility]  Consult performed by: Travis Henao MD PhD  Consult ordered by: Shannon Milligan NP  Reason for consult: chest pain  Assessment/Recommendations: See full consult note.        Subjective:     Chief Complaint:  Chest Pain    HPI:    Hamzah Reis is a 49 y/o F with a pmh of DM, HTN, and depression. Pt presents with abdominal pain and complaint of chest pain.  Cardiology consulted for evaluation of chest pain.  Pertinent history/events are as follows:    03/29/2024 patient presented to emergency department with onset of diffuse generalized abdominal pain beginning this morning associated with 1 episode of vomiting.  Upon evaluation, the patient was afebrile.  WBC 6.64.  Abdominal CT showed complex left adnexal mass with fluid.  Ultrasound confirmed that this was a complex ovarian mass.  Patient was empirically started on ceftriaxone, doxycycline, metronidazole.  Gynecology has been consulted.  Infectious disease was consulted for possible tubo-ovarian abscess    Pt reports having an episode of chest pain on Tuesday 3/26/2024.  Chest pain located at left breast area, described as pressure, 8/10 in intensity, lasting 2 hours.  States she has had no further chest pain since that time.  Reports family history of coronary artery disease with MI in father at unknown age.  EKG shows normal sinus rhythm/low voltage QRS/axis shift to the right.  Troponin negative x3.  BNP within normal limits.    Past Medical  "History:   Diagnosis Date    Depression     Diabetes mellitus, type 2     Hyperlipidemia     Hypertension     Vision impairment     wears glasses       Past Surgical History:   Procedure Laterality Date    ENDOMETRIAL ABLATION N/A 8/11/2020    Procedure: ABLATION, ENDOMETRIUM;  Surgeon: Michael A. Wiedemann, MD;  Location: [facility] OR;  Service: OB/GYN;  Laterality: N/A;    HYSTEROSCOPY WITH DILATION AND CURETTAGE OF UTERUS N/A 8/11/2020    Procedure: HYSTEROSCOPY, WITH DILATION AND CURETTAGE OF UTERUS;  Surgeon: Michael A. Wiedemann, MD;  Location: [facility] OR;  Service: OB/GYN;  Laterality: N/A;       Review of patient's allergies indicates:  No Known Allergies    No current facility-administered medications on file prior to encounter.     Current Outpatient Medications on File Prior to Encounter   Medication Sig    atorvastatin (LIPITOR) 80 MG tablet Take 1 tablet (80 mg total) by mouth once daily.    gabapentin (NEURONTIN) 600 MG tablet Take 1 tablet (600 mg total) by mouth 2 (two) times daily. (Patient taking differently: Take 600 mg by mouth 2 (two) times daily as needed.)    ibuprofen (ADVIL,MOTRIN) 200 MG tablet Take 200 mg by mouth every 6 (six) hours as needed for Pain.    insulin detemir U-100 (LEVEMIR) 100 unit/mL injection Inject 25 Units into the skin every evening.    metFORMIN (GLUCOPHAGE-XR) 500 MG ER 24hr tablet Take 1 tablet (500 mg total) by mouth 2 (two) times daily with meals.    blood sugar diagnostic Strp Use to check blood glucose before meals and at bedtime up to 4 times a day (Patient not taking: Reported on 9/18/2023)    blood-glucose meter Misc Use as instructed to check blood glucose before meals and at bedtime (Patient not taking: Reported on 9/18/2023)    lancets 30 gauge Misc 1 application by Misc.(Non-Drug; Combo Route) route 4 (four) times daily. Check  before meals and at bedtime (Patient not taking: Reported on 9/18/2023)    pen needle, diabetic (NOVOFINE PLUS) 32 gauge x 1/6" Ndle use " with insulin once daily, must last 100 days (Patient not taking: Reported on 9/18/2023)    [DISCONTINUED] diclofenac sodium (VOLTAREN) 1 % Gel Apply 2 g topically 4 (four) times daily as needed. To painful area on the feet. (Patient not taking: Reported on 9/18/2023)    [DISCONTINUED] ibuprofen (ADVIL,MOTRIN) 600 MG tablet Take 1 tablet (600 mg total) by mouth every 6 (six) hours as needed for Pain. (Patient not taking: Reported on 10/2/2023)    [DISCONTINUED] medroxyPROGESTERone (PROVERA) 10 MG tablet Take 1 tablet (10 mg total) by mouth every evening. (Patient not taking: Reported on 10/2/2023)    [DISCONTINUED] meloxicam (MOBIC) 15 MG tablet Take 1 tablet (15 mg total) by mouth daily as needed for Pain. (Patient not taking: Reported on 9/18/2023)    [DISCONTINUED] oxyCODONE-acetaminophen (PERCOCET) 5-325 mg per tablet Take 1 tablet by mouth every 12 (twelve) hours as needed for Pain. (Patient not taking: Reported on 10/2/2023)    [DISCONTINUED] oxyCODONE-acetaminophen (PERCOCET) 5-325 mg per tablet Take 1 tablet by mouth every 8 (eight) hours as needed for Pain.    [DISCONTINUED] semaglutide (OZEMPIC) 1 mg/dose (4 mg/3 mL) Inject 1 mg into the skin every 7 days. (Patient not taking: Reported on 10/2/2023)     Family History       Problem Relation (Age of Onset)    Breast cancer Sister    Cancer Mother, Sister    Diabetes Mother, Sister, Maternal Grandmother    Heart disease Father    Hypertension Mother (55), Sister          Tobacco Use    Smoking status: Never     Passive exposure: Never    Smokeless tobacco: Never   Substance and Sexual Activity    Alcohol use: No    Drug use: No    Sexual activity: Yes     Partners: Male     Birth control/protection: None     Review of Systems   Constitutional: Negative.   HENT: Negative.     Eyes: Negative.    Cardiovascular: Negative.    Respiratory: Negative.     Endocrine: Negative.    Skin: Negative.    Musculoskeletal: Negative.    Gastrointestinal:  Positive for  abdominal pain.   Genitourinary: Negative.    Neurological: Negative.    Psychiatric/Behavioral: Negative.       Objective:     Vital Signs (Most Recent):  Temp: 98.7 °F (37.1 °C) (03/29/24 1932)  Pulse: 99 (03/29/24 1932)  Resp: 19 (03/29/24 1932)  BP: 136/85 (03/29/24 1932)  SpO2: (!) 91 % (03/29/24 1932) Vital Signs (24h Range):  Temp:  [98.3 °F (36.8 °C)-98.8 °F (37.1 °C)] 98.7 °F (37.1 °C)  Pulse:  [83-99] 99  Resp:  [15-19] 19  SpO2:  [91 %-100 %] 91 %  BP: (134-200)/(69-95) 136/85     Weight: 114.2 kg (251 lb 12.3 oz)  Body mass index is 40.64 kg/m².    SpO2: (!) 91 %         Intake/Output Summary (Last 24 hours) at 3/29/2024 2100  Last data filed at 3/29/2024 1307  Gross per 24 hour   Intake 999 ml   Output --   Net 999 ml       Lines/Drains/Airways       Peripheral Intravenous Line  Duration                  Peripheral IV - Single Lumen 03/29/24 1111 20 G Anterior;Proximal;Right Forearm <1 day                    Physical Exam  Vitals and nursing note reviewed.   HENT:      Head: Normocephalic and atraumatic.      Nose: Nose normal. No congestion or rhinorrhea.      Mouth/Throat:      Mouth: Mucous membranes are moist.      Pharynx: Oropharynx is clear. No oropharyngeal exudate.   Eyes:      Extraocular Movements: Extraocular movements intact.      Conjunctiva/sclera: Conjunctivae normal.      Pupils: Pupils are equal, round, and reactive to light.   Cardiovascular:      Rate and Rhythm: Normal rate and regular rhythm.      Pulses: Normal pulses.      Heart sounds: Normal heart sounds. No murmur heard.     No friction rub. No gallop.   Pulmonary:      Effort: Pulmonary effort is normal.      Breath sounds: Normal breath sounds. No rales.   Abdominal:      Comments: Bowel sounds present.  Mild abdominal tenderness throughout.   Genitourinary:     Comments: No Puri  Musculoskeletal:         General: No swelling or tenderness.      Cervical back: Normal range of motion and neck supple.      Right lower leg: No  "edema.      Left lower leg: No edema.   Lymphadenopathy:      Cervical: No cervical adenopathy.   Skin:     Findings: No erythema, lesion or rash.   Neurological:      General: No focal deficit present.      Mental Status: She is alert and oriented to person, place, and time. Mental status is at baseline.   Psychiatric:         Mood and Affect: Mood normal.         Behavior: Behavior normal.     Significant Labs: BMP:   Recent Labs   Lab 03/29/24  1058   *      K 4.1      CO2 23   BUN 8   CREATININE 0.9   CALCIUM 9.5   , CMP   Recent Labs   Lab 03/29/24  1058      K 4.1      CO2 23   *   BUN 8   CREATININE 0.9   CALCIUM 9.5   PROT 7.6   ALBUMIN 3.7   BILITOT 0.4   ALKPHOS 81   AST 15   ALT 17   ANIONGAP 11   , CBC   Recent Labs   Lab 03/29/24  1058   WBC 6.64   HGB 13.7   HCT 41.9      , INR No results for input(s): "INR", "PROTIME" in the last 48 hours., Troponin   Recent Labs   Lab 03/29/24  1513 03/29/24  1557 03/29/24  1823   TROPONINI <0.006 0.018 <0.006   , and All pertinent lab results from the last 24 hours have been reviewed.    Significant Imaging:   Pelvis Ultrasound 3/29/2024  Complex left ovarian cyst, suggests hemorrhagic cyst.  If pain persists in the region, follow-up ultrasound could be performed in 6-8 weeks to ensure resolution.   No discrete endometrial abnormalities.    Assessment and Plan:   DM2  Obesity  Chest Pain  Hemorrhagic ovarian cyst    Chest Pain: Ms. Reis presents with chest pain as described.  EKG with no acute ischemic changes.  Troponin negative x3.  Echo in a.m..  If no significant findings on echo, recommend ischemic workup with stress test following treatment of hemorrhagic ovarian cyst.  Monitor on telemetry.    HLD: Continue atorvastatin 80 mg daily.    VTE Risk Mitigation (From admission, onward)           Ordered     enoxaparin injection 40 mg  Daily         03/29/24 1522     IP VTE HIGH RISK PATIENT  Once         03/29/24 1522 "     Place sequential compression device  Until discontinued         03/29/24 1522                    Thank you for your consult. I will follow-up with patient. Please contact us if you have any additional questions.    Travis Henao MD PhD  Cardiology   [facility] Surg

## 2024-03-30 NOTE — PROGRESS NOTES
[facility] Medicine  Progress Note    Patient Name: Hamzah Reis  MRN: [id number]  Patient Class: OP- Observation   Admission Date: 3/29/2024  Length of Stay: 0 days  Attending Physician: Az Parra,*  Primary Care Provider: Tatum Santos MD        Subjective:     Principal Problem:Hemorrhagic ovarian cyst        HPI:  Hamzah Reis is a 47 y/o F with a pmh of DM, HTN, and depression. Her PCP is Dr. Santos. She presents to the ED here at [place] for lower abdominal pain that began this morning.  Patient reports 1 episode of emesis while at work on today. She also complains of CP that she had on Tuesday, which has since resolved.  She denies shortness of breath, CP, palpitations, leg swelling, lightheadedness, syncope, diaphoresis, or any trauma. She reports that she started having vaginal bleeding once she to the ED. Patient had an endometrial ablation and hysteroscopy with D&C in 2020. Patient denies dysuria, hematuria, flank pain, unusual vaginal discharge--also denies any known STD exposure. Her ED workup includes CT abd/pelvis with IV contrast--left adnexal cystic lesion measuring 6.2 x 5.2 cm in transverse dimensions with a fluid-filled tubular structure a medially adjacent to the adnexal cyst, possibly hydrosalpinx.  There is a small amount of adjacent free peritoneal fluid.  The possibility of tubo-ovarian abscess should be considered.  Further evaluation with pelvic ultrasound examination may be helpful. 8 mm pleural based nodule abutting the left hemidiaphragm. US pelvic- Complex left ovarian cyst, suggests hemorrhagic cyst.  If pain persists in the region, follow-up ultrasound could be performed in 6-8 weeks to ensure resolution. No discrete endometrial abnormalities. Negative pregnancy test. CXR--No acute abnormality. IV ceftriaxone, doxycyline, Flagyl were ordered. Pain regimen ordered in the ED. Hemoglobin is stable. IVFs were ordered. OBGYN, ID, and cardiology  were consulted. She will be admitted to the [facility] service for further care.     Overview/Hospital Course:  No notes on file    Interval History: Seen at the bedside--afebrile. No leukocytosis--procal negative and lactate. Pelvic US with hemorrhagic cyst. Appreciate GYN's recs.    Review of Systems   Constitutional:  Negative for activity change, appetite change, fatigue and fever.   HENT:  Negative for trouble swallowing.    Respiratory:  Negative for cough and shortness of breath.    Cardiovascular:  Negative for chest pain, palpitations and leg swelling.   Gastrointestinal:  Positive for abdominal pain (right and left lower quad pain). Negative for constipation, diarrhea, nausea and vomiting.   Genitourinary:  Negative for difficulty urinating and hematuria.   Musculoskeletal:  Positive for myalgias.   Neurological:  Negative for dizziness and headaches.   Psychiatric/Behavioral:  Negative for confusion.      Objective:     Vital Signs (Most Recent):  Temp: 98.3 °F (36.8 °C) (03/30/24 1042)  Pulse: 73 (03/30/24 1042)  Resp: 20 (03/30/24 1042)  BP: 127/67 (03/30/24 1042)  SpO2: 97 % (03/30/24 1042) Vital Signs (24h Range):  Temp:  [98.2 °F (36.8 °C)-98.8 °F (37.1 °C)] 98.3 °F (36.8 °C)  Pulse:  [73-99] 73  Resp:  [15-20] 20  SpO2:  [91 %-100 %] 97 %  BP: (116-166)/(67-95) 127/67     Weight: 113.9 kg (251 lb)  Body mass index is 40.51 kg/m².    Intake/Output Summary (Last 24 hours) at 3/30/2024 1047  Last data filed at 3/29/2024 1307  Gross per 24 hour   Intake 999 ml   Output --   Net 999 ml         Physical Exam  Vitals and nursing note reviewed.   Constitutional:       Appearance: She is obese.   HENT:      Head: Normocephalic and atraumatic.   Cardiovascular:      Rate and Rhythm: Normal rate and regular rhythm.   Pulmonary:      Effort: Pulmonary effort is normal.   Abdominal:      Palpations: Abdomen is soft.      Tenderness: There is abdominal tenderness (lower quad tender to palpation---R and L side).    Musculoskeletal:         General: Normal range of motion.   Skin:     General: Skin is warm and dry.      Capillary Refill: Capillary refill takes 2 to 3 seconds.   Neurological:      Mental Status: She is alert and oriented to person, place, and time.   Psychiatric:         Mood and Affect: Mood normal.         Behavior: Behavior normal.             Significant Labs: All pertinent labs within the past 24 hours have been reviewed.    Significant Imaging: I have reviewed all pertinent imaging results/findings within the past 24 hours.    Assessment/Plan:      * Hemorrhagic ovarian cyst  Was initially concerned for tubo-ovarian abscess from CT abd/pelvis read.  US pelvis complete transvag notes a Complex left ovarian cyst, suggests hemorrhagic cyst.  Case d/w OBGYN who does not think abx are warranted.  Will cont pain control  Appreciate GYN as well as ID recs  Monitor h/h--stable at this time      Chest pain  Patient c/o CP on Tuesday that has now resolved.  Troponin is wnl--will trend x3  No acute changes on EKG  BNP wnl  TTE pending  Even though there is a very low suspicion for ACS we will consult cardiology.      Nausea and vomiting  Cont antiemetics IV/SL       Anxiety and depression  Patient has persistent depression which is mild and is currently controlled. We will not consult psychiatry at this time. Patient does not display psychosis at this time. Continue to monitor closely and adjust plan of care as needed.        Hyperlipidemia associated with type 2 diabetes mellitus  Cont statin      BMI 39.0-39.9,adult  Body mass index is 40.51 kg/m². Morbid obesity complicates all aspects of disease management from diagnostic modalities to treatment. Weight loss encouraged and health benefits explained to patient.        Type 2 diabetes mellitus with diabetic polyneuropathy, with long-term current use of insulin  Patient's FSGs are uncontrolled due to hyperglycemia on current medication regimen.  Last A1c reviewed-    Lab Results   Component Value Date    HGBA1C 9.6 (H) 03/29/2024     Most recent fingerstick glucose reviewed-   Recent Labs   Lab 03/29/24  1641 03/29/24  2048 03/30/24  0559 03/30/24  1043   POCTGLUCOSE 150* 237* 175* 169*       Current correctional scale  Low  Maintain anti-hyperglycemic dose as follows-   Antihyperglycemics (From admission, onward)      Start     Stop Route Frequency Ordered    03/29/24 1752  insulin aspart U-100 pen 0-5 Units         -- SubQ Before meals & nightly PRN 03/29/24 1652          Hold Oral hypoglycemics while patient is in the hospital.          VTE Risk Mitigation (From admission, onward)           Ordered     enoxaparin injection 40 mg  Daily         03/29/24 1522     IP VTE HIGH RISK PATIENT  Once         03/29/24 1522     Place sequential compression device  Until discontinued         03/29/24 1522                    Discharge Planning   GRADY:      Code Status: Full Code   Is the patient medically ready for discharge?:     Reason for patient still in hospital (select all that apply): Laboratory test, Treatment, Imaging, and Consult recommendations                     Shannon Milligan NP  Department of Hospital Medicine   [facility]

## 2024-03-30 NOTE — CONSULTS
[facility] Surg  Obstetrics & Gynecology  Consult Note    Patient Name: Hamzah Reis  MRN: [id number]  Admission Date: 3/29/2024  Hospital Length of Stay: 0 days  Code Status: Full Code  Primary Care Provider: Tatum Santos MD  Principal Problem: Hemorrhagic ovarian cyst    Inpatient consult to Gynecology  Consult performed by: Ria Spaulding MD  Consult ordered by: Shannon Milligan NP        Subjective:     Chief Complaint: Abdominal pain and emesis    History of Present Illness:   Patient presented to ED with one episode of emesis and abdominal pain and in the work up had CT scan that suggested TOA of left adnexa. An US was ordered to rule out torsion and there was not a TOA identified but instead a hemorrhagic ovarian cyst. Patient was to be admitted for other reasons so consult was ordered for ovarian cyst. She states she follows with Dr. Mike Wiedemann as her GYN and normally has irregular periods sometimes 2 times per month. Had her period last week but then started again yesterday. Discussed how cyst might play a part in irregular bleeding and needs a repeat US in about 3 months. Can start Provera now to help with bleeding.     No current facility-administered medications on file prior to encounter.     Current Outpatient Medications on File Prior to Encounter   Medication Sig    atorvastatin (LIPITOR) 80 MG tablet Take 1 tablet (80 mg total) by mouth once daily.    gabapentin (NEURONTIN) 600 MG tablet Take 1 tablet (600 mg total) by mouth 2 (two) times daily. (Patient taking differently: Take 600 mg by mouth 2 (two) times daily as needed.)    ibuprofen (ADVIL,MOTRIN) 200 MG tablet Take 200 mg by mouth every 6 (six) hours as needed for Pain.    insulin detemir U-100 (LEVEMIR) 100 unit/mL injection Inject 25 Units into the skin every evening.    metFORMIN (GLUCOPHAGE-XR) 500 MG ER 24hr tablet Take 1 tablet (500 mg total) by mouth 2 (two) times daily with meals.    blood sugar diagnostic  "Strp Use to check blood glucose before meals and at bedtime up to 4 times a day (Patient not taking: Reported on 2023)    blood-glucose meter Misc Use as instructed to check blood glucose before meals and at bedtime (Patient not taking: Reported on 2023)    lancets 30 gauge Misc 1 application by Misc.(Non-Drug; Combo Route) route 4 (four) times daily. Check  before meals and at bedtime (Patient not taking: Reported on 2023)    pen needle, diabetic (NOVOFINE PLUS) 32 gauge x 1/6" Ndle use with insulin once daily, must last 100 days (Patient not taking: Reported on 2023)       Review of patient's allergies indicates:  No Known Allergies    Past Medical History:   Diagnosis Date    Depression     Diabetes mellitus, type 2     Hyperlipidemia     Hypertension     Vision impairment     wears glasses     OB History    Para Term  AB Living   2 2 2 0 0 0   SAB IAB Ectopic Multiple Live Births   0 0 0 0 0      # Outcome Date GA Lbr Garret/2nd Weight Sex Delivery Anes PTL Lv   2 Term      Vag-Spont      1 Term      Vag-Spont        Past Surgical History:   Procedure Laterality Date    ENDOMETRIAL ABLATION N/A 2020    Procedure: ABLATION, ENDOMETRIUM;  Surgeon: Michael A. Wiedemann, MD;  Location: [facility] OR;  Service: OB/GYN;  Laterality: N/A;    HYSTEROSCOPY WITH DILATION AND CURETTAGE OF UTERUS N/A 2020    Procedure: HYSTEROSCOPY, WITH DILATION AND CURETTAGE OF UTERUS;  Surgeon: Michael A. Wiedemann, MD;  Location: [facility] OR;  Service: OB/GYN;  Laterality: N/A;     Family History       Problem Relation (Age of Onset)    Breast cancer Sister    Cancer Mother, Sister    Diabetes Mother, Sister, Maternal Grandmother    Heart disease Father    Hypertension Mother (55), Sister          Tobacco Use    Smoking status: Never     Passive exposure: Never    Smokeless tobacco: Never   Substance and Sexual Activity    Alcohol use: No    Drug use: No    Sexual activity: Yes     Partners: Male     Birth " control/protection: None       Objective:     Vital Signs (Most Recent):  Temp: 98.3 °F (36.8 °C) (03/30/24 1042)  Pulse: 73 (03/30/24 1042)  Resp: 20 (03/30/24 1042)  BP: 127/67 (03/30/24 1042)  SpO2: 97 % (03/30/24 1042) Vital Signs (24h Range):  Temp:  [98.2 °F (36.8 °C)-98.8 °F (37.1 °C)] 98.3 °F (36.8 °C)  Pulse:  [73-99] 73  Resp:  [15-20] 20  SpO2:  [91 %-100 %] 97 %  BP: (116-154)/(67-93) 127/67     Weight: 113.9 kg (251 lb)  Body mass index is 40.51 kg/m².  Patient's last menstrual period was 03/20/2024.    Physical Exam:   Constitutional: She appears well-developed and well-nourished. No distress.       Cardiovascular:  Normal rate, regular rhythm and normal heart sounds.             Pulmonary/Chest: Effort normal and breath sounds normal.        Abdominal: Soft. She exhibits no distension. There is no abdominal tenderness.                  Skin: She is not diaphoretic.    Patient wishes to defer pelvic exam      Diagnostic Results:  US: Reviewed  Discussed results of left hemorrhagic ovarian cyst    Assessment/Plan:     Active Diagnoses:    Diagnosis Date Noted POA    PRINCIPAL PROBLEM:  Hemorrhagic ovarian cyst [N83.209] 03/29/2024 Yes    Nausea and vomiting [R11.2] 03/29/2024 Yes    Chest pain [R07.9] 03/29/2024 Yes    Anxiety and depression [F41.9, F32.A] 03/07/2022 Yes    Hyperlipidemia associated with type 2 diabetes mellitus [E11.69, E78.5] 03/18/2020 Yes    BMI 39.0-39.9,adult [Z68.39] 03/25/2015 Not Applicable    Type 2 diabetes mellitus with diabetic polyneuropathy, with long-term current use of insulin [E11.42, Z79.4] 11/06/2014 Not Applicable      Problems Resolved During this Admission:       Left ovarian hemorrhagic cyst--needs to follow up with Dr. Mike Wiedemann (needs follow up US in 3 months)  AUB--will start Provera 20mg daily in the hospital and needs to continue at discharge    Thank you for your consult. I will sign off. Please contact us if you have any additional questions.    Ria SANDERS  MD [place]  Obstetrics & Gynecology  [facility]

## 2024-03-30 NOTE — PLAN OF CARE
AOX4. VS stable. Safety maintained. Meds given per MAR. Pain treated with PRN meds. Denies N/V at this time. Blood glucose monitored. 2K diabetic/cardiac diet with 1800ml fluid restriction maintained. Cardiac monitoring in place. Resting quietly. SR up X 2. Call light in reach. Bed locked in lowest position. Pt denies any further needs at this time. Plan of care ongoing.       Problem: Adult Inpatient Plan of Care  Goal: Plan of Care Review  Outcome: Ongoing, Progressing  Goal: Patient-Specific Goal (Individualized)  Outcome: Ongoing, Progressing     Problem: Diabetes Comorbidity  Goal: Blood Glucose Level Within Targeted Range  Outcome: Ongoing, Progressing     Problem: Infection (Intestinal Obstruction)  Goal: Absence of Infection Signs and Symptoms  Outcome: Ongoing, Progressing     Problem: Pain (Intestinal Obstruction)  Goal: Acceptable Pain Control  Outcome: Ongoing, Progressing

## 2024-03-30 NOTE — ASSESSMENT & PLAN NOTE
Patient's FSGs are uncontrolled due to hyperglycemia on current medication regimen.  Last A1c reviewed-   Lab Results   Component Value Date    HGBA1C 9.6 (H) 03/29/2024     Most recent fingerstick glucose reviewed-   Recent Labs   Lab 03/29/24  1641 03/29/24  2048 03/30/24  0559 03/30/24  1043   POCTGLUCOSE 150* 237* 175* 169*       Current correctional scale  Low  Maintain anti-hyperglycemic dose as follows-   Antihyperglycemics (From admission, onward)    Start     Stop Route Frequency Ordered    03/29/24 1752  insulin aspart U-100 pen 0-5 Units         -- SubQ Before meals & nightly PRN 03/29/24 1652        Hold Oral hypoglycemics while patient is in the hospital.

## 2024-03-30 NOTE — PROGRESS NOTES
[facility] - Med Surg  Cardiology  Progress Note    Patient Name: Hamzah Reis  MRN: [id number]  Admission Date: 3/29/2024  Hospital Length of Stay: 0 days  Code Status: Full Code   Attending Physician: Az Parra,*   Primary Care Physician: Tatum Santos MD  Expected Discharge Date:   Principal Problem:Hemorrhagic ovarian cyst    Subjective:     Hospital Course/Interval History:     3/30/2024:  -Pt with no chest pain   -No acute events on telemetry  -Treatment continued for hemorrhagic ovarian cyst.     -Echo done today shows normal LV systolic and diastolic function with no wall motion abnormalities.    Objective:     Vital Signs (Most Recent):  Temp: 98.2 °F (36.8 °C) (03/30/24 1601)  Pulse: 73 (03/30/24 1601)  Resp: 20 (03/30/24 1601)  BP: 137/80 (03/30/24 1601)  SpO2: 98 % (03/30/24 1601) Vital Signs (24h Range):  Temp:  [98.2 °F (36.8 °C)-98.7 °F (37.1 °C)] 98.2 °F (36.8 °C)  Pulse:  [73-99] 73  Resp:  [18-20] 20  SpO2:  [91 %-98 %] 98 %  BP: (116-138)/(67-85) 137/80     Weight: 113.9 kg (251 lb)  Body mass index is 40.51 kg/m².    SpO2: 98 %       No intake or output data in the 24 hours ending 03/30/24 1706    Lines/Drains/Airways       Peripheral Intravenous Line  Duration                  Peripheral IV - Single Lumen 03/29/24 1111 20 G Anterior;Proximal;Right Forearm 1 day                    Physical Exam    Significant Labs: BMP:   Recent Labs   Lab 03/29/24  1058 03/30/24  0553   * 190*    139   K 4.1 3.8    105   CO2 23 25   BUN 8 9   CREATININE 0.9 0.8   CALCIUM 9.5 8.7   MG  --  1.4*   , CMP   Recent Labs   Lab 03/29/24  1058 03/30/24  0553    139   K 4.1 3.8    105   CO2 23 25   * 190*   BUN 8 9   CREATININE 0.9 0.8   CALCIUM 9.5 8.7   PROT 7.6  --    ALBUMIN 3.7  --    BILITOT 0.4  --    ALKPHOS 81  --    AST 15  --    ALT 17  --    ANIONGAP 11 9   , CBC   Recent Labs   Lab 03/29/24  1058 03/30/24  0553   WBC 6.64 9.25   HGB 13.7 11.7*   HCT  "41.9 35.9*    356   , INR No results for input(s): "INR", "PROTIME" in the last 48 hours., Lipid Panel   Recent Labs   Lab 03/29/24  1557   CHOL 170   HDL 38*   LDLCALC 115.8   TRIG 81   CHOLHDL 22.4   , Troponin   Recent Labs   Lab 03/29/24  1513 03/29/24  1557 03/29/24  1823   TROPONINI <0.006 0.018 <0.006   , and All pertinent lab results from the last 24 hours have been reviewed.    Significant Imaging: Echocardiogram: Transthoracic echo (TTE) complete (Cupid Only):   Results for orders placed or performed during the hospital encounter of 03/29/24   Echo   Result Value Ref Range    BSA 2.3 m2    Valencia's Biplane MOD Ejection Fraction 67 %    LVOT stroke volume 78.24 cm3    LVIDd 4.19 3.5 - 6.0 cm    LV Systolic Volume 36.23 mL    LV Systolic Volume Index 16.5 mL/m2    LVIDs 3.04 2.1 - 4.0 cm    LV Diastolic Volume 78.04 mL    LV Diastolic Volume Index 35.47 mL/m2    IVS 1.14 (A) 0.6 - 1.1 cm    LVOT diameter 2.10 cm    LVOT area 3.5 cm2    FS 27 (A) 28 - 44 %    Left Ventricle Relative Wall Thickness 0.50 cm    Posterior Wall 1.04 0.6 - 1.1 cm    LV mass 154.45 g    LV Mass Index 70 g/m2    MV Peak E Kendall 0.70 m/s    TDI LATERAL 0.09 m/s    TDI SEPTAL 0.07 m/s    E/E' ratio 8.75 m/s    MV Peak A Kendall 0.49 m/s    TR Max Kendall 1.56 m/s    E/A ratio 1.43     E wave deceleration time 213.25 msec    MV "A" wave duration [id number] msec    LV SEPTAL E/E' RATIO 10.00 m/s    LV LATERAL E/E' RATIO 7.78 m/s    LVOT peak kendall 0.97 m/s    Left Ventricular Outflow Tract Mean Velocity 0.73 cm/s    Left Ventricular Outflow Tract Mean Gradient 2.33 mmHg    RV S' 11.82 cm/s    TAPSE 1.78 cm    LA size 3.79 cm    Left Atrium Minor Axis 4.73 cm    Left Atrium Major Axis 5.32 cm    LA volume (mod) 38.53 cm3    LA Volume Index (Mod) 17.5 mL/m2    RA Major Axis 3.99 cm    RA Width 3.64 cm    AV mean gradient 4 mmHg    AV peak gradient 6 mmHg    Ao peak kendall 1.27 m/s    Ao VTI 27.50 cm    LVOT peak VTI 22.60 cm    AV valve " area 2.85 cm²    AV Velocity Ratio 0.76     AV index (prosthetic) 0.82     BONILLA by Velocity Ratio 2.64 cm²    MV mean gradient 1 mmHg    MV peak gradient 2 mmHg    MV stenosis pressure 1/2 time 61.84 ms    MV valve area p 1/2 method 3.56 cm2    MV valve area by continuity eq 4.23 cm2    MV VTI 18.5 cm    Triscuspid Valve Regurgitation Peak Gradient 10 mmHg    PV PEAK VELOCITY 1.06 m/s    PV peak gradient 4 mmHg    Pulmonary Valve Mean Velocity 0.78 m/s    Sinus 3.02 cm    STJ 2.91 cm    Ascending aorta 3.09 cm    IVC diameter 1.81 cm    Mean e' 0.08 m/s    ZLVIDS -3.21     ZLVIDD -5.84     LA Volume Index 27.9 mL/m2    LA volume 61.30 cm3    LA WIDTH 3.8 cm    TV resting pulmonary artery pressure 13 mmHg    RV TB RVSP 5 mmHg    Est. RA pres 3 mmHg    Narrative      Left Ventricle: The left ventricle is normal in size with normal   systolic function. Biplane (2D) method of discs ejection fraction is 67%.    Left Ventricle: There is normal diastolic function.    Right Ventricle: Normal right ventricular cavity size. Systolic   function is normal.    IVC/SVC: Normal venous pressure at 3 mmHg.    Pulmonary Artery: The estimated pulmonary artery systolic pressure is   13 mmHg.       Assessment and Plan:       [facility] Surg  Cardiology  Consult Note     Patient Name: Hamzah Reis  MRN: [id number]  Admission Date: 3/29/2024  Hospital Length of Stay: 0 days  Code Status: Full Code   Attending Provider: Az Parra,*   Consulting Provider: Travis Henao MD PhD  Primary Care Physician: Tatum Santos MD  Principal Problem:Hemorrhagic ovarian cyst     Patient information was obtained from patient, past medical records, and ER records.      Inpatient consult to [facility]iology-[facility]  Consult performed by: Travis Henao MD PhD  Consult ordered by: Shannon Milligan NP  Reason for consult: chest pain  Assessment/Recommendations: See full consult note.           Subjective:      Chief  Complaint:  Chest Pain     HPI:    Hamzah Reis is a 49 y/o F with a pmh of DM, HTN, and depression. Pt presents with abdominal pain and complaint of chest pain.  Cardiology consulted for evaluation of chest pain.  Pertinent history/events are as follows:     03/29/2024 patient presented to emergency department with onset of diffuse generalized abdominal pain beginning this morning associated with 1 episode of vomiting.  Upon evaluation, the patient was afebrile.  WBC 6.64.  Abdominal CT showed complex left adnexal mass with fluid.  Ultrasound confirmed that this was a complex ovarian mass.  Patient was empirically started on ceftriaxone, doxycycline, metronidazole.  Gynecology has been consulted.  Infectious disease was consulted for possible tubo-ovarian abscess     Pt reports having an episode of chest pain on Tuesday 3/26/2024.  Chest pain located at left breast area, described as pressure, 8/10 in intensity, lasting 2 hours.  States she has had no further chest pain since that time.  Reports family history of coronary artery disease with MI in father at unknown age.  EKG shows normal sinus rhythm/low voltage QRS/axis shift to the right.  Troponin negative x3.  BNP within normal limits.         Past Medical History:   Diagnosis Date    Depression      Diabetes mellitus, type 2      Hyperlipidemia      Hypertension      Vision impairment       wears glasses               Past Surgical History:   Procedure Laterality Date    ENDOMETRIAL ABLATION N/A 8/11/2020     Procedure: ABLATION, ENDOMETRIUM;  Surgeon: Michael A. Wiedemann, MD;  Location: [facility] OR;  Service: OB/GYN;  Laterality: N/A;    HYSTEROSCOPY WITH DILATION AND CURETTAGE OF UTERUS N/A 8/11/2020     Procedure: HYSTEROSCOPY, WITH DILATION AND CURETTAGE OF UTERUS;  Surgeon: Michael A. Wiedemann, MD;  Location: [facility] OR;  Service: OB/GYN;  Laterality: N/A;         Review of patient's allergies indicates:  No Known Allergies     No current  "facility-administered medications on file prior to encounter.           Current Outpatient Medications on File Prior to Encounter   Medication Sig    atorvastatin (LIPITOR) 80 MG tablet Take 1 tablet (80 mg total) by mouth once daily.    gabapentin (NEURONTIN) 600 MG tablet Take 1 tablet (600 mg total) by mouth 2 (two) times daily. (Patient taking differently: Take 600 mg by mouth 2 (two) times daily as needed.)    ibuprofen (ADVIL,MOTRIN) 200 MG tablet Take 200 mg by mouth every 6 (six) hours as needed for Pain.    insulin detemir U-100 (LEVEMIR) 100 unit/mL injection Inject 25 Units into the skin every evening.    metFORMIN (GLUCOPHAGE-XR) 500 MG ER 24hr tablet Take 1 tablet (500 mg total) by mouth 2 (two) times daily with meals.    blood sugar diagnostic Strp Use to check blood glucose before meals and at bedtime up to 4 times a day (Patient not taking: Reported on 9/18/2023)    blood-glucose meter Misc Use as instructed to check blood glucose before meals and at bedtime (Patient not taking: Reported on 9/18/2023)    lancets 30 gauge Misc 1 application by Misc.(Non-Drug; Combo Route) route 4 (four) times daily. Check  before meals and at bedtime (Patient not taking: Reported on 9/18/2023)    pen needle, diabetic (NOVOFINE PLUS) 32 gauge x 1/6" Ndle use with insulin once daily, must last 100 days (Patient not taking: Reported on 9/18/2023)    [DISCONTINUED] diclofenac sodium (VOLTAREN) 1 % Gel Apply 2 g topically 4 (four) times daily as needed. To painful area on the feet. (Patient not taking: Reported on 9/18/2023)    [DISCONTINUED] ibuprofen (ADVIL,MOTRIN) 600 MG tablet Take 1 tablet (600 mg total) by mouth every 6 (six) hours as needed for Pain. (Patient not taking: Reported on 10/2/2023)    [DISCONTINUED] medroxyPROGESTERone (PROVERA) 10 MG tablet Take 1 tablet (10 mg total) by mouth every evening. (Patient not taking: Reported on 10/2/2023)    [DISCONTINUED] meloxicam (MOBIC) 15 MG tablet Take 1 tablet (15 mg " total) by mouth daily as needed for Pain. (Patient not taking: Reported on 9/18/2023)    [DISCONTINUED] oxyCODONE-acetaminophen (PERCOCET) 5-325 mg per tablet Take 1 tablet by mouth every 12 (twelve) hours as needed for Pain. (Patient not taking: Reported on 10/2/2023)    [DISCONTINUED] oxyCODONE-acetaminophen (PERCOCET) 5-325 mg per tablet Take 1 tablet by mouth every 8 (eight) hours as needed for Pain.    [DISCONTINUED] semaglutide (OZEMPIC) 1 mg/dose (4 mg/3 mL) Inject 1 mg into the skin every 7 days. (Patient not taking: Reported on 10/2/2023)      Family History         Problem Relation (Age of Onset)     Breast cancer Sister     Cancer Mother, Sister     Diabetes Mother, Sister, Maternal Grandmother     Heart disease Father     Hypertension Mother (55), Sister                   Tobacco Use    Smoking status: Never       Passive exposure: Never    Smokeless tobacco: Never   Substance and Sexual Activity    Alcohol use: No    Drug use: No    Sexual activity: Yes       Partners: Male       Birth control/protection: None      Review of Systems   Constitutional: Negative.   HENT: Negative.     Eyes: Negative.    Cardiovascular: Negative.    Respiratory: Negative.     Endocrine: Negative.    Skin: Negative.    Musculoskeletal: Negative.    Gastrointestinal:  Positive for abdominal pain.   Genitourinary: Negative.    Neurological: Negative.    Psychiatric/Behavioral: Negative.        Objective:      Vital Signs (Most Recent):  Temp: 98.7 °F (37.1 °C) (03/29/24 1932)  Pulse: 99 (03/29/24 1932)  Resp: 19 (03/29/24 1932)  BP: 136/85 (03/29/24 1932)  SpO2: (!) 91 % (03/29/24 1932) Vital Signs (24h Range):  Temp:  [98.3 °F (36.8 °C)-98.8 °F (37.1 °C)] 98.7 °F (37.1 °C)  Pulse:  [83-99] 99  Resp:  [15-19] 19  SpO2:  [91 %-100 %] 91 %  BP: (134-200)/(69-95) 136/85      Weight: 114.2 kg (251 lb 12.3 oz)  Body mass index is 40.64 kg/m².     SpO2: (!) 91 %        Intake/Output Summary (Last 24 hours) at 3/29/2024 2100  Last  data filed at 3/29/2024 1307      Gross per 24 hour   Intake 999 ml   Output --   Net 999 ml         Lines/Drains/Airways         Peripheral Intravenous Line  Duration                     Peripheral IV - Single Lumen 03/29/24 1111 20 G Anterior;Proximal;Right Forearm <1 day                          Physical Exam  Vitals and nursing note reviewed.   HENT:      Head: Normocephalic and atraumatic.      Nose: Nose normal. No congestion or rhinorrhea.      Mouth/Throat:      Mouth: Mucous membranes are moist.      Pharynx: Oropharynx is clear. No oropharyngeal exudate.   Eyes:      Extraocular Movements: Extraocular movements intact.      Conjunctiva/sclera: Conjunctivae normal.      Pupils: Pupils are equal, round, and reactive to light.   Cardiovascular:      Rate and Rhythm: Normal rate and regular rhythm.      Pulses: Normal pulses.      Heart sounds: Normal heart sounds. No murmur heard.     No friction rub. No gallop.   Pulmonary:      Effort: Pulmonary effort is normal.      Breath sounds: Normal breath sounds. No rales.   Abdominal:      Comments: Bowel sounds present.  Mild abdominal tenderness throughout.   Genitourinary:     Comments: No Puri  Musculoskeletal:         General: No swelling or tenderness.      Cervical back: Normal range of motion and neck supple.      Right lower leg: No edema.      Left lower leg: No edema.   Lymphadenopathy:      Cervical: No cervical adenopathy.   Skin:     Findings: No erythema, lesion or rash.   Neurological:      General: No focal deficit present.      Mental Status: She is alert and oriented to person, place, and time. Mental status is at baseline.   Psychiatric:         Mood and Affect: Mood normal.         Behavior: Behavior normal.      Significant Labs: BMP:       Recent Labs   Lab 03/29/24  1058   *      K 4.1      CO2 23   BUN 8   CREATININE 0.9   CALCIUM 9.5   , CMP       Recent Labs   Lab 03/29/24  1058      K 4.1      CO2 23   GLU  "267*   BUN 8   CREATININE 0.9   CALCIUM 9.5   PROT 7.6   ALBUMIN 3.7   BILITOT 0.4   ALKPHOS 81   AST 15   ALT 17   ANIONGAP 11   , CBC       Recent Labs   Lab 03/29/24  1058   WBC 6.64   HGB 13.7   HCT 41.9      , INR No results for input(s): "INR", "PROTIME" in the last 48 hours., Troponin         Recent Labs   Lab 03/29/24  1513 03/29/24  1557 03/29/24  1823   TROPONINI <0.006 0.018 <0.006   , and All pertinent lab results from the last 24 hours have been reviewed.     Significant Imaging:   Pelvis Ultrasound 3/29/2024  Complex left ovarian cyst, suggests hemorrhagic cyst.  If pain persists in the region, follow-up ultrasound could be performed in 6-8 weeks to ensure resolution.   No discrete endometrial abnormalities.     Assessment and Plan:   DM2  Obesity  Chest Pain  Hemorrhagic ovarian cyst     Chest Pain: Ms. Reis presents with episode of chest pain on 3/26/2024 as described.  EKG with no acute ischemic changes.  Troponin negative x3.  Echo done today shows normal LV systolic and diastolic function and no wall motion abnormalities.  Recommend follow up with Cardiology within 1 week with stress test.     HLD: Continue atorvastatin 80 mg daily.       VTE Risk Mitigation (From admission, onward)           Ordered     enoxaparin injection 40 mg  Every 12 hours         03/30/24 1646     IP VTE HIGH RISK PATIENT  Once         03/29/24 1522     Place sequential compression device  Until discontinued         03/29/24 1522                    Travis Henao MD PhD  Cardiology  [facility]  "

## 2024-03-30 NOTE — PROGRESS NOTES
Pharmacist Renal Dose Adjustment Note    Hamzah Reis is a 48 y.o. female being treated with the medication enoxaparin    Patient Data:    Vital Signs (Most Recent):  Temp: 98.2 °F (36.8 °C) (03/30/24 1601)  Pulse: 73 (03/30/24 1601)  Resp: 20 (03/30/24 1601)  BP: 137/80 (03/30/24 1601)  SpO2: 98 % (03/30/24 1601) Vital Signs (72h Range):  Temp:  [98.2 °F (36.8 °C)-98.8 °F (37.1 °C)]   Pulse:  [73-99]   Resp:  [15-20]   BP: (116-200)/(67-95)   SpO2:  [91 %-100 %]      Recent Labs   Lab 03/29/24  1058 03/30/24  0553   CREATININE 0.9 0.8     Serum creatinine: 0.8 mg/dL 03/30/24 0553  Estimated creatinine clearance: 112.5 mL/min    Medication:Enoxaparin dose: 40mg frequency daily will be changed to medication:enoxaparin dose:40mg frequency:BID    Pharmacist's Name: Mary Anne Mahmood  Pharmacist's Extension: [phone]

## 2024-03-30 NOTE — SUBJECTIVE & OBJECTIVE
Interval History: Seen at the bedside--afebrile. No leukocytosis--procal negative and lactate. Pelvic US with hemorrhagic cyst. Appreciate GYN's recs.    Review of Systems   Constitutional:  Negative for activity change, appetite change, fatigue and fever.   HENT:  Negative for trouble swallowing.    Respiratory:  Negative for cough and shortness of breath.    Cardiovascular:  Negative for chest pain, palpitations and leg swelling.   Gastrointestinal:  Positive for abdominal pain (right and left lower quad pain). Negative for constipation, diarrhea, nausea and vomiting.   Genitourinary:  Negative for difficulty urinating and hematuria.   Musculoskeletal:  Positive for myalgias.   Neurological:  Negative for dizziness and headaches.   Psychiatric/Behavioral:  Negative for confusion.      Objective:     Vital Signs (Most Recent):  Temp: 98.3 °F (36.8 °C) (03/30/24 1042)  Pulse: 73 (03/30/24 1042)  Resp: 20 (03/30/24 1042)  BP: 127/67 (03/30/24 1042)  SpO2: 97 % (03/30/24 1042) Vital Signs (24h Range):  Temp:  [98.2 °F (36.8 °C)-98.8 °F (37.1 °C)] 98.3 °F (36.8 °C)  Pulse:  [73-99] 73  Resp:  [15-20] 20  SpO2:  [91 %-100 %] 97 %  BP: (116-166)/(67-95) 127/67     Weight: 113.9 kg (251 lb)  Body mass index is 40.51 kg/m².    Intake/Output Summary (Last 24 hours) at 3/30/2024 1047  Last data filed at 3/29/2024 1307  Gross per 24 hour   Intake 999 ml   Output --   Net 999 ml         Physical Exam  Vitals and nursing note reviewed.   Constitutional:       Appearance: She is obese.   HENT:      Head: Normocephalic and atraumatic.   Cardiovascular:      Rate and Rhythm: Normal rate and regular rhythm.   Pulmonary:      Effort: Pulmonary effort is normal.   Abdominal:      Palpations: Abdomen is soft.      Tenderness: There is abdominal tenderness (lower quad tender to palpation---R and L side).   Musculoskeletal:         General: Normal range of motion.   Skin:     General: Skin is warm and dry.      Capillary Refill:  Capillary refill takes 2 to 3 seconds.   Neurological:      Mental Status: She is alert and oriented to person, place, and time.   Psychiatric:         Mood and Affect: Mood normal.         Behavior: Behavior normal.             Significant Labs: All pertinent labs within the past 24 hours have been reviewed.    Significant Imaging: I have reviewed all pertinent imaging results/findings within the past 24 hours.

## 2024-03-30 NOTE — PROGRESS NOTES
[facility] ID note    Patient afebrile.  WBC 9.25.  Off antibiotics.  Status post 1 dose metronidazole, doxycycline, ceftriaxone    No indication for antibiotic for left ovarian hemorrhagic cyst    We will sign off at this time    Please call if any questions   Octavio Hogan  [facility] ID  [phone]

## 2024-03-30 NOTE — ASSESSMENT & PLAN NOTE
Body mass index is 40.51 kg/m². Morbid obesity complicates all aspects of disease management from diagnostic modalities to treatment. Weight loss encouraged and health benefits explained to patient.

## 2024-03-31 VITALS
TEMPERATURE: 98 F | WEIGHT: 254.88 LBS | BODY MASS INDEX: 40.96 KG/M2 | HEIGHT: 66 IN | OXYGEN SATURATION: 97 % | DIASTOLIC BLOOD PRESSURE: 74 MMHG | SYSTOLIC BLOOD PRESSURE: 143 MMHG | RESPIRATION RATE: 20 BRPM | HEART RATE: 64 BPM

## 2024-03-31 PROBLEM — R07.9 CHEST PAIN: Status: RESOLVED | Noted: 2024-03-29 | Resolved: 2024-03-31

## 2024-03-31 PROBLEM — R11.2 NAUSEA AND VOMITING: Status: RESOLVED | Noted: 2024-03-29 | Resolved: 2024-03-31

## 2024-03-31 LAB
ANION GAP SERPL CALC-SCNC: 7 MMOL/L (ref 8–16)
BASOPHILS # BLD AUTO: 0.03 K/UL (ref 0–0.2)
BASOPHILS NFR BLD: 0.6 % (ref 0–1.9)
BUN SERPL-MCNC: 7 MG/DL (ref 6–20)
CALCIUM SERPL-MCNC: 8.7 MG/DL (ref 8.7–10.5)
CHLORIDE SERPL-SCNC: 107 MMOL/L (ref 95–110)
CO2 SERPL-SCNC: 25 MMOL/L (ref 23–29)
CREAT SERPL-MCNC: 0.7 MG/DL (ref 0.5–1.4)
DIFFERENTIAL METHOD BLD: ABNORMAL
EOSINOPHIL # BLD AUTO: 0.1 K/UL (ref 0–0.5)
EOSINOPHIL NFR BLD: 2.3 % (ref 0–8)
ERYTHROCYTE [DISTWIDTH] IN BLOOD BY AUTOMATED COUNT: 13.4 % (ref 11.5–14.5)
EST. GFR  (NO RACE VARIABLE): >60 ML/MIN/1.73 M^2
GLUCOSE SERPL-MCNC: 145 MG/DL (ref 70–110)
HCT VFR BLD AUTO: 37.2 % (ref 37–48.5)
HGB BLD-MCNC: 12.1 G/DL (ref 12–16)
IMM GRANULOCYTES # BLD AUTO: 0.02 K/UL (ref 0–0.04)
IMM GRANULOCYTES NFR BLD AUTO: 0.4 % (ref 0–0.5)
LYMPHOCYTES # BLD AUTO: 2.1 K/UL (ref 1–4.8)
LYMPHOCYTES NFR BLD: 38.7 % (ref 18–48)
MAGNESIUM SERPL-MCNC: 1.8 MG/DL (ref 1.6–2.6)
MCH RBC QN AUTO: 26.6 PG (ref 27–31)
MCHC RBC AUTO-ENTMCNC: 32.5 G/DL (ref 32–36)
MCV RBC AUTO: 82 FL (ref 82–98)
MONOCYTES # BLD AUTO: 0.4 K/UL (ref 0.3–1)
MONOCYTES NFR BLD: 7.7 % (ref 4–15)
NEUTROPHILS # BLD AUTO: 2.7 K/UL (ref 1.8–7.7)
NEUTROPHILS NFR BLD: 50.3 % (ref 38–73)
NRBC BLD-RTO: 0 /100 WBC
PLATELET # BLD AUTO: 356 K/UL (ref 150–450)
PMV BLD AUTO: 9.6 FL (ref 9.2–12.9)
POCT GLUCOSE: 143 MG/DL (ref 70–110)
POTASSIUM SERPL-SCNC: 3.8 MMOL/L (ref 3.5–5.1)
RBC # BLD AUTO: 4.55 M/UL (ref 4–5.4)
SODIUM SERPL-SCNC: 139 MMOL/L (ref 136–145)
WBC # BLD AUTO: 5.32 K/UL (ref 3.9–12.7)

## 2024-03-31 PROCEDURE — 25000003 PHARM REV CODE 250: Performed by: NURSE PRACTITIONER

## 2024-03-31 PROCEDURE — A4216 STERILE WATER/SALINE, 10 ML: HCPCS | Performed by: NURSE PRACTITIONER

## 2024-03-31 PROCEDURE — 85025 COMPLETE CBC W/AUTO DIFF WBC: CPT | Performed by: NURSE PRACTITIONER

## 2024-03-31 PROCEDURE — 83735 ASSAY OF MAGNESIUM: CPT | Performed by: NURSE PRACTITIONER

## 2024-03-31 PROCEDURE — 96372 THER/PROPH/DIAG INJ SC/IM: CPT | Performed by: HOSPITALIST

## 2024-03-31 PROCEDURE — G0378 HOSPITAL OBSERVATION PER HR: HCPCS

## 2024-03-31 PROCEDURE — 80048 BASIC METABOLIC PNL TOTAL CA: CPT | Performed by: NURSE PRACTITIONER

## 2024-03-31 PROCEDURE — 36415 COLL VENOUS BLD VENIPUNCTURE: CPT | Performed by: NURSE PRACTITIONER

## 2024-03-31 PROCEDURE — 63600175 PHARM REV CODE 636 W HCPCS: Performed by: HOSPITALIST

## 2024-03-31 PROCEDURE — 25000003 PHARM REV CODE 250: Performed by: OBSTETRICS & GYNECOLOGY

## 2024-03-31 RX ORDER — HYDROCODONE BITARTRATE AND ACETAMINOPHEN 7.5; 325 MG/1; MG/1
1 TABLET ORAL EVERY 6 HOURS PRN
Qty: 10 TABLET | Refills: 0 | Status: SHIPPED | OUTPATIENT
Start: 2024-03-31 | End: 2024-05-03

## 2024-03-31 RX ORDER — ONDANSETRON 4 MG/1
4 TABLET, ORALLY DISINTEGRATING ORAL EVERY 6 HOURS PRN
Qty: 20 TABLET | Refills: 0 | OUTPATIENT
Start: 2024-03-31 | End: 2024-06-05

## 2024-03-31 RX ORDER — MEDROXYPROGESTERONE ACETATE 10 MG/1
20 TABLET ORAL DAILY
Qty: 60 TABLET | Refills: 6 | Status: SHIPPED | OUTPATIENT
Start: 2024-03-31

## 2024-03-31 RX ADMIN — SENNOSIDES AND DOCUSATE SODIUM 1 TABLET: 8.6; 5 TABLET ORAL at 09:03

## 2024-03-31 RX ADMIN — MEDROXYPROGESTERONE ACETATE 20 MG: 10 TABLET ORAL at 09:03

## 2024-03-31 RX ADMIN — Medication 10 ML: at 05:03

## 2024-03-31 RX ADMIN — ATORVASTATIN CALCIUM 80 MG: 40 TABLET, FILM COATED ORAL at 09:03

## 2024-03-31 RX ADMIN — ENOXAPARIN SODIUM 40 MG: 40 INJECTION SUBCUTANEOUS at 09:03

## 2024-03-31 NOTE — PLAN OF CARE
Introduced as VN and will be reviewing discharge instructions.  Educated patient on reason for admission, home medication list, and discharge instructions including when to return to ED and the following doctor appointments.  Education per flowsheet.  Opportunity given for questions and questions answered.    Nurse notified of   completion of discharge education. Patient is waiting for family to arrive for ride home

## 2024-03-31 NOTE — PLAN OF CARE
Pt will dc with no needs noted. Pts daughter will provide transport home. Pt will schedule own PCP follow up.    Cleared from CM . Bedside Nurse and VN notified.     03/31/24 0912   Final Note   Assessment Type Final Discharge Note   Anticipated Discharge Disposition Home   Hospital Resources/Appts/Education Provided Appointments scheduled and added to AVS   Post-Acute Status   Discharge Delays None known at this time

## 2024-03-31 NOTE — PLAN OF CARE
Re:  Hamzah Reis, WORK / SCHOOL EXCUSE    Date: 03/31/2024            Hospital Medicine Dept.  [facility] [address] [place] [address]  [phone] after hours  [phone] fax To whom it may concern:    Ms. Hamzah Reis has been hospitalized at the [facility] since 3/29/2024.  Please excuse the patient from duties.  Patient may return on 4/4/2024.  No restrictions.     Please contact me if you have any questions.                    __________________________  Shannon Milligan NP

## 2024-03-31 NOTE — PLAN OF CARE
AAO x 4.  Diabetic diet.  Complained of tolerable pain 3-4/10.  No request for pain medications at this time.  Safety maintained.  Encouraged to call for assistance.

## 2024-03-31 NOTE — NURSING
Plan of care discussed with patient. Meds administered per MAR.  IV removed intact. Tele-monitoring disconnected.  Work excuse provided to patient.  AVS handed to patient.  Awaiting further discharge instructions.  All questions answered.  Patient informed me no assistance needed to get dressed.

## 2024-04-01 ENCOUNTER — OFFICE VISIT (OUTPATIENT)
Dept: OBSTETRICS AND GYNECOLOGY | Facility: CLINIC | Age: 49
End: 2024-04-01
Payer: COMMERCIAL

## 2024-04-01 VITALS — DIASTOLIC BLOOD PRESSURE: 87 MMHG | BODY MASS INDEX: 40.1 KG/M2 | WEIGHT: 248.44 LBS | SYSTOLIC BLOOD PRESSURE: 126 MMHG

## 2024-04-01 DIAGNOSIS — Z87.42 HISTORY OF ABNORMAL CERVICAL PAP SMEAR: Primary | ICD-10-CM

## 2024-04-01 DIAGNOSIS — N92.6 IRREGULAR BLEEDING: ICD-10-CM

## 2024-04-01 LAB
OHS QRS DURATION: 82 MS
OHS QTC CALCULATION: 415 MS

## 2024-04-01 PROCEDURE — 99214 OFFICE O/P EST MOD 30 MIN: CPT | Mod: S$GLB,,, | Performed by: OBSTETRICS & GYNECOLOGY

## 2024-04-01 PROCEDURE — 3074F SYST BP LT 130 MM HG: CPT | Mod: CPTII,S$GLB,, | Performed by: OBSTETRICS & GYNECOLOGY

## 2024-04-01 PROCEDURE — 88141 CYTOPATH C/V INTERPRET: CPT | Mod: ,,, | Performed by: PATHOLOGY

## 2024-04-01 PROCEDURE — 3079F DIAST BP 80-89 MM HG: CPT | Mod: CPTII,S$GLB,, | Performed by: OBSTETRICS & GYNECOLOGY

## 2024-04-01 PROCEDURE — 3008F BODY MASS INDEX DOCD: CPT | Mod: CPTII,S$GLB,, | Performed by: OBSTETRICS & GYNECOLOGY

## 2024-04-01 PROCEDURE — 3046F HEMOGLOBIN A1C LEVEL >9.0%: CPT | Mod: CPTII,S$GLB,, | Performed by: OBSTETRICS & GYNECOLOGY

## 2024-04-01 PROCEDURE — 99999 PR PBB SHADOW E&M-EST. PATIENT-LVL III: CPT | Mod: PBBFAC,,, | Performed by: OBSTETRICS & GYNECOLOGY

## 2024-04-01 PROCEDURE — 1159F MED LIST DOCD IN RCRD: CPT | Mod: CPTII,S$GLB,, | Performed by: OBSTETRICS & GYNECOLOGY

## 2024-04-01 PROCEDURE — 88175 CYTOPATH C/V AUTO FLUID REDO: CPT | Performed by: PATHOLOGY

## 2024-04-01 PROCEDURE — 87624 HPV HI-RISK TYP POOLED RSLT: CPT | Performed by: OBSTETRICS & GYNECOLOGY

## 2024-04-01 PROCEDURE — 1160F RVW MEDS BY RX/DR IN RCRD: CPT | Mod: CPTII,S$GLB,, | Performed by: OBSTETRICS & GYNECOLOGY

## 2024-04-02 NOTE — PROGRESS NOTES
48 y.o.   OB History          2    Para   2    Term   2            AB        Living             SAB        IAB        Ectopic        Multiple        Live Births                   Comlaining of:  Pt fu ed visit  Has chronic pain, pelvic, irreg cycles,   Level 1 pap /bx in past  Known enlarging fibroids  Pelvic pressure, cycles linger even after stopping  Prev ablation, failed      ROS:  GENERAL: No fever, chills, fatigability or weight loss.  SKIN: No rashes, itching or changes in color or texture of skin.  HEAD: No headaches or recent head trauma.  EYES: Visual acuity fine. No photophobia, ocular pain or diplopia.  EARS: Denies ear pain, discharge or vertigo.  NOSE: No loss of smell, no epistaxis or postnasal drip.  MOUTH & THROAT: No hoarseness or change in voice. No excessive gum bleeding.  NODES: Denies swollen glands.  CHEST: Denies ANDERS, cyanosis, wheezing, cough and sputum production.  CARDIOVASCULAR: Denies chest pain, PND, orthopnea or reduced exercise tolerance.  ABDOMEN: Appetite fine. No weight loss. Denies diarrhea, abdominal pain, hematemesis or blood in stool.  URINARY: No flank pain, dysuria or hematuria.  PERIPHERAL VASCULAR: No claudication or cyanosis.  MUSCULOSKELETAL: No joint stiffness or swelling. Denies back pain.  NEUROLOGIC: No history of seizures, paralysis, alteration of gait or coordination      PE: /87   Wt 112.7 kg (248 lb 7.3 oz)   LMP 2024   BMI 40.10 kg/m²    Abd soft  Pelvic hard to appreciate,but no large masses  Embx discussed,   Unable to get in endocervix, either from fibroid or scar from ablation  Good pap done instead to get pathology     Rev chart. Us showes fibroids larger  Discussed options, meds/surgery etc  Pt wants to proceed with surgery  Risks discussed, can start with scope, poss need for laparotomy margaret if fibroids too large or position to inhibit scope  Pt wants to proceed  Will need cx removed also     A/P  Pelvic pain  Menorrhagia,    Failed ablation  Enlarging fibroids  Perv tony 1    Proceed with sched surgery, try TLH, may need open  Complex decision making and counselling visit

## 2024-04-03 LAB
BACTERIA BLD CULT: NORMAL
BACTERIA BLD CULT: NORMAL

## 2024-04-04 DIAGNOSIS — E11.9 TYPE 2 DIABETES MELLITUS WITHOUT COMPLICATION, UNSPECIFIED WHETHER LONG TERM INSULIN USE: ICD-10-CM

## 2024-04-05 ENCOUNTER — TELEPHONE (OUTPATIENT)
Dept: OBSTETRICS AND GYNECOLOGY | Facility: CLINIC | Age: 49
End: 2024-04-05
Payer: COMMERCIAL

## 2024-04-05 DIAGNOSIS — E11.42 TYPE 2 DIABETES MELLITUS WITH DIABETIC POLYNEUROPATHY, WITH LONG-TERM CURRENT USE OF INSULIN: Primary | ICD-10-CM

## 2024-04-05 DIAGNOSIS — Z79.4 TYPE 2 DIABETES MELLITUS WITH DIABETIC POLYNEUROPATHY, WITH LONG-TERM CURRENT USE OF INSULIN: Primary | ICD-10-CM

## 2024-04-05 RX ORDER — SEMAGLUTIDE 1.34 MG/ML
1 INJECTION, SOLUTION SUBCUTANEOUS
Qty: 3 ML | Refills: 2 | Status: SHIPPED | OUTPATIENT
Start: 2024-04-05 | End: 2024-05-03

## 2024-04-05 NOTE — TELEPHONE ENCOUNTER
----- Message from Sonya Bunch sent at 4/5/2024  4:02 PM CDT -----  Type:  Needs Medical Advice    Who Called:  pt     Would the patient rather a call back or a response via [facility]? Call back   Best Call Back Number: [phone]  Additional Information: pt was discharged from the hospital on 03/31/24 and the pt stated that the Ozempic needs  to get sent to the pharmacy for a refill but the hospital removed it from her chart

## 2024-04-10 LAB
FINAL PATHOLOGIC DIAGNOSIS: ABNORMAL
Lab: ABNORMAL

## 2024-04-12 LAB
HPV HR 12 DNA SPEC QL NAA+PROBE: NEGATIVE
HPV16 AG SPEC QL: NEGATIVE
HPV18 DNA SPEC QL NAA+PROBE: NEGATIVE

## 2024-04-15 ENCOUNTER — PATIENT MESSAGE (OUTPATIENT)
Dept: OBSTETRICS AND GYNECOLOGY | Facility: CLINIC | Age: 49
End: 2024-04-15
Payer: COMMERCIAL

## 2024-04-15 ENCOUNTER — TELEPHONE (OUTPATIENT)
Dept: INTERNAL MEDICINE | Facility: CLINIC | Age: 49
End: 2024-04-15
Payer: COMMERCIAL

## 2024-04-15 ENCOUNTER — TELEPHONE (OUTPATIENT)
Dept: OBSTETRICS AND GYNECOLOGY | Facility: CLINIC | Age: 49
End: 2024-04-15
Payer: COMMERCIAL

## 2024-04-15 DIAGNOSIS — R07.9 CHEST PAIN WITH LOW RISK OF ACUTE CORONARY SYNDROME: Primary | ICD-10-CM

## 2024-04-15 RX ORDER — IBUPROFEN 600 MG/1
600 TABLET ORAL EVERY 6 HOURS PRN
Qty: 20 TABLET | Refills: 1 | Status: SHIPPED | OUTPATIENT
Start: 2024-04-15

## 2024-04-15 RX ORDER — TRAMADOL HYDROCHLORIDE 50 MG/1
50 TABLET ORAL EVERY 6 HOURS PRN
Qty: 15 EACH | Refills: 0 | Status: SHIPPED | OUTPATIENT
Start: 2024-04-15 | End: 2024-05-03

## 2024-04-15 NOTE — TELEPHONE ENCOUNTER
----- Message from Isaias Vargas sent at 4/15/2024  8:16 AM CDT -----  Pt Requesting Call Back    Who called: pt  Who called for pt:  Best call back #:  [phone]  Add notes: pt said she would like a call back regarding a follow up from her hospital visit

## 2024-04-15 NOTE — TELEPHONE ENCOUNTER
----- Message from Isaias Vargas sent at 4/15/2024  8:14 AM CDT -----  Pt Requesting Call Back    Who called: pt  Who called for pt:  Best call back #:  [phone]  Add notes: pt said she would like a call back regarding to the large cyst she have; pt said it's important

## 2024-04-15 NOTE — TELEPHONE ENCOUNTER
Pt is following up about fibroid and pain. Complaining of being at work and barely being able to stand

## 2024-04-16 NOTE — ASSESSMENT & PLAN NOTE
"Patient's FSGs are uncontrolled due to hyperglycemia on current medication regimen.  Last A1c reviewed-   Lab Results   Component Value Date    HGBA1C 9.6 (H) 03/29/2024     Most recent fingerstick glucose reviewed-   No results for input(s): "POCTGLUCOSE" in the last 24 hours.    Current correctional scale  Low  Maintain anti-hyperglycemic dose as follows-   Antihyperglycemics (From admission, onward)    None        Hold Oral hypoglycemics while patient is in the hospital.      "

## 2024-04-16 NOTE — ASSESSMENT & PLAN NOTE
Body mass index is 41.13 kg/m². Morbid obesity complicates all aspects of disease management from diagnostic modalities to treatment. Weight loss encouraged and health benefits explained to patient.

## 2024-04-16 NOTE — ASSESSMENT & PLAN NOTE
Patient c/o CP on Tuesday that has now resolved.  Troponin is wnl--will trend x3  No acute changes on EKG  BNP wnl  TTE pending  Even though there is a very low suspicion for ACS we will consult cardiology.  Appreciate cardiology eval--will need outpatient follow up

## 2024-04-16 NOTE — DISCHARGE SUMMARY
[facility]  Discharge Summary      Patient Name: Hamzah Reis  MRN: [id number]  [facility]: [id number]  Patient Class: OP- Observation  Admission Date: 3/29/2024  Hospital Length of Stay: 0 days  Discharge Date and Time: 3/31/2024 11:47 AM  Attending Physician: No att. providers found   Discharging Provider: Shannon Milligan NP  Primary Care Provider: Tatum Santos MD    Primary Care Team: Networked reference to record PCT     HPI:   Hamzah Reis is a 49 y/o F with a pmh of DM, HTN, and depression. Her PCP is Dr. Santos. She presents to the ED here at [facility] for lower abdominal pain that began this morning.  Patient reports 1 episode of emesis while at work on today. She also complains of CP that she had on Tuesday, which has since resolved.  She denies shortness of breath, CP, palpitations, leg swelling, lightheadedness, syncope, diaphoresis, or any trauma. She reports that she started having vaginal bleeding once she to the ED. Patient had an endometrial ablation and hysteroscopy with D&C in 2020. Patient denies dysuria, hematuria, flank pain, unusual vaginal discharge--also denies any known STD exposure. Her ED workup includes CT abd/pelvis with IV contrast--left adnexal cystic lesion measuring 6.2 x 5.2 cm in transverse dimensions with a fluid-filled tubular structure a medially adjacent to the adnexal cyst, possibly hydrosalpinx.  There is a small amount of adjacent free peritoneal fluid.  The possibility of tubo-ovarian abscess should be considered.  Further evaluation with pelvic ultrasound examination may be helpful. 8 mm pleural based nodule abutting the left hemidiaphragm. US pelvic- Complex left ovarian cyst, suggests hemorrhagic cyst.  If pain persists in the region, follow-up ultrasound could be performed in 6-8 weeks to ensure resolution. No discrete endometrial abnormalities. Negative pregnancy test. CXR--No acute abnormality. IV ceftriaxone, doxycyline,  Flagyl were ordered. Pain regimen ordered in the ED. Hemoglobin is stable. IVFs were ordered. OBGYN, ID, and cardiology were consulted. She will be admitted to the [facility] service for further care.     * No surgery found *      Hospital Course:   No notes on file     Goals of Care Treatment Preferences:  Code Status: Full Code      Consults:   Consults (From admission, onward)          Status Ordering Provider     Inpatient consult to Cardiology[facility]  Once        Provider:  (Not yet assigned)    Completed CLEO HURST     Inpatient consult to Infectious Diseases  Once        Provider:  (Not yet assigned)    Completed CLEO HURST     Inpatient consult to Gynecology  Once        Provider:  (Not yet assigned)    Completed CLEO HURST            Cardiac/Vascular  Hyperlipidemia associated with type 2 diabetes mellitus  Cont statin      Chest pain-resolved as of 3/31/2024  Patient c/o CP on Tuesday that has now resolved.  Troponin is wnl--will trend x3  No acute changes on EKG  BNP wnl  TTE pending  Even though there is a very low suspicion for ACS we will consult cardiology.  Appreciate cardiology eval--will need outpatient follow up      Renal/  * Hemorrhagic ovarian cyst  Was initially concerned for tubo-ovarian abscess from CT abd/pelvis read.  US pelvis complete transvag notes a Complex left ovarian cyst, suggests hemorrhagic cyst.  Case d/w OBGYN who does not think abx are warranted.  Will cont pain control  Appreciate GYN as well as ID recs  Monitor h/h--stable at this time      Endocrine  BMI 39.0-39.9,adult  Body mass index is 41.13 kg/m². Morbid obesity complicates all aspects of disease management from diagnostic modalities to treatment. Weight loss encouraged and health benefits explained to patient.        Type 2 diabetes mellitus with diabetic polyneuropathy, with long-term current use of insulin  Patient's FSGs are uncontrolled due to hyperglycemia on  "current medication regimen.  Last A1c reviewed-   Lab Results   Component Value Date    HGBA1C 9.6 (H) 03/29/2024     Most recent fingerstick glucose reviewed-   No results for input(s): "POCTGLUCOSE" in the last 24 hours.    Current correctional scale  Low  Maintain anti-hyperglycemic dose as follows-   Antihyperglycemics (From admission, onward)      None          Hold Oral hypoglycemics while patient is in the hospital.        GI  Nausea and vomiting-resolved as of 3/31/2024  Cont antiemetics IV/SL       Other  Anxiety and depression  Patient has persistent depression which is mild and is currently controlled. We will not consult psychiatry at this time. Patient does not display psychosis at this time. Continue to monitor closely and adjust plan of care as needed.          Final Active Diagnoses:    Diagnosis Date Noted POA    PRINCIPAL PROBLEM:  Hemorrhagic ovarian cyst [N83.209] 03/29/2024 Yes    Anxiety and depression [F41.9, F32.A] 03/07/2022 Yes    Hyperlipidemia associated with type 2 diabetes mellitus [E11.69, E78.5] 03/18/2020 Yes    BMI 39.0-39.9,adult [Z68.39] 03/25/2015 Not Applicable    Type 2 diabetes mellitus with diabetic polyneuropathy, with long-term current use of insulin [E11.42, Z79.4] 11/06/2014 Not Applicable      Problems Resolved During this Admission:    Diagnosis Date Noted Date Resolved POA    Nausea and vomiting [R11.2] 03/29/2024 03/31/2024 Yes    Chest pain [R07.9] 03/29/2024 03/31/2024 Yes       Discharged Condition: stable    Disposition: Home or Self Care    Follow Up:   Follow-up Information       Tatum Santos MD. Call.    Specialty: Internal Medicine  Why: Please call and scehdule own PCP follow up within 1-7 days.  Contact information:  [address] [facility]  [place] [address] [phone]               Wiedemann, Michael A., MD Follow up in 1 week(s).    Specialties: Obstetrics, Obstetrics and Gynecology  Contact information:  [address]  [place] " [id number]  [phone]               Billing Department. Call.    Why: Please call Billing Department Monday-Friday 8am-4pm to update insurance information.     Phone Number: [phone]                         Patient Instructions:      Ambulatory referral/consult to Cardiology   Standing Status: Future   Referral Priority: Routine Referral Type: Consultation   Referral Reason: Specialty Services Required   Requested Specialty: Cardiology   Number of Visits Requested: 1     Diet diabetic     Diet Cardiac     Notify your health care provider if you experience any of the following:  temperature >100.4     Notify your health care provider if you experience any of the following:  persistent nausea and vomiting or diarrhea     Notify your health care provider if you experience any of the following:  redness, tenderness, or signs of infection (pain, swelling, redness, odor or green/yellow discharge around incision site)     Notify your health care provider if you experience any of the following:  difficulty breathing or increased cough     Notify your health care provider if you experience any of the following:  severe persistent headache     Notify your health care provider if you experience any of the following:  worsening rash     Notify your health care provider if you experience any of the following:  persistent dizziness, light-headedness, or visual disturbances     Notify your health care provider if you experience any of the following:  increased confusion or weakness     Activity as tolerated       Significant Diagnostic Studies: Labs: All labs within the past 24 hours have been reviewed    Pending Diagnostic Studies:       None           Medications:  Reconciled Home Medications:      Medication List        START taking these medications      HYDROcodone-acetaminophen 7.5-325 mg per tablet  Commonly known as: NORCO  Take 1 tablet by mouth every 6 (six) hours as needed for Pain.     medroxyPROGESTERone 10 MG  "tablet  Commonly known as: PROVERA  Take 2 tablets (20 mg total) by mouth once daily.     NOVOFINE PLUS 32 gauge x 1/6" Ndle  Generic drug: pen needle, diabetic  use with insulin once daily, must last 100 days     ondansetron 4 MG Tbdl  Commonly known as: ZOFRAN-ODT  Take 1 tablet (4 mg total) by mouth every 6 (six) hours as needed (nausea).     TRUE METRIX GLUCOSE METER Misc  Generic drug: blood-glucose meter  Use as instructed to check blood glucose before meals and at bedtime     TRUE METRIX GLUCOSE TEST STRIP Strp  Generic drug: blood sugar diagnostic  Use to check blood glucose before meals and at bedtime up to 4 times a day     TRUEPLUS LANCETS 30 gauge Misc  Generic drug: lancets  1 application by Misc.(Non-Drug; Combo Route) route 4 (four) times daily. Check  before meals and at bedtime            CHANGE how you take these medications      gabapentin 600 MG tablet  Commonly known as: NEURONTIN  Take 1 tablet (600 mg total) by mouth 2 (two) times daily.  What changed:   when to take this  reasons to take this            CONTINUE taking these medications      atorvastatin 80 MG tablet  Commonly known as: LIPITOR  Take 1 tablet (80 mg total) by mouth once daily.     ibuprofen 200 MG tablet  Commonly known as: ADVIL,MOTRIN  Take 200 mg by mouth every 6 (six) hours as needed for Pain.     insulin detemir U-100 100 unit/mL injection  Commonly known as: Levemir  Inject 25 Units into the skin every evening.     metFORMIN 500 MG ER 24hr tablet  Commonly known as: GLUCOPHAGE-XR  Take 1 tablet (500 mg total) by mouth 2 (two) times daily with meals.              Indwelling Lines/Drains at time of discharge:   Lines/Drains/Airways       None                   Time spent on the discharge of patient: 45 minutes         Shannon Milligan NP  Department of Hospital Medicine  [place] [facility]  "

## 2024-04-17 DIAGNOSIS — E11.9 TYPE 2 DIABETES MELLITUS WITHOUT COMPLICATION: ICD-10-CM

## 2024-04-17 PROBLEM — R07.89 OTHER CHEST PAIN: Status: ACTIVE | Noted: 2024-03-29

## 2024-04-19 ENCOUNTER — TELEPHONE (OUTPATIENT)
Dept: INTERNAL MEDICINE | Facility: CLINIC | Age: 49
End: 2024-04-19
Payer: COMMERCIAL

## 2024-04-19 ENCOUNTER — TELEPHONE (OUTPATIENT)
Dept: OBSTETRICS AND GYNECOLOGY | Facility: CLINIC | Age: 49
End: 2024-04-19
Payer: COMMERCIAL

## 2024-04-19 DIAGNOSIS — N92.6 IRREGULAR BLEEDING: ICD-10-CM

## 2024-04-19 DIAGNOSIS — R10.2 PELVIC PAIN: Primary | ICD-10-CM

## 2024-04-19 DIAGNOSIS — R87.619 ABNORMAL CERVICAL PAPANICOLAOU SMEAR, UNSPECIFIED ABNORMAL PAP FINDING: ICD-10-CM

## 2024-04-19 NOTE — TELEPHONE ENCOUNTER
----- Message from Sonya Bunch sent at 4/19/2024 10:00 AM CDT -----  Type:  Needs Medical Advice    Who Called: pt     Would the patient rather a call back or a response via [facility]? Call back   Best Call Back Number: [phone]   Additional Information: pt is needing to get a hospital f/u appointment pt was admitted to [facility] on 03/29/- 03/31/24 for your sugar being high

## 2024-04-19 NOTE — TELEPHONE ENCOUNTER
----- Message from Michael A. Wiedemann, MD sent at 4/14/2024  6:21 AM CDT -----  [facility],  will need md help for this one  Thanks,   Pain,fibroids, failed ablation, abn pap

## 2024-04-19 NOTE — TELEPHONE ENCOUNTER
Hi, see note April 5  Needs tlh, but have suspicion needs teresa, will try with scope  Need md help  Thanks

## 2024-04-24 ENCOUNTER — TELEPHONE (OUTPATIENT)
Dept: INTERNAL MEDICINE | Facility: CLINIC | Age: 49
End: 2024-04-24
Payer: COMMERCIAL

## 2024-05-02 ENCOUNTER — TELEPHONE (OUTPATIENT)
Dept: OBSTETRICS AND GYNECOLOGY | Facility: CLINIC | Age: 49
End: 2024-05-02

## 2024-05-03 ENCOUNTER — OFFICE VISIT (OUTPATIENT)
Dept: INTERNAL MEDICINE | Facility: CLINIC | Age: 49
End: 2024-05-03
Payer: COMMERCIAL

## 2024-05-03 VITALS
BODY MASS INDEX: 40.39 KG/M2 | HEIGHT: 66 IN | SYSTOLIC BLOOD PRESSURE: 120 MMHG | OXYGEN SATURATION: 97 % | HEART RATE: 80 BPM | DIASTOLIC BLOOD PRESSURE: 78 MMHG | WEIGHT: 251.31 LBS

## 2024-05-03 DIAGNOSIS — E11.42 TYPE 2 DIABETES MELLITUS WITH DIABETIC POLYNEUROPATHY, WITH LONG-TERM CURRENT USE OF INSULIN: ICD-10-CM

## 2024-05-03 DIAGNOSIS — Z09 HOSPITAL DISCHARGE FOLLOW-UP: ICD-10-CM

## 2024-05-03 DIAGNOSIS — N83.209 HEMORRHAGIC OVARIAN CYST: ICD-10-CM

## 2024-05-03 DIAGNOSIS — Z79.4 TYPE 2 DIABETES MELLITUS WITH DIABETIC POLYNEUROPATHY, WITH LONG-TERM CURRENT USE OF INSULIN: ICD-10-CM

## 2024-05-03 PROCEDURE — 99214 OFFICE O/P EST MOD 30 MIN: CPT | Mod: S$GLB,,, | Performed by: INTERNAL MEDICINE

## 2024-05-03 PROCEDURE — 99999 PR PBB SHADOW E&M-EST. PATIENT-LVL IV: CPT | Mod: PBBFAC,,, | Performed by: INTERNAL MEDICINE

## 2024-05-03 PROCEDURE — 3074F SYST BP LT 130 MM HG: CPT | Mod: CPTII,S$GLB,, | Performed by: INTERNAL MEDICINE

## 2024-05-03 PROCEDURE — 3078F DIAST BP <80 MM HG: CPT | Mod: CPTII,S$GLB,, | Performed by: INTERNAL MEDICINE

## 2024-05-03 PROCEDURE — 1159F MED LIST DOCD IN RCRD: CPT | Mod: CPTII,S$GLB,, | Performed by: INTERNAL MEDICINE

## 2024-05-03 PROCEDURE — 1160F RVW MEDS BY RX/DR IN RCRD: CPT | Mod: CPTII,S$GLB,, | Performed by: INTERNAL MEDICINE

## 2024-05-03 PROCEDURE — 3008F BODY MASS INDEX DOCD: CPT | Mod: CPTII,S$GLB,, | Performed by: INTERNAL MEDICINE

## 2024-05-03 PROCEDURE — 3046F HEMOGLOBIN A1C LEVEL >9.0%: CPT | Mod: CPTII,S$GLB,, | Performed by: INTERNAL MEDICINE

## 2024-05-03 RX ORDER — OXYCODONE AND ACETAMINOPHEN 5; 325 MG/1; MG/1
1 TABLET ORAL EVERY 8 HOURS PRN
Qty: 30 TABLET | Refills: 0 | Status: SHIPPED | OUTPATIENT
Start: 2024-05-03 | End: 2024-05-04 | Stop reason: ALTCHOICE

## 2024-05-03 RX ORDER — SEMAGLUTIDE 2.68 MG/ML
2 INJECTION, SOLUTION SUBCUTANEOUS
Qty: 3 ML | Refills: 11 | Status: SHIPPED | OUTPATIENT
Start: 2024-05-03 | End: 2024-05-15 | Stop reason: SDUPTHER

## 2024-05-03 NOTE — TELEPHONE ENCOUNTER
" N# [id number]  Received: Yesterday  Burton, Tomeka M P Wiedemann Michael A. Staff  Good morning/Afternoon      Please respond YES or NO via In-basket or contact [facility] @ [phone]      Is this procedure medically urgent or non-medically urgent?          Medical Urgency Definition    If you can Answer yes to one of the following questions, the  Case will be considered "Medically Urgent" and will be done as  Scheduled irrespective of whether [facility] will receive payment.    *If this particular case is not done as scheduled, would the patient  Experience loss of life?    *Loss of Limb?    *Irreversible loss of function?    *Would their condition significantly worsen?            Financial Call Center has not been able to contact patient.    If this is not "Medically Urgent", please have your office contact the patient to reschedule until financial obligations can be met.      As a courtesy for DOS  5/27/2024 and Procedure/Test  Procedures:     94719 (CPT®) - RI LAPAROSCOPY W TOT HYSTERECT UTERUS 250 GRAM OR LESS    Pt has a liability balance due of $6,066.64    Thanks,  Angela Palmer  Financial Clearance Department  [phone]  "

## 2024-05-03 NOTE — PROGRESS NOTES
Patient ID: Hamzah Reis is a 48 y.o. female.    Chief Complaint: Hospital Follow Up    HPI Hamzah is a 48 y.o. female with  type 2 diabetes, hyperlipidemia, chronic back pain, and heavy menstrual cycles who presents for hospital discharge follow up. She was hospitalized overnight for left side pelvic pain and found to have a left hemorrhagic ovarian cyst. There are plans for her to have a hysterectomy to address this on 5/27. Unfortunately though patient is still having severe pain. She contacted her OB-GYN regarding pain control. He sent in tramadol but the tramadol is not helping.     She also wants to increase her ozempic dose for better BG control and weight loss. She follows with endocrinology for treatment of type II diabetes. No further acute complaints or concerns.    Reviewed lab results from 3/31/24.    Review of Systems   Genitourinary:  Positive for pelvic pain.   All other systems reviewed and are negative.      Objective:     Vitals:    05/03/24 1055   BP: 120/78   Pulse: 80        Physical Exam  Vitals reviewed.   Constitutional:       General: She is not in acute distress.     Appearance: Normal appearance. She is well-developed. She is not ill-appearing, toxic-appearing or diaphoretic.   HENT:      Head: Normocephalic and atraumatic.      Right Ear: External ear normal.      Left Ear: External ear normal.      Nose: Nose normal.   Eyes:      General: No scleral icterus.        Right eye: No discharge.         Left eye: No discharge.      Extraocular Movements: Extraocular movements intact.      Conjunctiva/sclera: Conjunctivae normal.   Cardiovascular:      Rate and Rhythm: Normal rate and regular rhythm.      Heart sounds: Normal heart sounds. No murmur heard.     No friction rub. No gallop.   Pulmonary:      Effort: Pulmonary effort is normal. No respiratory distress.      Breath sounds: Normal breath sounds. No stridor. No wheezing, rhonchi or rales.   Abdominal:          Comments: Area  of pain as shown.    Skin:     General: Skin is warm and dry.   Neurological:      General: No focal deficit present.      Mental Status: She is alert and oriented to person, place, and time. Mental status is at baseline.   Psychiatric:         Mood and Affect: Mood normal.         Behavior: Behavior normal.         Thought Content: Thought content normal.         Judgment: Judgment normal.         Assessment:       1. Hospital discharge follow-up    2. Hemorrhagic ovarian cyst Active   3. Type 2 diabetes mellitus with diabetic polyneuropathy, with long-term current use of insulin Poorly controlled       Plan:         Hospital discharge follow-up    Hemorrhagic ovarian cyst  Comments:  Follow up with OB-GYN. If pain worsens, go to ER  Orders:  -     oxyCODONE-acetaminophen (PERCOCET) 5-325 mg per tablet; Take 1 tablet by mouth every 8 (eight) hours as needed for Pain.  Dispense: 30 tablet; Refill: 0    Type 2 diabetes mellitus with diabetic polyneuropathy, with long-term current use of insulin  Comments:  Increase ozempic dose. Continue to follow with endocrinology  Orders:  -     semaglutide (OZEMPIC) 2 mg/dose (8 mg/3 mL) PnIj; Inject 2 mg into the skin every 7 days.  Dispense: 3 mL; Refill: 11        RTC 3 months    Warning signs discussed, patient to call with any further issues or worsening of symptoms.       Parts of the above note were dictated using a voice dictation software. Please excuse any grammatical or typographical errors.

## 2024-05-04 ENCOUNTER — HOSPITAL ENCOUNTER (EMERGENCY)
Facility: HOSPITAL | Age: 49
Discharge: HOME | End: 2024-05-04
Attending: EMERGENCY MEDICINE
Payer: COMMERCIAL

## 2024-05-04 VITALS
HEART RATE: 78 BPM | HEIGHT: 66 IN | DIASTOLIC BLOOD PRESSURE: 55 MMHG | WEIGHT: 250 LBS | TEMPERATURE: 98 F | RESPIRATION RATE: 16 BRPM | OXYGEN SATURATION: 97 % | SYSTOLIC BLOOD PRESSURE: 98 MMHG | BODY MASS INDEX: 40.18 KG/M2

## 2024-05-04 DIAGNOSIS — E83.42 HYPOMAGNESEMIA: ICD-10-CM

## 2024-05-04 DIAGNOSIS — N83.202 LEFT OVARIAN CYST: Primary | ICD-10-CM

## 2024-05-04 LAB
ALBUMIN SERPL BCP-MCNC: 3.2 G/DL (ref 3.5–5.2)
ALP SERPL-CCNC: 75 U/L (ref 55–135)
ALT SERPL W/O P-5'-P-CCNC: 15 U/L (ref 10–44)
ANION GAP SERPL CALC-SCNC: 11 MMOL/L (ref 8–16)
AST SERPL-CCNC: 10 U/L (ref 10–40)
BASOPHILS # BLD AUTO: 0.03 K/UL (ref 0–0.2)
BASOPHILS NFR BLD: 0.4 % (ref 0–1.9)
BILIRUB SERPL-MCNC: 0.6 MG/DL (ref 0.1–1)
BUN SERPL-MCNC: 7 MG/DL (ref 6–20)
CALCIUM SERPL-MCNC: 8.7 MG/DL (ref 8.7–10.5)
CHLORIDE SERPL-SCNC: 103 MMOL/L (ref 95–110)
CO2 SERPL-SCNC: 22 MMOL/L (ref 23–29)
CREAT SERPL-MCNC: 0.8 MG/DL (ref 0.5–1.4)
DIFFERENTIAL METHOD BLD: ABNORMAL
EOSINOPHIL # BLD AUTO: 0.1 K/UL (ref 0–0.5)
EOSINOPHIL NFR BLD: 1.2 % (ref 0–8)
ERYTHROCYTE [DISTWIDTH] IN BLOOD BY AUTOMATED COUNT: 13 % (ref 11.5–14.5)
EST. GFR  (NO RACE VARIABLE): >60 ML/MIN/1.73 M^2
GLUCOSE SERPL-MCNC: 193 MG/DL (ref 70–110)
HCT VFR BLD AUTO: 35.9 % (ref 37–48.5)
HGB BLD-MCNC: 12.1 G/DL (ref 12–16)
IMM GRANULOCYTES # BLD AUTO: 0.03 K/UL (ref 0–0.04)
IMM GRANULOCYTES NFR BLD AUTO: 0.4 % (ref 0–0.5)
LIPASE SERPL-CCNC: 7 U/L (ref 4–60)
LYMPHOCYTES # BLD AUTO: 2.3 K/UL (ref 1–4.8)
LYMPHOCYTES NFR BLD: 32.1 % (ref 18–48)
MAGNESIUM SERPL-MCNC: 1.4 MG/DL (ref 1.6–2.6)
MCH RBC QN AUTO: 27 PG (ref 27–31)
MCHC RBC AUTO-ENTMCNC: 33.7 G/DL (ref 32–36)
MCV RBC AUTO: 80 FL (ref 82–98)
MONOCYTES # BLD AUTO: 0.6 K/UL (ref 0.3–1)
MONOCYTES NFR BLD: 7.5 % (ref 4–15)
NEUTROPHILS # BLD AUTO: 4.3 K/UL (ref 1.8–7.7)
NEUTROPHILS NFR BLD: 58.4 % (ref 38–73)
NRBC BLD-RTO: 0 /100 WBC
PLATELET # BLD AUTO: 345 K/UL (ref 150–450)
PMV BLD AUTO: 9.6 FL (ref 9.2–12.9)
POTASSIUM SERPL-SCNC: 3.9 MMOL/L (ref 3.5–5.1)
PROT SERPL-MCNC: 7 G/DL (ref 6–8.4)
RBC # BLD AUTO: 4.48 M/UL (ref 4–5.4)
SODIUM SERPL-SCNC: 136 MMOL/L (ref 136–145)
WBC # BLD AUTO: 7.3 K/UL (ref 3.9–12.7)

## 2024-05-04 PROCEDURE — 80053 COMPREHEN METABOLIC PANEL: CPT | Performed by: EMERGENCY MEDICINE

## 2024-05-04 PROCEDURE — 25000003 PHARM REV CODE 250: Performed by: EMERGENCY MEDICINE

## 2024-05-04 PROCEDURE — 99285 EMERGENCY DEPT VISIT HI MDM: CPT | Mod: 25

## 2024-05-04 PROCEDURE — 83690 ASSAY OF LIPASE: CPT | Performed by: EMERGENCY MEDICINE

## 2024-05-04 PROCEDURE — 83735 ASSAY OF MAGNESIUM: CPT | Performed by: EMERGENCY MEDICINE

## 2024-05-04 PROCEDURE — 85025 COMPLETE CBC W/AUTO DIFF WBC: CPT | Performed by: EMERGENCY MEDICINE

## 2024-05-04 PROCEDURE — 96374 THER/PROPH/DIAG INJ IV PUSH: CPT

## 2024-05-04 PROCEDURE — 63600175 PHARM REV CODE 636 W HCPCS: Performed by: EMERGENCY MEDICINE

## 2024-05-04 RX ORDER — MAGNESIUM SULFATE HEPTAHYDRATE 40 MG/ML
4 INJECTION, SOLUTION INTRAVENOUS ONCE
Status: DISCONTINUED | OUTPATIENT
Start: 2024-05-04 | End: 2024-05-04

## 2024-05-04 RX ORDER — NAPROXEN 500 MG/1
500 TABLET ORAL 2 TIMES DAILY WITH MEALS
Qty: 28 TABLET | Refills: 0 | Status: SHIPPED | OUTPATIENT
Start: 2024-05-04 | End: 2024-05-18

## 2024-05-04 RX ORDER — LANOLIN ALCOHOL/MO/W.PET/CERES
400 CREAM (GRAM) TOPICAL ONCE
Status: COMPLETED | OUTPATIENT
Start: 2024-05-04 | End: 2024-05-04

## 2024-05-04 RX ORDER — ACETAMINOPHEN 500 MG
1000 TABLET ORAL
Status: COMPLETED | OUTPATIENT
Start: 2024-05-04 | End: 2024-05-04

## 2024-05-04 RX ORDER — OXYCODONE HYDROCHLORIDE 5 MG/1
5 TABLET ORAL EVERY 6 HOURS PRN
Qty: 10 TABLET | Refills: 0 | Status: SHIPPED | OUTPATIENT
Start: 2024-05-04

## 2024-05-04 RX ORDER — KETOROLAC TROMETHAMINE 30 MG/ML
15 INJECTION, SOLUTION INTRAMUSCULAR; INTRAVENOUS
Status: COMPLETED | OUTPATIENT
Start: 2024-05-04 | End: 2024-05-04

## 2024-05-04 RX ORDER — ACETAMINOPHEN 500 MG
1000 TABLET ORAL EVERY 6 HOURS PRN
Qty: 56 TABLET | Refills: 0 | Status: SHIPPED | OUTPATIENT
Start: 2024-05-04 | End: 2024-05-18

## 2024-05-04 RX ADMIN — KETOROLAC TROMETHAMINE 15 MG: 30 INJECTION, SOLUTION INTRAMUSCULAR; INTRAVENOUS at 11:05

## 2024-05-04 RX ADMIN — MAGNESIUM OXIDE TAB 400 MG (241.3 MG ELEMENTAL MG) 400 MG: 400 (241.3 MG) TAB at 12:05

## 2024-05-04 RX ADMIN — ACETAMINOPHEN 1000 MG: 500 TABLET ORAL at 11:05

## 2024-05-04 NOTE — ED NOTES
C/o worsening LLQ abdominal pain x2 days. Pt. Was recently diagnosed with a (L) Ovarian cyst and has a pending Hysterectomy scheduled for this month. She was seen by her PCP yesterday and prescribed Tramadol and Nsaid with minimal relief. She denies dysuria but does report some lower abdominal bladder pressure. Reports some vaginal bleeding that she describes and minimal mild nausea, no vomiting. She appears mildly distressed from pain with activity. She has not taken any medications this morning.

## 2024-05-04 NOTE — DISCHARGE INSTRUCTIONS

## 2024-05-04 NOTE — ED PROVIDER NOTES
Encounter Date: 2024       History     Chief Complaint   Patient presents with    Abdominal Pain     Pt. C/o worsening LLQ abdominal pain for 2 days. Recently diagnosed with ovarian cyst and has pending hysterectomy scheduled. Pt. Was prescribed Tramadol/NSAIDS with minimal relief. Pt. Appears in mild distress.      48 y.o. female with  type 2 diabetes, hyperlipidemia, chronic back pain, and heavy menstrual cycles  presents with complaint of abdominal pain.  Patient says that over the past 2-3 days she was had worsening abdominal pain.  Says it is the same pain that she has been having due to her ovarian cyst but feels worse than prior.  She says that she saw her primary care doctor yesterday and was prescribed tramadol which has provided no relief.  She complains of associated nausea but no vomiting.  Denies fever, chills, chest pain, shortness of breath, diarrhea, dysuria, hematuria.  Says that she was currently having some intermittent vaginal bleeding but is not currently on her menstrual cycle. Did not take any medication for pain prior to coming to the ED today. No hx of abdominal surgeries.     The history is provided by the patient.     Review of patient's allergies indicates:  No Known Allergies  Past Medical History:   Diagnosis Date    Depression     Diabetes mellitus, type 2     Hyperlipidemia     Hypertension     Vision impairment     wears glasses     Past Surgical History:   Procedure Laterality Date    ENDOMETRIAL ABLATION N/A 2020    Procedure: ABLATION, ENDOMETRIUM;  Surgeon: Michael A. Wiedemann, MD;  Location: [facility] OR;  Service: OB/GYN;  Laterality: N/A;    HYSTEROSCOPY WITH DILATION AND CURETTAGE OF UTERUS N/A 2020    Procedure: HYSTEROSCOPY, WITH DILATION AND CURETTAGE OF UTERUS;  Surgeon: Michael A. Wiedemann, MD;  Location: [facility] OR;  Service: OB/GYN;  Laterality: N/A;     Family History   Problem Relation Name Age of Onset    Hypertension Mother  55            Diabetes  Mother      Cancer Mother          cervical    Heart disease Father      Diabetes Sister      Hypertension Sister      Cancer Sister          cervical    Breast cancer Sister      Diabetes Maternal Grandmother       Social History     Tobacco Use    Smoking status: Never     Passive exposure: Never    Smokeless tobacco: Never   Substance Use Topics    Alcohol use: No    Drug use: No     Review of Systems    Physical Exam     Initial Vitals [05/04/24 1023]   BP Pulse Resp Temp SpO2   (!) 149/95 86 20 98.2 °F (36.8 °C) 98 %      MAP       --         Physical Exam    Nursing note and vitals reviewed.  Constitutional: She appears well-developed. She is not diaphoretic. No distress.   HENT:   Head: Normocephalic and atraumatic.   Eyes: EOM are normal. Pupils are equal, round, and reactive to light.   Neck: Neck supple.   Normal range of motion.  Cardiovascular:  Normal rate.           Pulmonary/Chest: No respiratory distress.   Abdominal: Abdomen is soft. She exhibits no distension. There is abdominal tenderness (moderate LLQ).   Musculoskeletal:         General: Normal range of motion.      Cervical back: Normal range of motion and neck supple.     Neurological: She is alert and oriented to person, place, and time.   Skin: Skin is warm and dry.   Psychiatric: She has a normal mood and affect.         ED Course   Procedures  Labs Reviewed   MAGNESIUM - Abnormal; Notable for the following components:       Result Value    Magnesium 1.4 (*)     All other components within normal limits   COMPREHENSIVE METABOLIC PANEL - Abnormal; Notable for the following components:    CO2 22 (*)     Glucose 193 (*)     Albumin 3.2 (*)     All other components within normal limits   CBC W/ AUTO DIFFERENTIAL - Abnormal; Notable for the following components:    Hematocrit 35.9 (*)     MCV 80 (*)     All other components within normal limits   LIPASE   URINALYSIS, REFLEX TO URINE CULTURE   POCT URINE PREGNANCY          Imaging Results               US Pelvis Comp with Transvag NON-OB (xpd) (Final result)  Result time 05/04/24 11:38:01   Procedure changed from US Transvaginal Non OB     Final result by Jimmie Barker MD (05/04/24 11:38:01)                   Impression:      1. Preserved arterial and venous flow to both ovaries without definite evidence of torsion at the present time.  2. Complex, mixed echogenicity lesion associated with the left ovary.  Somewhat changed appearance when compared to the 03/29/2024 ultrasound, possibly related to evolving hemorrhagic corpus luteum or follicular cyst.  Continued imaging follow-up to resolution with follow-up ultrasound in 6-8 weeks would be recommended.  3. Nonspecific avascular echogenic material within the endometrial canal, possibly the basis of blood products.  Clinical correlation recommended.  4. Additional details, as above.      Electronically signed by: Jimmie Barker  Date:    05/04/2024  Time:    11:38               Narrative:    EXAMINATION:  US PELVIS COMP WITH TRANSVAG NON-OB (XPD)    CLINICAL HISTORY:  ovarian cyst; Unspecified ovarian cyst, unspecified side    TECHNIQUE:  Transabdominal sonography of the pelvis was performed, followed by transvaginal sonography to better evaluate the uterus and ovaries.    COMPARISON:  Pelvic ultrasound 03/29/2024.    FINDINGS:  Uterus:    Size: 10.8 x 6.3 x 7.6 cm    Masses: None    Endometrium: Not thickened in this pre menopausal patient, measuring 5.3 mm.  Avascular echogenic material is noted within the endometrial canal.    Additional comment: Cervical nabothian cyst.    Right ovary:    Size: 2.8 x 2.6 x 1.8 cm    Appearance: Normal    Vascular flow: Normal.    Left ovary:    Size: 6.5 x 4.8 x 4.9 cm    Appearance: Complex, mixed echogenicity lesion measures 4.8 x 4 x 4.8 cm.    Vascular Flow: Normal.    Free Fluid:    None.                                       Medications   acetaminophen tablet 1,000 mg (1,000 mg Oral Given 5/4/24 1117)    ketorolac injection 15 mg (15 mg Intravenous Given 5/4/24 1117)   magnesium oxide tablet 400 mg (400 mg Oral Given 5/4/24 1212)     Medical Decision Making    48-year-old female past medical history as above presents with complaint of abdominal pain.  Patient says that the pain feels similar to the pain she has been having from her ovarian cyst but felt worse over the past few days so came to the emergency department. Ddx includes but not limited to appendicitis, cholecystitis, cholangitis, pancreatitis, hepatitis, abdominal aortic aneurysm, diabetic ketoacidosis, bowel obstruction, intra-abdominal perforation, intra-abdominal infection vs. abscess, nephrolithiasis, pyelonephritis, urinary tract infection, electrolyte and/or metabolic abnormality, testicular/ovarian torsion, shingles.   No acute lab abnormalities aside from hypomagnesemia which was repleted.  Ultrasound notable for ongoing complex left ovarian cyst with no other complications.  She is already scheduled to have surgery later this month.   Patient says that they made her void before the ultrasound and she can not currently provide a urine sample.  Denies chance of pregnancy and does not have any urinary symptoms.  Does not want to wait for urine before discharge. Says Tramadal is not relieving her pain. Advised to take scheduled Tylenol and naprosyn, and given a one time ED oxycodone script to use instead. Told to use sparingly as needed and any refills will have to come from PCP.     No acute emergent medical condition has been identified. The patient appears to be low risk for an emergent medical condition is appropriate for discharge with outpatient f/u as detailed in discharge instructions for reevaluation and possible continued outpatient workup and management. I have discussed the workup with the patient, who has verbalized understanding of the plan and need for outpatient follow-up.  This evaluation does not preclude the development of an  emergent condition so in addition, I have advised the patient that they can return to the ED at any time with worsening or change of their symptoms, or with any other medical complaint.         Amount and/or Complexity of Data Reviewed  Independent Historian: spouse     Details: At bedside throughout ED stay   External Data Reviewed: notes.     Details: Seen yesterday by PCP for similar complaints and prescribed Tramadol   Labs: ordered. Decision-making details documented in ED Course.  Radiology: ordered and independent interpretation performed.    Risk  OTC drugs.  Prescription drug management.  Decision regarding hospitalization.               ED Course as of 05/04/24 1312   Sat May 04, 2024   1015 TVUS 3/29/24 Impression:     Complex left ovarian cyst, suggests hemorrhagic cyst.  If pain persists in the region, follow-up ultrasound could be performed in 6-8 weeks to ensure resolution.   [AT]   1059 Hemoglobin: 12.1  Normal  [AT]   1119 Magnesium (!): 1.4  Repletion ordered [AT]   1120 Lipase: 7  Normal  [AT]   1123 Creatinine: 0.8  Normal  [AT]   1149 TVUS Impression:     1. Preserved arterial and venous flow to both ovaries without definite evidence of torsion at the present time.  2. Complex, mixed echogenicity lesion associated with the left ovary.  Somewhat changed appearance when compared to the 03/29/2024 ultrasound, possibly related to evolving hemorrhagic corpus luteum or follicular cyst.  Continued imaging follow-up to resolution with follow-up ultrasound in 6-8 weeks would be recommended.  3. Nonspecific avascular echogenic material within the endometrial canal, possibly the basis of blood products.  Clinical correlation recommended. [AT]   1150 On reassessment pt says her pain is improved from time of presentation after Tylenol/Toradol. Feels ready for discharge.  [AT]      ED Course User Index  [AT] Cassidy Mehta MD                             Clinical Impression:  Final diagnoses:  [N83.202]  Left ovarian cyst (Primary)  [E83.42] Hypomagnesemia          ED Disposition Condition    Discharge Stable          ED Prescriptions       Medication Sig Dispense Start Date End Date Auth. Provider    acetaminophen (TYLENOL) 500 MG tablet Take 2 tablets (1,000 mg total) by mouth every 6 (six) hours as needed for Pain. 56 tablet 5/4/2024 5/18/2024 Cassidy Mehta MD    naproxen (NAPROSYN) 500 MG tablet Take 1 tablet (500 mg total) by mouth 2 (two) times daily with meals. for 14 days 28 tablet 5/4/2024 5/18/2024 Cassidy Mehta MD    oxyCODONE (ROXICODONE) 5 MG immediate release tablet Take 1 tablet (5 mg total) by mouth every 6 (six) hours as needed for Pain. 10 tablet 5/4/2024 -- Cassidy Mehta MD          Follow-up Information       Follow up With Specialties Details Why Contact Info    Tatum Santos MD Internal Medicine Schedule an appointment as soon as possible for a visit in 2 days  [address] [facility]  [place] [address] [phone]      Wiedemann, Michael A., MD Obstetrics, Obstetrics and Gynecology Schedule an appointment as soon as possible for a visit in 2 days  [address] [facility] [address] [address] [phone]      [place] - Emergency Dept Emergency Medicine  As needed, If symptoms worsen [address] [facility] [address]  [facility] [id number] [phone]             Cassidy Mehta MD  05/04/24 5512

## 2024-05-06 ENCOUNTER — TELEPHONE (OUTPATIENT)
Dept: OBSTETRICS AND GYNECOLOGY | Facility: CLINIC | Age: 49
End: 2024-05-06
Payer: COMMERCIAL

## 2024-05-06 ENCOUNTER — PATIENT MESSAGE (OUTPATIENT)
Dept: OBSTETRICS AND GYNECOLOGY | Facility: CLINIC | Age: 49
End: 2024-05-06
Payer: COMMERCIAL

## 2024-05-13 ENCOUNTER — HOSPITAL ENCOUNTER (OUTPATIENT)
Dept: PREADMISSION TESTING | Facility: HOSPITAL | Age: 49
Discharge: HOME | End: 2024-05-13
Attending: NURSE PRACTITIONER
Payer: COMMERCIAL

## 2024-05-13 NOTE — DISCHARGE INSTRUCTIONS

## 2024-05-13 NOTE — PRE-PROCEDURE INSTRUCTIONS
Daughter.       Allergies, medical, surgical, family and psychosocial histories reviewed with patient. Periop plan of care reviewed. Preop instructions given, including medications to take and to hold. Hibiclens soap and instructions on use given. Time allotted for questions to be addressed.  Patient verbalized understanding.    Arrival time 0530.    Ozempic last dose on 5/16/24.

## 2024-05-15 DIAGNOSIS — E11.42 TYPE 2 DIABETES MELLITUS WITH DIABETIC POLYNEUROPATHY, WITH LONG-TERM CURRENT USE OF INSULIN: ICD-10-CM

## 2024-05-15 DIAGNOSIS — Z79.4 TYPE 2 DIABETES MELLITUS WITH DIABETIC POLYNEUROPATHY, WITH LONG-TERM CURRENT USE OF INSULIN: ICD-10-CM

## 2024-05-15 RX ORDER — SEMAGLUTIDE 2.68 MG/ML
2 INJECTION, SOLUTION SUBCUTANEOUS
Qty: 3 ML | Refills: 11 | Status: SHIPPED | OUTPATIENT
Start: 2024-05-15 | End: 2024-05-21 | Stop reason: SDUPTHER

## 2024-05-15 NOTE — TELEPHONE ENCOUNTER
No care due was identified.  [facility] Embedded Care Due Messages. Reference number: [id number].   5/15/2024 3:16:22 PM CDT

## 2024-05-18 ENCOUNTER — LAB VISIT (OUTPATIENT)
Dept: LAB | Facility: HOSPITAL | Age: 49
End: 2024-05-18
Attending: INTERNAL MEDICINE
Payer: COMMERCIAL

## 2024-05-18 DIAGNOSIS — E11.42 TYPE 2 DIABETES MELLITUS WITH DIABETIC POLYNEUROPATHY, WITH LONG-TERM CURRENT USE OF INSULIN: ICD-10-CM

## 2024-05-18 DIAGNOSIS — E66.01 CLASS 2 SEVERE OBESITY DUE TO EXCESS CALORIES WITH SERIOUS COMORBIDITY AND BODY MASS INDEX (BMI) OF 39.0 TO 39.9 IN ADULT: ICD-10-CM

## 2024-05-18 DIAGNOSIS — Z79.4 TYPE 2 DIABETES MELLITUS WITH DIABETIC POLYNEUROPATHY, WITH LONG-TERM CURRENT USE OF INSULIN: ICD-10-CM

## 2024-05-18 LAB
ALBUMIN SERPL BCP-MCNC: 3.4 G/DL (ref 3.5–5.2)
ALP SERPL-CCNC: 69 U/L (ref 55–135)
ALT SERPL W/O P-5'-P-CCNC: 12 U/L (ref 10–44)
ANION GAP SERPL CALC-SCNC: 8 MMOL/L (ref 8–16)
AST SERPL-CCNC: 10 U/L (ref 10–40)
BASOPHILS # BLD AUTO: 0.06 K/UL (ref 0–0.2)
BASOPHILS NFR BLD: 0.8 % (ref 0–1.9)
BILIRUB SERPL-MCNC: 0.5 MG/DL (ref 0.1–1)
BUN SERPL-MCNC: 11 MG/DL (ref 6–20)
CALCIUM SERPL-MCNC: 9.2 MG/DL (ref 8.7–10.5)
CHLORIDE SERPL-SCNC: 103 MMOL/L (ref 95–110)
CHOLEST SERPL-MCNC: 177 MG/DL (ref 120–199)
CHOLEST/HDLC SERPL: 4.1 {RATIO} (ref 2–5)
CO2 SERPL-SCNC: 24 MMOL/L (ref 23–29)
CREAT SERPL-MCNC: 0.8 MG/DL (ref 0.5–1.4)
DIFFERENTIAL METHOD BLD: ABNORMAL
EOSINOPHIL # BLD AUTO: 0.2 K/UL (ref 0–0.5)
EOSINOPHIL NFR BLD: 2.5 % (ref 0–8)
ERYTHROCYTE [DISTWIDTH] IN BLOOD BY AUTOMATED COUNT: 13.1 % (ref 11.5–14.5)
EST. GFR  (NO RACE VARIABLE): >60 ML/MIN/1.73 M^2
ESTIMATED AVG GLUCOSE: 237 MG/DL (ref 68–131)
GLUCOSE SERPL-MCNC: 113 MG/DL (ref 70–110)
HBA1C MFR BLD: 9.9 % (ref 4–5.6)
HCT VFR BLD AUTO: 42 % (ref 37–48.5)
HDLC SERPL-MCNC: 43 MG/DL (ref 40–75)
HDLC SERPL: 24.3 % (ref 20–50)
HGB BLD-MCNC: 13.1 G/DL (ref 12–16)
IMM GRANULOCYTES # BLD AUTO: 0.06 K/UL (ref 0–0.04)
IMM GRANULOCYTES NFR BLD AUTO: 0.8 % (ref 0–0.5)
LDLC SERPL CALC-MCNC: 120.2 MG/DL (ref 63–159)
LYMPHOCYTES # BLD AUTO: 3 K/UL (ref 1–4.8)
LYMPHOCYTES NFR BLD: 38.9 % (ref 18–48)
MCH RBC QN AUTO: 26.7 PG (ref 27–31)
MCHC RBC AUTO-ENTMCNC: 31.2 G/DL (ref 32–36)
MCV RBC AUTO: 86 FL (ref 82–98)
MONOCYTES # BLD AUTO: 0.6 K/UL (ref 0.3–1)
MONOCYTES NFR BLD: 7.6 % (ref 4–15)
NEUTROPHILS # BLD AUTO: 3.8 K/UL (ref 1.8–7.7)
NEUTROPHILS NFR BLD: 49.4 % (ref 38–73)
NONHDLC SERPL-MCNC: 134 MG/DL
NRBC BLD-RTO: 0 /100 WBC
PLATELET # BLD AUTO: 460 K/UL (ref 150–450)
PMV BLD AUTO: 10.2 FL (ref 9.2–12.9)
POTASSIUM SERPL-SCNC: 3.9 MMOL/L (ref 3.5–5.1)
PROT SERPL-MCNC: 7.1 G/DL (ref 6–8.4)
RBC # BLD AUTO: 4.91 M/UL (ref 4–5.4)
SODIUM SERPL-SCNC: 135 MMOL/L (ref 136–145)
TRIGL SERPL-MCNC: 69 MG/DL (ref 30–150)
WBC # BLD AUTO: 7.74 K/UL (ref 3.9–12.7)

## 2024-05-18 PROCEDURE — 80061 LIPID PANEL: CPT | Performed by: INTERNAL MEDICINE

## 2024-05-18 PROCEDURE — 85025 COMPLETE CBC W/AUTO DIFF WBC: CPT | Performed by: INTERNAL MEDICINE

## 2024-05-18 PROCEDURE — 36415 COLL VENOUS BLD VENIPUNCTURE: CPT | Mod: PO | Performed by: INTERNAL MEDICINE

## 2024-05-18 PROCEDURE — 80053 COMPREHEN METABOLIC PANEL: CPT | Performed by: INTERNAL MEDICINE

## 2024-05-18 PROCEDURE — 83036 HEMOGLOBIN GLYCOSYLATED A1C: CPT | Performed by: INTERNAL MEDICINE

## 2024-05-20 ENCOUNTER — TELEPHONE (OUTPATIENT)
Dept: ANESTHESIOLOGY | Facility: HOSPITAL | Age: 49
End: 2024-05-20
Payer: COMMERCIAL

## 2024-05-20 NOTE — TELEPHONE ENCOUNTER
Ms. Reis is scheduled for a laparoscopic hysterectomy with Dr. Wiedemann on 5/27/24. Her most recent A1C remains elevated. Would you still like to proceed with surgery as scheduled?    Lab Results   Component Value Date    HGBA1C 9.9 (H) 05/18/2024

## 2024-05-21 ENCOUNTER — OFFICE VISIT (OUTPATIENT)
Dept: OBSTETRICS AND GYNECOLOGY | Facility: CLINIC | Age: 49
End: 2024-05-21
Payer: COMMERCIAL

## 2024-05-21 ENCOUNTER — TELEPHONE (OUTPATIENT)
Dept: FAMILY MEDICINE | Facility: CLINIC | Age: 49
End: 2024-05-21
Payer: COMMERCIAL

## 2024-05-21 VITALS
BODY MASS INDEX: 41.85 KG/M2 | DIASTOLIC BLOOD PRESSURE: 82 MMHG | WEIGHT: 259.25 LBS | SYSTOLIC BLOOD PRESSURE: 134 MMHG

## 2024-05-21 DIAGNOSIS — E11.42 TYPE 2 DIABETES MELLITUS WITH DIABETIC POLYNEUROPATHY, WITH LONG-TERM CURRENT USE OF INSULIN: ICD-10-CM

## 2024-05-21 DIAGNOSIS — R10.2 PELVIC PAIN SYNDROME: Primary | ICD-10-CM

## 2024-05-21 DIAGNOSIS — Z79.4 TYPE 2 DIABETES MELLITUS WITH DIABETIC POLYNEUROPATHY, WITH LONG-TERM CURRENT USE OF INSULIN: ICD-10-CM

## 2024-05-21 PROCEDURE — 3008F BODY MASS INDEX DOCD: CPT | Mod: CPTII,S$GLB,, | Performed by: OBSTETRICS & GYNECOLOGY

## 2024-05-21 PROCEDURE — 3079F DIAST BP 80-89 MM HG: CPT | Mod: CPTII,S$GLB,, | Performed by: OBSTETRICS & GYNECOLOGY

## 2024-05-21 PROCEDURE — 3046F HEMOGLOBIN A1C LEVEL >9.0%: CPT | Mod: CPTII,S$GLB,, | Performed by: OBSTETRICS & GYNECOLOGY

## 2024-05-21 PROCEDURE — 99214 OFFICE O/P EST MOD 30 MIN: CPT | Mod: S$GLB,,, | Performed by: OBSTETRICS & GYNECOLOGY

## 2024-05-21 PROCEDURE — 3075F SYST BP GE 130 - 139MM HG: CPT | Mod: CPTII,S$GLB,, | Performed by: OBSTETRICS & GYNECOLOGY

## 2024-05-21 PROCEDURE — 99999 PR PBB SHADOW E&M-EST. PATIENT-LVL III: CPT | Mod: PBBFAC,,, | Performed by: OBSTETRICS & GYNECOLOGY

## 2024-05-21 PROCEDURE — 1159F MED LIST DOCD IN RCRD: CPT | Mod: CPTII,S$GLB,, | Performed by: OBSTETRICS & GYNECOLOGY

## 2024-05-21 RX ORDER — SEMAGLUTIDE 2.68 MG/ML
2 INJECTION, SOLUTION SUBCUTANEOUS
Qty: 3 ML | Refills: 11 | Status: SHIPPED | OUTPATIENT
Start: 2024-05-21 | End: 2024-05-24 | Stop reason: SDUPTHER

## 2024-05-21 NOTE — TELEPHONE ENCOUNTER
----- Message from Jesica Child sent at 5/21/2024  8:38 AM CDT -----  Type:  Needs Medical Advice/pharmacy change /PA    Who Called: Pt     Would the patient rather a call back or a response via [facility]? CALL  Best Call Back Number: [phone]  Additional Information: semaglutide (OZEMPIC) 2 mg/dose (8 mg/3 mL) PnIj change pharmacy to [facility] also needing a PA

## 2024-05-22 RX ORDER — IBUPROFEN 800 MG/1
800 TABLET ORAL EVERY 6 HOURS PRN
Qty: 30 TABLET | Refills: 2 | Status: SHIPPED | OUTPATIENT
Start: 2024-05-22 | End: 2025-05-22

## 2024-05-22 RX ORDER — OXYCODONE AND ACETAMINOPHEN 10; 325 MG/1; MG/1
1 TABLET ORAL EVERY 8 HOURS PRN
Qty: 25 TABLET | Refills: 0 | Status: SHIPPED | OUTPATIENT
Start: 2024-05-22

## 2024-05-23 ENCOUNTER — TELEPHONE (OUTPATIENT)
Dept: OBSTETRICS AND GYNECOLOGY | Facility: CLINIC | Age: 49
End: 2024-05-23
Payer: COMMERCIAL

## 2024-05-24 DIAGNOSIS — E11.42 TYPE 2 DIABETES MELLITUS WITH DIABETIC POLYNEUROPATHY, WITH LONG-TERM CURRENT USE OF INSULIN: ICD-10-CM

## 2024-05-24 DIAGNOSIS — Z79.4 TYPE 2 DIABETES MELLITUS WITH DIABETIC POLYNEUROPATHY, WITH LONG-TERM CURRENT USE OF INSULIN: ICD-10-CM

## 2024-05-24 RX ORDER — SEMAGLUTIDE 2.68 MG/ML
2 INJECTION, SOLUTION SUBCUTANEOUS
Qty: 3 ML | Refills: 2 | Status: SHIPPED | OUTPATIENT
Start: 2024-05-24 | End: 2024-05-27 | Stop reason: SDUPTHER

## 2024-05-27 ENCOUNTER — PATIENT MESSAGE (OUTPATIENT)
Dept: INTERNAL MEDICINE | Facility: CLINIC | Age: 49
End: 2024-05-27
Payer: COMMERCIAL

## 2024-05-27 DIAGNOSIS — Z79.4 TYPE 2 DIABETES MELLITUS WITH DIABETIC POLYNEUROPATHY, WITH LONG-TERM CURRENT USE OF INSULIN: ICD-10-CM

## 2024-05-27 DIAGNOSIS — E11.42 TYPE 2 DIABETES MELLITUS WITH DIABETIC POLYNEUROPATHY, WITH LONG-TERM CURRENT USE OF INSULIN: ICD-10-CM

## 2024-05-27 RX ORDER — SEMAGLUTIDE 2.68 MG/ML
2 INJECTION, SOLUTION SUBCUTANEOUS
Qty: 3 ML | Refills: 2 | Status: SHIPPED | OUTPATIENT
Start: 2024-05-27 | End: 2024-06-17

## 2024-06-05 ENCOUNTER — NURSE TRIAGE (OUTPATIENT)
Dept: ADMINISTRATIVE | Facility: CLINIC | Age: 49
End: 2024-06-05
Payer: COMMERCIAL

## 2024-06-05 ENCOUNTER — HOSPITAL ENCOUNTER (EMERGENCY)
Facility: HOSPITAL | Age: 49
Discharge: HOME | End: 2024-06-05
Attending: EMERGENCY MEDICINE
Payer: COMMERCIAL

## 2024-06-05 VITALS
RESPIRATION RATE: 18 BRPM | HEART RATE: 102 BPM | OXYGEN SATURATION: 99 % | TEMPERATURE: 97 F | SYSTOLIC BLOOD PRESSURE: 148 MMHG | DIASTOLIC BLOOD PRESSURE: 91 MMHG | BODY MASS INDEX: 40.03 KG/M2 | WEIGHT: 248 LBS

## 2024-06-05 DIAGNOSIS — N83.202 CYST OF LEFT OVARY: ICD-10-CM

## 2024-06-05 DIAGNOSIS — R11.2 NAUSEA AND VOMITING, UNSPECIFIED VOMITING TYPE: Primary | ICD-10-CM

## 2024-06-05 DIAGNOSIS — T50.905A MEDICATION REACTION, INITIAL ENCOUNTER: ICD-10-CM

## 2024-06-05 DIAGNOSIS — R10.2 PELVIC PAIN: ICD-10-CM

## 2024-06-05 LAB
ALBUMIN SERPL BCP-MCNC: 4.3 G/DL (ref 3.5–5.2)
ALP SERPL-CCNC: 92 U/L (ref 55–135)
ALT SERPL W/O P-5'-P-CCNC: 18 U/L (ref 10–44)
ANION GAP SERPL CALC-SCNC: 20 MMOL/L (ref 8–16)
AST SERPL-CCNC: 20 U/L (ref 10–40)
B-HCG UR QL: NEGATIVE
BACTERIA #/AREA URNS HPF: ABNORMAL /HPF
BACTERIA GENITAL QL WET PREP: NORMAL
BASOPHILS # BLD AUTO: 0.03 K/UL (ref 0–0.2)
BASOPHILS NFR BLD: 0.2 % (ref 0–1.9)
BILIRUB SERPL-MCNC: 0.7 MG/DL (ref 0.1–1)
BILIRUB UR QL STRIP: NEGATIVE
BUN SERPL-MCNC: 11 MG/DL (ref 6–20)
CALCIUM SERPL-MCNC: 9.9 MG/DL (ref 8.7–10.5)
CHLORIDE SERPL-SCNC: 98 MMOL/L (ref 95–110)
CLARITY UR: ABNORMAL
CLUE CELLS VAG QL WET PREP: NORMAL
CO2 SERPL-SCNC: 19 MMOL/L (ref 23–29)
COLOR UR: YELLOW
CREAT SERPL-MCNC: 0.9 MG/DL (ref 0.5–1.4)
CTP QC/QA: YES
DIFFERENTIAL METHOD BLD: ABNORMAL
EOSINOPHIL # BLD AUTO: 0 K/UL (ref 0–0.5)
EOSINOPHIL NFR BLD: 0 % (ref 0–8)
ERYTHROCYTE [DISTWIDTH] IN BLOOD BY AUTOMATED COUNT: 13 % (ref 11.5–14.5)
EST. GFR  (NO RACE VARIABLE): >60 ML/MIN/1.73 M^2
FILAMENT FUNGI VAG WET PREP-#/AREA: NORMAL
GLUCOSE SERPL-MCNC: 257 MG/DL (ref 70–110)
GLUCOSE UR QL STRIP: ABNORMAL
HCT VFR BLD AUTO: 45.8 % (ref 37–48.5)
HGB BLD-MCNC: 15.3 G/DL (ref 12–16)
HGB UR QL STRIP: ABNORMAL
HYALINE CASTS #/AREA URNS LPF: 0 /LPF
IMM GRANULOCYTES # BLD AUTO: 0.08 K/UL (ref 0–0.04)
IMM GRANULOCYTES NFR BLD AUTO: 0.6 % (ref 0–0.5)
KETONES UR QL STRIP: ABNORMAL
LEUKOCYTE ESTERASE UR QL STRIP: NEGATIVE
LIPASE SERPL-CCNC: 46 U/L (ref 4–60)
LYMPHOCYTES # BLD AUTO: 1.7 K/UL (ref 1–4.8)
LYMPHOCYTES NFR BLD: 13.1 % (ref 18–48)
MCH RBC QN AUTO: 26.4 PG (ref 27–31)
MCHC RBC AUTO-ENTMCNC: 33.4 G/DL (ref 32–36)
MCV RBC AUTO: 79 FL (ref 82–98)
MICROSCOPIC COMMENT: ABNORMAL
MONOCYTES # BLD AUTO: 0.3 K/UL (ref 0.3–1)
MONOCYTES NFR BLD: 2.5 % (ref 4–15)
NEUTROPHILS # BLD AUTO: 11.1 K/UL (ref 1.8–7.7)
NEUTROPHILS NFR BLD: 83.6 % (ref 38–73)
NITRITE UR QL STRIP: NEGATIVE
NRBC BLD-RTO: 0 /100 WBC
PH UR STRIP: 6 [PH] (ref 5–8)
PLATELET # BLD AUTO: 492 K/UL (ref 150–450)
PMV BLD AUTO: 9.6 FL (ref 9.2–12.9)
POCT GLUCOSE: 232 MG/DL (ref 70–110)
POTASSIUM SERPL-SCNC: 3.8 MMOL/L (ref 3.5–5.1)
PROT SERPL-MCNC: 9.2 G/DL (ref 6–8.4)
PROT UR QL STRIP: ABNORMAL
RBC # BLD AUTO: 5.79 M/UL (ref 4–5.4)
RBC #/AREA URNS HPF: 2 /HPF (ref 0–4)
SODIUM SERPL-SCNC: 137 MMOL/L (ref 136–145)
SP GR UR STRIP: >1.03 (ref 1–1.03)
SPECIMEN SOURCE: NORMAL
SQUAMOUS #/AREA URNS HPF: 11 /HPF
T VAGINALIS GENITAL QL WET PREP: NORMAL
URN SPEC COLLECT METH UR: ABNORMAL
UROBILINOGEN UR STRIP-ACNC: ABNORMAL EU/DL
WBC # BLD AUTO: 13.22 K/UL (ref 3.9–12.7)
WBC #/AREA URNS HPF: 5 /HPF (ref 0–5)
WBC #/AREA VAG WET PREP: NORMAL
YEAST GENITAL QL WET PREP: NORMAL
YEAST URNS QL MICRO: ABNORMAL

## 2024-06-05 PROCEDURE — 96374 THER/PROPH/DIAG INJ IV PUSH: CPT | Mod: 59

## 2024-06-05 PROCEDURE — 81025 URINE PREGNANCY TEST: CPT

## 2024-06-05 PROCEDURE — 85025 COMPLETE CBC W/AUTO DIFF WBC: CPT

## 2024-06-05 PROCEDURE — 87210 SMEAR WET MOUNT SALINE/INK: CPT

## 2024-06-05 PROCEDURE — 25000003 PHARM REV CODE 250: Performed by: EMERGENCY MEDICINE

## 2024-06-05 PROCEDURE — 82962 GLUCOSE BLOOD TEST: CPT

## 2024-06-05 PROCEDURE — 87591 N.GONORRHOEAE DNA AMP PROB: CPT

## 2024-06-05 PROCEDURE — 96375 TX/PRO/DX INJ NEW DRUG ADDON: CPT

## 2024-06-05 PROCEDURE — 83690 ASSAY OF LIPASE: CPT

## 2024-06-05 PROCEDURE — 80053 COMPREHEN METABOLIC PANEL: CPT

## 2024-06-05 PROCEDURE — 81000 URINALYSIS NONAUTO W/SCOPE: CPT

## 2024-06-05 PROCEDURE — 25500020 PHARM REV CODE 255: Performed by: EMERGENCY MEDICINE

## 2024-06-05 PROCEDURE — 63600175 PHARM REV CODE 636 W HCPCS: Performed by: EMERGENCY MEDICINE

## 2024-06-05 PROCEDURE — 96361 HYDRATE IV INFUSION ADD-ON: CPT

## 2024-06-05 PROCEDURE — 99285 EMERGENCY DEPT VISIT HI MDM: CPT | Mod: 25

## 2024-06-05 RX ORDER — KETOROLAC TROMETHAMINE 30 MG/ML
15 INJECTION, SOLUTION INTRAMUSCULAR; INTRAVENOUS
Status: COMPLETED | OUTPATIENT
Start: 2024-06-05 | End: 2024-06-05

## 2024-06-05 RX ORDER — ONDANSETRON 4 MG/1
4 TABLET, ORALLY DISINTEGRATING ORAL EVERY 6 HOURS PRN
Qty: 20 TABLET | Refills: 0 | Status: SHIPPED | OUTPATIENT
Start: 2024-06-05

## 2024-06-05 RX ORDER — ONDANSETRON HYDROCHLORIDE 2 MG/ML
4 INJECTION, SOLUTION INTRAVENOUS
Status: COMPLETED | OUTPATIENT
Start: 2024-06-05 | End: 2024-06-05

## 2024-06-05 RX ORDER — DICYCLOMINE HYDROCHLORIDE 20 MG/1
20 TABLET ORAL 2 TIMES DAILY PRN
Qty: 20 TABLET | Refills: 0 | Status: SHIPPED | OUTPATIENT
Start: 2024-06-05

## 2024-06-05 RX ADMIN — ONDANSETRON 4 MG: 2 INJECTION INTRAMUSCULAR; INTRAVENOUS at 12:06

## 2024-06-05 RX ADMIN — SODIUM CHLORIDE 1000 ML: 9 INJECTION, SOLUTION INTRAVENOUS at 12:06

## 2024-06-05 RX ADMIN — KETOROLAC TROMETHAMINE 15 MG: 30 INJECTION, SOLUTION INTRAMUSCULAR; INTRAVENOUS at 12:06

## 2024-06-05 RX ADMIN — IOHEXOL 100 ML: 350 INJECTION, SOLUTION INTRAVENOUS at 02:06

## 2024-06-05 NOTE — ED PROVIDER NOTES
Encounter Date: 2024       History     Chief Complaint   Patient presents with    Abdominal Pain     Patient presents to ED with c/o lower abdominal pain for a few months and vomiting that began yesterday. Patient reports a being diagnosed with a cyst on her right ovary on . Patient was scheduled for a hysterectomy last Monday, but was cxld due to elevated A1C. Takes ozempic, last dose yesterday.      Patient is a 49-year-old female who complains of severe lower abdominal pain.  Pain has been present over the past few months which patient says is due to an ovarian cyst.  She was scheduled for surgery recently, but it was canceled due to her hemoglobin A1c being elevated.  She was started on Ozempic for this, with the last dose being yesterday.  Since then the pain has increased and she began vomiting, saying she is unable to hold down any p.o. intake for the past 24 hours.      Review of patient's allergies indicates:  No Known Allergies  Past Medical History:   Diagnosis Date    Depression     Diabetes mellitus, type 2     Hyperlipidemia     Hypertension     Vision impairment     wears glasses     Past Surgical History:   Procedure Laterality Date    ENDOMETRIAL ABLATION N/A 2020    Procedure: ABLATION, ENDOMETRIUM;  Surgeon: Michael A. Wiedemann, MD;  Location: [facility] OR;  Service: OB/GYN;  Laterality: N/A;    HYSTEROSCOPY WITH DILATION AND CURETTAGE OF UTERUS N/A 2020    Procedure: HYSTEROSCOPY, WITH DILATION AND CURETTAGE OF UTERUS;  Surgeon: Michael A. Wiedemann, MD;  Location: [facility] OR;  Service: OB/GYN;  Laterality: N/A;     Family History   Problem Relation Name Age of Onset    Hypertension Mother  55            Diabetes Mother      Cancer Mother          cervical    Heart disease Father      Diabetes Sister      Hypertension Sister      Cancer Sister          cervical    Breast cancer Sister      Diabetes Maternal Grandmother       Social History     Tobacco Use    Smoking status:  Never     Passive exposure: Never    Smokeless tobacco: Never   Substance Use Topics    Alcohol use: No    Drug use: No     Review of Systems   Constitutional:  Negative for fever.   Cardiovascular:  Negative for chest pain.   Gastrointestinal:  Positive for abdominal pain, nausea and vomiting.   Neurological:  Negative for syncope.   All other systems reviewed and are negative.      Physical Exam     Initial Vitals [06/05/24 1104]   BP Pulse Resp Temp SpO2   (!) 183/104 101 18 97.4 °F (36.3 °C) 97 %      MAP       --         Physical Exam    Nursing note and vitals reviewed.  Constitutional: She appears distressed.   Cardiovascular:  Normal rate, regular rhythm and normal heart sounds.           Pulmonary/Chest: No respiratory distress.   Abdominal: Abdomen is soft. Bowel sounds are normal.   There is mild midepigastric tenderness as well as tenderness to the left lower quadrant.   Musculoskeletal:         General: Normal range of motion.     Neurological: She is alert and oriented to person, place, and time.   Skin: Skin is warm and dry.   Psychiatric: Thought content normal.         ED Course   Procedures  Labs Reviewed   URINALYSIS, REFLEX TO URINE CULTURE - Abnormal; Notable for the following components:       Result Value    Appearance, UA Hazy (*)     Specific Gravity, UA >1.030 (*)     Protein, UA 3+ (*)     Glucose, UA 3+ (*)     Ketones, UA 2+ (*)     Occult Blood UA 2+ (*)     Urobilinogen, UA 2.0-3.0 (*)     All other components within normal limits    Narrative:     Specimen Source->Urine   CBC W/ AUTO DIFFERENTIAL - Abnormal; Notable for the following components:    WBC 13.22 (*)     RBC 5.79 (*)     MCV 79 (*)     MCH 26.4 (*)     Platelets 492 (*)     Immature Granulocytes 0.6 (*)     Gran # (ANC) 11.1 (*)     Immature Grans (Abs) 0.08 (*)     Gran % 83.6 (*)     Lymph % 13.1 (*)     Mono % 2.5 (*)     All other components within normal limits   COMPREHENSIVE METABOLIC PANEL - Abnormal; Notable for  the following components:    CO2 19 (*)     Glucose 257 (*)     Total Protein 9.2 (*)     Anion Gap 20 (*)     All other components within normal limits   URINALYSIS MICROSCOPIC - Abnormal; Notable for the following components:    Bacteria Few (*)     All other components within normal limits    Narrative:     Specimen Source->Urine   POCT GLUCOSE - Abnormal; Notable for the following components:    POCT Glucose 232 (*)     All other components within normal limits   VAGINAL SCREEN    Narrative:     Release to patient->Immediate   LIPASE   C. TRACHOMATIS/N. GONORRHOEAE BY AMP DNA   POCT URINE PREGNANCY          Imaging Results              CT Abdomen Pelvis With IV Contrast NO Oral Contrast (Final result)  Result time 06/05/24 14:25:42      Final result by Jimmie Hull MD (06/05/24 14:25:42)                   Impression:      No acute process in the abdomen or pelvis.    Significant interval decrease in previously identified left ovarian cystic lesion.    Probable left hydrosalpinx similar to prior CT from 03/29/2024.    Fluid within the endometrial canal, likely hemorrhagic products.      Electronically signed by: Jimmie Hull MD  Date:    06/05/2024  Time:    14:25               Narrative:    EXAMINATION:  CT ABDOMEN PELVIS WITH IV CONTRAST    CLINICAL HISTORY:  Abdominal abscess/infection suspected;    TECHNIQUE:  Axial images of the abdomen and pelvis were acquired  after the use of 100 cc [id number] IV contrast. No oral contrast.  Coronal and sagittal reconstructions were also obtained    COMPARISON:  CT abdomen pelvis from 03/29/2024 and pelvic ultrasound from 06/05/2024    FINDINGS:  Heart: Normal in size. No pericardial effusion. Mild calcific coronary atherosclerosis.    Lungs: Visualized portions of the lungs are clear.    Liver: Normal in size and contour.  No focal hepatic lesion.    Gallbladder: No calcified gallstones.    Bile Ducts: No evidence of dilated ducts.    Pancreas: No mass or  peripancreatic fat stranding.    Spleen: Unremarkable.    Stomach and duodenum: Unremarkable.    Adrenals: Unremarkable.    Kidneys/ Ureters: Normal in size and location. Normal enhancement. No hydronephrosis or nephrolithiasis. No ureteral dilatation.    Bladder: No evidence of wall thickening.    Reproductive organs: No evidence of adnexal mass.  Previously identified left ovarian cystic lesion measures 1.8 x 2.1 cm, previously measuring 4.8 x 4.0 x 4.8 cm.  Tubular dilatation of the left adnexa, likely representing a hydrosalpinx, unchanged compared to prior CT from 03/29/2024.  Fluid within the endometrial canal.    Bowel/Mesentery: Small bowel is normal in caliber with no evidence of obstruction. No evidence of inflammation or wall thickening.  Normal appendix.  Colon demonstrates no focal wall thickening.    Lymph nodes: No lymphadenapathy.    Vasculature: No aneurysm. No significant calcific atherosclerosis.    Abdominal wall:  Unremarkable.    Bones: No acute fracture. No suspicious osseous lesions.                                       US Pelvis Comp with Transvag NON-OB (xpd) (Final result)  Result time 06/05/24 13:30:30   Procedure changed from US Transvaginal Non OB     Final result by Jimmie Hull MD (06/05/24 13:30:30)                   Impression:      No acute sonographic abnormality.    Small amount fluid within the endometrial canal, likely representing hemorrhagic products.    Two uterine fibroids.    Decreasing size of minimally complicated left ovarian cyst favored to represent a hemorrhagic cyst.  No evidence of torsion.      Electronically signed by: Jimmie Hull MD  Date:    06/05/2024  Time:    13:30               Narrative:    EXAMINATION:  US PELVIS COMP WITH TRANSVAG NON-OB (XPD)    CLINICAL HISTORY:  pelvic pain; Pelvic and perineal pain    TECHNIQUE:  Transabdominal sonography of the pelvis was performed, followed by transvaginal sonography to better evaluate the uterus and  ovaries.    COMPARISON:  Ultrasound from 05/04/2024    FINDINGS:  Uterus:    Size: 9.1 x 5.7 x 7.6 cm    Masses: Uterine fibroid in the fundus measuring 1.3 x 0.9 x 1.1 cm.  Uterine fibroid in the body measuring 1.3 x 1.1 x 1.8 cm.    Endometrium: Normal in this pre menopausal patient, measuring 5 mm.  Fluid is present within the endometrial canal.    Right ovary:    Size: 2.9 x 1.5 x 2.0 cm    Appearance: Normal    Vascular flow: Normal.    Left ovary:    Size: 4.1 x 2.6 x 3.7 cm    Appearance: Minimally complicated cyst measuring 2.7 x 2.8 x 3.1 cm, previously measuring 4.8 x 4.0 x 4.8 cm.    Vascular Flow: Normal.    Free Fluid:    Small volume of free fluid, likely physiologic.                                       Medications   ondansetron injection 4 mg (4 mg Intravenous Given 6/5/24 1228)   sodium chloride 0.9% bolus 1,000 mL 1,000 mL (0 mLs Intravenous Stopped 6/5/24 1326)   ketorolac injection 15 mg (15 mg Intravenous Given 6/5/24 1228)   iohexoL (OMNIPAQUE 350) injection 100 mL (100 mLs Intravenous Given 6/5/24 1410)     Medical Decision Making  DDx :  Including but not limited to :  Ovarian cyst, urinary tract infection diverticulitis, groin muscle strain.    Emergency evaluation of a 49-year-old female with abdominal pain and vomiting.  Pelvic pain most likely due to left ovarian cyst, which the patient was already aware of.  Upper abdominal pain and vomiting may be due to taking Ozempic.  Patient felt better after by IV fluids, Zofran and Toradol.  I will discharge her with prescriptions for Zofran and Bentyl and advise close follow-up with her primary physician.  She may return to the ED for any possible worsening.    Amount and/or Complexity of Data Reviewed  Labs: ordered.     Details: CBC with a white blood count of 13.22.  Urinalysis with no signs of infection.  Radiology: ordered.     Details: CT of abdomen with no acute process, decrease in size of left ovarian cyst.    Risk  Prescription drug  management.               ED Course as of 06/05/24 1605   Wed Jun 05, 2024   1105 Pt with worsening pain associated with nausea. Moderate tenderness with palpation over the left suprapubic region at triage. Diagnosed with left ovarian cyst on May. Will obtain further routine labs and imaging studies and reassess.  [NW]      ED Course User Index  [NW] Brenda Pereira PA-C                           Clinical Impression:  Final diagnoses:  [R10.2] Pelvic pain  [R11.2] Nausea and vomiting, unspecified vomiting type (Primary)  [T50.905A] Medication reaction, initial encounter  [N83.202] Cyst of left ovary          ED Disposition Condition    Discharge Stable          ED Prescriptions       Medication Sig Dispense Start Date End Date Auth. Provider    ondansetron (ZOFRAN-ODT) 4 MG TbDL Take 1 tablet (4 mg total) by mouth every 6 (six) hours as needed (Nausea or vomiting). 20 tablet 6/5/2024 -- Bon Jeronimo MD    dicyclomine (BENTYL) 20 mg tablet Take 1 tablet (20 mg total) by mouth 2 (two) times daily as needed (Abdominal pain). 20 tablet 6/5/2024 -- Bon Jeronimo MD          Follow-up Information       Follow up With Specialties Details Why Contact Info    Tatum Santos MD Internal Medicine  As needed [address] [facility] [address]  [phone]      [place] - Emergency Dept Emergency Medicine  If symptoms worsen [address] [facility] [id number] [phone]             Bon Jeronimo MD  06/05/24 1600

## 2024-06-05 NOTE — ED TRIAGE NOTES
Generalized abdominal pain for past few months with planned hysterectomy. Presents today with c/o vomiting since yesterday with lower abdominal pain. Last emesis within past hour. States began taking Ozempic again yesterday after not being on it for a while. Presents awake, alert. Denies fever. LBM 2 days ago - WNL.

## 2024-06-05 NOTE — TELEPHONE ENCOUNTER
Yesterday she took her ozempic medication and she hasn't stopped throwing up since. Her dose was increased to 2 mg. Pt took medication at 3 pm yesterday evening. He started vomiting an hour later. Pt is weak. Pt vomited blood last night.  She has not slept due to vomiting all night. Pt stated she has vomited too many times to count. Care advice recommend pt go to Er. Pt verbalized understanding.   Reason for Disposition   Vomiting red blood or black (coffee ground) material    Additional Information   Negative: Shock suspected (e.g., cold/pale/clammy skin, too weak to stand, low BP, rapid pulse)   Negative: Difficult to awaken or acting confused (e.g., disoriented, slurred speech)   Negative: Sounds like a life-threatening emergency to the triager    Protocols used: Vomiting-A-OH

## 2024-06-08 LAB
C TRACH DNA SPEC QL NAA+PROBE: NOT DETECTED
N GONORRHOEA DNA SPEC QL NAA+PROBE: NOT DETECTED

## 2024-06-14 ENCOUNTER — TELEPHONE (OUTPATIENT)
Dept: ENDOCRINOLOGY | Facility: CLINIC | Age: 49
End: 2024-06-14
Payer: COMMERCIAL

## 2024-06-14 NOTE — TELEPHONE ENCOUNTER
----- Message from Juan Carlos Santana sent at 6/14/2024  9:51 AM CDT -----  Name of Who is Calling:BROOKE GONZÁLES [[id number]]        What is the request in detail:Pt would like a callback from the office in regards to larisa missed appt due to being in hosp for medication sickness. Pt would also like to discuss possibly switching from Ozempic to Mounjaro medication.Please advise thank you       Can the clinic reply by [facility]:NO        What Number to Call Back if not in [facility]:.Telephone Information:  Mobile          [phone]

## 2024-06-17 ENCOUNTER — OFFICE VISIT (OUTPATIENT)
Dept: ENDOCRINOLOGY | Facility: CLINIC | Age: 49
End: 2024-06-17
Payer: COMMERCIAL

## 2024-06-17 DIAGNOSIS — E11.42 TYPE 2 DIABETES MELLITUS WITH DIABETIC POLYNEUROPATHY, WITH LONG-TERM CURRENT USE OF INSULIN: Primary | ICD-10-CM

## 2024-06-17 DIAGNOSIS — Z79.4 TYPE 2 DIABETES MELLITUS WITH DIABETIC POLYNEUROPATHY, WITH LONG-TERM CURRENT USE OF INSULIN: Primary | ICD-10-CM

## 2024-06-17 DIAGNOSIS — E78.5 HYPERLIPIDEMIA ASSOCIATED WITH TYPE 2 DIABETES MELLITUS: ICD-10-CM

## 2024-06-17 DIAGNOSIS — M54.50 ACUTE BILATERAL LOW BACK PAIN WITHOUT SCIATICA: ICD-10-CM

## 2024-06-17 DIAGNOSIS — E11.69 HYPERLIPIDEMIA ASSOCIATED WITH TYPE 2 DIABETES MELLITUS: ICD-10-CM

## 2024-06-17 PROBLEM — N76.0 BACTERIAL VAGINOSIS: Status: RESOLVED | Noted: 2020-03-18 | Resolved: 2024-06-17

## 2024-06-17 PROBLEM — B96.89 BACTERIAL VAGINOSIS: Status: RESOLVED | Noted: 2020-03-18 | Resolved: 2024-06-17

## 2024-06-17 PROBLEM — B37.9 YEAST INFECTION: Status: RESOLVED | Noted: 2020-03-18 | Resolved: 2024-06-17

## 2024-06-17 PROBLEM — Z91.199 NONCOMPLIANCE: Status: RESOLVED | Noted: 2022-03-07 | Resolved: 2024-06-17

## 2024-06-17 PROCEDURE — 1159F MED LIST DOCD IN RCRD: CPT | Mod: CPTII,95,, | Performed by: INTERNAL MEDICINE

## 2024-06-17 PROCEDURE — 99214 OFFICE O/P EST MOD 30 MIN: CPT | Mod: 95,,, | Performed by: INTERNAL MEDICINE

## 2024-06-17 PROCEDURE — G2211 COMPLEX E/M VISIT ADD ON: HCPCS | Mod: 95,,, | Performed by: INTERNAL MEDICINE

## 2024-06-17 PROCEDURE — 1160F RVW MEDS BY RX/DR IN RCRD: CPT | Mod: CPTII,95,, | Performed by: INTERNAL MEDICINE

## 2024-06-17 PROCEDURE — 3046F HEMOGLOBIN A1C LEVEL >9.0%: CPT | Mod: CPTII,95,, | Performed by: INTERNAL MEDICINE

## 2024-06-17 RX ORDER — LANCETS
EACH MISCELLANEOUS
Qty: 200 EACH | Refills: 3 | Status: SHIPPED | OUTPATIENT
Start: 2024-06-17

## 2024-06-17 RX ORDER — METFORMIN HYDROCHLORIDE 500 MG/1
500 TABLET, EXTENDED RELEASE ORAL 2 TIMES DAILY WITH MEALS
Qty: 180 TABLET | Refills: 3 | Status: SHIPPED | OUTPATIENT
Start: 2024-06-17 | End: 2025-06-17

## 2024-06-17 RX ORDER — PEN NEEDLE, DIABETIC 32 GX 1/6"
NEEDLE, DISPOSABLE MISCELLANEOUS
Qty: 100 EACH | Refills: 3 | Status: SHIPPED | OUTPATIENT
Start: 2024-06-17

## 2024-06-17 RX ORDER — GABAPENTIN 600 MG/1
600 TABLET ORAL 2 TIMES DAILY
Qty: 270 TABLET | Refills: 3 | Status: SHIPPED | OUTPATIENT
Start: 2024-06-17

## 2024-06-17 RX ORDER — INSULIN GLARGINE 100 [IU]/ML
30 INJECTION, SOLUTION SUBCUTANEOUS NIGHTLY
Qty: 27 ML | Refills: 3 | Status: SHIPPED | OUTPATIENT
Start: 2024-06-17 | End: 2025-06-17

## 2024-06-17 RX ORDER — ATORVASTATIN CALCIUM 80 MG/1
80 TABLET, FILM COATED ORAL DAILY
Qty: 90 TABLET | Refills: 3 | Status: SHIPPED | OUTPATIENT
Start: 2024-06-17 | End: 2025-06-17

## 2024-06-17 RX ORDER — INSULIN PUMP SYRINGE, 3 ML
EACH MISCELLANEOUS
Qty: 1 EACH | Refills: 0 | Status: SHIPPED | OUTPATIENT
Start: 2024-06-17

## 2024-06-17 RX ORDER — TIRZEPATIDE 2.5 MG/.5ML
2.5 INJECTION, SOLUTION SUBCUTANEOUS
Qty: 4 PEN | Refills: 3 | Status: SHIPPED | OUTPATIENT
Start: 2024-06-17
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
## 2024-06-17 NOTE — PATIENT INSTRUCTIONS
Mounjaro start- wait to start until the nausea and appetite improve  We will start Mounjaro at 2.5 mg weekly. After 4 weeks if you tolerate it well, we typically increase to the next dose for improved blood sugar control as well as added weight loss benefit or we can continue current dose for longer to help adjust to the medication. Please send me a message when you take the 4th dose to let me know if we should increase or keep dose the same for the following month. We will increase dose as we are able until we reach the maximum dose of 15 mg or the highest dose that you tolerate. We can increase the dose as often as every month or increase more slowly if needed    The pen is good out of the refrigerator for up to 21 days. Please remove the pen from the refrigerator for 10 minutes before taking injection    Potential Side Effects of Mounjaro:   One of the ways this medication works is by decreasing how fast your stomach empties, which makes you feel full faster after you eat and should help you lose weight. The main side-effects are related to GI upset, such as nausea, bloating and abdominal cramps. You can usually avoid this by eating slowly (take half of your normal portion and eat it over 30 minutes). If you do experience nausea, it does tend to get better after the first few weeks. The most severe reaction is acute pancreatitis which can cause severe abdominal pain radiating to your back. If you experience this, stop the medication and go to the ER. Fortunately this is very rare.      Increase levemir to 30 units once nightly  Once you run out we will change to Lantus 30 units nightly    Resume atorvastatin 80 mg daily      Call your insurance and ask about policy for continuous glucose monitors  We can do Cande or Dexcom. It may need to go to a DME company  Which one they cover and where to send    If insurance does not work, Cande is about $70-80 out of pocket

## 2024-06-17 NOTE — ASSESSMENT & PLAN NOTE
Discussed importance of taking statin  Resume atorvastatin 80 mg daily with labs before next visit

## 2024-06-17 NOTE — ASSESSMENT & PLAN NOTE
A1c uncontrolled but with recent ED visit for severe GI upset from ozempic  Will increase insulin as below and do trial of Mounjaro which may have better GI tolerance  Will also work to get sensor as I think this will help improve her control as well    Patient Instructions   Mounjaro start- wait to start until the nausea and appetite improve  We will start Mounjaro at 2.5 mg weekly. After 4 weeks if you tolerate it well, we typically increase to the next dose for improved blood sugar control as well as added weight loss benefit or we can continue current dose for longer to help adjust to the medication. Please send me a message when you take the 4th dose to let me know if we should increase or keep dose the same for the following month. We will increase dose as we are able until we reach the maximum dose of 15 mg or the highest dose that you tolerate. We can increase the dose as often as every month or increase more slowly if needed    The pen is good out of the refrigerator for up to 21 days. Please remove the pen from the refrigerator for 10 minutes before taking injection    Potential Side Effects of Mounjaro:   One of the ways this medication works is by decreasing how fast your stomach empties, which makes you feel full faster after you eat and should help you lose weight. The main side-effects are related to GI upset, such as nausea, bloating and abdominal cramps. You can usually avoid this by eating slowly (take half of your normal portion and eat it over 30 minutes). If you do experience nausea, it does tend to get better after the first few weeks. The most severe reaction is acute pancreatitis which can cause severe abdominal pain radiating to your back. If you experience this, stop the medication and go to the ER. Fortunately this is very rare.      Increase levemir to 30 units once nightly  Once you run out we will change to Lantus 30 units nightly    Resume atorvastatin 80 mg daily      Call your  insurance and ask about policy for continuous glucose monitors  We can do Cande or Dexcom. It may need to go to a DME company  Which one they cover and where to send    If insurance does not work, Cande is about $70-80 out of pocket

## 2024-07-26 ENCOUNTER — TELEPHONE (OUTPATIENT)
Dept: OBSTETRICS AND GYNECOLOGY | Facility: CLINIC | Age: 49
End: 2024-07-26

## 2024-07-26 ENCOUNTER — OFFICE VISIT (OUTPATIENT)
Dept: OBSTETRICS AND GYNECOLOGY | Facility: CLINIC | Age: 49
End: 2024-07-26
Payer: COMMERCIAL

## 2024-07-26 VITALS
BODY MASS INDEX: 40.32 KG/M2 | WEIGHT: 249.81 LBS | DIASTOLIC BLOOD PRESSURE: 80 MMHG | SYSTOLIC BLOOD PRESSURE: 120 MMHG

## 2024-07-26 DIAGNOSIS — N93.9 ABNORMAL UTERINE BLEEDING (AUB): ICD-10-CM

## 2024-07-26 DIAGNOSIS — N89.8 VAGINAL DISCHARGE: ICD-10-CM

## 2024-07-26 DIAGNOSIS — R29.3 POOR POSTURE: Primary | ICD-10-CM

## 2024-07-26 DIAGNOSIS — R10.32 LLQ PAIN: ICD-10-CM

## 2024-07-26 PROCEDURE — 87591 N.GONORRHOEAE DNA AMP PROB: CPT | Performed by: OBSTETRICS & GYNECOLOGY

## 2024-07-26 PROCEDURE — 87491 CHLMYD TRACH DNA AMP PROBE: CPT | Performed by: OBSTETRICS & GYNECOLOGY

## 2024-07-26 PROCEDURE — 99999 PR PBB SHADOW E&M-EST. PATIENT-LVL III: CPT | Mod: PBBFAC,,, | Performed by: OBSTETRICS & GYNECOLOGY

## 2024-07-26 RX ORDER — MEDROXYPROGESTERONE ACETATE 10 MG/1
20 TABLET ORAL DAILY
Qty: 60 TABLET | Refills: 6 | Status: SHIPPED | OUTPATIENT
Start: 2024-07-26
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
## 2024-07-26 NOTE — TELEPHONE ENCOUNTER
----- Message from Janusz Childers sent at 7/26/2024  9:56 AM CDT -----  .Type:  Needs Medical Advice    Who Called: pt  Symptoms (please be specific):  abdominal pain   How long has patient had these symptoms:  3 days    Would the patient rather a call back or a response via [facility]?  Call back  Best Call Back Number:  [phone]  Additional Information: Pt. Is requesting a call back from the nurse regarding pelvic pain

## 2024-07-29 ENCOUNTER — PATIENT MESSAGE (OUTPATIENT)
Dept: OBSTETRICS AND GYNECOLOGY | Facility: CLINIC | Age: 49
End: 2024-07-29
Payer: COMMERCIAL

## 2024-08-17 ENCOUNTER — NURSE TRIAGE (OUTPATIENT)
Dept: ADMINISTRATIVE | Facility: CLINIC | Age: 49
End: 2024-08-17
Payer: COMMERCIAL

## 2024-08-17 NOTE — TELEPHONE ENCOUNTER
"OOC NT escalation call -  Pt reports pain shooting down left arm and neck. Denies any visible sweat down face. Pain 10-10 and present without movement. Arm pain protocol followed and pt advised to go to ED now. Encounter routed to PCP   Reason for Disposition   [1] Age > 40 AND [2] associated chest or jaw pain AND [3] pain lasts > 5 minutes    Additional Information   Negative: Shock suspected (e.g., cold/pale/clammy skin, too weak to stand, low BP, rapid pulse)   Negative: [1] Similar pain previously AND [2] it was from "heart attack"   Negative: [1] Similar pain previously AND [2] it was from "angina" AND [3] not relieved by nitroglycerin   Negative: Sounds like a life-threatening emergency to the triager   Negative: Difficulty breathing or unusual sweating (e.g., sweating without exertion)    Protocols used: Arm Pain-A-AH    "

## 2024-08-23 ENCOUNTER — PATIENT MESSAGE (OUTPATIENT)
Dept: ADMINISTRATIVE | Facility: HOSPITAL | Age: 49
End: 2024-08-23
Payer: COMMERCIAL

## 2024-09-27 ENCOUNTER — OFFICE VISIT (OUTPATIENT)
Dept: FAMILY MEDICINE | Facility: CLINIC | Age: 49
End: 2024-09-27
Payer: COMMERCIAL

## 2024-09-27 VITALS
HEIGHT: 66 IN | OXYGEN SATURATION: 98 % | HEART RATE: 89 BPM | BODY MASS INDEX: 38.3 KG/M2 | DIASTOLIC BLOOD PRESSURE: 90 MMHG | SYSTOLIC BLOOD PRESSURE: 130 MMHG | WEIGHT: 238.31 LBS

## 2024-09-27 DIAGNOSIS — Z12.11 SCREENING FOR MALIGNANT NEOPLASM OF COLON: ICD-10-CM

## 2024-09-27 DIAGNOSIS — G89.29 CHRONIC LEFT-SIDED LOW BACK PAIN WITH LEFT-SIDED SCIATICA: Primary | ICD-10-CM

## 2024-09-27 DIAGNOSIS — E11.42 TYPE 2 DIABETES MELLITUS WITH DIABETIC POLYNEUROPATHY, WITH LONG-TERM CURRENT USE OF INSULIN: ICD-10-CM

## 2024-09-27 DIAGNOSIS — F41.9 ANXIETY AND DEPRESSION: ICD-10-CM

## 2024-09-27 DIAGNOSIS — Z79.4 TYPE 2 DIABETES MELLITUS WITH DIABETIC POLYNEUROPATHY, WITH LONG-TERM CURRENT USE OF INSULIN: ICD-10-CM

## 2024-09-27 DIAGNOSIS — E66.01 CLASS 2 SEVERE OBESITY WITH SERIOUS COMORBIDITY AND BODY MASS INDEX (BMI) OF 38.0 TO 38.9 IN ADULT, UNSPECIFIED OBESITY TYPE: ICD-10-CM

## 2024-09-27 DIAGNOSIS — Z12.31 ENCOUNTER FOR SCREENING MAMMOGRAM FOR MALIGNANT NEOPLASM OF BREAST: ICD-10-CM

## 2024-09-27 DIAGNOSIS — F32.A ANXIETY AND DEPRESSION: ICD-10-CM

## 2024-09-27 DIAGNOSIS — M54.42 CHRONIC LEFT-SIDED LOW BACK PAIN WITH LEFT-SIDED SCIATICA: Primary | ICD-10-CM

## 2024-09-27 DIAGNOSIS — M99.05 SOMATIC DYSFUNCTION OF PELVIS REGION: ICD-10-CM

## 2024-09-27 PROCEDURE — 99999 PR PBB SHADOW E&M-EST. PATIENT-LVL V: CPT | Mod: PBBFAC,,, | Performed by: STUDENT IN AN ORGANIZED HEALTH CARE EDUCATION/TRAINING PROGRAM

## 2024-09-27 RX ORDER — TIRZEPATIDE 5 MG/.5ML
5 INJECTION, SOLUTION SUBCUTANEOUS
Qty: 6 ML | Refills: 3 | Status: SHIPPED | OUTPATIENT
Start: 2024-09-27 | End: 2025-09-27

## 2024-09-27 NOTE — PROGRESS NOTES
History & Physical  [facility]      SUBJECTIVE:     History of Present Illness:  Patient is a 49 y.o. female presents to clinic to establish care. Current diagnoses include HTN, type 2 diabetes, hyperlipidemia, chronic back pain, and heavy menstrual cycles. Established with endocrinology, OB/Gyn.    Mood: Will get down sometimes. Feels manageable overall. Has good social support at home.     Back: Has chronic back pain. OB/Gyn had told her her hip was shifted which could cause pain on the L. Pain on the L low back. Has been there for some time. Does go down into the leg. Ibuprofen helps some. Has taken percocet very once in a while which helped. Thought it was related to ovarian cyst in the past but this has resolved and still has pain. Does feel weaker on that side and feels leg will give out on her. No inciting trauma or injury.     T2DM: Seeing endocrinology. Currently on 2.5mg mounjaro, would liek to increase.   Foot exam- overdue    Weight: Has lost 10lbs since starting mounjaro. Looking into bariatric surgery through work program, has paperwork she will drop off for me to sign.     Colon cancer screening- No FmHx colon cancer    Review of patient's allergies indicates:  No Known Allergies    Past Medical History:   Diagnosis Date    Depression     Diabetes mellitus, type 2     Hyperlipidemia     Hypertension     Vision impairment     wears glasses     Past Surgical History:   Procedure Laterality Date    ENDOMETRIAL ABLATION N/A 8/11/2020    Procedure: ABLATION, ENDOMETRIUM;  Surgeon: Michael A. Wiedemann, MD;  Location: [facility] OR;  Service: OB/GYN;  Laterality: N/A;    HYSTEROSCOPY WITH DILATION AND CURETTAGE OF UTERUS N/A 8/11/2020    Procedure: HYSTEROSCOPY, WITH DILATION AND CURETTAGE OF UTERUS;  Surgeon: Michael A. Wiedemann, MD;  Location: [facility] OR;  Service: OB/GYN;  Laterality: N/A;     Family History   Problem Relation Name Age of Onset    Hypertension Mother  55         "    Diabetes Mother      Cancer Mother          cervical    Heart disease Father      Diabetes Sister      Hypertension Sister      Cancer Sister          cervical    Breast cancer Sister      Diabetes Maternal Grandmother       Social History     Tobacco Use    Smoking status: Never     Passive exposure: Never    Smokeless tobacco: Never   Substance Use Topics    Alcohol use: No    Drug use: No        OBJECTIVE:     Vital Signs (Most Recent)  Vitals:    24 1120   BP: (!) 130/90   BP Location: Left arm   Patient Position: Sitting   BP Method: Medium (Manual)   Pulse: 89   SpO2: 98%   Weight: 108.1 kg (238 lb 5.1 oz)   Height: 5' 6" (1.676 m)     BMI: 38.47    Physical Exam:  Gen: No apparent distress, well nourished and developed, appears stated age  CV: RRR, S1 and S2 present, no LE edema  Resp: CTAB, normal respiratory effort  MSK: TTP L SI joint and greater trochanter. Iliac crest heights symmetric. ASIS inferior on the L.   Neuro: Sensation intact and muscle strength 5/5 in LE bilaterally. Negative slump test.    Psych: Logical thought process, answers questions appropriately  Diabetic foot exam: Sensation intact throughout to light touch and monofilament. Dorsalis pedis pulses equal and present bilaterally. Skin is well moisturized without signs of ulceration or injury. Toe nails well kept without signs of infection. Some thickening of skin underneath great toenail.     OMM: L anterior pelvis muscle energy performed.    OMM and treatment techniques discussed with the patient. Risks and benefits also discussed, overall treatments have low risk of poor outcomes aside from possibility of not improving symptoms and soreness/discomfort during treatment. All questions answered and pt provided verbal consent for treatment. Pt tolerated OMT well with improvement in symptoms after treatment.        ASSESSMENT/PLAN:   49 y.o.female presents to clinic to establish care.     1. Chronic left-sided low back pain " with left-sided sciatica  Pt tolerated procedure well with marked improvement in symptoms. Gave exercises and stretches to be done at home to prevent recurrence. Exam reassuring, anticipate improvement with conservative treatment. Pt verbalized understanding.   - Ambulatory referral/consult to Physical/Occupational Therapy; Future    2. Somatic dysfunction of pelvis region  As above. Tolerated OMM well.     3. Type 2 diabetes mellitus with diabetic polyneuropathy, with long-term current use of insulin  Medication, side effects and proper use discussed. Should continue to follow with endocrinology for management. Pt verbalized understanding and was in agreement with the plan.    - tirzepatide (MOUNJARO) 5 mg/0.5 mL PnIj; Inject 5 mg into the skin every 7 days.  Dispense: 6 mL; Refill: 3    4. Class 2 severe obesity with serious comorbidity and body mass index (BMI) of 38.0 to 38.9 in adult, unspecified obesity type  Doing well on mounjaro. Will address paperwork once she drops it off.     5. Anxiety and depression  Doing well off of medication at this time. Discussed if things change to let me know.     6. Screening for malignant neoplasm of colon  Agreeable to screen  - Ambulatory referral/consult to Endo Procedure ; Future    7. Encounter for screening mammogram for malignant neoplasm of breast  Due for screen in November  - Mammo Digital Screening Bilat w/ Aaron; Future     Follow-up: 6 months for routine healthcare or sooner if back pain persists    I spent a total of 45 minutes on the day of the visit.This includes face to face time and non-face to face time preparing to see the patient (eg, review of tests), obtaining and/or reviewing separately obtained history, documenting clinical information in the electronic or other health record, independently interpreting results and communicating results to the patient/family/caregiver, or care coordinator.      Dominic Hull, DO  Family Medicine

## 2024-10-04 DIAGNOSIS — E11.69 HYPERLIPIDEMIA ASSOCIATED WITH TYPE 2 DIABETES MELLITUS: ICD-10-CM

## 2024-10-04 DIAGNOSIS — E78.5 HYPERLIPIDEMIA ASSOCIATED WITH TYPE 2 DIABETES MELLITUS: ICD-10-CM

## 2024-10-04 DIAGNOSIS — M54.50 ACUTE BILATERAL LOW BACK PAIN WITHOUT SCIATICA: ICD-10-CM

## 2024-10-04 RX ORDER — GABAPENTIN 600 MG/1
600 TABLET ORAL 2 TIMES DAILY
Qty: 270 TABLET | Refills: 3 | Status: SHIPPED | OUTPATIENT
Start: 2024-10-04

## 2024-10-04 RX ORDER — ATORVASTATIN CALCIUM 80 MG/1
80 TABLET, FILM COATED ORAL DAILY
Qty: 90 TABLET | Refills: 3 | Status: SHIPPED | OUTPATIENT
Start: 2024-10-04 | End: 2025-10-04

## 2024-10-16 ENCOUNTER — OFFICE VISIT (OUTPATIENT)
Dept: ENDOCRINOLOGY | Facility: CLINIC | Age: 49
End: 2024-10-16
Payer: COMMERCIAL

## 2024-10-16 DIAGNOSIS — Z79.4 TYPE 2 DIABETES MELLITUS WITH DIABETIC POLYNEUROPATHY, WITH LONG-TERM CURRENT USE OF INSULIN: Primary | ICD-10-CM

## 2024-10-16 DIAGNOSIS — E11.69 HYPERLIPIDEMIA ASSOCIATED WITH TYPE 2 DIABETES MELLITUS: ICD-10-CM

## 2024-10-16 DIAGNOSIS — R11.0 NAUSEA: ICD-10-CM

## 2024-10-16 DIAGNOSIS — E11.42 TYPE 2 DIABETES MELLITUS WITH DIABETIC POLYNEUROPATHY, WITH LONG-TERM CURRENT USE OF INSULIN: Primary | ICD-10-CM

## 2024-10-16 DIAGNOSIS — E78.5 HYPERLIPIDEMIA ASSOCIATED WITH TYPE 2 DIABETES MELLITUS: ICD-10-CM

## 2024-10-16 PROCEDURE — 99214 OFFICE O/P EST MOD 30 MIN: CPT | Mod: 95,,, | Performed by: INTERNAL MEDICINE

## 2024-10-16 PROCEDURE — 3046F HEMOGLOBIN A1C LEVEL >9.0%: CPT | Mod: CPTII,95,, | Performed by: INTERNAL MEDICINE

## 2024-10-16 PROCEDURE — 1160F RVW MEDS BY RX/DR IN RCRD: CPT | Mod: CPTII,95,, | Performed by: INTERNAL MEDICINE

## 2024-10-16 PROCEDURE — 1159F MED LIST DOCD IN RCRD: CPT | Mod: CPTII,95,, | Performed by: INTERNAL MEDICINE

## 2024-10-16 PROCEDURE — G2211 COMPLEX E/M VISIT ADD ON: HCPCS | Mod: 95,,, | Performed by: INTERNAL MEDICINE

## 2024-10-16 RX ORDER — BLOOD-GLUCOSE SENSOR
1 EACH MISCELLANEOUS
Qty: 3 EACH | Refills: 11 | Status: SHIPPED | OUTPATIENT
Start: 2024-10-16 | End: 2025-10-16

## 2024-10-16 RX ORDER — ONDANSETRON 4 MG/1
4 TABLET, ORALLY DISINTEGRATING ORAL 2 TIMES DAILY
Qty: 20 TABLET | Refills: 0 | Status: SHIPPED | OUTPATIENT
Start: 2024-10-16
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
## 2024-10-28 ENCOUNTER — TELEPHONE (OUTPATIENT)
Dept: FAMILY MEDICINE | Facility: CLINIC | Age: 49
End: 2024-10-28
Payer: COMMERCIAL

## 2024-11-13 NOTE — ED PROVIDER NOTES
Chief Complaint   Patient presents with    Medicare AWV     Primarily for ANNUAL WELLNESS VISIT service    Rash     Pt had fever for one day. Pt just recently received covid and flu vax and rash showed up after     history of cancer     History of bilateral breast cancer, treated with lumpectomy in 2019, declined post-surgical f-u and oncology consult.  REFUSES F-U MAMMO today, also refuses colon ca screening and vaccines and lung ca screening     Hypertension     Blood pressure is borderline elevated today       [place] NARRATIVE:    History of Present Illness    Patient was scheduled for Medicare annual wellness visit, she has a history of breast cancer treated in 2019 with bilateral lumpectomy, she has declined cancer specialty follow-up with surgeon or oncology, she declines mammogram today as she states she was told that after lumpectomy and radiation her chance of recurrence of breast cancer is low.  She also declines other vaccines colon and lung cancer screening interventions.  BP is mildly elevated today.    Her new problem today is a rash that started on her back about a week ago, this was 2 weeks after combined COVID and flu vaccination and that started with a red patch perhaps the size of a cantaloupe and has enlarged, become confluent, developed satellite lesions on other parts of her back arms neck and even on her leg.  The lesions are erythematous with a wide zone of erythema, centralized we will which is raised and the some with central blisters or even brown possibly necrotic patches.  She says they are mildly itchy.  Their appearance is very impressive.  She does not feel ill although she says she felt feverish for a day and had lower appetite she now feels back to normal.  She denies respiratory or GI symptoms.    Patient is established unless otherwise noted.  Above chief complaint and [place] obtained by this physician provider.     Patrick was not used in the exam room.    Review of Systems  Encounter Date: 3/29/2024       History     Chief Complaint   Patient presents with    Abdominal Pain     Diffuse abd pain x a few hours. + emesis earlier today. Last oral intake was apple juice at 0800. Pt has hx of DM and report cbg in the 300s.      Patient is a 48 y.o. female who presents to the ED for evaluation of diffuse, generalized abdominal pain that began this morning.  Patient reports 1 episode of emesis just prior to arrival.  Patient is a diabetic, on arrival to ED, patient's glucose noted to be 230.  Patient states that she had 25 units of insulin last seen at 5:00 p.m.. Most recent oral intake was apple juice at 8:00 a.m..    Additionally, patient states that she had an episode of chest pain on Tuesday, which has since resolved.  Denies shortness of breath, leg swelling, palpitations, lightheadedness, syncope, diaphoresis.    States that her menses are irregular, reports that she began having vaginal bleeding right after she arrived to the ED. Patient had an endometrial ablation and hysteroscopy with D&C in 2020. Patient denies dysuria, hematuria, flank pain, unusual vaginal discharge.  Denies any known STD exposure.  Denies diarrhea, constipation, BRBPR, melena, or change in bowel habits, or any other complaints at this time.     The history is provided by the patient.     Review of patient's allergies indicates:  No Known Allergies  Past Medical History:   Diagnosis Date    Depression     Diabetes mellitus, type 2     Hyperlipidemia     Hypertension     Vision impairment     wears glasses     Past Surgical History:   Procedure Laterality Date    ENDOMETRIAL ABLATION N/A 8/11/2020    Procedure: ABLATION, ENDOMETRIUM;  Surgeon: Michael A. Wiedemann, MD;  Location: [facility] OR;  Service: OB/GYN;  Laterality: N/A;    HYSTEROSCOPY WITH DILATION AND CURETTAGE OF UTERUS N/A 8/11/2020    Procedure: HYSTEROSCOPY, WITH DILATION AND CURETTAGE OF UTERUS;  Surgeon: Michael A. Wiedemann, MD;  Location: [facility] OR;   Service: OB/GYN;  Laterality: N/A;     Family History   Problem Relation Age of Onset    Hypertension Mother 55            Diabetes Mother     Cancer Mother         cervical    Heart disease Father     Diabetes Sister     Hypertension Sister     Cancer Sister         cervical    Breast cancer Sister     Diabetes Maternal Grandmother      Social History     Tobacco Use    Smoking status: Never     Passive exposure: Never    Smokeless tobacco: Never   Substance Use Topics    Alcohol use: No    Drug use: No     Review of Systems   Constitutional:  Negative for chills and fever.   HENT:  Negative for congestion and sore throat.    Respiratory:  Negative for shortness of breath and wheezing.    Cardiovascular:  Negative for chest pain.   Gastrointestinal:  Positive for abdominal pain, nausea and vomiting. Negative for abdominal distention, anal bleeding, blood in stool, constipation and diarrhea.   Genitourinary:  Positive for pelvic pain and vaginal bleeding. Negative for dysuria, flank pain, frequency and hematuria.   Musculoskeletal:  Negative for back pain, neck pain and neck stiffness.   Skin:  Negative for rash.   Neurological:  Negative for weakness.   Hematological:  Does not bruise/bleed easily.       Physical Exam     Initial Vitals [24 1036]   BP Pulse Resp Temp SpO2   (!) 200/93 86 18 98.3 °F (36.8 °C) 100 %      MAP       --         Physical Exam    Constitutional: She appears well-developed and well-nourished.   HENT:   Head: Normocephalic and atraumatic.   Eyes: EOM are normal.   Neck:   Normal range of motion.  Cardiovascular:  Normal rate and regular rhythm.           Pulmonary/Chest: Breath sounds normal. No respiratory distress. She has no wheezes. She has no rhonchi. She has no rales.   Abdominal: Abdomen is soft. Bowel sounds are normal. She exhibits no distension and no mass. There is generalized abdominal tenderness and tenderness in the periumbilical area and suprapubic area.   No  abdominal distention, rigidity, peritoneal signs.       No right CVA tenderness.  No left CVA tenderness. There is no rebound, no guarding, no tenderness at McBurney's point and negative Fonseca's sign. negative Rovsing's sign  Musculoskeletal:      Cervical back: Normal range of motion.     Neurological: She is alert and oriented to person, place, and time.         ED Course   Procedures  Labs Reviewed   CBC W/ AUTO DIFFERENTIAL - Abnormal; Notable for the following components:       Result Value    MCV 81 (*)     MCH 26.6 (*)     All other components within normal limits   COMPREHENSIVE METABOLIC PANEL - Abnormal; Notable for the following components:    Glucose 267 (*)     All other components within normal limits   URINALYSIS, REFLEX TO URINE CULTURE - Abnormal; Notable for the following components:    Appearance, UA Hazy (*)     Protein, UA Trace (*)     Glucose, UA 1+ (*)     Occult Blood UA 3+ (*)     Urobilinogen, UA 2.0-3.0 (*)     All other components within normal limits    Narrative:     Specimen Source->Urine   URINALYSIS MICROSCOPIC - Abnormal; Notable for the following components:    RBC, UA >100 (*)     All other components within normal limits    Narrative:     Specimen Source->Urine   LIPID PANEL - Abnormal; Notable for the following components:    HDL 38 (*)     All other components within normal limits   HEMOGLOBIN A1C - Abnormal; Notable for the following components:    Hemoglobin A1C 9.6 (*)     Estimated Avg Glucose 229 (*)     All other components within normal limits   POCT GLUCOSE - Abnormal; Notable for the following components:    POCT Glucose 230 (*)     All other components within normal limits   CULTURE, BLOOD   CULTURE, BLOOD   BETA - HYDROXYBUTYRATE, SERUM   LIPASE   TROPONIN I   TROPONIN I   B-TYPE NATRIURETIC PEPTIDE   B-TYPE NATRIURETIC PEPTIDE   TROPONIN I   LACTIC ACID, PLASMA   TROPONIN I   TROPONIN I   POCT URINE PREGNANCY   POCT GLUCOSE MONITORING CONTINUOUS          Imaging  Results              US Pelvis Comp with Transvag NON-OB (xpd) (Final result)  Result time 03/29/24 16:03:49      Final result by Edgar Dejesus MD (03/29/24 16:03:49)                   Impression:      Complex left ovarian cyst, suggests hemorrhagic cyst.  If pain persists in the region, follow-up ultrasound could be performed in 6-8 weeks to ensure resolution.    No discrete endometrial abnormalities.      Electronically signed by: Edgar Dejesus MD  Date:    03/29/2024  Time:    16:03               Narrative:    EXAMINATION:  US PELVIS COMP WITH TRANSVAG NON-OB (XPD)    CLINICAL HISTORY:  Left adnexal cyst;    TECHNIQUE:  Transabdominal sonography of the pelvis was performed, followed by transvaginal sonography to better evaluate the uterus and ovaries.    COMPARISON:  10/02/2022, CT abdomen and pelvis 03/29/2024    FINDINGS:  The uterus measures approximately 14.0 x 6.0 x 8.0 cm.  There are cervical nabothian cysts.  The endometrial stripe is not thickened measuring 0.5 cm noting trace fluid in the endometrial canal.    The left ovary measures approximately 6.6 x 6.8 x 9.2 cm and contains a complex cyst, suggesting hemorrhagic content measuring approximately 5.7 x 5.2 x 6.1 cm.  The right ovary measures approximately 3.9 x 2.8 x 3.4 cm.  Arterial and venous flow is documented to the ovaries bilaterally.  No significant free fluid in the pelvis.                                        CT Abdomen Pelvis With IV Contrast NO Oral Contrast (Final result)  Result time 03/29/24 13:53:38      Final result by Bob Meraz MD (03/29/24 13:53:38)                   Impression:      Left adnexal cystic lesion measuring 6.2 x 5.2 cm in transverse dimensions with a fluid-filled tubular structure a medially adjacent to the adnexal cyst, possibly hydrosalpinx.  There is a small amount of adjacent free peritoneal fluid.  The possibility of tubo-ovarian abscess should be considered.  Further evaluation with pelvic  ultrasound examination may be helpful.    8 mm pleural based nodule abutting the left hemidiaphragm.  For a solid nodule 6-8 mm, Fleischner Society 2017 guidelines recommend follow up with non-contrast chest CT at 6-12 months and 18-24 months after discovery.    This report was flagged in Epic as abnormal.      Electronically signed by: Bob Meraz MD  Date:    03/29/2024  Time:    13:53               Narrative:    EXAMINATION:  CT ABDOMEN PELVIS WITH IV CONTRAST    CLINICAL HISTORY:  Abdominal pain, acute, nonlocalized;    TECHNIQUE:  Low dose axial images, sagittal and coronal reformations were obtained from the lung bases to the pubic symphysis following the IV administration of 100 mL of Omnipaque 350.  Oral contrast was not given.    COMPARISON:  None.    FINDINGS:  There are mild dependent atelectatic changes.  There is an 8 mm pleural based nodule abutting the left hemidiaphragm (image 38, series 2).  No pleural effusions are identified.  Bony structures appear intact.  There is no evidence for acute fracture or bone destruction.    The liver is normal in size and homogeneous in density with no focal liver lesions identified.  The gallbladder is present and appears unremarkable.  There is no evidence for intrahepatic or extrahepatic biliary dilatation.  The portal venous system is patent.  The spleen, stomach, and pancreas all appear grossly unremarkable.  The adrenal glands are not enlarged.  The kidneys are normal in size, position, and contour and are noted to concentrate contrast symmetrically.  Atherosclerotic calcification is present within the abdominal aorta which tapers normally without aneurysmal dilatation.  No para-aortic lymphadenopathy is identified.  The appendix is present.  No dilated loops of bowel are evident.  There is a left adnexal cyst measuring 6.2 x 5.2 cm in transverse dimensions (image 147, series 2).  There is an adjacent fluid-filled tubular structure which does not appear to  communicate with bowel possibly representing hydrosalpinx.  There is a small amount of adjacent free peritoneal fluid in this region.  The possibility of tubo-ovarian abscess should be considered.  Further evaluation with pelvic ultrasound examination may be helpful.  The urinary bladder is unremarkable.                                       X-Ray Chest PA And Lateral (Final result)  Result time 03/29/24 12:27:29      Final result by Jimmie Hull MD (03/29/24 12:27:29)                   Impression:      No acute abnormality.      Electronically signed by: Jimmie Hull MD  Date:    03/29/2024  Time:    12:27               Narrative:    EXAMINATION:  XR CHEST PA AND LATERAL    CLINICAL HISTORY:  Chest Pain;    TECHNIQUE:  PA and lateral views of the chest were performed.    COMPARISON:  Chest radiograph from 04/16/2020    FINDINGS:  The lungs are clear, with normal appearance of pulmonary vasculature and no pleural effusion or pneumothorax.    The cardiac silhouette is normal in size. The hilar and mediastinal contours are unremarkable.    Bones are intact.                                       Medications   sodium chloride 0.9% flush 10 mL (has no administration in time range)   albuterol-ipratropium 2.5 mg-0.5 mg/3 mL nebulizer solution 3 mL (has no administration in time range)   melatonin tablet 6 mg (has no administration in time range)   ondansetron disintegrating tablet 8 mg (has no administration in time range)   ondansetron injection 4 mg (has no administration in time range)   senna-docusate 8.6-50 mg per tablet 1 tablet (has no administration in time range)   simethicone chewable tablet 80 mg (has no administration in time range)   aluminum-magnesium hydroxide-simethicone 200-200-20 mg/5 mL suspension 30 mL (has no administration in time range)   acetaminophen tablet 650 mg (has no administration in time range)   enoxaparin injection 40 mg (40 mg Subcutaneous Given 3/29/24 3009)   morphine  injection 1 mg (has no administration in time range)   HYDROcodone-acetaminophen 7.5-325 mg per tablet 1 tablet (has no administration in time range)   atorvastatin tablet 80 mg (has no administration in time range)   glucose chewable tablet 16 g (has no administration in time range)   glucose chewable tablet 24 g (has no administration in time range)   glucagon (human recombinant) injection 1 mg (has no administration in time range)   insulin aspart U-100 pen 0-5 Units (has no administration in time range)   dextrose 10% bolus 125 mL 125 mL (has no administration in time range)   dextrose 10% bolus 250 mL 250 mL (has no administration in time range)   ondansetron injection 4 mg (4 mg Intravenous Given 3/29/24 1139)   sodium chloride 0.9% bolus 1,000 mL 1,000 mL (0 mLs Intravenous Stopped 3/29/24 1307)   iohexoL (OMNIPAQUE 350) injection 100 mL (100 mLs Intravenous Given 3/29/24 1333)   cefTRIAXone (Rocephin) 1 g in dextrose 5 % in water (D5W) 100 mL IVPB (MB+) (0 g Intravenous Stopped 3/29/24 1542)   doxycycline tablet 100 mg (100 mg Oral Given 3/29/24 1504)   metronidazole IVPB 500 mg (0 mg Intravenous Stopped 3/29/24 1607)   ketorolac injection 15 mg (15 mg Intravenous Given 3/29/24 1512)     Medical Decision Making  Patient is an afebrile 48 y.o. female for evaluation of abdominal pain. VSS and do not suggest sepsis.  Denies chest pain, SOB, or any other complaints at this time. A&Ox4. The patient remained comfortable and stable during their visit in the ED. Details of ED course documented in ED workup.     Differential diagnosis includes, but is not limited to:  Cholecystitis, cholelithiasis, choledocholithiasis, cholangitis, bowel obstruction, diverticulitis, diverticulosis, cystitis, pyelonephritis, nephrolithiasis, ovarian cyst, tubo-ovarian abscess, ovarian torsion, ACS, arrhythmia, PE, pneumonia, hyperglycemia, DKA, HHS    After review of the patients physical exam, ED testing, and history/symptoms, the  patient will be admitted. [facility] called and case was discussed. Dr. Parra will accept the admission with recommendations for ACS rule out, management of hyperglycemia, further evaluation of adnexal cyst.  Admit orders will be completed. The diagnosis, treatment and plan for admission were discussed with the patient and understanding was verbalized. All questions or concerns have been addressed.     Amount and/or Complexity of Data Reviewed  External Data Reviewed: notes.  Labs: ordered. Decision-making details documented in ED Course.  Radiology: ordered and independent interpretation performed. Decision-making details documented in ED Course.  ECG/medicine tests: ordered and independent interpretation performed. Decision-making details documented in ED Course.  Discussion of management or test interpretation with external provider(s): Patient history and plan discussed with Dr. Arnett, who agrees with plan. Also discussed with OB on call (Dr. Spaulding) about CT findings. Recommended patient be admitted for further workup.  Also recommended patient have pelvic ultrasound, and be given Rocephin, Flagyl, azithromycin, all of which were ordered in the ED.      Risk  Prescription drug management.               ED Course as of 03/29/24 1720   Fri Mar 29, 2024   1051 Patient examined and assessed. Patient answering questions appropriately, speaking in complete sentences, respirations are even and unlabored.  AAO x 4.     Patient is diabetic.  Took 25 units of insulin last night at 5:00 p.m..  Abdominal pain that began this morning. One episode of nausea and vomiting.  [OB]   1051 POCT Glucose(!): 230 [OB]   1104 WBC: 6.64  No leukocytosis [OB]   1104 Hemoglobin: 13.7  H&H stable [OB]   1104 Hematocrit: 41.9 [OB]   1108 Beta-Hydroxybutyrate: 0.1  WNL [OB]   1109 EKG independently reviewed by myself: 84BMP NSR. No STEMI noted. [OB]   1127 Sodium: 138  WNL [OB]   1128 Potassium: 4.1  WNL [OB]   1128 BUN:  8  WNL [OB]   1128 Creatinine: 0.9  WNL [OB]   1128 BILIRUBIN TOTAL: 0.4  WNL [OB]   1128 eGFR: >60  WNL [OB]   1128 ALT: 17  WNL [OB]   1128 AST: 15  WNL [OB]   1128 ALP: 81  WNL [OB]   1128 Creatinine: 0.9  WNL [OB]   1146 Lipase: 15  WNL [OB]   1147 Troponin I: <0.006  WNL [OB]   1214 POCT urine pending.  Patient has not been taken for CT scan. [OB]   1230 Since secure chat to nurse to ensure that patient has been marked ready for CT. [OB]   1231 Chest x-ray independently reviewed:  No acute abnormalities.  No cardiomegaly.  No consolidation. [OB]   1240 BNP: 21  WNL [OB]   1424 CT abdomen pelvis independently reviewed:     Left adnexal cystic lesion measuring 6.2 x 5.2 cm in transverse dimensions with a fluid-filled tubular structure a medially adjacent to the adnexal cyst, possibly hydrosalpinx.  There is a small amount of adjacent free peritoneal fluid.  The possibility of tubo-ovarian abscess should be considered [OB]   1424 Discussed with Dr. Arnett, who agrees that patient should be admitted for further management.  Will order ultrasound to further evaluate for tubo-ovarian abscess.  Dr. Arnett recommends starting ceftriaxone and azithromycin.  Attempted to reexamined patient, however patient not in room. [OB]   1427 Speaking with OB on call (Dr. Spaulding).  Recommend ceftriaxone, doxycycline, Flagyl.  Recommends ordering pelvic ultrasound to rule out torsion and to further evaluate cystic lesion [OB]   1436 UA still pending. Will call lab for update.  [OB]   1447 Lab states that they have not received a urine for the patient.  We will check with nurse on status. [OB]   1453 Spoke with [facility].  Patient will be admitted for management of hyperglycemia, cardiac workup, and further management and evaluation of large left adnexal cyst/possible tubo-ovarian abscess.  Admit orders placed. [OB]   1542 Leukocyte Esterase, UA: Negative [OB]   1542 NITRITE UA: Negative [OB]   1542 Blood, UA(!): 3+  [OB]   1542 RBC, UA(!): >100 [OB]   1608 Ultrasound pelvis independently reviewed: The left ovary measures approximately 6.6 x 6.8 x 9.2 cm and contains a complex cyst, suggesting hemorrhagic content measuring approximately 5.7 x 5.2 x 6.1 cm.  The right ovary measures approximately 3.9 x 2.8 x 3.4 cm.  Arterial and venous flow is documented to the ovaries bilaterally.  [OB]      ED Course User Index  [OB] Martha Weems PA-C                           Clinical Impression:  Final diagnoses:  [R07.9] Chest pain  [N83.209] Ovarian cystic mass  [E11.65] Hyperglycemia due to diabetes mellitus (Primary)  [N70.11] Hydrosalpinx          ED Disposition Condition    Observation Stable                Martha Weems PA-C  03/29/24 1726

## 2024-12-20 ENCOUNTER — NURSE TRIAGE (OUTPATIENT)
Dept: ADMINISTRATIVE | Facility: CLINIC | Age: 49
End: 2024-12-20
Payer: COMMERCIAL

## 2024-12-21 NOTE — TELEPHONE ENCOUNTER
"Pt states started cycle on Wednesday, c/o "sharp pain, can barely stand, so intense. Pain at 11."  Pain to RLQ. Vomiting x3. Pt states too weak to stand, cold clammy skin. Per protocol, call  now. Pt verbalizes understanding.   Reason for Disposition   Shock suspected (e.g., cold/pale/clammy skin, too weak to stand, low BP, rapid pulse)    Protocols used: Abdominal Pain - Female-A-AH    "

## 2024-12-23 ENCOUNTER — PATIENT MESSAGE (OUTPATIENT)
Dept: FAMILY MEDICINE | Facility: CLINIC | Age: 49
End: 2024-12-23
Payer: COMMERCIAL

## 2025-01-02 ENCOUNTER — PATIENT OUTREACH (OUTPATIENT)
Dept: ADMINISTRATIVE | Facility: HOSPITAL | Age: 50
End: 2025-01-02
Payer: COMMERCIAL

## 2025-01-02 VITALS — DIASTOLIC BLOOD PRESSURE: 78 MMHG | SYSTOLIC BLOOD PRESSURE: 130 MMHG

## 2025-01-03 ENCOUNTER — CLINICAL SUPPORT (OUTPATIENT)
Dept: ENDOSCOPY | Facility: HOSPITAL | Age: 50
End: 2025-01-03
Attending: STUDENT IN AN ORGANIZED HEALTH CARE EDUCATION/TRAINING PROGRAM
Payer: COMMERCIAL

## 2025-01-03 ENCOUNTER — TELEPHONE (OUTPATIENT)
Dept: ENDOSCOPY | Facility: HOSPITAL | Age: 50
End: 2025-01-03

## 2025-01-03 DIAGNOSIS — Z12.11 SCREENING FOR MALIGNANT NEOPLASM OF COLON: ICD-10-CM

## 2025-01-03 NOTE — PLAN OF CARE
Contacted the patient 2x to schedule an endoscopy procedure(s) colonoscopy. The patient did not answer the call and left a voice message requesting a call back.

## 2025-01-03 NOTE — TELEPHONE ENCOUNTER
Contacted the 2x patient to schedule an endoscopy procedure(s) colonoscopy. The patient did not answer the call and left a voice message requesting a call back.

## 2025-01-10 LAB
LEFT EYE DM RETINOPATHY: NEGATIVE
RIGHT EYE DM RETINOPATHY: NEGATIVE

## 2025-01-17 ENCOUNTER — TELEPHONE (OUTPATIENT)
Dept: OBSTETRICS AND GYNECOLOGY | Facility: CLINIC | Age: 50
End: 2025-01-17
Payer: COMMERCIAL

## 2025-01-17 NOTE — TELEPHONE ENCOUNTER
----- Message from Roselyn sent at 1/17/2025 11:22 AM CST -----  Contact: pt  Type:  Needs Medical Advice    Who Called: pt   Symptoms (please be specific): menstrual cramps    How long has patient had these symptoms:  ongoing   Pharmacy name and phone #:  [facility]  [address]   Phone: [phone]  Fax: [phone]    Would the patient rather a call back or a response via [facility]? Call   Best Call Back Number:  [phone]  Additional Information:      Symptom: Menstrual Cramps  Outcome: Schedule an urgent appointment (within 4 hours) or talk to a nurse or provider within 30 minutes.  Reason: Severe pain now

## 2025-01-28 ENCOUNTER — HOSPITAL ENCOUNTER (EMERGENCY)
Facility: HOSPITAL | Age: 50
Discharge: HOME | End: 2025-01-28
Attending: EMERGENCY MEDICINE
Payer: COMMERCIAL

## 2025-01-28 VITALS
TEMPERATURE: 99 F | RESPIRATION RATE: 18 BRPM | WEIGHT: 238 LBS | BODY MASS INDEX: 38.25 KG/M2 | OXYGEN SATURATION: 98 % | SYSTOLIC BLOOD PRESSURE: 138 MMHG | HEART RATE: 88 BPM | DIASTOLIC BLOOD PRESSURE: 88 MMHG | HEIGHT: 66 IN

## 2025-01-28 DIAGNOSIS — N83.202 CYST OF LEFT OVARY: ICD-10-CM

## 2025-01-28 DIAGNOSIS — J11.1 INFLUENZA: Primary | ICD-10-CM

## 2025-01-28 LAB
ALBUMIN SERPL BCP-MCNC: 3.9 G/DL (ref 3.5–5.2)
ALP SERPL-CCNC: 84 U/L (ref 40–150)
ALT SERPL W/O P-5'-P-CCNC: 20 U/L (ref 10–44)
ANION GAP SERPL CALC-SCNC: 8 MMOL/L (ref 8–16)
AST SERPL-CCNC: 13 U/L (ref 10–40)
B-HCG UR QL: NEGATIVE
BASOPHILS # BLD AUTO: 0.03 K/UL (ref 0–0.2)
BASOPHILS NFR BLD: 0.5 % (ref 0–1.9)
BILIRUB SERPL-MCNC: 0.4 MG/DL (ref 0.1–1)
BILIRUB UR QL STRIP: NEGATIVE
BUN SERPL-MCNC: 8 MG/DL (ref 6–20)
CALCIUM SERPL-MCNC: 9.3 MG/DL (ref 8.7–10.5)
CHLORIDE SERPL-SCNC: 103 MMOL/L (ref 95–110)
CLARITY UR: CLEAR
CO2 SERPL-SCNC: 25 MMOL/L (ref 23–29)
COLOR UR: YELLOW
CREAT SERPL-MCNC: 0.9 MG/DL (ref 0.5–1.4)
CTP QC/QA: YES
DIFFERENTIAL METHOD BLD: ABNORMAL
EOSINOPHIL # BLD AUTO: 0.2 K/UL (ref 0–0.5)
EOSINOPHIL NFR BLD: 2.9 % (ref 0–8)
ERYTHROCYTE [DISTWIDTH] IN BLOOD BY AUTOMATED COUNT: 13.6 % (ref 11.5–14.5)
EST. GFR  (NO RACE VARIABLE): >60 ML/MIN/1.73 M^2
GLUCOSE SERPL-MCNC: 160 MG/DL (ref 70–110)
GLUCOSE UR QL STRIP: NEGATIVE
HCT VFR BLD AUTO: 42.5 % (ref 37–48.5)
HGB BLD-MCNC: 13.7 G/DL (ref 12–16)
HGB UR QL STRIP: NEGATIVE
IMM GRANULOCYTES # BLD AUTO: 0.04 K/UL (ref 0–0.04)
IMM GRANULOCYTES NFR BLD AUTO: 0.7 % (ref 0–0.5)
KETONES UR QL STRIP: NEGATIVE
LEUKOCYTE ESTERASE UR QL STRIP: NEGATIVE
LIPASE SERPL-CCNC: 10 U/L (ref 4–60)
LYMPHOCYTES # BLD AUTO: 0.8 K/UL (ref 1–4.8)
LYMPHOCYTES NFR BLD: 13.9 % (ref 18–48)
MCH RBC QN AUTO: 25.8 PG (ref 27–31)
MCHC RBC AUTO-ENTMCNC: 32.2 G/DL (ref 32–36)
MCV RBC AUTO: 80 FL (ref 82–98)
MONOCYTES # BLD AUTO: 0.6 K/UL (ref 0.3–1)
MONOCYTES NFR BLD: 9.6 % (ref 4–15)
NEUTROPHILS # BLD AUTO: 4.2 K/UL (ref 1.8–7.7)
NEUTROPHILS NFR BLD: 72.4 % (ref 38–73)
NITRITE UR QL STRIP: NEGATIVE
NRBC BLD-RTO: 0 /100 WBC
PH UR STRIP: 7 [PH] (ref 5–8)
PLATELET # BLD AUTO: 373 K/UL (ref 150–450)
PMV BLD AUTO: 9.1 FL (ref 9.2–12.9)
POC MOLECULAR INFLUENZA A AGN: POSITIVE
POC MOLECULAR INFLUENZA B AGN: NEGATIVE
POTASSIUM SERPL-SCNC: 4.2 MMOL/L (ref 3.5–5.1)
PROT SERPL-MCNC: 7.8 G/DL (ref 6–8.4)
PROT UR QL STRIP: NEGATIVE
RBC # BLD AUTO: 5.31 M/UL (ref 4–5.4)
SARS-COV-2 RDRP RESP QL NAA+PROBE: NEGATIVE
SODIUM SERPL-SCNC: 136 MMOL/L (ref 136–145)
SP GR UR STRIP: 1.02 (ref 1–1.03)
URN SPEC COLLECT METH UR: NORMAL
UROBILINOGEN UR STRIP-ACNC: NEGATIVE EU/DL
WBC # BLD AUTO: 5.81 K/UL (ref 3.9–12.7)

## 2025-01-28 PROCEDURE — 96374 THER/PROPH/DIAG INJ IV PUSH: CPT

## 2025-01-28 PROCEDURE — 99285 EMERGENCY DEPT VISIT HI MDM: CPT | Mod: 25

## 2025-01-28 PROCEDURE — 85025 COMPLETE CBC W/AUTO DIFF WBC: CPT | Performed by: NURSE PRACTITIONER

## 2025-01-28 PROCEDURE — 25500020 PHARM REV CODE 255

## 2025-01-28 PROCEDURE — 81003 URINALYSIS AUTO W/O SCOPE: CPT | Performed by: NURSE PRACTITIONER

## 2025-01-28 PROCEDURE — 87635 SARS-COV-2 COVID-19 AMP PRB: CPT | Performed by: NURSE PRACTITIONER

## 2025-01-28 PROCEDURE — 87502 INFLUENZA DNA AMP PROBE: CPT

## 2025-01-28 PROCEDURE — 63600175 PHARM REV CODE 636 W HCPCS: Mod: TB

## 2025-01-28 PROCEDURE — 81025 URINE PREGNANCY TEST: CPT | Performed by: NURSE PRACTITIONER

## 2025-01-28 PROCEDURE — 83690 ASSAY OF LIPASE: CPT | Performed by: NURSE PRACTITIONER

## 2025-01-28 PROCEDURE — 80053 COMPREHEN METABOLIC PANEL: CPT | Performed by: NURSE PRACTITIONER

## 2025-01-28 RX ORDER — KETOROLAC TROMETHAMINE 30 MG/ML
15 INJECTION, SOLUTION INTRAMUSCULAR; INTRAVENOUS
Status: COMPLETED | OUTPATIENT
Start: 2025-01-28 | End: 2025-01-28

## 2025-01-28 RX ORDER — ONDANSETRON 4 MG/1
4 TABLET, ORALLY DISINTEGRATING ORAL EVERY 6 HOURS PRN
Qty: 28 TABLET | Refills: 0 | Status: SHIPPED | OUTPATIENT
Start: 2025-01-28

## 2025-01-28 RX ORDER — KETOROLAC TROMETHAMINE 10 MG/1
10 TABLET, FILM COATED ORAL EVERY 6 HOURS
Qty: 20 TABLET | Refills: 0 | Status: SHIPPED | OUTPATIENT
Start: 2025-01-28 | End: 2025-02-02

## 2025-01-28 RX ORDER — METHOCARBAMOL 500 MG/1
500 TABLET, FILM COATED ORAL 4 TIMES DAILY
Qty: 40 TABLET | Refills: 0 | Status: SHIPPED | OUTPATIENT
Start: 2025-01-28 | End: 2025-02-07

## 2025-01-28 RX ORDER — OSELTAMIVIR PHOSPHATE 75 MG/1
75 CAPSULE ORAL 2 TIMES DAILY
Qty: 10 CAPSULE | Refills: 0 | Status: SHIPPED | OUTPATIENT
Start: 2025-01-28 | End: 2025-02-02

## 2025-01-28 RX ADMIN — IOHEXOL 100 ML: 350 INJECTION, SOLUTION INTRAVENOUS at 04:01

## 2025-01-28 RX ADMIN — KETOROLAC TROMETHAMINE 15 MG: 30 INJECTION, SOLUTION INTRAMUSCULAR; INTRAVENOUS at 06:01

## 2025-01-28 NOTE — ED TRIAGE NOTES
Lower abdominal pain and right flank pain for past 2 weeks. + urinary frequency with mild hematuria last night. + N/V over past 2 days . No fever reported.

## 2025-01-28 NOTE — Clinical Note
"Hamzah "Hamzah"Chato was seen and treated in our emergency department on 1/28/2025.  She may return to work on 02/03/2025.       If you have any questions or concerns, please don't hesitate to call.      Goldie Tripathi PA-C"

## 2025-01-28 NOTE — DISCHARGE INSTRUCTIONS
Your visit in the emergency room today determined that you have a viral illness. This may take around 7 days to pass, but can be managed with over the counter medications. Please see some of my recommendations below:     If not allergic, please take over the counter Tylenol (Acetaminophen) and/or Motrin (Ibuprofen) as directed for control of pain (body aches) and/or fever. You can stagger the dosing so you are taking one or the other every three hours while spacing out the Tylenol and every 6 hours and the Motrin every 6 hours.    You may take an over the counter antihistamine medication (Allegra/Claritin/Zyrtec) as directed for nasal congestion/runny nose. You may also try a decongestant such as Mucinex D or Sudafed (found behind the pharmacy counter). Flonase is also an option that you may use- one spray each nostril twice daily OR two sprays each nostril once daily.    If you have a cough with mucus production, try an Mucinex or Robitussin. If you have a dry cough, Delsym may work better.      Sore throat recommendations: Warm fluids, warm salt water gargles, throat lozenges, tea, honey, soup, rest, hydration.     Please return or see your primary care doctor if you develop new or worsening symptoms.     Thank you for allowing me and my emergency team to take care of you here today! I hope you feel better soon. Please do not hesitate to return with any additional concerns that may arise from this or any new problem you encounter.    Our goal in the emergency department is to always give you outstanding care and exceptional service. If you receive a survey by mail or e-mail in the next week regarding your experience in our ED, we would greatly appreciate you completing it. Your feedback provides us with a way to recognize our staff who give very good care and it helps us learn how to improve when your experience was below the excellence we aspire to be!    Brook Juneau, PA-C [facility] [place], and   [place]   Emergency Room Physician Assistant

## 2025-01-28 NOTE — ED PROVIDER NOTES
Encounter Date: 2025       History     Chief Complaint   Patient presents with    Abdominal Pain     Patient reports lower abdominal pain x2 weeks. Endorses episode of hematuria last night. Endorses right side back pain.      Patient is a 49-year-old female with a past medical history of depression, diabetes mellitus type 2, hyperlipidemia, hypertension, and vision abnormalities who presents to emergency room for right lower quadrant abdominal pain and left lower back pain over the past 2 weeks. Endorses associated urinary frequency and hematuria last night. Has had some nausea and vomiting over the past 2 days. No changes in bowel movements. Pain waxes and wanes, but is worse with lying flat. Described as a sharp sensation. No vaginal bleeding or abnormal vaginal discharge.  No fever, body aches, or chills.  Prior treatment includes ibuprofen without relief.    The history is provided by the patient. No  was used.     Review of patient's allergies indicates:  No Known Allergies  Past Medical History:   Diagnosis Date    Depression     Diabetes mellitus, type 2     Hyperlipidemia     Hypertension     Vision impairment     wears glasses     Past Surgical History:   Procedure Laterality Date    ENDOMETRIAL ABLATION N/A 2020    Procedure: ABLATION, ENDOMETRIUM;  Surgeon: Michael A. Wiedemann, MD;  Location: [facility] OR;  Service: OB/GYN;  Laterality: N/A;    HYSTEROSCOPY WITH DILATION AND CURETTAGE OF UTERUS N/A 2020    Procedure: HYSTEROSCOPY, WITH DILATION AND CURETTAGE OF UTERUS;  Surgeon: Michael A. Wiedemann, MD;  Location: [facility] OR;  Service: OB/GYN;  Laterality: N/A;     Family History   Problem Relation Name Age of Onset    Hypertension Mother  55            Diabetes Mother      Cancer Mother          cervical    Heart disease Father      Diabetes Sister      Hypertension Sister      Cancer Sister          cervical    Breast cancer Sister      Diabetes Maternal Grandmother        Social History     Tobacco Use    Smoking status: Never     Passive exposure: Never    Smokeless tobacco: Never   Substance Use Topics    Alcohol use: No    Drug use: No     Review of Systems   Constitutional:  Negative for chills, diaphoresis, fatigue and fever.   HENT:  Negative for congestion, sore throat and trouble swallowing.    Respiratory:  Negative for cough and shortness of breath.    Cardiovascular:  Negative for chest pain and palpitations.   Gastrointestinal:  Positive for abdominal pain (right lower quadrant), nausea and vomiting. Negative for blood in stool, constipation and diarrhea.   Genitourinary:  Positive for frequency and hematuria. Negative for difficulty urinating, dysuria and urgency.   Musculoskeletal:  Positive for back pain (left lower). Negative for myalgias.   Skin:  Negative for rash and wound.   Neurological:  Negative for weakness, light-headedness, numbness and headaches.       Physical Exam     Initial Vitals [01/28/25 1408]   BP Pulse Resp Temp SpO2   (!) 173/89 93 18 99.3 °F (37.4 °C) 98 %      MAP       --         Physical Exam    Nursing note and vitals reviewed.  Constitutional: She appears well-developed and well-nourished. She is not diaphoretic. No distress.   Patient well-appearing.  Awake and alert.  No acute distress.  Maintaining airway appropriately.  Speaking in complete sentences.   HENT:   Head: Normocephalic and atraumatic.   Right Ear: External ear normal.   Left Ear: External ear normal.   Eyes: Conjunctivae and EOM are normal.   Neck: Neck supple.   Normal range of motion.  Pulmonary/Chest: No respiratory distress.   Abdominal: Abdomen is soft. She exhibits no distension. There is abdominal tenderness (right lower quadrant/right flank). There is no rebound and no guarding.   Musculoskeletal:         General: No edema. Normal range of motion.      Cervical back: Normal, normal range of motion and neck supple.      Thoracic back: Normal.      Lumbar back:  Tenderness present. Negative right straight leg raise test and negative left straight leg raise test.        Back:      Neurological: She is alert and oriented to person, place, and time. She has normal strength.   Skin: Skin is warm.   Psychiatric: She has a normal mood and affect. Her behavior is normal. Thought content normal.         ED Course   Procedures  Labs Reviewed   CBC W/ AUTO DIFFERENTIAL - Abnormal       Result Value    WBC 5.81      RBC 5.31      Hemoglobin 13.7      Hematocrit 42.5      MCV 80 (*)     MCH 25.8 (*)     MCHC 32.2      RDW 13.6      Platelets 373      MPV 9.1 (*)     Immature Granulocytes 0.7 (*)     Gran # (ANC) 4.2      Immature Grans (Abs) 0.04      Lymph # 0.8 (*)     Mono # 0.6      Eos # 0.2      Baso # 0.03      nRBC 0      Gran % 72.4      Lymph % 13.9 (*)     Mono % 9.6      Eosinophil % 2.9      Basophil % 0.5      Differential Method Automated     COMPREHENSIVE METABOLIC PANEL - Abnormal    Sodium 136      Potassium 4.2      Chloride 103      CO2 25      Glucose 160 (*)     BUN 8      Creatinine 0.9      Calcium 9.3      Total Protein 7.8      Albumin 3.9      Total Bilirubin 0.4      Alkaline Phosphatase 84      AST 13      ALT 20      eGFR >60      Anion Gap 8     POCT INFLUENZA A/B MOLECULAR - Abnormal    POC Molecular Influenza A Ag Positive (*)     POC Molecular Influenza B Ag Negative       Acceptable Yes     LIPASE    Lipase 10     URINALYSIS, REFLEX TO URINE CULTURE    Specimen UA Urine, Clean Catch      Color, UA Yellow      Appearance, UA Clear      pH, UA 7.0      Specific Gravity, UA 1.020      Protein, UA Negative      Glucose, UA Negative      Ketones, UA Negative      Bilirubin (UA) Negative      Occult Blood UA Negative      Nitrite, UA Negative      Urobilinogen, UA Negative      Leukocytes, UA Negative      Narrative:     Specimen Source->Urine   POCT URINE PREGNANCY    POC Preg Test, Ur Negative       Acceptable Yes      SARS-COV-2 RDRP GENE    POC Rapid COVID Negative       Acceptable Yes            Imaging Results              CT Abdomen Pelvis With IV Contrast NO Oral Contrast (Final result)  Result time 01/28/25 17:24:02      Final result by Edgar Dejesus MD (01/28/25 17:24:02)                   Impression:      1. There is a tubular appearing cystic structure within the left adnexa.  Findings suggests possible left ovarian cyst with associated prominence of the fallopian tube.  The distention of the fallopian tube has decreased somewhat since the previous examination.  Clinical correlation and follow-up as warranted.  2. Findings suggest hepatic steatosis, correlation with LFTs recommended.  3. No findings to suggest obstructive uropathy.  4. Please see above for several additional findings.      Electronically signed by: Edgar Dejesus MD  Date:    01/28/2025  Time:    17:24               Narrative:    EXAMINATION:  CT ABDOMEN PELVIS WITH IV CONTRAST    CLINICAL HISTORY:  RLQ abdominal pain (Age >= 14y);    TECHNIQUE:  Low dose axial images, sagittal and coronal reformations were obtained from the lung bases to the pubic symphysis following the IV administration of 100 mL of Omnipaque 350 .  Oral contrast was not given.    COMPARISON:  CT 06/05/2024    FINDINGS:  Images of the lower thorax are remarkable for bilateral dependent atelectasis.  There is a pulmonary nodule within the left lower lobe measuring 6 mm, stable.    The liver is hypoattenuating suggesting steatosis, correlation with LFTs recommended.  The spleen, pancreas, gallbladder and adrenal glands are grossly unremarkable.  The stomach is decompressed without wall thickening.  The portal vein, splenic vein, SMV, celiac axis and SMA all are patent.  No significant abdominal lymphadenopathy.    The kidneys enhance symmetrically without hydronephrosis or nephrolithiasis.  The bilateral ureters are unable to be followed in their entirety to the  urinary bladder, no definite calculi seen or secondary findings to suggest obstructive uropathy.  The urinary bladder is unremarkable.  The uterus is unremarkable.  The right adnexa is unremarkable.  There is a cystic structure within the left adnexa measuring 3.5 cm suggesting ovarian cyst or adjacent cysts.  In the region, there is a tubular structure, suggesting hydrosalpinx.  No significant free fluid in the pelvis.    The terminal ileum and appendix are unremarkable noting distal appendicoliths.  The small bowel is grossly unremarkable.  There are a few scattered shotty periaortic, pericaval, and mesenteric lymph nodes.  No focal organized pelvic fluid collection.  There is atherosclerotic calcification of the aorta and its branches.    There are degenerative changes of the pubic symphysis, sacroiliac joints, and spine.  No significant inguinal lymphadenopathy.                                       Medications   ketorolac injection 15 mg (15 mg Intravenous Given 1/28/25 9298)   iohexoL (OMNIPAQUE 350) injection 100 mL (100 mLs Intravenous Given 1/28/25 1655)     Medical Decision Making  Patient presents to emergency room for right lower quadrant abdominal pain with associated nausea and vomiting.  Vital signs stable.  Physical exam as stated above.    Differential Diagnosis includes, but is not limited to AAA, aortic dissection, mesenteric ischemia, perforated viscous, MI/ACS, SBO/volvulus, incarcerated/strangulated hernia, intussusception, ileus, appendicitis, cholecystitis, cholangitis, diverticulitis, esophagitis, hepatitis, nephrolithiasis, pancreatitis, gastroenteritis, colitis, IBD/IBS, biliary colic, GERD, PUD, constipation, UTI/pyelonephritis, or  disorder.  Patient blood pressure stable and they are clinically well-appearing. No risk factors that would suggest AAA or dissection.  No chest pain.  Low suspicion for MI/ACS. Lab work unremarkable.  This is not pancreatitis or hepatitis.  Urinalysis  without signs of UTI.  Influenza positive.  CT also with evidence of left ovarian cyst.  Patient states that this is a chronic cyst.  Could be etiology of left lower back pain.  However, it appears that patient also has a history of chronic left-sided lower back pain that is being managed by primary care.  Clinical presentation and physical exam aligns with positive influenza test.  Will prescribe Tamiflu, Zofran, and Robaxin to use upon discharge.  Advised on conservative management such as adequate rest and hydration.  Discussed over-the-counter medications such as Delsym, Mucinex, and Flonase for treatment of additional symptoms.    I see no indication of an emergent process beyond that addressed during our encounter. Patient stable for discharge at this time. I have counseled the patient regarding follow up with PCP and gave strict return precautions. I have discussed the final diagnosis and gave instructions regarding prescribed medications. Patient verbalized understanding and is agreeable.     Problems Addressed:  Cyst of left ovary: acute illness or injury  Influenza: acute illness or injury    Amount and/or Complexity of Data Reviewed  External Data Reviewed: notes.     Details: Patient last saw primary care in 09/2024.  Labs: ordered. Decision-making details documented in ED Course.  Radiology: ordered. Decision-making details documented in ED Course.  ECG/medicine tests:      Details: Considered ordering EKG, though patient without any chest pain, palpitations, leg swelling, or SOB at this time.     Risk  OTC drugs.  Prescription drug management.  Risk Details: Comorbidities taken into consideration during the patient's evaluation and treatment include depression, diabetes mellitus type 2, hyperlipidemia, hypertension, and vision impairment.    Social determinants of health taken into consideration during development of our treatment plan include difficulty in obtaining follow-up, obtaining medications,  health literacy, access to healthy options for preventative/conservative management, and/or support systems due to, but not limited to, transportation limitations, socioeconomic status, and environmental factors.                ED Course as of 01/29/25 0834   Tue Jan 28, 2025   1601 CBC W/ AUTO DIFFERENTIAL(!)  CBC relatively unremarkable.  No leukocytosis.  H/H stable and within normal limits.  Platelet count within normal limits. [BJ]   1601 hCG Qualitative, Urine: Negative  Urine pregnancy negative. [BJ]   1606 Urinalysis, Reflex to Urine Culture Urine, Clean Catch  Urinalysis without leukocytes or occult blood. [BJ]   1606 POCT COVID-19 Rapid Screening  COVID negative. [BJ]   1623 Comp. Metabolic Panel(!)  CMP unremarkable.  Electrolytes within normal limits.  BUN and creatinine within normal limits.  LFTs within normal limits. [BJ]   1623 Lipase: 10  Lipase within normal limits. [BJ]   1627 POC Molecular Influenza A Ag(!): Positive  Influenza positive. [BJ]   1728 CT Abdomen Pelvis With IV Contrast NO Oral Contrast  Impression:     1. There is a tubular appearing cystic structure within the left adnexa.  Findings suggests possible left ovarian cyst with associated prominence of the fallopian tube.  The distention of the fallopian tube has decreased somewhat since the previous examination.  Clinical correlation and follow-up as warranted.  2. Findings suggest hepatic steatosis, correlation with LFTs recommended.  3. No findings to suggest obstructive uropathy.  4. Please see above for several additional findings. [BJ]      ED Course User Index  [BJ] Goldie Tripathi PA-C                             Clinical Impression:  Final diagnoses:  [J11.1] Influenza (Primary)  [N83.202] Cyst of left ovary          ED Disposition Condition    Discharge Stable          ED Prescriptions       Medication Sig Dispense Start Date End Date Auth. Provider    methocarbamoL (ROBAXIN) 500 MG Tab Take 1 tablet (500 mg total) by mouth 4  (four) times daily. for 10 days 40 tablet 1/28/2025 2/7/2025 Goldie Tripathi PA-C    oseltamivir (TAMIFLU) 75 MG capsule Take 1 capsule (75 mg total) by mouth 2 (two) times daily. for 5 days 10 capsule 1/28/2025 2/2/2025 Goldie Tripathi PA-C    ondansetron (ZOFRAN-ODT) 4 MG TbDL Take 1 tablet (4 mg total) by mouth every 6 (six) hours as needed (Nausea). 28 tablet 1/28/2025 -- Goldie Tripathi PA-C    ketorolac (TORADOL) 10 mg tablet Take 1 tablet (10 mg total) by mouth every 6 (six) hours. for 5 days 20 tablet 1/28/2025 2/2/2025 Goldie Tripathi PA-C          Follow-up Information       Follow up With Specialties Details Why Contact Info    Dominic Hull, DO [facility]   [address] [facility] [id number]  [phone]      [facility] Emergency Dept Emergency Medicine Go to  If new or worsening symptoms occur [address] [place] [id number] [phone]            This note was partially created using [employer] Voice Recognition software. Typographical and content errors may occur with this process. While efforts are made to detect and correct such errors, in some cases errors will persist. For this reason, wording in this document should be considered in the proper context and not strictly verbatim.        Goldie Tripathi PA-C  01/29/25 0834

## 2025-01-28 NOTE — FIRST PROVIDER EVALUATION
Emergency Department TeleTriage Encounter Note      CHIEF COMPLAINT    Chief Complaint   Patient presents with    Abdominal Pain     Patient reports lower abdominal pain x2 weeks. Endorses episode of hematuria last night. Endorses right side back pain.        VITAL SIGNS   Initial Vitals [01/28/25 1408]   BP Pulse Resp Temp SpO2   (!) 173/89 93 18 99.3 °F (37.4 °C) 98 %      MAP       --            ALLERGIES    Review of patient's allergies indicates:  No Known Allergies    PROVIDER TRIAGE NOTE  Verbal consent for the teletriage evaluation was given by the patient at the start of the evaluation.  All efforts will be made to maintain patient's privacy during the evaluation.      This is a teletriage evaluation of a 49 y.o. female presenting to the ED with c/o RLQ abdominal pain for 2 weeks.  Also reports body aches that started last night. Limited physical exam via telehealth: The patient is awake, alert, answering questions appropriately and is not in respiratory distress.  As the Teletriage provider, I performed an initial assessment and ordered appropriate labs and imaging studies, if any, to facilitate the patient's care once placed in the ED. Once a room is available, care and a full evaluation will be completed by an alternate ED provider.  Any additional orders and the final disposition will be determined by that provider.  All imaging and labs will not be followed-up by the Teletriage Team, including myself.          ORDERS  Labs Reviewed - No data to display    ED Orders (720h ago, onward)      Start Ordered     Status Ordering Provider    01/28/25 1442 01/28/25 1441  Vital signs  Every 2 hours         Ordered MOISE PELAYO    01/28/25 1442 01/28/25 1441  Diet NPO  Diet effective now         Ordered MOISE PELAYO    01/28/25 1442 01/28/25 1441  Insert peripheral IV  Once         Ordered MOISE PELAYO    01/28/25 1442 01/28/25 1441  CBC W/ AUTO DIFFERENTIAL  STAT         Ordered MOISE PELAYO    01/28/25  1442 01/28/25 1441  Comp. Metabolic Panel  STAT         Ordered MOISE PELAYO    01/28/25 1442 01/28/25 1441  Lipase  STAT         Ordered MOISE PELAYO    01/28/25 1442 01/28/25 1441  Urinalysis, Reflex to Urine Culture Urine, Clean Catch  STAT         Ordered MOISE PELAYO    01/28/25 1442 01/28/25 1441  POCT urine pregnancy  Once         Ordered MOISE PELAYO    01/28/25 1442 01/28/25 1441  POCT COVID-19 Rapid Screening  Once         Ordered MOISE PELAYO    01/28/25 1442 01/28/25 1441  POCT Influenza A/B Molecular  Once         Ordered MOISE PELAYO              Virtual Visit Note: The provider triage portion of this emergency department evaluation and documentation was performed via [employer], a HIPAA-compliant telemedicine application, in concert with a tele-presenter in the room. A face to face patient evaluation with one of my colleagues will occur once the patient is placed in an emergency department room.      DISCLAIMER: This note was prepared with [employer] voice recognition transcription software. Garbled syntax, mangled pronouns, and other bizarre constructions may be attributed to that software system.

## 2025-02-19 DIAGNOSIS — Z79.4 TYPE 2 DIABETES MELLITUS WITH DIABETIC POLYNEUROPATHY, WITH LONG-TERM CURRENT USE OF INSULIN: ICD-10-CM

## 2025-02-19 DIAGNOSIS — E11.42 TYPE 2 DIABETES MELLITUS WITH DIABETIC POLYNEUROPATHY, WITH LONG-TERM CURRENT USE OF INSULIN: ICD-10-CM

## 2025-02-19 RX ORDER — TIRZEPATIDE 5 MG/.5ML
5 INJECTION, SOLUTION SUBCUTANEOUS
Qty: 2 ML | Refills: 0 | Status: SHIPPED | OUTPATIENT
Start: 2025-02-19 | End: 2025-03-21

## 2025-02-19 NOTE — TELEPHONE ENCOUNTER
Refill Routing Note   Medication(s) are not appropriate for processing by [facility] for the following reason(s):        Required labs outdated    [facility] action(s):  Defer        Medication Therapy Plan: Recent ED for Influenza      Appointments  past 12m or future 3m with PCP    Date Provider   Last Visit   9/27/2024 Dominic Hull, DO   Next Visit   3/20/2025 Dominic Hull, DO   ED visits in past 90 days: 1        Note composed:2:17 PM 02/19/2025

## 2025-02-19 NOTE — TELEPHONE ENCOUNTER
Care Due:                  Date            Visit Type   Department     Provider  --------------------------------------------------------------------------------                                             [facility]  Last Visit: 09-      [facility]          PRECIOUS Hull                               -                              PRIMARY      [facility]  Next Visit: 03-      CARE ([facility])   [facility]       Dominic Hull                                                            Last  Test          Frequency    Reason                     Performed    Due Date  --------------------------------------------------------------------------------    HBA1C.......  6 months...  tirzepatide..............  05- 11-    [facility] Embedded Care Due Messages. Reference number: [id number].   2/19/2025 12:45:22 PM CST

## 2025-03-04 DIAGNOSIS — E11.42 TYPE 2 DIABETES MELLITUS WITH DIABETIC POLYNEUROPATHY, WITH LONG-TERM CURRENT USE OF INSULIN: ICD-10-CM

## 2025-03-04 DIAGNOSIS — M54.50 ACUTE BILATERAL LOW BACK PAIN WITHOUT SCIATICA: ICD-10-CM

## 2025-03-04 DIAGNOSIS — Z79.4 TYPE 2 DIABETES MELLITUS WITH DIABETIC POLYNEUROPATHY, WITH LONG-TERM CURRENT USE OF INSULIN: ICD-10-CM

## 2025-03-05 RX ORDER — GABAPENTIN 600 MG/1
600 TABLET ORAL 2 TIMES DAILY
Qty: 180 TABLET | Refills: 1 | Status: SHIPPED | OUTPATIENT
Start: 2025-03-05

## 2025-03-05 RX ORDER — TIRZEPATIDE 5 MG/.5ML
5 INJECTION, SOLUTION SUBCUTANEOUS
Qty: 2 ML | Refills: 0 | Status: SHIPPED | OUTPATIENT
Start: 2025-03-05 | End: 2025-04-04

## 2025-03-05 NOTE — TELEPHONE ENCOUNTER
Refill Routing Note   Medication(s) are not appropriate for processing by [facility] for the following reason(s):        Required labs outdated    [facility] action(s):  Defer   Requires labs : Yes -A1c            Appointments  past 12m or future 3m with PCP    Date Provider   Last Visit   Visit date not found Pavel Garcia PA   Next Visit   Visit date not found Pavel Garcia PA   ED visits in past 90 days: 1        Note composed:12:04 PM 03/05/2025

## 2025-03-05 NOTE — TELEPHONE ENCOUNTER
Refill Routing Note   Medication(s) are not appropriate for processing by [facility] for the following reason(s):        Required labs outdated    [facility] action(s):  Defer   Requires labs : Yes             Appointments  past 12m or future 3m with PCP    Date Provider   Last Visit   Visit date not found Pavel Garcia PA   Next Visit   Visit date not found Pavel Garcia PA   ED visits in past 90 days: 1        Note composed:12:04 PM 03/05/2025

## 2025-03-10 ENCOUNTER — PATIENT MESSAGE (OUTPATIENT)
Dept: FAMILY MEDICINE | Facility: CLINIC | Age: 50
End: 2025-03-10
Payer: COMMERCIAL

## 2025-03-13 ENCOUNTER — PATIENT MESSAGE (OUTPATIENT)
Dept: FAMILY MEDICINE | Facility: CLINIC | Age: 50
End: 2025-03-13
Payer: COMMERCIAL

## 2025-03-20 ENCOUNTER — PATIENT OUTREACH (OUTPATIENT)
Dept: ADMINISTRATIVE | Facility: HOSPITAL | Age: 50
End: 2025-03-20
Payer: COMMERCIAL

## 2025-03-20 NOTE — PROGRESS NOTES
03/20/2025  VB chart audit performed. Care Everywhere updates requested and reviewed  Overdue HM topic chart audit and/or requested. LINKS triggered and reconciled. Media reviewed Lvm/portal sent regarding overdue health topics

## 2025-03-20 NOTE — PROGRESS NOTES
Health Maintenance Topic(s) Outreach Outcomes & Actions Taken:    Eye Exam - Outreach Outcomes & Actions Taken  : External Records Requested & Care Team Updated if Applicable

## 2025-03-20 NOTE — LETTER
AUTHORIZATION FOR RELEASE OF   CONFIDENTIAL INFORMATION    Dear Dr Ribeiro,    We are seeing Hamzah Reis, date of birth 1975, in the clinic at [facility]. Dominic Hull DO is the patient's PCP. Hamzah Reis has an outstanding lab/procedure at the time we reviewed her chart. In order to help keep her health information updated, she has authorized us to request the following medical record(s):                                                ( xx )  EYE EXAM                      Please fax records to Dominic Hull DO, [phone]               Patient Name: Hamzah Reis  : 1975  Patient Phone #: [phone]

## 2025-03-24 ENCOUNTER — OFFICE VISIT (OUTPATIENT)
Dept: FAMILY MEDICINE | Facility: CLINIC | Age: 50
End: 2025-03-24
Payer: COMMERCIAL

## 2025-03-24 ENCOUNTER — TELEPHONE (OUTPATIENT)
Dept: FAMILY MEDICINE | Facility: CLINIC | Age: 50
End: 2025-03-24

## 2025-03-24 ENCOUNTER — LAB VISIT (OUTPATIENT)
Dept: LAB | Facility: HOSPITAL | Age: 50
End: 2025-03-24
Attending: INTERNAL MEDICINE
Payer: COMMERCIAL

## 2025-03-24 VITALS
HEART RATE: 76 BPM | WEIGHT: 254.88 LBS | HEIGHT: 66 IN | DIASTOLIC BLOOD PRESSURE: 90 MMHG | BODY MASS INDEX: 40.96 KG/M2 | SYSTOLIC BLOOD PRESSURE: 132 MMHG | OXYGEN SATURATION: 98 %

## 2025-03-24 DIAGNOSIS — M54.42 CHRONIC LEFT-SIDED LOW BACK PAIN WITH LEFT-SIDED SCIATICA: ICD-10-CM

## 2025-03-24 DIAGNOSIS — G89.29 CHRONIC LEFT-SIDED LOW BACK PAIN WITH LEFT-SIDED SCIATICA: ICD-10-CM

## 2025-03-24 DIAGNOSIS — N94.9 ADNEXAL CYST: ICD-10-CM

## 2025-03-24 DIAGNOSIS — E11.42 TYPE 2 DIABETES MELLITUS WITH DIABETIC POLYNEUROPATHY, WITH LONG-TERM CURRENT USE OF INSULIN: ICD-10-CM

## 2025-03-24 DIAGNOSIS — E11.42 TYPE 2 DIABETES MELLITUS WITH DIABETIC POLYNEUROPATHY, WITH LONG-TERM CURRENT USE OF INSULIN: Primary | ICD-10-CM

## 2025-03-24 DIAGNOSIS — Z79.4 TYPE 2 DIABETES MELLITUS WITH DIABETIC POLYNEUROPATHY, WITH LONG-TERM CURRENT USE OF INSULIN: ICD-10-CM

## 2025-03-24 DIAGNOSIS — Z79.4 TYPE 2 DIABETES MELLITUS WITH DIABETIC POLYNEUROPATHY, WITH LONG-TERM CURRENT USE OF INSULIN: Primary | ICD-10-CM

## 2025-03-24 DIAGNOSIS — Z12.31 SCREENING MAMMOGRAM, ENCOUNTER FOR: Primary | ICD-10-CM

## 2025-03-24 DIAGNOSIS — N92.1 MENORRHAGIA WITH IRREGULAR CYCLE: ICD-10-CM

## 2025-03-24 DIAGNOSIS — Z59.819 HOUSING INSECURITY: ICD-10-CM

## 2025-03-24 DIAGNOSIS — E66.813 CLASS 3 SEVERE OBESITY WITH SERIOUS COMORBIDITY AND BODY MASS INDEX (BMI) OF 40.0 TO 44.9 IN ADULT, UNSPECIFIED OBESITY TYPE: ICD-10-CM

## 2025-03-24 DIAGNOSIS — E66.01 CLASS 3 SEVERE OBESITY WITH SERIOUS COMORBIDITY AND BODY MASS INDEX (BMI) OF 40.0 TO 44.9 IN ADULT, UNSPECIFIED OBESITY TYPE: ICD-10-CM

## 2025-03-24 LAB
ALBUMIN SERPL BCP-MCNC: 3.5 G/DL (ref 3.5–5.2)
ALP SERPL-CCNC: 77 UNIT/L (ref 40–150)
ALT SERPL W/O P-5'-P-CCNC: 16 UNIT/L (ref 10–44)
ANION GAP (OHS): 11 MMOL/L (ref 8–16)
AST SERPL-CCNC: 14 UNIT/L (ref 11–45)
BILIRUB SERPL-MCNC: 0.4 MG/DL (ref 0.1–1)
BUN SERPL-MCNC: 11 MG/DL (ref 6–20)
CALCIUM SERPL-MCNC: 9.3 MG/DL (ref 8.7–10.5)
CHLORIDE SERPL-SCNC: 104 MMOL/L (ref 95–110)
CHOLEST SERPL-MCNC: 178 MG/DL (ref 120–199)
CHOLEST/HDLC SERPL: 3.9 {RATIO} (ref 2–5)
CO2 SERPL-SCNC: 22 MMOL/L (ref 23–29)
CREAT SERPL-MCNC: 0.7 MG/DL (ref 0.5–1.4)
EAG (OHS): 237 MG/DL (ref 68–131)
GFR SERPLBLD CREATININE-BSD FMLA CKD-EPI: >60 ML/MIN/1.73/M2
GLUCOSE SERPL-MCNC: 194 MG/DL (ref 70–110)
HBA1C MFR BLD: 9.9 % (ref 4–5.6)
HDLC SERPL-MCNC: 46 MG/DL (ref 40–75)
HDLC SERPL: 25.8 % (ref 20–50)
LDLC SERPL CALC-MCNC: 114.8 MG/DL (ref 63–159)
NONHDLC SERPL-MCNC: 132 MG/DL
POTASSIUM SERPL-SCNC: 4.3 MMOL/L (ref 3.5–5.1)
PROT SERPL-MCNC: 7.3 GM/DL (ref 6–8.4)
SODIUM SERPL-SCNC: 137 MMOL/L (ref 136–145)
TRIGL SERPL-MCNC: 86 MG/DL (ref 30–150)

## 2025-03-24 PROCEDURE — 80061 LIPID PANEL: CPT

## 2025-03-24 PROCEDURE — 99215 OFFICE O/P EST HI 40 MIN: CPT | Mod: ,,, | Performed by: STUDENT IN AN ORGANIZED HEALTH CARE EDUCATION/TRAINING PROGRAM

## 2025-03-24 PROCEDURE — G2211 COMPLEX E/M VISIT ADD ON: HCPCS | Mod: ,,, | Performed by: STUDENT IN AN ORGANIZED HEALTH CARE EDUCATION/TRAINING PROGRAM

## 2025-03-24 PROCEDURE — 83036 HEMOGLOBIN GLYCOSYLATED A1C: CPT

## 2025-03-24 PROCEDURE — 3075F SYST BP GE 130 - 139MM HG: CPT | Mod: CPTII,,, | Performed by: STUDENT IN AN ORGANIZED HEALTH CARE EDUCATION/TRAINING PROGRAM

## 2025-03-24 PROCEDURE — 99999 PR PBB SHADOW E&M-EST. PATIENT-LVL V: CPT | Mod: PBBFAC,,, | Performed by: STUDENT IN AN ORGANIZED HEALTH CARE EDUCATION/TRAINING PROGRAM

## 2025-03-24 PROCEDURE — 1160F RVW MEDS BY RX/DR IN RCRD: CPT | Mod: CPTII,,, | Performed by: STUDENT IN AN ORGANIZED HEALTH CARE EDUCATION/TRAINING PROGRAM

## 2025-03-24 PROCEDURE — 36415 COLL VENOUS BLD VENIPUNCTURE: CPT | Mod: PO

## 2025-03-24 PROCEDURE — 3008F BODY MASS INDEX DOCD: CPT | Mod: CPTII,,, | Performed by: STUDENT IN AN ORGANIZED HEALTH CARE EDUCATION/TRAINING PROGRAM

## 2025-03-24 PROCEDURE — 82570 ASSAY OF URINE CREATININE: CPT

## 2025-03-24 PROCEDURE — 1159F MED LIST DOCD IN RCRD: CPT | Mod: CPTII,,, | Performed by: STUDENT IN AN ORGANIZED HEALTH CARE EDUCATION/TRAINING PROGRAM

## 2025-03-24 PROCEDURE — 80053 COMPREHEN METABOLIC PANEL: CPT

## 2025-03-24 PROCEDURE — 3080F DIAST BP >= 90 MM HG: CPT | Mod: CPTII,,, | Performed by: STUDENT IN AN ORGANIZED HEALTH CARE EDUCATION/TRAINING PROGRAM

## 2025-03-24 SDOH — SOCIAL DETERMINANTS OF HEALTH (SDOH): HOUSING INSTABILITY UNSPECIFIED: Z59.819

## 2025-03-24 NOTE — TELEPHONE ENCOUNTER
----- Message from Ketty sent at 3/24/2025  1:26 PM CDT -----  Type:  Mammogram Caller is requesting to schedule their annual mammogram appointment.  Order is not listed in EPIC.  Please enter order and contact patient to schedule.Name of Caller: Where would they like the mammogram performed?[facility] Would the patient rather a call back or a response via [facility]? Best Call Back Number:.[phone]  Additional Information:

## 2025-03-24 NOTE — PROGRESS NOTES
Progress Note  [facility]    Subjective:     Hamzah Reis is a 49 y.o. year old female with current diagnoses of  HTN, type 2 diabetes, hyperlipidemia, chronic back pain, and heavy menstrual cycles who presents to clinic for multiple complaints    T2DM: Not checking sugar regularly. Lantus 30U nightly, mounjaro 5mg weekly. Sugar has been fasting 140-160's. Has been back on Mounjaro for 1 week.     Life stressors: Has had some increase stress. Doing better recently. Doesn't feel she needs medications to help. Issues mainly around housing insecurity and couldn't afford medications during this time. Gained weight with this.     Weight: Had been doing well with Mounjaro but then had some housing stressors and was off of it for a few months. Interested in bariatric medicine.     Menorrhagia: Took provera and missed a couple of doses and bleeding got heavier so stopped taking this. MCV low but Hgb wnl. Has to miss work due to cramping from periods. Would like to get hysterectomy but went to get this done they refused to do it until Hgb A1C less than 8. Cyst noted on ovary on CT abd pelvis in January, slightly larger from last year. Periods are irregular.     Pt has persistent L sided low back pain. Does radiate down the leg. Gabapentin and ibuprofen has not helped. Never did PT as previously recommended.     Patient Active Problem List    Diagnosis Date Noted    Chronic left-sided low back pain with left-sided sciatica 09/27/2024    Hemorrhagic ovarian cyst 03/29/2024    Other chest pain 03/29/2024    Anxiety and depression 03/07/2022    Menorrhagia 08/11/2020    Hyperlipidemia associated with type 2 diabetes mellitus 03/18/2020    Strain of left quadriceps tendon 09/29/2015    Left hamstring muscle strain 03/25/2015    BMI 39.0-39.9,adult 03/25/2015    Type 2 diabetes mellitus with diabetic polyneuropathy, with long-term current use of insulin 11/06/2014        Review of patient's  "allergies indicates:  No Known Allergies     Past Medical History:   Diagnosis Date    Depression     Diabetes mellitus, type 2     Hyperlipidemia     Hypertension     Vision impairment     wears glasses      Past Surgical History:   Procedure Laterality Date    ENDOMETRIAL ABLATION N/A 2020    Procedure: ABLATION, ENDOMETRIUM;  Surgeon: Michael A. Wiedemann, MD;  Location: [facility] OR;  Service: OB/GYN;  Laterality: N/A;    HYSTEROSCOPY WITH DILATION AND CURETTAGE OF UTERUS N/A 2020    Procedure: HYSTEROSCOPY, WITH DILATION AND CURETTAGE OF UTERUS;  Surgeon: Michael A. Wiedemann, MD;  Location: [facility] OR;  Service: OB/GYN;  Laterality: N/A;      Family History   Problem Relation Name Age of Onset    Hypertension Mother  55            Diabetes Mother      Cancer Mother          cervical    Heart disease Father      Diabetes Sister      Hypertension Sister      Cancer Sister          cervical    Breast cancer Sister      Diabetes Maternal Grandmother        Social History     Tobacco Use    Smoking status: Never     Passive exposure: Never    Smokeless tobacco: Never   Substance Use Topics    Alcohol use: No        Objective:     Vitals:    25 1029   BP: (!) 132/90   BP Location: Left arm   Patient Position: Sitting   Pulse: 76   SpO2: 98%   Weight: 115.6 kg (254 lb 13.6 oz)   Height: 5' 6" (1.676 m)     BMI: 41.13    Gen: No apparent distress, well nourished and developed, appears stated age  CV: RRR, S1 and S2 present, no LE edema  Resp: CTAB, normal respiratory effort      Assessment/Plan:     Hamzah Reis is a 49 y.o. year old female who presents to clinic for multiple complaints.     1. Type 2 diabetes mellitus with diabetic polyneuropathy, with long-term current use of insulin (Primary)  Will assist with getting labs done for endocrinologist and will reach out to see if we can get her looped back in. Would not recommend increasing mounjaro dose until she has been back on this for about a " month. Pt verbalized understanding and was in agreement with the plan.      2. Housing insecurity  Agreeable to case management referral.   - Ambulatory referral/consult to Outpatient Case Management    3. Menorrhagia with irregular cycle  Will reach out to gynecologist about her heavy periods causing her to miss work and that provera did not help. If unable to see her in the next month will prescribe tranexamic acid. Medication, side effects and proper use discussed.  Pt verbalized understanding and was in agreement with the plan.      4. Adnexal cyst  Has changed in size over the last year. Should continue to follow with gyn. Pt verbalized understanding and was in agreement with the plan.      5. Chronic left-sided low back pain with left-sided sciatica  Recommend PT. If no improvement when can consider MRI and pain referral. Pt verbalized understanding and was in agreement with the plan.    - PT evaluate and treat; Future    6. Class 3 severe obesity with serious comorbidity and body mass index (BMI) of 40.0 to 44.9 in adult, unspecified obesity type  Had done well on mounjaro previously. Discussed can refer to bariatric surgery but need to ensure T2DM well controlled first. Pt verbalized understanding and was in agreement with the plan.      Follow-up: 6 weeks for back pain/weight     I spent a total of 45 minutes on the day of the visit.This includes face to face time and non-face to face time preparing to see the patient (eg, review of tests), obtaining and/or reviewing separately obtained history, documenting clinical information in the electronic or other health record, independently interpreting results and communicating results to the patient/family/caregiver, or care coordinator.      Dominic Hull, DO  Family Medicine

## 2025-03-25 ENCOUNTER — RESULTS FOLLOW-UP (OUTPATIENT)
Dept: ENDOCRINOLOGY | Facility: CLINIC | Age: 50
End: 2025-03-25

## 2025-03-25 ENCOUNTER — PATIENT OUTREACH (OUTPATIENT)
Dept: ADMINISTRATIVE | Facility: HOSPITAL | Age: 50
End: 2025-03-25
Payer: COMMERCIAL

## 2025-03-25 LAB
ALBUMIN/CREAT UR: 7.9 UG/MG
CREAT UR-MCNC: 114 MG/DL (ref 15–325)
MICROALBUMIN UR-MCNC: 9 UG/ML (ref ?–5000)

## 2025-03-25 NOTE — PROGRESS NOTES
Health Maintenance Topic(s) Outreach Outcomes & Actions Taken:    Eye Exam - Outreach Outcomes & Actions Taken  : Diabetic Eye External Records Uploaded, Care Team & History Updated if Applicable

## 2025-03-27 ENCOUNTER — HOSPITAL ENCOUNTER (OUTPATIENT)
Dept: RADIOLOGY | Facility: HOSPITAL | Age: 50
Discharge: HOME | End: 2025-03-27
Attending: STUDENT IN AN ORGANIZED HEALTH CARE EDUCATION/TRAINING PROGRAM
Payer: COMMERCIAL

## 2025-03-27 DIAGNOSIS — Z12.31 SCREENING MAMMOGRAM, ENCOUNTER FOR: ICD-10-CM

## 2025-03-27 PROCEDURE — 77063 BREAST TOMOSYNTHESIS BI: CPT | Mod: 26,,, | Performed by: RADIOLOGY

## 2025-03-27 PROCEDURE — 77067 SCR MAMMO BI INCL CAD: CPT | Mod: 26,,, | Performed by: RADIOLOGY

## 2025-03-27 PROCEDURE — 77067 SCR MAMMO BI INCL CAD: CPT | Mod: TC

## 2025-03-28 ENCOUNTER — RESULTS FOLLOW-UP (OUTPATIENT)
Dept: FAMILY MEDICINE | Facility: CLINIC | Age: 50
End: 2025-03-28

## 2025-04-01 DIAGNOSIS — Z79.4 TYPE 2 DIABETES MELLITUS WITH DIABETIC POLYNEUROPATHY, WITH LONG-TERM CURRENT USE OF INSULIN: Primary | ICD-10-CM

## 2025-04-01 DIAGNOSIS — E11.42 TYPE 2 DIABETES MELLITUS WITH DIABETIC POLYNEUROPATHY, WITH LONG-TERM CURRENT USE OF INSULIN: Primary | ICD-10-CM

## 2025-04-01 RX ORDER — PEN NEEDLE, DIABETIC 32 GX 1/6"
1 NEEDLE, DISPOSABLE MISCELLANEOUS DAILY
Qty: 100 EACH | Refills: 3 | Status: SHIPPED | OUTPATIENT
Start: 2025-04-01

## 2025-04-08 ENCOUNTER — TELEPHONE (OUTPATIENT)
Dept: OBSTETRICS AND GYNECOLOGY | Facility: CLINIC | Age: 50
End: 2025-04-08
Payer: COMMERCIAL

## 2025-04-08 NOTE — TELEPHONE ENCOUNTER
----- Message from Roselyn sent at 4/7/2025  8:42 AM CDT -----  Type: CalL Back Who Called:ptDoes the patient know what this is regarding?:Appointment Would the patient rather a call back or a response via [facility]? Call Best Call Back Number: [phone] Information: pt stated she received a message that she needs to be seen today but no appointment was scheduled

## 2025-04-08 NOTE — TELEPHONE ENCOUNTER
Case: [id number] Date/Time: 07/30/23 1315    Procedure: GASTROSCOPY    Location:  ENDOSCOPIC ULTRASOUND ROOM / SURGERY [facility]    Surgeons: Kumar Hope M.D.          Relevant Problems      (positive) ELISABET (acute kidney injury) (HCC)       Physical Exam    Airway   Mallampati: III  TM distance: >3 FB  Neck ROM: limited       Cardiovascular - normal exam  Rhythm: regular  Rate: normal  (-) murmur     Dental - normal exam           Pulmonary - normal exam  Breath sounds clear to auscultation     Abdominal    Neurological - normal exam         Other findings: Acute GI bleed            Anesthesia Plan    ASA 2- EMERGENT       Plan - general       Airway plan will be natural airway    (Propofol TIVA)      Induction: intravenous    Postoperative Plan: Postoperative administration of opioids is intended.    Pertinent diagnostic labs and testing reviewed    Informed Consent:    Anesthetic plan and risks discussed with patient.    Use of blood products discussed with: patient whom consented to blood products.          Someone from the clinic contacted her and reschedule her for 04/14

## 2025-04-11 ENCOUNTER — PATIENT OUTREACH (OUTPATIENT)
Dept: ADMINISTRATIVE | Facility: OTHER | Age: 50
End: 2025-04-11
Payer: COMMERCIAL

## 2025-04-14 ENCOUNTER — LAB VISIT (OUTPATIENT)
Dept: LAB | Facility: HOSPITAL | Age: 50
End: 2025-04-14
Attending: OBSTETRICS & GYNECOLOGY
Payer: COMMERCIAL

## 2025-04-14 ENCOUNTER — OFFICE VISIT (OUTPATIENT)
Dept: OBSTETRICS AND GYNECOLOGY | Facility: CLINIC | Age: 50
End: 2025-04-14
Payer: COMMERCIAL

## 2025-04-14 ENCOUNTER — RESULTS FOLLOW-UP (OUTPATIENT)
Dept: ANESTHESIOLOGY | Facility: HOSPITAL | Age: 50
End: 2025-04-14

## 2025-04-14 ENCOUNTER — PATIENT OUTREACH (OUTPATIENT)
Dept: ADMINISTRATIVE | Facility: OTHER | Age: 50
End: 2025-04-14
Payer: COMMERCIAL

## 2025-04-14 VITALS — DIASTOLIC BLOOD PRESSURE: 86 MMHG | SYSTOLIC BLOOD PRESSURE: 148 MMHG | HEIGHT: 66 IN | BODY MASS INDEX: 41.13 KG/M2

## 2025-04-14 DIAGNOSIS — Z01.818 PREOPERATIVE TESTING: ICD-10-CM

## 2025-04-14 DIAGNOSIS — E11.42 TYPE 2 DIABETES MELLITUS WITH DIABETIC POLYNEUROPATHY, WITH LONG-TERM CURRENT USE OF INSULIN: ICD-10-CM

## 2025-04-14 DIAGNOSIS — R10.2 PELVIC PAIN IN FEMALE: ICD-10-CM

## 2025-04-14 DIAGNOSIS — R10.2 PELVIC PAIN IN FEMALE: Primary | ICD-10-CM

## 2025-04-14 DIAGNOSIS — Z79.4 TYPE 2 DIABETES MELLITUS WITH DIABETIC POLYNEUROPATHY, WITH LONG-TERM CURRENT USE OF INSULIN: ICD-10-CM

## 2025-04-14 LAB
EAG (OHS): 252 MG/DL (ref 68–131)
FSH SERPL-ACNC: 14.04 MIU/ML
HBA1C MFR BLD: 10.4 % (ref 4–5.6)

## 2025-04-14 PROCEDURE — 88175 CYTOPATH C/V AUTO FLUID REDO: CPT | Performed by: OBSTETRICS & GYNECOLOGY

## 2025-04-14 PROCEDURE — 3046F HEMOGLOBIN A1C LEVEL >9.0%: CPT | Mod: CPTII,S$GLB,, | Performed by: OBSTETRICS & GYNECOLOGY

## 2025-04-14 PROCEDURE — 3079F DIAST BP 80-89 MM HG: CPT | Mod: CPTII,S$GLB,, | Performed by: OBSTETRICS & GYNECOLOGY

## 2025-04-14 PROCEDURE — 83001 ASSAY OF GONADOTROPIN (FSH): CPT

## 2025-04-14 PROCEDURE — 3061F NEG MICROALBUMINURIA REV: CPT | Mod: CPTII,S$GLB,, | Performed by: OBSTETRICS & GYNECOLOGY

## 2025-04-14 PROCEDURE — 99396 PREV VISIT EST AGE 40-64: CPT | Mod: S$GLB,,, | Performed by: OBSTETRICS & GYNECOLOGY

## 2025-04-14 PROCEDURE — 99999 PR PBB SHADOW E&M-EST. PATIENT-LVL III: CPT | Mod: PBBFAC,,, | Performed by: OBSTETRICS & GYNECOLOGY

## 2025-04-14 PROCEDURE — 3077F SYST BP >= 140 MM HG: CPT | Mod: CPTII,S$GLB,, | Performed by: OBSTETRICS & GYNECOLOGY

## 2025-04-14 PROCEDURE — 3066F NEPHROPATHY DOC TX: CPT | Mod: CPTII,S$GLB,, | Performed by: OBSTETRICS & GYNECOLOGY

## 2025-04-14 PROCEDURE — 83036 HEMOGLOBIN GLYCOSYLATED A1C: CPT

## 2025-04-14 PROCEDURE — 1159F MED LIST DOCD IN RCRD: CPT | Mod: CPTII,S$GLB,, | Performed by: OBSTETRICS & GYNECOLOGY

## 2025-04-14 PROCEDURE — 36415 COLL VENOUS BLD VENIPUNCTURE: CPT

## 2025-04-14 RX ORDER — TIRZEPATIDE 5 MG/.5ML
5 INJECTION, SOLUTION SUBCUTANEOUS
COMMUNITY

## 2025-04-14 NOTE — PROGRESS NOTES
HPI:   49 y.o.   OB History          2    Para   2    Term   2            AB        Living             SAB        IAB        Ectopic        Multiple        Live Births                  Patient's last menstrual period was 2025 (approximate).    Patient is  here for her annual gynecologic exam.  She has no complaints.     ROS:  GENERAL: No fever, chills, fatigability or weight loss.  SKIN: No rashes, itching or changes in color or texture of skin.  HEAD: No headaches or recent head trauma.  EYES: Visual acuity fine. No photophobia, ocular pain or diplopia.  EARS: Denies ear pain, discharge or vertigo.  NOSE: No loss of smell, no epistaxis or postnasal drip.  MOUTH & THROAT: No hoarseness or change in voice. No excessive gum bleeding.  NODES: Denies swollen glands.  CHEST: Denies ANDERS, cyanosis, wheezing, cough and sputum production.  CARDIOVASCULAR: Denies chest pain, PND, orthopnea or reduced exercise tolerance.  ABDOMEN: Appetite fine. No weight loss. Denies diarrhea, abdominal pain, hematemesis or blood in stool.  URINARY: No flank pain, dysuria or hematuria.  PERIPHERAL VASCULAR: No claudication or cyanosis.  MUSCULOSKELETAL: No joint stiffness or swelling. Denies back pain.  NEUROLOGIC: No history of seizures, paralysis, alteration of gait or coordination.    PE:   BP (!) 148/86 (BP Location: Left arm, Patient Position: Sitting)   LMP 2025 (Approximate)   APPEARANCE: Well nourished, well developed, in no acute distress.  NECK: Neck symmetric without masses or thyromegaly.  BREASTS: Symmetrical, no skin changes or visible lesions. No palpable masses, nipple discharge or adenopathy bilaterally.  ABDOMEN: Flat. Soft. No tenderness or masses. No hepatosplenomegaly. No hernias. No CVA tenderness.  VULVA: No lesions. Normal female genitalia.  URETHRAL MEATUS: Normal size and location, no lesions, no prolapse.  URETHRA: No masses, tenderness, prolapse or scarring.  VAGINA: Moist and well  rugated, no discharge, no significant cystocele or rectocele.  CERVIX: No lesions and discharge. PAP done.  UTERUS: Normal size, regular shape, mobile, non-tender, bladder base nontender.  ADNEXA: No masses, tenderness or CDS nodularity.  ANUS PERINEUM: Normal.    PROCEDURES:  Pap smear    Assessment:  Normal Gynecologic Exam    Plan:  Mammogram and Colonoscopy if indicated by current recommendations.  Return to clinic in one year or for any problems or complaints.  Long hx pelvic pain, was sched for hyst, but cancelled for sugar  Pain pretty constant, nothing makes worse or better  No gi co  Uterus sl tender to motion  See prev notes  Last 2 cycles just spottng very very light 2 days  Plan, fsh, us

## 2025-04-14 NOTE — PROGRESS NOTES
CHW - Outreach Attempt    Community Health Worker left a voicemail message for 1st attempt to contact patient regarding:   Housing  Prescription Assistance/Medication    CHW sent portal message also     Community Health Worker to attempt to contact patient on: 4/14/25

## 2025-04-17 ENCOUNTER — PATIENT OUTREACH (OUTPATIENT)
Dept: ADMINISTRATIVE | Facility: OTHER | Age: 50
End: 2025-04-17
Payer: COMMERCIAL

## 2025-04-17 NOTE — PROGRESS NOTES
CHW - Outreach Attempt    Community Health Worker left a voicemail message for 2nd attempt to contact patient regarding: referral states Housing and   Prescription Assistance/Medication (cost of Rx).    CHW sent portal message with into and CHW's contact which was Last read by Hamzah Reis at 9:14PM on 4/14/2025.    Community Health Worker to attempt to contact patient on:

## 2025-04-21 ENCOUNTER — PATIENT OUTREACH (OUTPATIENT)
Dept: ADMINISTRATIVE | Facility: OTHER | Age: 50
End: 2025-04-21
Payer: COMMERCIAL

## 2025-04-21 NOTE — PROGRESS NOTES
"CHW - Initial Contact    This Community Health Worker completed the Social Determinant of Health questionnaire with patient via telephone today.      Pt identified barriers of most importance are: referral states " housing and prescription assistance"    Pt stated during the visit with PCP she was living in a hotel however just recently pt stated she obtained stable housing and lives in an apartment    Pt stated she needs Rx assistance for     MOUNJARO and LANTUS pt stated the co pays can range between $100 - $200 and she hasn't taken the Rx in a few months   CHW explained [facility] PAP program, pt gave CHW verbal permission to send a referral to them     Support and Services: CHW sent rental assistance to pt's [employer] portal and sent a referral to [facility] PAP via in basket     Follow up required: yes    Follow-up Outreach - Due: 4/24/2025     "

## 2025-04-22 ENCOUNTER — TELEPHONE (OUTPATIENT)
Dept: PHARMACY | Facility: CLINIC | Age: 50
End: 2025-04-22
Payer: COMMERCIAL

## 2025-04-22 ENCOUNTER — TELEPHONE (OUTPATIENT)
Dept: OBSTETRICS AND GYNECOLOGY | Facility: CLINIC | Age: 50
End: 2025-04-22
Payer: COMMERCIAL

## 2025-04-22 ENCOUNTER — PATIENT MESSAGE (OUTPATIENT)
Dept: OBSTETRICS AND GYNECOLOGY | Facility: CLINIC | Age: 50
End: 2025-04-22
Payer: COMMERCIAL

## 2025-04-22 DIAGNOSIS — R10.2 PELVIC PAIN IN FEMALE: Primary | ICD-10-CM

## 2025-04-22 DIAGNOSIS — R10.2 PELVIC PAIN IN FEMALE: ICD-10-CM

## 2025-04-22 RX ORDER — METRONIDAZOLE 500 MG/1
500 TABLET ORAL 2 TIMES DAILY
Qty: 12 TABLET | Refills: 0 | Status: SHIPPED | OUTPATIENT
Start: 2025-04-22

## 2025-04-22 RX ORDER — TRAMADOL HYDROCHLORIDE 50 MG/1
50 TABLET ORAL
Qty: 20 TABLET | Refills: 0 | Status: SHIPPED | OUTPATIENT
Start: 2025-04-22

## 2025-04-22 RX ORDER — TRAMADOL HYDROCHLORIDE 50 MG/1
50 TABLET ORAL
Qty: 20 TABLET | Refills: 0 | Status: CANCELLED | OUTPATIENT
Start: 2025-04-22

## 2025-04-22 NOTE — TELEPHONE ENCOUNTER
Currently we are unable to assist with PAP enrollment for the following reason(s):      Mounjaro does not offer patient assistance at this time. The only available option would be a co-pay card. These cards are available to patients with  private/commercial insurance only.        Maddy Gale  Pharmacy Patient Assistance Team

## 2025-04-22 NOTE — TELEPHONE ENCOUNTER
Pt is waiting on her Naproxen Rx and with BV RX.      Pt. Stated ibuprofen is not working ans she discussed this in clinic, she also stated her Pap results stated she has BV.     Symptoms:  Itching  Burning when urinating  Pressure/ pain  Odor  Brown lisa    LMP:  04/14    Pharmacy     [facility] - [place], [place] - [address]

## 2025-04-22 NOTE — TELEPHONE ENCOUNTER
[facility] does not have this R X in stock per patient she would like it to be sen to [place].        Pharmacy:   [id number] - [place] - [address] AT [facility]

## 2025-04-22 NOTE — TELEPHONE ENCOUNTER
----- Message from Magui sent at 4/22/2025  1:24 PM CDT -----  Type:  Needs Medical AdviceWho Called: pt BROOKE GONZÁLES [[id number]]Would the patient rather a call back or a response via [facility]? Call back Best Call Back Number: [phone] Additional Information: pt requesting a call back in regards to to prescription [facility] does not have tramadol in stock pt wants to know if medication can be sent [id number] - [place] - [address] AT [place] [address] [id number]: [phone] Fax: [phone]: Not open 24 hours

## 2025-04-22 NOTE — TELEPHONE ENCOUNTER
----- Message from Roselyn sent at 4/21/2025  8:26 AM CDT -----  Type:  Call back llWho Called:ptDoes the patient know what this is regarding?:prescription Would the patient rather a call back or a response via [facility]? Call Best Call Back Number: [phone] Information: pt stated a prescription was supposed to be called in for her at her last visit and the pharmacy hasn't received it

## 2025-04-23 ENCOUNTER — PATIENT OUTREACH (OUTPATIENT)
Dept: ADMINISTRATIVE | Facility: HOSPITAL | Age: 50
End: 2025-04-23
Payer: COMMERCIAL

## 2025-04-28 ENCOUNTER — PATIENT OUTREACH (OUTPATIENT)
Dept: ADMINISTRATIVE | Facility: OTHER | Age: 50
End: 2025-04-28
Payer: COMMERCIAL

## 2025-04-28 NOTE — PROGRESS NOTES
CHW - Case Closure    This Community Health Worker spoke to patient via telephone     Pt/Caregiver reported: Patient was successfully outreach by [facility] PAP team regarding prescription assistance    Per chart review pt viewed message from PAP team with savings card info on 4/28/25     And patient received the rental resources CHW sent via portal    Pt/Caregiver denied any additional needs at this time and agrees with episode closure at this time.  Provided patient with Community Health Worker's contact information and encouraged him/her to contact this Community Health Worker if additional needs arise.

## 2025-04-29 NOTE — TELEPHONE ENCOUNTER
CC was reviewed and signed by Dr Middleton.          No answer. I left a detailed VM informing he is cleared for upcoming procedure on 05/07/25.      I asked the pt to call us back with questions or concerns.        Copy of CC form was put in MA completed folder and sent to scanning.      Please note: the cardiac clearance form will be uploaded under the Media tab.      F: [phone]    No further action needed.    Called and left message for patient. She has a pansensitive E Coli urine culture and was treated with Cipro at time of clinic visit.

## 2025-05-05 ENCOUNTER — OFFICE VISIT (OUTPATIENT)
Dept: FAMILY MEDICINE | Facility: CLINIC | Age: 50
End: 2025-05-05
Payer: COMMERCIAL

## 2025-05-05 VITALS
HEART RATE: 76 BPM | OXYGEN SATURATION: 97 % | DIASTOLIC BLOOD PRESSURE: 80 MMHG | SYSTOLIC BLOOD PRESSURE: 116 MMHG | HEIGHT: 66 IN | WEIGHT: 254.63 LBS | BODY MASS INDEX: 40.92 KG/M2

## 2025-05-05 DIAGNOSIS — M54.42 CHRONIC LEFT-SIDED LOW BACK PAIN WITH LEFT-SIDED SCIATICA: Primary | ICD-10-CM

## 2025-05-05 DIAGNOSIS — Z79.4 TYPE 2 DIABETES MELLITUS WITH DIABETIC POLYNEUROPATHY, WITH LONG-TERM CURRENT USE OF INSULIN: ICD-10-CM

## 2025-05-05 DIAGNOSIS — E11.42 TYPE 2 DIABETES MELLITUS WITH DIABETIC POLYNEUROPATHY, WITH LONG-TERM CURRENT USE OF INSULIN: ICD-10-CM

## 2025-05-05 DIAGNOSIS — G89.29 CHRONIC LEFT-SIDED LOW BACK PAIN WITH LEFT-SIDED SCIATICA: Primary | ICD-10-CM

## 2025-05-05 PROCEDURE — 3079F DIAST BP 80-89 MM HG: CPT | Mod: CPTII,S$GLB,, | Performed by: STUDENT IN AN ORGANIZED HEALTH CARE EDUCATION/TRAINING PROGRAM

## 2025-05-05 PROCEDURE — 1159F MED LIST DOCD IN RCRD: CPT | Mod: CPTII,S$GLB,, | Performed by: STUDENT IN AN ORGANIZED HEALTH CARE EDUCATION/TRAINING PROGRAM

## 2025-05-05 PROCEDURE — 3074F SYST BP LT 130 MM HG: CPT | Mod: CPTII,S$GLB,, | Performed by: STUDENT IN AN ORGANIZED HEALTH CARE EDUCATION/TRAINING PROGRAM

## 2025-05-05 PROCEDURE — 99999 PR PBB SHADOW E&M-EST. PATIENT-LVL V: CPT | Mod: PBBFAC,,, | Performed by: STUDENT IN AN ORGANIZED HEALTH CARE EDUCATION/TRAINING PROGRAM

## 2025-05-05 PROCEDURE — 3066F NEPHROPATHY DOC TX: CPT | Mod: CPTII,S$GLB,, | Performed by: STUDENT IN AN ORGANIZED HEALTH CARE EDUCATION/TRAINING PROGRAM

## 2025-05-05 PROCEDURE — 1160F RVW MEDS BY RX/DR IN RCRD: CPT | Mod: CPTII,S$GLB,, | Performed by: STUDENT IN AN ORGANIZED HEALTH CARE EDUCATION/TRAINING PROGRAM

## 2025-05-05 PROCEDURE — G2211 COMPLEX E/M VISIT ADD ON: HCPCS | Mod: S$GLB,,, | Performed by: STUDENT IN AN ORGANIZED HEALTH CARE EDUCATION/TRAINING PROGRAM

## 2025-05-05 PROCEDURE — 99214 OFFICE O/P EST MOD 30 MIN: CPT | Mod: S$GLB,,, | Performed by: STUDENT IN AN ORGANIZED HEALTH CARE EDUCATION/TRAINING PROGRAM

## 2025-05-05 PROCEDURE — 3008F BODY MASS INDEX DOCD: CPT | Mod: CPTII,S$GLB,, | Performed by: STUDENT IN AN ORGANIZED HEALTH CARE EDUCATION/TRAINING PROGRAM

## 2025-05-05 PROCEDURE — 2023F DILAT RTA XM W/O RTNOPTHY: CPT | Mod: CPTII,S$GLB,, | Performed by: STUDENT IN AN ORGANIZED HEALTH CARE EDUCATION/TRAINING PROGRAM

## 2025-05-05 PROCEDURE — 3046F HEMOGLOBIN A1C LEVEL >9.0%: CPT | Mod: CPTII,S$GLB,, | Performed by: STUDENT IN AN ORGANIZED HEALTH CARE EDUCATION/TRAINING PROGRAM

## 2025-05-05 PROCEDURE — 3061F NEG MICROALBUMINURIA REV: CPT | Mod: CPTII,S$GLB,, | Performed by: STUDENT IN AN ORGANIZED HEALTH CARE EDUCATION/TRAINING PROGRAM

## 2025-05-05 RX ORDER — GABAPENTIN 300 MG/1
300 CAPSULE ORAL EVERY MORNING
Qty: 90 CAPSULE | Refills: 3 | Status: SHIPPED | OUTPATIENT
Start: 2025-05-05 | End: 2026-05-05

## 2025-05-05 NOTE — PROGRESS NOTES
Progress Note  [facility]    Subjective:     Hamzah Reis is a 49 y.o. year old female with current diagnoses of HTN, type 2 diabetes, hyperlipidemia, chronic back pain, and heavy menstrual cycles who presents to clinic for back pain.     Back pain: Referred to PT last appt. Never got called to schedule with PT after last appt. Gained some weight which hasn't helped either. Ibuprofen doesn't help. Gabapentin helps her feet but not her back. Pain is worse during the day at work.     T2DM: Recent Hgb A1C 10.4%. Seeing endocrinology. Currently on lantus 30U nightly, metformin 500mg BID, mounjaro 5mg weekly. Doing okay on the 5mg, had some nausea but tolerable. DEXCOM not approved. Sugar running in the 160-200's.       Patient Active Problem List    Diagnosis Date Noted    Chronic left-sided low back pain with left-sided sciatica 09/27/2024    Hemorrhagic ovarian cyst 03/29/2024    Other chest pain 03/29/2024    Anxiety and depression 03/07/2022    Menorrhagia 08/11/2020    Hyperlipidemia associated with type 2 diabetes mellitus 03/18/2020    Strain of left quadriceps tendon 09/29/2015    Left hamstring muscle strain 03/25/2015    BMI 39.0-39.9,adult 03/25/2015    Type 2 diabetes mellitus with diabetic polyneuropathy, with long-term current use of insulin 11/06/2014        Review of patient's allergies indicates:  No Known Allergies     Past Medical History:   Diagnosis Date    Depression     Diabetes mellitus, type 2     Hyperlipidemia     Hypertension     Vision impairment     wears glasses      Past Surgical History:   Procedure Laterality Date    ENDOMETRIAL ABLATION N/A 8/11/2020    Procedure: ABLATION, ENDOMETRIUM;  Surgeon: Michael A. Wiedemann, MD;  Location: [facility];  Service: OB/GYN;  Laterality: N/A;    HYSTEROSCOPY WITH DILATION AND CURETTAGE OF UTERUS N/A 8/11/2020    Procedure: HYSTEROSCOPY, WITH DILATION AND CURETTAGE OF UTERUS;  Surgeon: Michael A. Wiedemann, MD;   "Location: [facility] OR;  Service: OB/GYN;  Laterality: N/A;      Family History   Problem Relation Name Age of Onset    Hypertension Mother  55            Diabetes Mother      Cancer Mother          cervical    Heart disease Father      Diabetes Sister      Hypertension Sister      Cancer Sister          cervical    Breast cancer Sister      Diabetes Maternal Grandmother        Social History     Tobacco Use    Smoking status: Never     Passive exposure: Never    Smokeless tobacco: Never   Substance Use Topics    Alcohol use: No        Objective:     Vitals:    25 0943   BP: 116/80   BP Location: Right arm   Patient Position: Sitting   Pulse: 76   SpO2: 97%   Weight: 115.5 kg (254 lb 10.1 oz)   Height: 5' 6" (1.676 m)     BMI: 41.10    Gen: No apparent distress, well nourished and developed, appears stated age  CV: no LE edema  Resp: normal respiratory effort    Further physical exam unable to be performed as visit ended with patient taking a phone call and leaving the room prior to physical exam.    Assessment/Plan:     Hamzah Reis is a 49 y.o. year old female who presents to clinic for multiple complaints    1. Chronic left-sided low back pain with left-sided sciatica (Primary)  Will add 300mg gabapentin to take in the morning to see if this helps back pain as 600mg makes her drowsy. Declined interventional pain. Will re-refer to PT to see if conservative treatment will help. Pt verbalized understanding and was in agreement with the plan.    - Ambulatory Referral/Consult to Physical Therapy  - gabapentin (NEURONTIN) 300 MG capsule; Take 1 capsule (300 mg total) by mouth every morning.  Dispense: 90 capsule; Refill: 3    2. Type 2 diabetes mellitus with diabetic polyneuropathy, with long-term current use of insulin  Poorly controlled pending endo appt later this month. Will increase moinjaro. If 2 days after starting glucose still >150 should increase lantus 2U nightly until fasting sugar <150 " consistently. Will do E-visit in 1 week to assess sugar. Medication, side effects and proper use discussed. Pt verbalized understanding and was in agreement with the plan.    - tirzepatide 7.5 mg/0.5 mL PnIj; Inject 7.5 mg into the skin every 7 days.  Dispense: 6 mL; Refill: 3  - [facility] E-VISIT  - gabapentin (NEURONTIN) 300 MG capsule; Take 1 capsule (300 mg total) by mouth every morning.  Dispense: 90 capsule; Refill: 3       Follow-up: E-visit in 1 week for T2DM      Dominic Hull DO  Family Medicine

## 2025-05-05 NOTE — PATIENT INSTRUCTIONS
2 days after higher dose of mounjaro if sugar still above 150 consistently then increase lantus by 2 units nightly until your morning fasting sugar is less than 150 consistently.

## 2025-05-22 ENCOUNTER — TELEPHONE (OUTPATIENT)
Dept: OBSTETRICS AND GYNECOLOGY | Facility: CLINIC | Age: 50
End: 2025-05-22
Payer: COMMERCIAL

## 2025-05-22 NOTE — TELEPHONE ENCOUNTER
----- Message from Sonya sent at 5/22/2025  9:02 AM CDT -----  Type:  Needs Medical AdviceWho Called:  pt Would the patient rather a call back or a response via [facility]? Call back Best Call Back Number:  [phone] Information:  pt would like to get a return call in regards to a medication

## 2025-05-26 ENCOUNTER — TELEPHONE (OUTPATIENT)
Dept: OBSTETRICS AND GYNECOLOGY | Facility: CLINIC | Age: 50
End: 2025-05-26
Payer: COMMERCIAL

## 2025-05-26 DIAGNOSIS — R10.2 PELVIC PAIN IN FEMALE: Primary | ICD-10-CM

## 2025-05-26 NOTE — TELEPHONE ENCOUNTER
----- Message from Roselyn sent at 5/26/2025  9:16 AM CDT -----  Type:  Patient Returning CallWho Called:ptWho Left Message for Patient:Bridgette Does the patient know what this is regarding?:Medication Would the patient rather a call back or a response via [facility]? Call Best Call Back Number: [phone] Information:

## 2025-05-26 NOTE — TELEPHONE ENCOUNTER
Pt is requesting a refill on flagyl and ultram  Pt is also requesting refill on ibuprofen for cycles

## 2025-05-27 RX ORDER — IBUPROFEN 600 MG/1
600 TABLET, FILM COATED ORAL EVERY 6 HOURS PRN
Qty: 60 TABLET | Refills: 2 | Status: SHIPPED | OUTPATIENT
Start: 2025-05-27 | End: 2026-05-27

## 2025-05-27 RX ORDER — TRAMADOL HYDROCHLORIDE 50 MG/1
50 TABLET, FILM COATED ORAL EVERY 12 HOURS PRN
Qty: 20 TABLET | Refills: 0 | Status: SHIPPED | OUTPATIENT
Start: 2025-05-27

## 2025-05-27 RX ORDER — METRONIDAZOLE 500 MG/1
500 TABLET ORAL 2 TIMES DAILY
Qty: 12 TABLET | Refills: 1 | Status: SHIPPED | OUTPATIENT
Start: 2025-05-27

## 2025-06-02 NOTE — PLAN OF CARE
Pt AAOx4. PRN morphine given for c/o pain. No c/o N/V. Medications administered per MAR. On RA. Cardiac monitoring maintained. Blood glucose monitored, sliding scale insulin given. Bed in lowest position with wheels locked and side rails raised x2.    Pt has a shower with doors and grab bar. Pt owns a cane, Rollator, tub transfer bench and commode. Pt is R handed and does not drive.

## 2025-07-08 ENCOUNTER — PATIENT OUTREACH (OUTPATIENT)
Dept: ADMINISTRATIVE | Facility: HOSPITAL | Age: 50
End: 2025-07-08
Payer: COMMERCIAL

## 2025-08-06 ENCOUNTER — OFFICE VISIT (OUTPATIENT)
Dept: FAMILY MEDICINE | Facility: CLINIC | Age: 50
End: 2025-08-06
Payer: COMMERCIAL

## 2025-08-06 VITALS
BODY MASS INDEX: 41.56 KG/M2 | HEART RATE: 88 BPM | OXYGEN SATURATION: 98 % | WEIGHT: 258.63 LBS | DIASTOLIC BLOOD PRESSURE: 80 MMHG | HEIGHT: 66 IN | SYSTOLIC BLOOD PRESSURE: 120 MMHG

## 2025-08-06 DIAGNOSIS — E11.42 TYPE 2 DIABETES MELLITUS WITH DIABETIC POLYNEUROPATHY, WITH LONG-TERM CURRENT USE OF INSULIN: Primary | ICD-10-CM

## 2025-08-06 DIAGNOSIS — E11.69 HYPERLIPIDEMIA ASSOCIATED WITH TYPE 2 DIABETES MELLITUS: ICD-10-CM

## 2025-08-06 DIAGNOSIS — N93.9 ABNORMAL UTERINE BLEEDING (AUB): ICD-10-CM

## 2025-08-06 DIAGNOSIS — G89.29 CHRONIC LEFT-SIDED LOW BACK PAIN WITH LEFT-SIDED SCIATICA: ICD-10-CM

## 2025-08-06 DIAGNOSIS — Z12.11 COLON CANCER SCREENING: ICD-10-CM

## 2025-08-06 DIAGNOSIS — I10 PRIMARY HYPERTENSION: ICD-10-CM

## 2025-08-06 DIAGNOSIS — M54.42 CHRONIC LEFT-SIDED LOW BACK PAIN WITH LEFT-SIDED SCIATICA: ICD-10-CM

## 2025-08-06 DIAGNOSIS — E78.5 HYPERLIPIDEMIA ASSOCIATED WITH TYPE 2 DIABETES MELLITUS: ICD-10-CM

## 2025-08-06 DIAGNOSIS — Z79.4 TYPE 2 DIABETES MELLITUS WITH DIABETIC POLYNEUROPATHY, WITH LONG-TERM CURRENT USE OF INSULIN: Primary | ICD-10-CM

## 2025-08-06 PROBLEM — F41.9 ANXIETY AND DEPRESSION: Status: RESOLVED | Noted: 2022-03-07 | Resolved: 2025-08-06

## 2025-08-06 PROBLEM — F32.A ANXIETY AND DEPRESSION: Status: RESOLVED | Noted: 2022-03-07 | Resolved: 2025-08-06

## 2025-08-06 PROCEDURE — 99214 OFFICE O/P EST MOD 30 MIN: CPT | Mod: S$GLB,,, | Performed by: FAMILY MEDICINE

## 2025-08-06 PROCEDURE — 2023F DILAT RTA XM W/O RTNOPTHY: CPT | Mod: CPTII,S$GLB,, | Performed by: FAMILY MEDICINE

## 2025-08-06 PROCEDURE — 3079F DIAST BP 80-89 MM HG: CPT | Mod: CPTII,S$GLB,, | Performed by: FAMILY MEDICINE

## 2025-08-06 PROCEDURE — 3066F NEPHROPATHY DOC TX: CPT | Mod: CPTII,S$GLB,, | Performed by: FAMILY MEDICINE

## 2025-08-06 PROCEDURE — 3046F HEMOGLOBIN A1C LEVEL >9.0%: CPT | Mod: CPTII,S$GLB,, | Performed by: FAMILY MEDICINE

## 2025-08-06 PROCEDURE — 3074F SYST BP LT 130 MM HG: CPT | Mod: CPTII,S$GLB,, | Performed by: FAMILY MEDICINE

## 2025-08-06 PROCEDURE — 4010F ACE/ARB THERAPY RXD/TAKEN: CPT | Mod: CPTII,S$GLB,, | Performed by: FAMILY MEDICINE

## 2025-08-06 PROCEDURE — 99999 PR PBB SHADOW E&M-EST. PATIENT-LVL III: CPT | Mod: PBBFAC,,, | Performed by: FAMILY MEDICINE

## 2025-08-06 PROCEDURE — 3008F BODY MASS INDEX DOCD: CPT | Mod: CPTII,S$GLB,, | Performed by: FAMILY MEDICINE

## 2025-08-06 PROCEDURE — 1159F MED LIST DOCD IN RCRD: CPT | Mod: CPTII,S$GLB,, | Performed by: FAMILY MEDICINE

## 2025-08-06 PROCEDURE — 1160F RVW MEDS BY RX/DR IN RCRD: CPT | Mod: CPTII,S$GLB,, | Performed by: FAMILY MEDICINE

## 2025-08-06 PROCEDURE — 3061F NEG MICROALBUMINURIA REV: CPT | Mod: CPTII,S$GLB,, | Performed by: FAMILY MEDICINE

## 2025-08-06 RX ORDER — METFORMIN HYDROCHLORIDE 500 MG/1
500 TABLET, EXTENDED RELEASE ORAL 2 TIMES DAILY WITH MEALS
Qty: 180 TABLET | Refills: 3 | Status: SHIPPED | OUTPATIENT
Start: 2025-08-06 | End: 2026-08-06

## 2025-08-06 RX ORDER — LISINOPRIL 20 MG/1
20 TABLET ORAL DAILY
Qty: 90 TABLET | Refills: 3 | Status: SHIPPED | OUTPATIENT
Start: 2025-08-06 | End: 2026-08-06

## 2025-08-06 RX ORDER — GABAPENTIN 800 MG/1
800 TABLET ORAL 3 TIMES DAILY
Qty: 90 TABLET | Refills: 3 | Status: SHIPPED | OUTPATIENT
Start: 2025-08-06 | End: 2026-08-06

## 2025-08-06 RX ORDER — DICLOFENAC SODIUM 10 MG/G
2 GEL TOPICAL 4 TIMES DAILY PRN
COMMUNITY
Start: 2025-08-06

## 2025-08-06 RX ORDER — INSULIN GLARGINE 100 [IU]/ML
40 INJECTION, SOLUTION SUBCUTANEOUS DAILY
Qty: 39 ML | Refills: 3 | Status: SHIPPED | OUTPATIENT
Start: 2025-08-06

## 2025-08-06 NOTE — PROGRESS NOTES
Subjective     Patient ID: Hamzah Reis is a 50 y.o. female.    Chief Complaint: Establish Care, Back Pain, Diabetes, and Hypertension    HPI  History of Present Illness    CHIEF COMPLAINT:  Hamzah presents today to establish care, chronic illnesses and low back pain.    BACK PAIN:  She reports chronic left-sided low back pin with sciatica.  She was given Tramadol and Gabapentin but will little to no relief.  The lower back pain is located at the sacroiliac joint.  She expresses desire for pain management intervention.    DIABETES:  She has poorly controlled diabetes with recent A1C of 10.4. She takes Lantus 30 units at night, Mounjaro 7.5 mg, and Metformin bid but only takes in the evening only due to GI side effects preventing morning administration at work.   She verbalizes awareness of potential diabetes complications, noting eye discomfort and concerns about potential future complications such as amputations.   She has an upcoming endocrinology appointment on the 21st of the current month and is motivated to improve diabetes management.   She denies consistent morning Metformin dosing and reports intermittent breakfast consumption. She expresses understanding of the need to improve glycemic control and reduce A1C to target range of seven.  Admits to very poor diet compliance.    DIABETIC NEUROPATHY:  She reports diabetic neuropathy affecting bilateral feet with pain localized to the top of feet and toes, which is most bothersome at night. She experiences sensitivity and discomfort in the affected areas.   She is currently taking Gabapentin 600mg at HS for neuropathic symptoms provides some relief.  She doesn't take Gabapentin in the morning due to s.e. of drowsiness.    BACTERIAL VAGINOSIS:  She reports recurrent bacterial vaginosis with ongoing issues.    DIET:  She reports pasta as a dietary weak spot, frequently consuming pasta dishes such as shrimp pasta.   She prepares and eats eggs on Sundays and  prefers butter over margarine. She limits fried food and fast food consumption, eating these infrequently.   She acknowledges enjoying pasta while expressing awareness of its high sugar content after digestion. She does not consume significant quantities of potatoes or rice.    SOCIAL HISTORY:  She reports recent housing instability, having been displaced from her residence due to significant rent increase. She was temporarily residing in a hotel. She was having some depression and anxiety with recent housing problem which has resolved now.  She currently works at [employer], which involves standing throughout her work shift.    ROS:  General: -fever, -chills, -fatigue, -weight gain, -weight loss  Eyes: -vision changes, -redness, -discharge, +eye strain, +eye pain  ENT: -ear pain, -nasal congestion, -sore throat  Cardiovascular: -chest pain, -palpitations, -lower extremity edema  Respiratory: -cough, -shortness of breath  Gastrointestinal: -abdominal pain, +nausea, -vomiting, -diarrhea, -constipation, -blood in stool  Genitourinary: -dysuria, -hematuria, -frequency  Musculoskeletal: -joint pain, -muscle pain, +limb pain, +back pain  Skin: -rash, -lesion  Neurological: -headache, -dizziness, +numbness, +tingling, +burning sensation  Psychiatric: -anxiety, +depression, -sleep difficulty  Female Genitourinary: +vaginal discharge, +vaginal odor       Most recent lab results reviewed with patient.      Objective     Vitals:    08/06/25 1516   BP: 120/80   Pulse: 88      Current Medications[1]     Physical Exam    General: No acute distress. Well-developed. Well-nourished.  Eyes: EOMI. Sclerae anicteric.  HENT: Normocephalic. Atraumatic. Nares patent. Moist oral mucosa.  Ears: Bilateral TMs clear. Bilateral EACs clear.  Cardiovascular: Regular rate. Regular rhythm. No murmurs. No rubs. No gallops. Normal S1, S2.  Respiratory: Normal respiratory effort. Clear to auscultation bilaterally. No rales. No rhonchi. No  wheezing.  Abdomen: Soft. Non-tender. Non-distended. Normoactive bowel sounds.  Musculoskeletal: No  obvious deformity.  Extremities: No lower extremity edema.  Neurological: Alert & oriented x3. No slurred speech. Normal gait.  Psychiatric: Normal mood. Normal affect. Good insight. Good judgment.  Skin: Warm. Dry. No rash.          Assessment and Plan     Type 2 diabetes mellitus with diabetic polyneuropathy, with long-term current use of insulin  - Diabetes poorly controlled with A1c 10.4.  Has problems with compliance with Metformin and poor diet compliance.  Increase Lantus to 40 units in morning and improve compliance with Metformin bid  - Teaching on DMT2 including minimizing risk factors, diabetic diet, medications, exercise >150 minutes per week, and wt management.  -     insulin glargine U-100, Lantus, (LANTUS SOLOSTAR U-100 INSULIN) 100 unit/mL (3 mL) InPn pen; Inject 40 Units into the skin once daily.  Dispense: 39 mL; Refill: 3  -     metFORMIN (GLUCOPHAGE-XR) 500 MG ER 24hr tablet; Take 1 tablet (500 mg total) by mouth 2 (two) times daily with meals.  Dispense: 180 tablet; Refill: 3  -     CBC Auto Differential; Future; Expected date: 11/06/2025  -     Comprehensive Metabolic Panel; Future; Expected date: 11/06/2025  -     Hemoglobin A1C; Future; Expected date: 11/06/2025  -     TSH; Future; Expected date: 11/06/2025  -     Urinalysis, Reflex to Urine Culture Urine, Clean Catch; Future; Expected date: 11/06/2025  -     Comprehensive Metabolic Panel; Future; Expected date: 08/06/2025  -     Hemoglobin A1C; Future; Expected date: 08/06/2025    Primary hypertension  - BP not currently at goal as several recent high BP readings.  Restart Lisinopril for both BP and kidney prophylaxis.  - Teaching on HTN including minimizing risk factors, low sodium diet, avoid salty foods such as processed meats/frozen meals/fast foods, exercise of at least 150 minutes per week and healthy weight/BMI management with wt loss  recommended.   -     lisinopriL (PRINIVIL,ZESTRIL) 20 MG tablet; Take 1 tablet (20 mg total) by mouth once daily.  Dispense: 90 tablet; Refill: 3  -     Comprehensive Metabolic Panel; Future; Expected date: 11/06/2025    Hyperlipidemia associated with type 2 diabetes mellitus  - Lipids not at goal with LDL goal <100.  Continue Lipitor 80mg daily and improve diet compliance.  - Teaching on Hyperlipidemia including diet changes, exercise and wt loss.  Start statin and titrate dose.   - Lipitor 80mg by mouth daily  -     Lipid Panel; Future; Expected date: 11/06/2025  -     Lipid Panel; Future; Expected date: 08/06/2025    Chronic left-sided low back pain with left-sided sciatica  - Conservative treatment including rest, ice/heat, message, exercise, topical cream of Voltaren Gel, and Tylenol.  No NSAIDS due to risk with Diabetes.  -     gabapentin (NEURONTIN) 800 MG tablet; Take 1 tablet (800 mg total) by mouth 3 (three) times daily.  Dispense: 90 tablet; Refill: 3    Colon cancer screening  -     Fecal Immunochemical Test (iFOBT); Future; Expected date: 08/06/2025    Assessment & Plan    PLAN SUMMARY:  - Order lipid panel, thyroid function tests, CBC, and A1C  - Order at-home stool test for colon cancer screening  - Continue Metformin, to be taken with lunch and dinner  - Increase Gabapentin to 800 mg at bedtime  - Continue Mounjaro 7.5 mg  - Prescribe Lisinopril 20 mg daily  - Increase Lantus dose to 40 units, administer in the morning  - Continue Atorvastatin 80 mg daily  - Prescribe Voltaren gel for sacroiliac joint pain, apply up to 4 times daily  - Recommend boric acid suppositories for recurrent bacterial vaginosis  - Discontinue Tramadol  - Follow-up in 4 months with repeat labs, goal to reduce A1C to 7 range    ## TYPE 2 DIABETES MELLITUS WITH POLYNEUROPATHY:  - Identified poorly controlled diabetes with A1C of 10.4, requiring adjustment of diabetes management.  - Educated patient on the relationship between  poorly controlled diabetes and various complications including neuropathy (potential for nerve damage reversal with improved glycemic control), increased risk of renal disease with hypertension, importance of cholesterol management, and higher susceptibility to infections including bacterial vaginosis.  - Explained how pasta converts to glucose in the body, impacting glucose levels.  - Continued Metformin, to be taken with lunch and dinner to improve adherence and reduce GI side effects.  - Continued Mounjaro 7.5 mg.  - Increased Lantus dose to 40 units and advised changing administration time to morning instead of evening.  - Recommend dietary modifications including replacing pasta with vegetables and lean proteins, using spinach as a base for meals to improve glucose control.    ## DIABETIC POLYNEUROPATHY:  - Noted patient experiences numbness, tingling, and burning in feet, indicating neuropathy.  - Increased Gabapentin to 800 mg at bedtime for neuropathic pain management.  - Ordered lipid panel, thyroid function tests, CBC, and A1C due to lack of recent screening.    ## ESSENTIAL HYPERTENSION:  - Identified untreated hypertension with history of high blood pressure readings, requiring initiation of antihypertensive therapy for stroke prevention and kidney protection.  - Prescribed Lisinopril 20 mg daily.    ## SACROILIITIS:  - Diagnosed sacroiliitis as cause of back pain based on physical exam.  - Explained the mechanism of sacroiliitis and its anatomical basis to the patient.  - Prescribed Voltaren gel to be applied to sacroiliac joint area up to 4 times daily.    ## CHRONIC LEFT SIDE LOWER BACK PAIN WITH LEFT SCIATICA:  - Noted patient reports pain shooting down the leg, indicating sciatica.  - Discussed and advised increasing Gabapentin dosage to manage sciatica symptoms in addition to neuropathic pain.    ## BACTERIAL VAGINITIS:  - Recommend boric acid suppositories for recurrent bacterial vaginosis, likely  exacerbated by poorly controlled diabetes.    ## HYPERLIPIDEMIA:  - Continued Atorvastatin 80 mg daily for hyperlipidemia management.    ##   MEDICATION MANAGEMENT:  - Discontinued Tramadol.  - Prescribed Voltaren gel (NSAID) for sacroiliac joint pain.  - Medication adjustments also include increased Gabapentin dosage, continued Metformin and Mounjaro, increased morning Lantus, new Lisinopril, and continued Atorvastatin as detailed under respective condition headers.    ## FOLLOW-UP PLAN:  - Scheduled follow up in 4 months with repeat labs with goal to reduce A1C to the 7 range.  - Ordered at-home stool test for colon cancer screening.       Follow up in about 4 months (around 12/6/2025) for Diabetes, HTN, HLD and chronic illnesses with labs week prior to visit.    This note was generated with the assistance of ambient listening technology. Verbal consent was obtained by the patient and accompanying visitor(s) for the recording of patient appointment to facilitate this note. I attest to having reviewed and edited the generated note for accuracy, though some syntax or spelling errors may persist. Please contact the author of this note for any clarification.           [1]   Current Outpatient Medications   Medication Sig Dispense Refill    atorvastatin (LIPITOR) 80 MG tablet Take 1 tablet (80 mg total) by mouth once daily. 90 tablet 3    blood sugar diagnostic Strp To check BG 2 times daily, to use with insurance preferred meter 200 strip 3    blood-glucose meter kit To check BG as instructed. Please dispense insurance preferred meter 1 each 0    ibuprofen (ADVIL,MOTRIN) 600 MG tablet Take 1 tablet (600 mg total) by mouth every 6 (six) hours as needed for Pain. 60 tablet 2    lancets Misc To check BG 2 times daily, to use with insurance preferred meter 200 each 3    medroxyPROGESTERone (PROVERA) 10 MG tablet Take 2 tablets (20 mg total) by mouth once daily. 60 tablet 6    ondansetron (ZOFRAN-ODT) 4 MG TbDL Take 1  "tablet (4 mg total) by mouth every 6 (six) hours as needed (Nausea). 28 tablet 0    pen needle, diabetic (NOVOFINE PLUS) 32 gauge x 1/6" Ndle 1 each by Misc.(Non-Drug; Combo Route) route once daily. 100 each 3    tirzepatide 7.5 mg/0.5 mL PnIj Inject 7.5 mg into the skin every 7 days. 6 mL 3    gabapentin (NEURONTIN) 800 MG tablet Take 1 tablet (800 mg total) by mouth 3 (three) times daily. 90 tablet 3    insulin glargine U-100, Lantus, (LANTUS SOLOSTAR U-100 INSULIN) 100 unit/mL (3 mL) InPn pen Inject 40 Units into the skin once daily. 39 mL 3    lisinopriL (PRINIVIL,ZESTRIL) 20 MG tablet Take 1 tablet (20 mg total) by mouth once daily. 90 tablet 3    metFORMIN (GLUCOPHAGE-XR) 500 MG ER 24hr tablet Take 1 tablet (500 mg total) by mouth 2 (two) times daily with meals. 180 tablet 3     No current facility-administered medications for this visit.     "

## 2025-08-10 RX ORDER — MEDROXYPROGESTERONE ACETATE 10 MG/1
20 TABLET ORAL DAILY
Qty: 180 TABLET | Refills: 2 | Status: SHIPPED | OUTPATIENT
Start: 2025-08-10

## 2025-09-02 ENCOUNTER — PATIENT OUTREACH (OUTPATIENT)
Dept: ADMINISTRATIVE | Facility: HOSPITAL | Age: 50
End: 2025-09-02
Payer: COMMERCIAL

## (undated) DEVICE — PAD PREP 50/CA

## (undated) DEVICE — TUBING ARTRL PRESS MNTR M-F 4

## (undated) DEVICE — DEVICE ABLATION NOVASURE DISP

## (undated) DEVICE — CATH URETHRAL 16FR RED

## (undated) DEVICE — SEE MEDLINE ITEM 157116

## (undated) DEVICE — GLOVE PROTEXIS HYDROGEL SZ7.5

## (undated) DEVICE — SEE MEDLINE ITEM 154981

## (undated) DEVICE — SOL NS 1000CC

## (undated) DEVICE — SEE MEDLINE ITEM 157117

## (undated) DEVICE — DRESSING TELFA N ADH 3X8

## (undated) DEVICE — SEE MEDLINE ITEM 157181

## (undated) DEVICE — GLOVE SURG BIOGEL LATEX SZ 7.5

## (undated) DEVICE — Device

## (undated) DEVICE — SEE MEDLINE ITEM 146355

## (undated) DEVICE — PANTIES FEMININE NAPKIN LG/XLG

## (undated) DEVICE — DRAPE SURGICAL STERI IRRG PCH

## (undated) DEVICE — SYR 50CC LL

## (undated) DEVICE — SEE MEDLINE ITEM 156955

## (undated) DEVICE — SEE MEDLINE ITEM 152622

## (undated) DEVICE — COVER OVERHEAD SURG LT BLUE